# Patient Record
Sex: FEMALE | Race: OTHER | Employment: FULL TIME | URBAN - METROPOLITAN AREA
[De-identification: names, ages, dates, MRNs, and addresses within clinical notes are randomized per-mention and may not be internally consistent; named-entity substitution may affect disease eponyms.]

---

## 2017-03-21 ENCOUNTER — OFFICE VISIT (OUTPATIENT)
Dept: ORTHOPEDICS CLINIC | Facility: CLINIC | Age: 57
End: 2017-03-21

## 2017-03-21 DIAGNOSIS — M21.42 ACQUIRED PES PLANUS OF LEFT FOOT: Primary | ICD-10-CM

## 2017-03-21 PROCEDURE — 99212 OFFICE O/P EST SF 10 MIN: CPT | Performed by: ORTHOPAEDIC SURGERY

## 2017-03-21 PROCEDURE — 99203 OFFICE O/P NEW LOW 30 MIN: CPT | Performed by: ORTHOPAEDIC SURGERY

## 2017-03-21 RX ORDER — OLOPATADINE HCL 0.2 %
DROPS OPHTHALMIC (EYE)
Refills: 3 | COMMUNITY
Start: 2017-01-21 | End: 2018-05-07

## 2017-03-21 RX ORDER — MELOXICAM 15 MG/1
TABLET ORAL
Refills: 1 | COMMUNITY
Start: 2017-01-17 | End: 2017-09-16

## 2017-03-21 NOTE — PROGRESS NOTES
3/21/2017  Marysol Meng  7/10/1960  60year old   female  Alona Cockayne, MD    HPI:   Patient presents with:  Consult: Left ankle pain -- This is a second opinion. Referred by Dr Fabio Mcknight, per pt.  MRI done of right ankle and ordered by Dr. Viky Pond tonsillectomy 1970   • Lipid screening 2/28/2012     per NG   • Osteoporosis screening 5/5/2007     per NG   • Fibroids 2003   • Other and unspecified hyperlipidemia 2003          Past Surgical History    FOOT SURGERY  2010    Comment bunionectomy    TONSI and 2+ distal pulses. Sensation is intact to light touch in superficial peroneal, deep peroneal, sural, saphenous, and tibial nerve distributions. The patient has 5/5 strength in tibialis anterior, gastrocsoleus complex, EHL, and FHL.   The patient has pe

## 2017-06-30 RX ORDER — ENALAPRIL MALEATE 10 MG/1
TABLET ORAL
Qty: 90 TABLET | Refills: 0 | Status: SHIPPED | OUTPATIENT
Start: 2017-06-30 | End: 2017-10-04

## 2017-08-11 ENCOUNTER — OFFICE VISIT (OUTPATIENT)
Dept: INTERNAL MEDICINE CLINIC | Facility: CLINIC | Age: 57
End: 2017-08-11

## 2017-08-11 VITALS
HEIGHT: 61 IN | WEIGHT: 165 LBS | DIASTOLIC BLOOD PRESSURE: 90 MMHG | BODY MASS INDEX: 31.15 KG/M2 | TEMPERATURE: 98 F | HEART RATE: 90 BPM | SYSTOLIC BLOOD PRESSURE: 137 MMHG

## 2017-08-11 DIAGNOSIS — Z00.00 ROUTINE GENERAL MEDICAL EXAMINATION AT A HEALTH CARE FACILITY: Primary | ICD-10-CM

## 2017-08-11 DIAGNOSIS — I10 ESSENTIAL HYPERTENSION: ICD-10-CM

## 2017-08-11 DIAGNOSIS — Z12.31 VISIT FOR SCREENING MAMMOGRAM: ICD-10-CM

## 2017-08-11 DIAGNOSIS — E78.5 HYPERLIPIDEMIA, UNSPECIFIED HYPERLIPIDEMIA TYPE: ICD-10-CM

## 2017-08-11 PROCEDURE — 99396 PREV VISIT EST AGE 40-64: CPT | Performed by: INTERNAL MEDICINE

## 2017-08-28 NOTE — PROGRESS NOTES
HPI:    Patient ID: Brianne Unger is a 59year old female.     HPI    Physical exam  HTN  Long standing history of hypertension  More than a year adrian  Status::::: controlled:::::::::::::::::::::::::::::y   sympotms  :        Headache no  dizziness        n Outpatient Prescriptions:  ENALAPRIL MALEATE 10 MG Oral Tab TAKE 1 TABLET BY MOUTH DAILY Disp: 90 tablet Rfl: 0   Meloxicam 15 MG Oral Tab TK 1 T PO QD PC Disp:  Rfl: 1   PATADAY 0.2 % Ophthalmic Solution INT 1  GTT INTO BOTH EYES QD Disp:  Rfl: 3   Calciu alcoholism   • Hypertension Father    • Glaucoma Maternal Grandmother    • Diabetes Maternal Grandmother    • Hypertension Maternal Grandmother    • Diabetes Other      mGA   • Musculo-skelatal Disorder Mother      per ng osteoporosis   • Breast Cancer Mat colonoscopy, mammogram, dexa scan and eye exam       (Z12.31) Visit for screening mammogram  Plan: [facility] SCREENING BILAT (CPT=77067)  . Breast exam.  Bilateral symmetric  No discrete palpable masses or tenderness  No nipple discharge  And no axillary adenopat

## 2017-09-09 ENCOUNTER — HOSPITAL ENCOUNTER (OUTPATIENT)
Dept: MAMMOGRAPHY | Age: 57
Discharge: HOME | End: 2017-09-09
Attending: INTERNAL MEDICINE
Payer: COMMERCIAL

## 2017-09-09 DIAGNOSIS — Z12.31 VISIT FOR SCREENING MAMMOGRAM: ICD-10-CM

## 2017-09-09 PROCEDURE — 77067 SCR MAMMO BI INCL CAD: CPT | Performed by: INTERNAL MEDICINE

## 2017-09-13 ENCOUNTER — APPOINTMENT (OUTPATIENT)
Dept: CT IMAGING | Facility: HOSPITAL | Age: 57
DRG: 392 | End: 2017-09-13
Attending: EMERGENCY MEDICINE
Payer: COMMERCIAL

## 2017-09-13 ENCOUNTER — HOSPITAL ENCOUNTER (INPATIENT)
Facility: HOSPITAL | Age: 57
LOS: 3 days | Discharge: HOME | DRG: 392 | End: 2017-09-16
Attending: EMERGENCY MEDICINE | Admitting: HOSPITALIST
Payer: COMMERCIAL

## 2017-09-13 ENCOUNTER — TELEPHONE (OUTPATIENT)
Dept: INTERNAL MEDICINE CLINIC | Facility: CLINIC | Age: 57
End: 2017-09-13

## 2017-09-13 DIAGNOSIS — D72.829 LEUKOCYTOSIS, UNSPECIFIED TYPE: ICD-10-CM

## 2017-09-13 DIAGNOSIS — K57.32 SIGMOID DIVERTICULITIS: Primary | ICD-10-CM

## 2017-09-13 PROBLEM — R73.9 HYPERGLYCEMIA: Status: ACTIVE | Noted: 2017-09-13

## 2017-09-13 PROBLEM — E87.1 HYPONATREMIA: Status: ACTIVE | Noted: 2017-09-13

## 2017-09-13 LAB
ALBUMIN SERPL BCP-MCNC: 3.9 G/DL (ref 3.5–4.8)
ALP SERPL-CCNC: 110 U/L (ref 32–100)
ALT SERPL-CCNC: 46 U/L (ref 14–54)
ANION GAP SERPL CALC-SCNC: 7 MMOL/L (ref 0–18)
AST SERPL-CCNC: 40 U/L (ref 15–41)
BASOPHILS # BLD: 0 K/UL (ref 0–0.2)
BASOPHILS NFR BLD: 0 %
BILIRUB DIRECT SERPL-MCNC: 0.3 MG/DL (ref 0–0.2)
BILIRUB SERPL-MCNC: 2.5 MG/DL (ref 0.3–1.2)
BILIRUB UR QL: NEGATIVE
BUN SERPL-MCNC: 7 MG/DL (ref 8–20)
BUN/CREAT SERPL: 10.9 (ref 10–20)
CALCIUM SERPL-MCNC: 9.1 MG/DL (ref 8.5–10.5)
CHLORIDE SERPL-SCNC: 102 MMOL/L (ref 95–110)
CO2 SERPL-SCNC: 24 MMOL/L (ref 22–32)
COLOR UR: YELLOW
CREAT SERPL-MCNC: 0.64 MG/DL (ref 0.5–1.5)
EOSINOPHIL # BLD: 0 K/UL (ref 0–0.7)
EOSINOPHIL NFR BLD: 0 %
ERYTHROCYTE [DISTWIDTH] IN BLOOD BY AUTOMATED COUNT: 13 % (ref 11–15)
GLUCOSE SERPL-MCNC: 148 MG/DL (ref 70–99)
GLUCOSE UR-MCNC: NEGATIVE MG/DL
HCT VFR BLD AUTO: 41.8 % (ref 35–48)
HGB BLD-MCNC: 14.1 G/DL (ref 12–16)
KETONES UR-MCNC: NEGATIVE MG/DL
LEUKOCYTE ESTERASE UR QL STRIP.AUTO: NEGATIVE
LIPASE SERPL-CCNC: 23 U/L (ref 22–51)
LYMPHOCYTES # BLD: 2.1 K/UL (ref 1–4)
LYMPHOCYTES NFR BLD: 16 %
MCH RBC QN AUTO: 30.2 PG (ref 27–32)
MCHC RBC AUTO-ENTMCNC: 33.7 G/DL (ref 32–37)
MCV RBC AUTO: 89.5 FL (ref 80–100)
MONOCYTES # BLD: 0.5 K/UL (ref 0–1)
MONOCYTES NFR BLD: 4 %
NEUTROPHILS # BLD AUTO: 10.9 K/UL (ref 1.8–7.7)
NEUTROPHILS NFR BLD: 80 %
NITRITE UR QL STRIP.AUTO: NEGATIVE
OSMOLALITY UR CALC.SUM OF ELEC: 277 MOSM/KG (ref 275–295)
PH UR: 5 [PH] (ref 5–8)
PLATELET # BLD AUTO: 210 K/UL (ref 140–400)
PMV BLD AUTO: 8 FL (ref 7.4–10.3)
POTASSIUM SERPL-SCNC: 3.6 MMOL/L (ref 3.3–5.1)
PROT SERPL-MCNC: 7.8 G/DL (ref 5.9–8.4)
PROT UR-MCNC: NEGATIVE MG/DL
RBC # BLD AUTO: 4.67 M/UL (ref 3.7–5.4)
RBC #/AREA URNS AUTO: 3 /HPF
SODIUM SERPL-SCNC: 133 MMOL/L (ref 136–144)
SP GR UR STRIP: 1.01 (ref 1–1.03)
UROBILINOGEN UR STRIP-ACNC: <2
VIT C UR-MCNC: NEGATIVE MG/DL
WBC # BLD AUTO: 13.6 K/UL (ref 4–11)
WBC #/AREA URNS AUTO: 2 /HPF

## 2017-09-13 PROCEDURE — 74177 CT ABD & PELVIS W/CONTRAST: CPT | Performed by: EMERGENCY MEDICINE

## 2017-09-13 PROCEDURE — 99222 1ST HOSP IP/OBS MODERATE 55: CPT | Performed by: HOSPITALIST

## 2017-09-13 RX ORDER — ONDANSETRON 2 MG/ML
4 INJECTION INTRAMUSCULAR; INTRAVENOUS EVERY 6 HOURS PRN
Status: DISCONTINUED | OUTPATIENT
Start: 2017-09-13 | End: 2017-09-16

## 2017-09-13 RX ORDER — BISACODYL 10 MG
10 SUPPOSITORY, RECTAL RECTAL
Status: DISCONTINUED | OUTPATIENT
Start: 2017-09-13 | End: 2017-09-16

## 2017-09-13 RX ORDER — HYDROMORPHONE HYDROCHLORIDE 1 MG/ML
0.8 INJECTION, SOLUTION INTRAMUSCULAR; INTRAVENOUS; SUBCUTANEOUS EVERY 2 HOUR PRN
Status: DISCONTINUED | OUTPATIENT
Start: 2017-09-13 | End: 2017-09-16

## 2017-09-13 RX ORDER — SODIUM CHLORIDE 9 MG/ML
INJECTION, SOLUTION INTRAVENOUS CONTINUOUS
Status: DISCONTINUED | OUTPATIENT
Start: 2017-09-13 | End: 2017-09-16

## 2017-09-13 RX ORDER — OYSTER SHELL CALCIUM WITH VITAMIN D 500; 200 MG/1; [IU]/1
1 TABLET, FILM COATED ORAL DAILY
Status: DISCONTINUED | OUTPATIENT
Start: 2017-09-14 | End: 2017-09-16

## 2017-09-13 RX ORDER — ENALAPRIL MALEATE 10 MG/1
10 TABLET ORAL
Status: DISCONTINUED | OUTPATIENT
Start: 2017-09-14 | End: 2017-09-16

## 2017-09-13 RX ORDER — HYDROMORPHONE HYDROCHLORIDE 1 MG/ML
0.2 INJECTION, SOLUTION INTRAMUSCULAR; INTRAVENOUS; SUBCUTANEOUS EVERY 2 HOUR PRN
Status: DISCONTINUED | OUTPATIENT
Start: 2017-09-13 | End: 2017-09-16

## 2017-09-13 RX ORDER — SODIUM PHOSPHATE, DIBASIC AND SODIUM PHOSPHATE, MONOBASIC 7; 19 G/133ML; G/133ML
1 ENEMA RECTAL ONCE AS NEEDED
Status: DISCONTINUED | OUTPATIENT
Start: 2017-09-13 | End: 2017-09-16

## 2017-09-13 RX ORDER — DOCUSATE SODIUM 100 MG/1
100 CAPSULE, LIQUID FILLED ORAL 2 TIMES DAILY
Status: DISCONTINUED | OUTPATIENT
Start: 2017-09-13 | End: 2017-09-16

## 2017-09-13 RX ORDER — MORPHINE SULFATE 4 MG/ML
4 INJECTION, SOLUTION INTRAMUSCULAR; INTRAVENOUS ONCE
Status: COMPLETED | OUTPATIENT
Start: 2017-09-13 | End: 2017-09-13

## 2017-09-13 RX ORDER — HYDROMORPHONE HYDROCHLORIDE 1 MG/ML
0.4 INJECTION, SOLUTION INTRAMUSCULAR; INTRAVENOUS; SUBCUTANEOUS EVERY 2 HOUR PRN
Status: DISCONTINUED | OUTPATIENT
Start: 2017-09-13 | End: 2017-09-16

## 2017-09-13 RX ORDER — HEPARIN SODIUM 5000 [USP'U]/ML
5000 INJECTION, SOLUTION INTRAVENOUS; SUBCUTANEOUS EVERY 12 HOURS SCHEDULED
Status: DISCONTINUED | OUTPATIENT
Start: 2017-09-13 | End: 2017-09-16

## 2017-09-13 RX ORDER — ONDANSETRON 2 MG/ML
4 INJECTION INTRAMUSCULAR; INTRAVENOUS ONCE
Status: COMPLETED | OUTPATIENT
Start: 2017-09-13 | End: 2017-09-13

## 2017-09-13 RX ORDER — POLYETHYLENE GLYCOL 3350 17 G/17G
17 POWDER, FOR SOLUTION ORAL DAILY PRN
Status: DISCONTINUED | OUTPATIENT
Start: 2017-09-13 | End: 2017-09-16

## 2017-09-13 RX ORDER — SODIUM CHLORIDE 0.9 % (FLUSH) 0.9 %
3 SYRINGE (ML) INJECTION AS NEEDED
Status: DISCONTINUED | OUTPATIENT
Start: 2017-09-13 | End: 2017-09-16

## 2017-09-13 RX ORDER — ACETAMINOPHEN 325 MG/1
325 TABLET ORAL EVERY 6 HOURS PRN
Status: DISCONTINUED | OUTPATIENT
Start: 2017-09-13 | End: 2017-09-16

## 2017-09-13 NOTE — H&P
[facility]    PATIENT'S NAME: Shabnam Hernandez PHYSICIAN: Michael Allen MD   PATIENT ACCOUNT#:   [id number]    LOCATION:  [place] [address]  MEDICAL RECORD #:   [id number]       YOB: 1960  ADMISSION DATE:       09/13/20 78, respiratory rate 18, blood pressure 132/77, pulse ox 98% on room air. HEENT:  Atraumatic. Oropharynx clear with moist mucous membranes. Normal hard and soft palate. NECK:  Trachea midline. Full range of motion. Supple.   No thyromegaly or lymphad

## 2017-09-13 NOTE — TELEPHONE ENCOUNTER
To sick to be seen in the clinic  Hx of diverticultis?   I advise ER vist  So she can be promptly evaluated and treated  Suspect acute diveritcultiis

## 2017-09-13 NOTE — TELEPHONE ENCOUNTER
Dr. Rios Boyle, pt calls with c/o left sided abdominal pain. Started Monday. Had elevated temp this am of 100, now is 98.9. +tender to touch, +nausea yesterday, none today. Denies vomiting, diarrhea, dizziness, urinary pain. Requesting to see you.  You only have M

## 2017-09-13 NOTE — ED PROVIDER NOTES
Patient Seen in: [facility] AND [facility] Emergency Department    History   Patient presents with:  Abdomen/Flank Pain (GI/)    Stated Complaint: diverticulitis flare-up    HPI    The patient is a 49-year-old female with past history of hypertension, one prev HISTORY  2013: OTHER SURGICAL HISTORY Right      Comment: thigh cyst removal  2006: PESSARY  1970: TONSILLECTOMY  1992: TUBAL LIGATION    Family History   Problem Relation Age of Onset   • Diabetes Mother      type 2   • Lipids Mother      hyperlipidemia pharyngeal erythema  Chest: Clear to auscultation, no tenderness  Cardiovascular: Regular rate and rhythm without murmur  Abdomen: Soft and mildly distended. There is moderate left lower quadrant tenderness to palpation.   There is mild left upper quadrant W/ DIFFERENTIAL - Abnormal; Notable for the following:     HGB 11.9 (*)     HCT 34.0 (*)     Neutrophil Absolute 8.0 (*)     All other components within normal limits   LIPASE - Normal   POTASSIUM - Normal   CBC WITH DIFFERENTIAL WITH PLATELET    Narrative request something for pain at this time. Case was discussed with the hospitalist.  He will admit the patient. He request IV Zosyn.   He request consult to Dr. Floria Saint from GI.  MDM     abdominal pain including but not limited to appendicitis, cholecystitis

## 2017-09-13 NOTE — TELEPHONE ENCOUNTER
Pt calling back (Name and  verified) to follow-up on MD response for possible OV appt today. Pt instructed to go to the ER as per MD advice and pt voiced agreement with the plan. RN to follow up tomorrow.

## 2017-09-14 LAB
ANION GAP SERPL CALC-SCNC: 7 MMOL/L (ref 0–18)
BASOPHILS # BLD: 0 K/UL (ref 0–0.2)
BASOPHILS NFR BLD: 0 %
BUN SERPL-MCNC: 5 MG/DL (ref 8–20)
BUN/CREAT SERPL: 8.2 (ref 10–20)
CALCIUM SERPL-MCNC: 8.4 MG/DL (ref 8.5–10.5)
CHLORIDE SERPL-SCNC: 103 MMOL/L (ref 95–110)
CO2 SERPL-SCNC: 23 MMOL/L (ref 22–32)
CREAT SERPL-MCNC: 0.61 MG/DL (ref 0.5–1.5)
EOSINOPHIL # BLD: 0 K/UL (ref 0–0.7)
EOSINOPHIL NFR BLD: 0 %
ERYTHROCYTE [DISTWIDTH] IN BLOOD BY AUTOMATED COUNT: 12.7 % (ref 11–15)
GLUCOSE SERPL-MCNC: 135 MG/DL (ref 70–99)
HCT VFR BLD AUTO: 35.8 % (ref 35–48)
HGB BLD-MCNC: 12.4 G/DL (ref 12–16)
LYMPHOCYTES # BLD: 2.4 K/UL (ref 1–4)
LYMPHOCYTES NFR BLD: 20 %
MCH RBC QN AUTO: 30.7 PG (ref 27–32)
MCHC RBC AUTO-ENTMCNC: 34.6 G/DL (ref 32–37)
MCV RBC AUTO: 88.8 FL (ref 80–100)
MONOCYTES # BLD: 0.6 K/UL (ref 0–1)
MONOCYTES NFR BLD: 5 %
NEUTROPHILS # BLD AUTO: 9.2 K/UL (ref 1.8–7.7)
NEUTROPHILS NFR BLD: 75 %
OSMOLALITY UR CALC.SUM OF ELEC: 275 MOSM/KG (ref 275–295)
PLATELET # BLD AUTO: 192 K/UL (ref 140–400)
PMV BLD AUTO: 8.3 FL (ref 7.4–10.3)
POTASSIUM SERPL-SCNC: 3.8 MMOL/L (ref 3.3–5.1)
RBC # BLD AUTO: 4.03 M/UL (ref 3.7–5.4)
SODIUM SERPL-SCNC: 133 MMOL/L (ref 136–144)
WBC # BLD AUTO: 12.2 K/UL (ref 4–11)

## 2017-09-14 PROCEDURE — 99233 SBSQ HOSP IP/OBS HIGH 50: CPT | Performed by: HOSPITALIST

## 2017-09-14 RX ORDER — POTASSIUM CHLORIDE 20 MEQ/1
40 TABLET, EXTENDED RELEASE ORAL ONCE
Status: COMPLETED | OUTPATIENT
Start: 2017-09-14 | End: 2017-09-14

## 2017-09-14 NOTE — TELEPHONE ENCOUNTER
Patient went to [address] [place] [address] ER yesterday and was admitted. ASSESSMENT AND PLAN:    1. Acute sigmoid diverticulitis with no evidence of perforation.   2.       Left lower quadrant abdominal pain.     The patient will be admitted to general medical floor, I

## 2017-09-14 NOTE — PROGRESS NOTES
[facility] HOSP - [facility]  Progress Note     Juan Luis Miranda  : 7/10/1960    Status: Inpatient  Day #: 1    Attending: Jose Eduardo Martinez MD  PCP: Gage Dickson MD      Assessment and Plan     Acute Sigmoid Diverticulitis  Leukocytosis  - con't abx  - full liq sigmoid colon without abscess or perforation. If not recently performed, following resolution of patient's symptoms a colonoscopy should be performed to exclude an underlying mass. Hepatic steatosis without focal lesion.               Medications     • pi

## 2017-09-15 LAB
ANION GAP SERPL CALC-SCNC: 6 MMOL/L (ref 0–18)
BASOPHILS # BLD: 0 K/UL (ref 0–0.2)
BASOPHILS NFR BLD: 0 %
BUN SERPL-MCNC: 4 MG/DL (ref 8–20)
BUN/CREAT SERPL: 7 (ref 10–20)
CALCIUM SERPL-MCNC: 8.5 MG/DL (ref 8.5–10.5)
CHLORIDE SERPL-SCNC: 105 MMOL/L (ref 95–110)
CO2 SERPL-SCNC: 23 MMOL/L (ref 22–32)
CREAT SERPL-MCNC: 0.57 MG/DL (ref 0.5–1.5)
EOSINOPHIL # BLD: 0 K/UL (ref 0–0.7)
EOSINOPHIL NFR BLD: 0 %
ERYTHROCYTE [DISTWIDTH] IN BLOOD BY AUTOMATED COUNT: 13 % (ref 11–15)
GLUCOSE SERPL-MCNC: 131 MG/DL (ref 70–99)
HCT VFR BLD AUTO: 34 % (ref 35–48)
HGB BLD-MCNC: 11.9 G/DL (ref 12–16)
LYMPHOCYTES # BLD: 1.8 K/UL (ref 1–4)
LYMPHOCYTES NFR BLD: 17 %
MCH RBC QN AUTO: 31.3 PG (ref 27–32)
MCHC RBC AUTO-ENTMCNC: 34.9 G/DL (ref 32–37)
MCV RBC AUTO: 89.7 FL (ref 80–100)
MONOCYTES # BLD: 0.5 K/UL (ref 0–1)
MONOCYTES NFR BLD: 5 %
NEUTROPHILS # BLD AUTO: 8 K/UL (ref 1.8–7.7)
NEUTROPHILS NFR BLD: 77 %
OSMOLALITY UR CALC.SUM OF ELEC: 277 MOSM/KG (ref 275–295)
PLATELET # BLD AUTO: 188 K/UL (ref 140–400)
PMV BLD AUTO: 8.2 FL (ref 7.4–10.3)
POTASSIUM SERPL-SCNC: 3.7 MMOL/L (ref 3.3–5.1)
POTASSIUM SERPL-SCNC: 3.7 MMOL/L (ref 3.3–5.1)
RBC # BLD AUTO: 3.79 M/UL (ref 3.7–5.4)
SODIUM SERPL-SCNC: 134 MMOL/L (ref 136–144)
WBC # BLD AUTO: 10.4 K/UL (ref 4–11)

## 2017-09-15 PROCEDURE — 99223 1ST HOSP IP/OBS HIGH 75: CPT | Performed by: INTERNAL MEDICINE

## 2017-09-15 PROCEDURE — 99233 SBSQ HOSP IP/OBS HIGH 50: CPT | Performed by: HOSPITALIST

## 2017-09-15 RX ORDER — POTASSIUM CHLORIDE 20 MEQ/1
40 TABLET, EXTENDED RELEASE ORAL ONCE
Status: COMPLETED | OUTPATIENT
Start: 2017-09-15 | End: 2017-09-15

## 2017-09-15 NOTE — CONSULTS
[facility]  Report of Consultation    Eleanor Lugo Patient Status:  Inpatient    7/10/1960 MRN [id number]   Location [facility] 5SW/SE Attending Ernestina Hernandez MD   Hosp Day # 2 PCP Yolanda Foster MD     Reason for Consultation:  Acute dive LIGATION  Family History   Problem Relation Age of Onset   • Diabetes Mother      type 2   • Lipids Mother      hyperlipidemia   • Eye Problems Mother      eye issues   • Heart Disorder Mother      per ng CABG   • Hypertension Mother    • Musculo-skelatal Rectal, Daily PRN  •  FLEET ENEMA (FLEET) 7-19 GM/118ML enema 133 mL, 1 enema, Rectal, Once PRN  •  acetaminophen (TYLENOL) tab 325 mg, 325 mg, Oral, Q6H PRN  •  Calcium Carbonate-Vitamin D (OSCAL-500) 500-200 MG-UNIT per tab 1 tablet, 1 tablet, Oral, Christian will have the patient follow up with the GI service as an outpatient for consideration for repeat colonoscopy in 8 weeks. The patient is agreeable with above plan, all questions were answered. Consuelo Oshea  9/15/2017  6:58 PM

## 2017-09-15 NOTE — PLAN OF CARE
Problem: Patient/Family Goals  Goal: Patient/Family Long Term Goal  Patient's Long Term Goal: To discharge home    Interventions:  - Pain management, IVF, IV abx.   - See additional Care Plan goals for specific interventions    Outcome: Progressing  (1) Pa Outcome: Progressing  (1) Patient calls when experiencing pain. (2) Patient assessed for pain of pain scale of 1 to 10.  (3) Patient educated on side effects of opioids.   (4) Patient is receiving stool softeners per doctors orders    Problem: SAFETY MARTAH to be discharged home when medically stable. Problem: Patient Centered Care  Goal: Patient preferences are identified and integrated in the patient's plan of care  Interventions:  - What would you like us to know as we care for you?  Has a chronic left

## 2017-09-15 NOTE — PLAN OF CARE
Problem: Patient/Family Goals  Goal: Patient/Family Short Term Goal  Patient's Short Term Goal: free from abdominal pain    Interventions:   - Pain management, IVF, IV abx.   - See additional Care Plan goals for specific interventions    Outcome: Progressi assistive devices as appropriate  - Consider OT/PT consult to assist with strengthening/mobility  - Encourage toileting schedule   Outcome: Progressing      Problem: Patient Centered Care  Goal: Patient preferences are identified and integrated in the sami

## 2017-09-16 VITALS
HEIGHT: 61 IN | TEMPERATURE: 98 F | RESPIRATION RATE: 16 BRPM | HEART RATE: 80 BPM | WEIGHT: 160 LBS | SYSTOLIC BLOOD PRESSURE: 155 MMHG | OXYGEN SATURATION: 95 % | BODY MASS INDEX: 30.21 KG/M2 | DIASTOLIC BLOOD PRESSURE: 85 MMHG

## 2017-09-16 LAB
ANION GAP SERPL CALC-SCNC: 9 MMOL/L (ref 0–18)
BASOPHILS # BLD: 0 K/UL (ref 0–0.2)
BASOPHILS NFR BLD: 1 %
BUN SERPL-MCNC: 4 MG/DL (ref 8–20)
BUN/CREAT SERPL: 6.8 (ref 10–20)
CALCIUM SERPL-MCNC: 8.7 MG/DL (ref 8.5–10.5)
CHLORIDE SERPL-SCNC: 104 MMOL/L (ref 95–110)
CO2 SERPL-SCNC: 23 MMOL/L (ref 22–32)
CREAT SERPL-MCNC: 0.59 MG/DL (ref 0.5–1.5)
EOSINOPHIL # BLD: 0.1 K/UL (ref 0–0.7)
EOSINOPHIL NFR BLD: 2 %
ERYTHROCYTE [DISTWIDTH] IN BLOOD BY AUTOMATED COUNT: 13 % (ref 11–15)
GLUCOSE SERPL-MCNC: 112 MG/DL (ref 70–99)
HCT VFR BLD AUTO: 34.5 % (ref 35–48)
HGB BLD-MCNC: 12.1 G/DL (ref 12–16)
LYMPHOCYTES # BLD: 2 K/UL (ref 1–4)
LYMPHOCYTES NFR BLD: 29 %
MCH RBC QN AUTO: 30.9 PG (ref 27–32)
MCHC RBC AUTO-ENTMCNC: 34.9 G/DL (ref 32–37)
MCV RBC AUTO: 88.4 FL (ref 80–100)
MONOCYTES # BLD: 0.3 K/UL (ref 0–1)
MONOCYTES NFR BLD: 5 %
NEUTROPHILS # BLD AUTO: 4.5 K/UL (ref 1.8–7.7)
NEUTROPHILS NFR BLD: 64 %
OSMOLALITY UR CALC.SUM OF ELEC: 280 MOSM/KG (ref 275–295)
PLATELET # BLD AUTO: 218 K/UL (ref 140–400)
PMV BLD AUTO: 7.9 FL (ref 7.4–10.3)
POTASSIUM SERPL-SCNC: 3.6 MMOL/L (ref 3.3–5.1)
RBC # BLD AUTO: 3.9 M/UL (ref 3.7–5.4)
SODIUM SERPL-SCNC: 136 MMOL/L (ref 136–144)
WBC # BLD AUTO: 7 K/UL (ref 4–11)

## 2017-09-16 PROCEDURE — 99239 HOSP IP/OBS DSCHRG MGMT >30: CPT | Performed by: HOSPITALIST

## 2017-09-16 RX ORDER — POTASSIUM CHLORIDE 20 MEQ/1
40 TABLET, EXTENDED RELEASE ORAL EVERY 4 HOURS
Status: COMPLETED | OUTPATIENT
Start: 2017-09-16 | End: 2017-09-16

## 2017-09-16 RX ORDER — CIPROFLOXACIN 500 MG/1
500 TABLET, FILM COATED ORAL 2 TIMES DAILY
Qty: 20 TABLET | Refills: 0 | Status: SHIPPED | OUTPATIENT
Start: 2017-09-16 | End: 2017-10-18

## 2017-09-16 RX ORDER — METRONIDAZOLE 500 MG/1
500 TABLET ORAL 3 TIMES DAILY
Qty: 30 TABLET | Refills: 0 | Status: SHIPPED | OUTPATIENT
Start: 2017-09-16 | End: 2017-10-20

## 2017-09-16 NOTE — PLAN OF CARE
Problem: Patient/Family Goals  Goal: Patient/Family Long Term Goal  Patient's Long Term Goal: To discharge home    Interventions:  - Pain management, IVF, IV abx.   - See additional Care Plan goals for specific interventions    Outcome: [place] physical needs  - Identify cognitive and physical deficits and behaviors that affect risk of falls.   - [place] fall precautions as indicated by assessment.  - Educate pt/family on patient safety including physical limitations  - Instruct pt to call for a cultural backgrounds into the planning and delivery of care  - Encourage patient/family to participate in care and decision-making at the level they choose  - Honor patient and family perspectives and choices   Outcome: Progressing  Encouraged patient to e

## 2017-09-16 NOTE — PLAN OF CARE
Problem: Patient/Family Goals  Goal: Patient/Family Long Term Goal  Patient's Long Term Goal: To discharge home    Interventions:  - Pain management, IVF, IV abx.   - See additional Care Plan goals for specific interventions    Outcome: Progressing    Goal physical limitations  - Instruct pt to call for assistance with activity based on assessment  - Modify environment to reduce risk of injury  - Provide assistive devices as appropriate  - Consider OT/PT consult to assist with strengthening/mobility  - [place]

## 2017-09-16 NOTE — PROGRESS NOTES
Patient tolerated lunch. Okay per Dr. Kar Quintana to discharge patient before potassium recheck per protocol for later today. Patient given bishop information on low fiber diet.

## 2017-09-16 NOTE — PLAN OF CARE
Problem: Patient/Family Goals  Goal: Patient/Family Long Term Goal  Patient's Long Term Goal: To discharge home    Interventions:  - Pain management, IVF, IV abx.   - See additional Care Plan goals for specific interventions    Outcome: Josy Sophia that affect risk of falls.   - [place] fall precautions as indicated by assessment.  - Educate pt/family on patient safety including physical limitations  - Instruct pt to call for assistance with activity based on assessment  - Modify environment to redu Encourage patient/family to participate in care and decision-making at the level they choose  - Honor patient and family perspectives and choices   Outcome: Progressing  Patient ready for discharge.  Patient to be given all discharge information and updates

## 2017-09-16 NOTE — DISCHARGE SUMMARY
[facility]  Discharge Summary     Aislinn Chandler  : 7/10/1960    Status: Inpatient  Day #: 3    Attending: Melissa Ruffin MD  PCP: Yuli Walker MD     Date of Admission: 2017  Date of Discharge: 2017     Hospital Discharge Diagno Prescription details   Ciprofloxacin HCl 500 MG Tabs  Commonly known as:  CIPRO      Take 1 tablet (500 mg total) by mouth 2 (two) times daily.    Quantity:  20 tablet  Refills:  0     metRONIDAZOLE 500 MG Tabs  Commonly known as:  FLAGYL      Take 1 tablet patients enrolled in the Medicare ACO, [facility] and [address] [place] [address] will be handled by a member of the Care Management Team.  For all other patients, please follow usual protocol for discharge care transition.     Lace+ Score: 24  59-90 High Risk  29-58 Medium Ri

## 2017-09-18 ENCOUNTER — PATIENT OUTREACH (OUTPATIENT)
Dept: INTERNAL MEDICINE CLINIC | Facility: CLINIC | Age: 57
End: 2017-09-18

## 2017-09-18 ENCOUNTER — OFFICE VISIT (OUTPATIENT)
Dept: OBGYN CLINIC | Facility: CLINIC | Age: 57
End: 2017-09-18

## 2017-09-18 VITALS
BODY MASS INDEX: 30.68 KG/M2 | WEIGHT: 160.38 LBS | SYSTOLIC BLOOD PRESSURE: 125 MMHG | HEIGHT: 60.5 IN | HEART RATE: 90 BPM | DIASTOLIC BLOOD PRESSURE: 76 MMHG

## 2017-09-18 DIAGNOSIS — Z01.419 ENCOUNTER FOR GYNECOLOGICAL EXAMINATION WITHOUT ABNORMAL FINDING: Primary | ICD-10-CM

## 2017-09-18 DIAGNOSIS — N81.89 PELVIC RELAXATION: ICD-10-CM

## 2017-09-18 PROCEDURE — 99396 PREV VISIT EST AGE 40-64: CPT | Performed by: OBSTETRICS & GYNECOLOGY

## 2017-09-18 NOTE — PROGRESS NOTES
TCM OUTREACH    Call made to attempt to reach patient for TCM follow up. No answer, Voicemail left requesting call back.

## 2017-09-19 NOTE — PROGRESS NOTES
Initial Post Discharge Follow Up   Discharge Date: 9/16/17  Contact Date: 9/18/2017    Consent Verification:  Assessment Completed With: Patient  HIPAA Verified? Yes    1.  Tell me why you were in the hospital? \"Diverticulitis and fever pain improving\" N/A    13.  Do you have a follow up visit scheduled with your Primary Care Physician or Specialist?    Your appointments     Date & Time Appointment Department [facility])    Sep 20, 2017  1:00 PM CDT FOLLOW UP with Palmer Hayward MD [facility]

## 2017-09-19 NOTE — PROGRESS NOTES
Yolette Littlejohn is a 59year old female  No LMP recorded.  Patient is postmenopausal. Patient presents with:  Gyn Exam: annual exam -- recently in ER due to diverticulitis one week ago; now lives in [facility]; wants to get pessary checks -- no HISTORY Right      Comment: thigh cyst removal  2006: PESSARY  1970: TONSILLECTOMY  1992: TUBAL LIGATION  Obstetric History     T3    L3    SAB0  TAB0  Ectopic0  Multiple0  Live Births3        SOCIAL HISTORY:    Social History  Social History (Patient taking differently: 10 mg by mouth daily), Disp: 90 tablet, Rfl: 0  •  PATADAY 0.2 % Ophthalmic Solution, 1 drop to both eyes daily as needed, Disp: , Rfl: 3  •  Calcium 500-125 MG-UNIT Oral Tab, Take 1 tablet by mouth daily.   , Disp: , Rfl:   • lesions and prolapse  Bladder:  No fullness, masses or tenderness  Vagina:  Normal appearance without lesions, no abnormal discharge  (+) grade 3 cystocele  Cervix:  Normal without tenderness on motion  Uterus: normal in size, contour, position, mobility,

## 2017-09-19 NOTE — PROGRESS NOTES
TCM OUTREACH    Call made to attempt to reach patient for TCM follow up. No answer, Voicemail left requesting call back at [phone].

## 2017-09-19 NOTE — PROGRESS NOTES
TCM OUTREACH- Second Attempt      Call made to attempt to reach patient for TCM follow up. No answer, Voicemail left requesting call back.

## 2017-09-20 ENCOUNTER — OFFICE VISIT (OUTPATIENT)
Dept: INTERNAL MEDICINE CLINIC | Facility: CLINIC | Age: 57
End: 2017-09-20

## 2017-09-20 VITALS
HEART RATE: 103 BPM | HEIGHT: 60.5 IN | DIASTOLIC BLOOD PRESSURE: 68 MMHG | WEIGHT: 167 LBS | SYSTOLIC BLOOD PRESSURE: 110 MMHG | BODY MASS INDEX: 31.94 KG/M2 | TEMPERATURE: 98 F

## 2017-09-20 DIAGNOSIS — E66.09 NON MORBID OBESITY DUE TO EXCESS CALORIES: ICD-10-CM

## 2017-09-20 DIAGNOSIS — K57.32 DIVERTICULITIS OF COLON: Primary | ICD-10-CM

## 2017-09-20 DIAGNOSIS — I10 ESSENTIAL HYPERTENSION: ICD-10-CM

## 2017-09-20 DIAGNOSIS — R73.9 HYPERGLYCEMIA: ICD-10-CM

## 2017-09-20 PROCEDURE — 99496 TRANSJ CARE MGMT HIGH F2F 7D: CPT | Performed by: INTERNAL MEDICINE

## 2017-09-20 NOTE — PROGRESS NOTES
HPI:    Patient ID: Parrish Duggan is a 59year old female.     HPI     TCM  Follow up post hospitalization  DX acute diverticuotis uncomplicated   Tolerating antibiotic   Continue to have tenderness  LLQ overall feeling better  No fever no chills no nausea GASTROENTEROLOGY     Sol Jean-Baptiste MD Consulting Physician  GASTROENTEROLOGY              Physical Exam   Blood pressure 155/85, pulse 80, temperature 97.7 °F (36.5 °C), temperature source Oral, resp.  rate 16, height 5' 1\" (1.549 m), weight headaches. Hematological: Negative for adenopathy. Does not bruise/bleed easily. Psychiatric/Behavioral: Negative for agitation and behavioral problems.            Current Outpatient Prescriptions:  Ciprofloxacin HCl (CIPRO) 500 MG Oral Tab Take 1 table HISTORY  2013: OTHER SURGICAL HISTORY Right      Comment: thigh cyst removal  2006: PESSARY  1970: TONSILLECTOMY  1992: TUBAL LIGATION   Family History   Problem Relation Age of Onset   • Diabetes Mother      type 2   • Lipids Mother      hyperlipidemia LLQ).   Musculoskeletal: She exhibits no edema or tenderness. Lymphadenopathy:     She has no cervical adenopathy. Neurological: She is alert. Skin: No rash noted. She is not diaphoretic (obese). No erythema. Nursing note and vitals reviewed. total) by mouth 2 (two) times daily. metRONIDAZOLE (FLAGYL) 500 MG Oral Tab Take 1 tablet (500 mg total) by mouth 3 (three) times daily.    ENALAPRIL MALEATE 10 MG Oral Tab TAKE 1 TABLET BY MOUTH DAILY (Patient taking differently: 10 mg by mouth daily) smoked. She has never used smokeless tobacco. She reports that she does not drink alcohol or use drugs.      ROS:   GENERAL: weight stable, energy stable, no sweating  SKIN: denies any unusual skin lesions  EYES: denies blurred vision or double vision  HEEN change for now thereis still mild LLQ tenderens  contineu antibiotic and stay on low residue diet  exapline  When better  In about 2 wks  May resume diet  High in fiber again counsele re this  Follow up with GI within th enext 2 months  If pain wosens or p

## 2017-09-26 ENCOUNTER — TELEPHONE (OUTPATIENT)
Dept: INTERNAL MEDICINE CLINIC | Facility: CLINIC | Age: 57
End: 2017-09-26

## 2017-09-26 ENCOUNTER — PATIENT MESSAGE (OUTPATIENT)
Dept: INTERNAL MEDICINE CLINIC | Facility: CLINIC | Age: 57
End: 2017-09-26

## 2017-09-26 NOTE — TELEPHONE ENCOUNTER
Dr Frederick Rojo, please advise. Pt says she has no fever and feels much better but still has intermittent bouts of slight pain. Pt wants to know if she should continue (refill) Metronidazole.   Pt says her appetite has return, she has no chills, no fever, and

## 2017-09-26 NOTE — TELEPHONE ENCOUNTER
LMTCB please transfer to triage nurse  ----- Message -----     From: Aislinn Chandler     Sent: 9/26/2017 10:12 AM CDT       To: Yuli Walker MD  Subject: Non-Urgent Medical Question    I wanted to know after i am finish with my Metronidazole medicine I o

## 2017-09-26 NOTE — TELEPHONE ENCOUNTER
From: Hortensia Navarro  To: Nico Schumacher MD  Sent: 9/26/2017 10:12 AM CDT  Subject: Non-Urgent Medical Question    I wanted to know after i am finish with my Metronidazole medicine I only have on pill left can I refill it?     Thank you for your time    S

## 2017-09-27 NOTE — TELEPHONE ENCOUNTER
Advised patient of Dr. Rhonda Akins note. Patient verbalized understanding. Patient has 4 pills left of the cipro so will finish them off. Patient indicated that symptoms have cleared up.

## 2017-09-29 ENCOUNTER — PATIENT MESSAGE (OUTPATIENT)
Dept: INTERNAL MEDICINE CLINIC | Facility: CLINIC | Age: 57
End: 2017-09-29

## 2017-09-29 ENCOUNTER — TELEPHONE (OUTPATIENT)
Dept: GASTROENTEROLOGY | Facility: CLINIC | Age: 57
End: 2017-09-29

## 2017-09-29 DIAGNOSIS — R93.89 ABNORMAL CT SCAN: Primary | ICD-10-CM

## 2017-09-29 DIAGNOSIS — Z87.19 HISTORY OF DIVERTICULITIS: ICD-10-CM

## 2017-09-29 NOTE — TELEPHONE ENCOUNTER
Last colonoscopy 5/19/2012 recalled for 10 years - no polyps and no family history. Pt was admitted to [address] [place] [address] 9/13/17 and seen by Dr Lolly Smith for acute diverticulitis    Please review inpatient notes and advise on scheduling repeat colonoscopy in 8 weeks.

## 2017-09-29 NOTE — TELEPHONE ENCOUNTER
From: Hortensia Navarro  To: Nico Schumacher MD  Sent: 9/29/2017 3:52 PM CDT  Subject: Non-Urgent [facility] Dr. Carmelina Carmona. I wanted to know what phone number do i call to make the colonscopy appointment?     Thank you    Lilian Fowler

## 2017-10-04 RX ORDER — ENALAPRIL MALEATE 10 MG/1
TABLET ORAL
Qty: 90 TABLET | Refills: 0 | Status: SHIPPED | OUTPATIENT
Start: 2017-10-04 | End: 2018-02-27

## 2017-10-06 NOTE — TELEPHONE ENCOUNTER
I spoke with this patient today to schedule her procedure but she could not find a date that she can either take off work or find transportation. Patient will call back once she talks to her  regarding arranging transportation.

## 2017-10-06 NOTE — TELEPHONE ENCOUNTER
CBLM to schedule procedure. Please transfer to [facility] or [facility] in GI.

## 2017-10-17 NOTE — TELEPHONE ENCOUNTER
Scheduled for:  Colonoscopy 67[address]  Provider Name: Dr. Kadi Huddleston  Date:  12/5/17  Location:  [facility]  Sedation:  MAC  Time:  1300 (pt is aware to arrive at 1200)   Prep:  2 day Miralax/Gatorade, mailed 12/17/17 with codes  Meds/Allergies Reconciled?:  Physician rev

## 2017-10-18 ENCOUNTER — HOSPITAL ENCOUNTER (INPATIENT)
Facility: HOSPITAL | Age: 57
LOS: 1 days | Discharge: HOME | DRG: 392 | End: 2017-10-20
Attending: EMERGENCY MEDICINE | Admitting: HOSPITALIST
Payer: COMMERCIAL

## 2017-10-18 ENCOUNTER — NURSE TRIAGE (OUTPATIENT)
Dept: OTHER | Age: 57
End: 2017-10-18

## 2017-10-18 ENCOUNTER — HOSPITAL ENCOUNTER (OUTPATIENT)
Dept: CT IMAGING | Age: 57
Discharge: HOME | End: 2017-10-18
Attending: INTERNAL MEDICINE
Payer: COMMERCIAL

## 2017-10-18 ENCOUNTER — OFFICE VISIT (OUTPATIENT)
Dept: INTERNAL MEDICINE CLINIC | Facility: CLINIC | Age: 57
End: 2017-10-18

## 2017-10-18 VITALS
DIASTOLIC BLOOD PRESSURE: 76 MMHG | TEMPERATURE: 98 F | HEART RATE: 98 BPM | SYSTOLIC BLOOD PRESSURE: 162 MMHG | WEIGHT: 159 LBS | HEIGHT: 61 IN | BODY MASS INDEX: 30.02 KG/M2

## 2017-10-18 DIAGNOSIS — R10.31 RIGHT LOWER QUADRANT ABDOMINAL PAIN: Primary | ICD-10-CM

## 2017-10-18 DIAGNOSIS — N13.30 HYDRONEPHROSIS, UNSPECIFIED HYDRONEPHROSIS TYPE: ICD-10-CM

## 2017-10-18 DIAGNOSIS — R10.31 RIGHT LOWER QUADRANT ABDOMINAL PAIN: ICD-10-CM

## 2017-10-18 DIAGNOSIS — K57.20 DIVERTICULITIS OF LARGE INTESTINE WITH PERFORATION AND ABSCESS WITHOUT BLEEDING: Primary | ICD-10-CM

## 2017-10-18 DIAGNOSIS — I10 ESSENTIAL HYPERTENSION: ICD-10-CM

## 2017-10-18 PROCEDURE — 99223 1ST HOSP IP/OBS HIGH 75: CPT | Performed by: HOSPITALIST

## 2017-10-18 PROCEDURE — 82565 ASSAY OF CREATININE: CPT

## 2017-10-18 PROCEDURE — 99212 OFFICE O/P EST SF 10 MIN: CPT | Performed by: INTERNAL MEDICINE

## 2017-10-18 PROCEDURE — 99214 OFFICE O/P EST MOD 30 MIN: CPT | Performed by: INTERNAL MEDICINE

## 2017-10-18 PROCEDURE — 74177 CT ABD & PELVIS W/CONTRAST: CPT | Performed by: INTERNAL MEDICINE

## 2017-10-18 RX ORDER — DEXTROSE, SODIUM CHLORIDE, AND POTASSIUM CHLORIDE 5; .9; .15 G/100ML; G/100ML; G/100ML
INJECTION INTRAVENOUS CONTINUOUS
Status: DISCONTINUED | OUTPATIENT
Start: 2017-10-18 | End: 2017-10-18

## 2017-10-18 RX ORDER — MORPHINE SULFATE 4 MG/ML
4 INJECTION, SOLUTION INTRAMUSCULAR; INTRAVENOUS ONCE
Status: COMPLETED | OUTPATIENT
Start: 2017-10-18 | End: 2017-10-18

## 2017-10-18 NOTE — PROGRESS NOTES
Patient ID: Colin Lyman is a 59year old female. Patient presents with:  Urinary Frequency  Abdominal Pain: R low abdominal pain Began last night       HISTORY OF PRESENT ILLNESS:   HPI  Patient presents for above.   Here with a one-day history of righ Past Surgical History:  10/2007: BIOPSY OF UTERUS LINING      Comment: neg endometrial biopsy  5/19/2012: COLONOSCOPY      Comment: erendira MONSIVAIS  2010: FOOT SURGERY      Comment: bunionectomy  6/28/2016: FOOT SURGERY Bilateral  03/01/2013: NEEDLE BIOPSY LE kg/m². Physical Exam   Constitutional: She is oriented to person, place, and time. She appears well-developed and well-nourished. Cardiovascular: Normal rate, regular rhythm and normal heart sounds.     Pulmonary/Chest: Breath sounds normal.   Abdomina

## 2017-10-18 NOTE — TELEPHONE ENCOUNTER
Action Requested: Summary for Provider     []  Critical Lab, Recommendations Needed  [] Need Additional Advice  []   FYI    []   Need Orders  [] Need Medications Sent to Pharmacy  []  Other     SUMMARY: Appt today at 1 PM with Dr. Teresa Card at [place], to elisabeth

## 2017-10-19 PROBLEM — N13.30 HYDRONEPHROSIS, UNSPECIFIED HYDRONEPHROSIS TYPE: Status: ACTIVE | Noted: 2017-10-19

## 2017-10-19 PROCEDURE — 99254 IP/OBS CNSLTJ NEW/EST MOD 60: CPT | Performed by: SURGERY

## 2017-10-19 PROCEDURE — 02HV33Z INSERTION OF INFUSION DEVICE INTO SUPERIOR VENA CAVA, PERCUTANEOUS APPROACH: ICD-10-PCS | Performed by: HOSPITALIST

## 2017-10-19 PROCEDURE — 99233 SBSQ HOSP IP/OBS HIGH 50: CPT | Performed by: HOSPITALIST

## 2017-10-19 PROCEDURE — 99253 IP/OBS CNSLTJ NEW/EST LOW 45: CPT | Performed by: UROLOGY

## 2017-10-19 RX ORDER — MORPHINE SULFATE 2 MG/ML
2 INJECTION, SOLUTION INTRAMUSCULAR; INTRAVENOUS EVERY 4 HOURS PRN
Status: DISCONTINUED | OUTPATIENT
Start: 2017-10-19 | End: 2017-10-20

## 2017-10-19 RX ORDER — LIDOCAINE HYDROCHLORIDE 10 MG/ML
0.5 INJECTION, SOLUTION INFILTRATION; PERINEURAL ONCE AS NEEDED
Status: ACTIVE | OUTPATIENT
Start: 2017-10-19 | End: 2017-10-19

## 2017-10-19 RX ORDER — SODIUM CHLORIDE 9 MG/ML
INJECTION, SOLUTION INTRAVENOUS CONTINUOUS
Status: DISCONTINUED | OUTPATIENT
Start: 2017-10-19 | End: 2017-10-20

## 2017-10-19 RX ORDER — KETOTIFEN FUMARATE 0.35 MG/ML
1 SOLUTION/ DROPS OPHTHALMIC 2 TIMES DAILY
Status: DISCONTINUED | OUTPATIENT
Start: 2017-10-19 | End: 2017-10-20

## 2017-10-19 RX ORDER — SODIUM CHLORIDE 0.9 % (FLUSH) 0.9 %
10 SYRINGE (ML) INJECTION AS NEEDED
Status: DISCONTINUED | OUTPATIENT
Start: 2017-10-19 | End: 2017-10-20

## 2017-10-19 RX ORDER — ENALAPRIL MALEATE 10 MG/1
10 TABLET ORAL DAILY
Status: DISCONTINUED | OUTPATIENT
Start: 2017-10-19 | End: 2017-10-20

## 2017-10-19 RX ORDER — SODIUM CHLORIDE 0.9 % (FLUSH) 0.9 %
3 SYRINGE (ML) INJECTION AS NEEDED
Status: DISCONTINUED | OUTPATIENT
Start: 2017-10-19 | End: 2017-10-20

## 2017-10-19 RX ORDER — ONDANSETRON 2 MG/ML
4 INJECTION INTRAMUSCULAR; INTRAVENOUS EVERY 4 HOURS PRN
Status: DISCONTINUED | OUTPATIENT
Start: 2017-10-19 | End: 2017-10-20

## 2017-10-19 RX ORDER — HEPARIN SODIUM 5000 [USP'U]/ML
5000 INJECTION, SOLUTION INTRAVENOUS; SUBCUTANEOUS EVERY 12 HOURS SCHEDULED
Status: DISCONTINUED | OUTPATIENT
Start: 2017-10-19 | End: 2017-10-20

## 2017-10-19 RX ORDER — MORPHINE SULFATE 2 MG/ML
4 INJECTION, SOLUTION INTRAMUSCULAR; INTRAVENOUS EVERY 4 HOURS PRN
Status: DISCONTINUED | OUTPATIENT
Start: 2017-10-19 | End: 2017-10-20

## 2017-10-19 NOTE — CONSULTS
[facility] HOSP - [facility]    Report of Consultation    Ev Voss Patient Status:  Inpatient    7/10/1960 MRN [id number]   Location [facility] 4W/SW/SE Attending Lorraine Lowe MD   Hosp Day # 0 PCP Jelani Williamson MD     Date of Admis • Diverticulitis 2017    hosp x 3 days   • Diverticulosis    • Fibroids 2003   • Hemorrhoids 2012    per ng colonoscopy   • High blood pressure    • High cholesterol    • History of bone density study 12/2004    \"normal dexa '04, '07\"   • Postmenopausa Facility-Administered Medications:  Normal Saline Flush 0.9 % injection 3 mL 3 mL Intravenous PRN   Heparin Sodium (Porcine) 5000 UNIT/ML injection 5,000 Units 5,000 Units Subcutaneous 2 times per day   ondansetron HCl (ZOFRAN) injection 4 mg 4 mg Intraven normal.  Psychiatric: calm    Results:     Laboratory Data:    Lab Results  Component Value Date   WBC 7.3 10/19/2017   HGB 12.2 10/19/2017   HCT 35.7 10/19/2017    10/19/2017   CREATSERUM 0.52 10/19/2017   BUN 7 (L) 10/19/2017    10/19/2017 surgery service,      Hydronephrosis, unspecified hydronephrosis type  Differential diagnosis includes intrinsic ureteral mass, recently passed ureteral stone, extrinsic compression from an ovarian lesion or uterine lesion, inflammation in the pelvis relat

## 2017-10-19 NOTE — ED NOTES
Pt ambulated to the restroom to give urine sample with the assist of one nurse. Pt experienced no difficulty walking but did have some pain with movement. Report given to the [facility], RN at this time, pt transport will be placed.

## 2017-10-19 NOTE — PROGRESS NOTES
Vascular Access Note  Inserted by Shannan Miller Rn  Vascular Access Screening:   Allergies to Lidocaine: no  Allergies to Latex: no  Presence of Pacemaker/Defibrillator: No  Mastectomy with Lymph Node Dissection: No  AV Fistula / AV Graft: No  Dialysis Catheter:

## 2017-10-19 NOTE — PROGRESS NOTES
[facility]    Progress Note    Sherry Alvarado Patient Status:  Inpatient    7/10/1960 MRN [id number]   Location [facility] 4W/SW/SE Attending Allan Sadler MD   Hosp Day # 0 PCP Mitchel Joshua MD       Subjective:   Emily Van normal  Gastrointestinal prophylaxis.   Protonix    Dispo: Pending    Results:     Lab Results  Component Value Date   WBC 7.3 10/19/2017   HGB 12.2 10/19/2017   HCT 35.7 10/19/2017    10/19/2017   CREATSERUM 0.52 10/19/2017   BUN 7 (L) 10/19/2017 Miriam Schwab MD  10/19/2017

## 2017-10-19 NOTE — CONSULTS
60 y/o with recurrent or persistent sigmoid diverticulitis, probably unrelated R hydronephrosis. Clear liqs ok, broad IV abx,  and GI f/u. Would begin meropenem with fluconazole, given recent abx usage and anticipated outpt IV abx tx.  Short interval CT

## 2017-10-19 NOTE — ED PROVIDER NOTES
Patient Seen in: [facility] AND [facility] Emergency Department    History   Patient presents with:  Abdomen/Flank Pain (GI/)    Stated Complaint: right sided abd pain    HPI    18-year-old female presents for complaint of abdominal pain and abnormal CT.  Mirella Lipids Mother      hyperlipidemia   • Eye Problems Mother      eye issues   • Heart Disorder Mother      per ng CABG   • Hypertension Mother    • Musculo-skelatal Disorder Mother      per ng osteoporosis   • Alcohol and Other Disorders Associated Father atraumatic. Eyes: EOM are normal. Pupils are equal, round, and reactive to light. Neck: Normal range of motion. Neck supple. Cardiovascular: Normal rate, regular rhythm and intact distal pulses.     Pulmonary/Chest: Effort normal and breath sounds nor PELVIS IV CONTRAST NO ORAL (ER), 9/13/2017, 16:41.   INDICATIONS: R10.31 Right lower quadrant pain  TECHNIQUE: CT images of the abdomen and pelvis were obtained with non-ionic intravenous contrast material.  Automated exposure control for dose reduction was organized mitral fluid collection or lymphadenopathy in the abdomen or pelvis. ABDOMINAL WALL: Normal.  No mass or hernia. URINARY BLADDER: No visible focal wall thickening, lesion or calculus. PELVIC NODES: No enlarged mass or adenopathy.    PELVIC ORGANS redemonstrated.            SpO2: Normal 98%    Total Critical Care Time: 35 minutes including time spent examining and re-evaluating the patient, ordering and reviewing laboratory tests, documenting, reviewing previous records, obtaining information from th

## 2017-10-19 NOTE — PLAN OF CARE
Received patient from ED. A&O X4. From home with . Came in d/t right lower abdominal pain started last night and urinary urgency. Patient states that pain is much better right now,denies nausea and vomiting,VSS.  Patient gets up with standby assist,

## 2017-10-19 NOTE — BH PROGRESS NOTE
ADMISSION NOTE    59year old female with h/o diverticulitis presents with r sided abdominal pain  Ct reveals R hydroureteronephrosis and sigmoid diverticulitis with microperf and fluid collection . Available medical records partially reviewed.  Dictation t

## 2017-10-19 NOTE — H&P
[facility]    PATIENT'S NAME: Tomasa regan PHYSICIAN: Vibha Braxton MD   PATIENT ACCOUNT#:   [id number]    LOCATION:  [address] [place] [address] #:   [id number]       YOB: 1960  ADMISSION DATE:       1 with many bacteria and a few squamous epithelial cells. The patient was given IV Zosyn in the emergency room. Urology and surgical services were consulted. She was admitted for further evaluation and treatment.     PAST MEDICAL HISTORY:  Significant for reactive to light. Sclerae anicteric. There is no sinus tenderness. Posterior pharynx is not injected. NECK:  Supple. LUNGS:  Clear with easy respiratory excursions. No wheezes or rhonchi. HEART:  Regular rate and rhythm. Normal S1 and S2.   I did n Deep vein thrombosis prophylaxis. The patient is placed on SCDs and subcutaneous heparin. 4.   Elevated bilirubin of unclear significance. Question Gilbert's in this patient. Recheck labs in the morning. 5.   History of hypertension.   Hold oral medic

## 2017-10-19 NOTE — ED INITIAL ASSESSMENT (HPI)
Complains of R sided abd pain since 2 days ago, states she was sent here for evaluation of possible diverticulitis, states she has urinary frequency, states her last BM was this morning

## 2017-10-19 NOTE — CONSULTS
[facility]    PATIENT'S NAME: Mateo Poser PHYSICIAN: Kerline Yuen MD   CONSULTING PHYSICIAN: Micky Nuñez MD   PATIENT ACCOUNT#:   [id number]    LOCATION:  4WSWSE [address] [facility] #:   [id number]       DATE OF BI vitamin D, enalapril, loratadine. ALLERGIES:  Codeine (rash). SOCIAL HISTORY:  She is  and has 3 children. She previously worked as an . No significant tobacco or alcohol use.     FAMILY HISTORY:  No known colorectal d ago.  She has a small clinically contained abscess adjacent to the sigmoid colon. Another new finding on CT scan is that of right hydroureteronephrosis. PLAN:  I reviewed the findings today with the patient and discussed this case with Dr. Mar Santos.   He

## 2017-10-19 NOTE — PLAN OF CARE
PAIN - ADULT    • Verbalizes/displays adequate comfort level or patient's stated pain goal Progressing        Patient/Family Goals    • Patient/Family Long Term Goal Progressing    • Patient/Family Short Term Goal Progressing        Patient feeling better

## 2017-10-20 VITALS
HEART RATE: 83 BPM | TEMPERATURE: 98 F | WEIGHT: 158 LBS | OXYGEN SATURATION: 95 % | SYSTOLIC BLOOD PRESSURE: 124 MMHG | RESPIRATION RATE: 18 BRPM | BODY MASS INDEX: 29.83 KG/M2 | DIASTOLIC BLOOD PRESSURE: 76 MMHG | HEIGHT: 61 IN

## 2017-10-20 PROBLEM — K57.20 PERFORATED DIVERTICULUM OF LARGE INTESTINE: Status: ACTIVE | Noted: 2017-10-20

## 2017-10-20 PROCEDURE — 99231 SBSQ HOSP IP/OBS SF/LOW 25: CPT | Performed by: SURGERY

## 2017-10-20 PROCEDURE — 99232 SBSQ HOSP IP/OBS MODERATE 35: CPT | Performed by: UROLOGY

## 2017-10-20 PROCEDURE — 99239 HOSP IP/OBS DSCHRG MGMT >30: CPT | Performed by: HOSPITALIST

## 2017-10-20 RX ORDER — FLUCONAZOLE 2 MG/ML
200 INJECTION, SOLUTION INTRAVENOUS EVERY 24 HOURS
Status: DISCONTINUED | OUTPATIENT
Start: 2017-10-20 | End: 2017-10-20

## 2017-10-20 RX ORDER — FLUCONAZOLE 200 MG/1
200 TABLET ORAL DAILY
Qty: 14 TABLET | Refills: 0 | Status: SHIPPED | OUTPATIENT
Start: 2017-10-20 | End: 2017-11-03

## 2017-10-20 NOTE — PROGRESS NOTES
[facility]    Progress Note    Andrés Armendariz Patient Status:  Inpatient    7/10/1960 MRN [id number]   Location [facility] 4W/SW/SE Attending Lela Carrasco MD   Hosp Day # 1 PCP Flo Jaime MD     Assessment and Plan: right ureter approximately 5 cm from the ureterovesicular junction (UVJ). No obvious ureteral stone.   The site of transition corresponds with the level of the right ovary, and the ovary otherwise has a normal size for age and the CT appearance of the righ

## 2017-10-20 NOTE — PAYOR COMM NOTE
10/20/17----  Continued IVAB  D/c planning in progress for home IV Antibiotic therapy    ----------  ADMISSION REVIEW     Payor: ANNABELLA GALLAGHER  Subscriber #:  [id number]  Authorization Number: [id number]    Admit date: 10/19/17  Admit time: Lindsay Huff Temporal  SpO2: 98 %  O2 Device: n/a[CS.2]    Current:[CS.1]/74   Pulse 100   Temp 99.6 °F (37.6 °C) (Temporal)   Resp 18   Ht 154.9 cm (5' 1\")   Wt 71.2 kg   SpO2 98%   BMI 29.66 kg/m²[CS.2]     Physical Exam   Abdominal: Soft. Normal appearance.  James Goldman ureteral compression, vesicoureteral reflux or ascending urinary infection. Further Urologic assessment is recommended.  2.  There is acute diverticulitis in the proximal cell sigmoid colon in a distribution similar to prior exam.  There is an inflamed gas Fumarate (ZADITOR) 0.025 % ophthalmic solution 1 drop     Date Action Dose Route User    10/20/2017 0800 Given 1 drop Both Eyes Dario Donohue, RN      Normal Saline Flush 0.9 % injection 10 mL     Date Action Dose Route User    10/20/2017 0911 Given 1

## 2017-10-20 NOTE — PROGRESS NOTES
[facility]    Progress Note    Colin Lyman Patient Status:  Inpatient    7/10/1960 MRN [id number]   Location [facility] 4W/SW/SE Attending Jacquie Molina MD   Hosp Day # 1 PCP Nathalia Elias MD       Subjective:   Maco Dey junction (UVJ). No obvious ureteral stone.   The site of transition corresponds with the level of the right ovary, and the ovary otherwise has a normal size for age and the CT appearance of the right ovary is similar to prior exam suggesting this may be co

## 2017-10-20 NOTE — CM/SW NOTE
10/20CM-This Writer explained the below to the Patient. The Patient reiterated that it was too far for her to come daily for IV infusion at the [facility].  This Writer acknowledged the Patient stating that because she only wants a professional to [employer] This Writer to inform the Patient of the above. This Writer has informed the Patient's RN of the above.         9:32a. m.10/20CM-Per rounds, the Patient is going to need IV infusion upon discharge.   The Patient was seen at bedside.  The Patient reside with

## 2017-10-20 NOTE — PLAN OF CARE
PAIN - ADULT    • Verbalizes/displays adequate comfort level or patient's stated pain goal Progressing        Patient/Family Goals    • Patient/Family Long Term Goal Progressing    • Patient/Family Short Term Goal Progressing          Patient sleeping comf

## 2017-10-20 NOTE — DISCHARGE SUMMARY
[facility] HOSPITALIST  DISCHARGE SUMMARY     Abdifatah Parry Patient Status:  Inpatient    7/10/1960 MRN [id number]   Location [facility] 4W/SW/SE Attending Danay Daniel MD   Hosp Day # 1 PCP Ananth Shankar MD     Date of Admission: 10/18/ sigmoid colon in the distribution similar to the prior exam; however, there was now an inflamed gap and fluid-filled collection along the inferior margin of the site of diverticulitis which measures 29 x 35 x 19 mm. This suggested a contained perforation. 11/3/2017  Quantity:  14 tablet  Refills:  0     sodium chloride 0.9 % SOLN 100 mL with ertapenem 1 g SOLR 1 g      Inject 1 g into the vein daily.    Stop taking on:  11/3/2017  Quantity:  14 Dose  Refills:  0        CONTINUE taking these medications edema.  -----------------------------------------------------------------------------------------------  PATIENT DISCHARGE INSTRUCTIONS: See electronic chart  Hospital Discharge Diagnoses: diverticulitis    TCM Diagnosis at discharge from Hospital: Other:

## 2017-10-21 ENCOUNTER — OFFICE VISIT (OUTPATIENT)
Dept: HEMATOLOGY/ONCOLOGY | Facility: HOSPITAL | Age: 57
End: 2017-10-21
Attending: HOSPITALIST
Payer: COMMERCIAL

## 2017-10-21 VITALS
DIASTOLIC BLOOD PRESSURE: 85 MMHG | TEMPERATURE: 98 F | SYSTOLIC BLOOD PRESSURE: 148 MMHG | RESPIRATION RATE: 18 BRPM | HEART RATE: 90 BPM

## 2017-10-21 DIAGNOSIS — K57.20 PERFORATED DIVERTICULUM OF LARGE INTESTINE: Primary | ICD-10-CM

## 2017-10-21 PROCEDURE — 96365 THER/PROPH/DIAG IV INF INIT: CPT

## 2017-10-21 RX ORDER — SODIUM CHLORIDE 9 MG/ML
INJECTION, SOLUTION INTRAVENOUS
Status: DISCONTINUED
Start: 2017-10-21 | End: 2017-10-21

## 2017-10-21 RX ORDER — 0.9 % SODIUM CHLORIDE 0.9 %
VIAL (ML) INJECTION
Status: DISCONTINUED
Start: 2017-10-21 | End: 2017-10-21

## 2017-10-21 NOTE — PATIENT INSTRUCTIONS
Patient to department for infusion of invanz to treat her diverticulitis with possible perforation/abcess. Denies any gi upset. Following a low residue diet.  Encouraged adherence to diet for bowel rest. Patient unsure why she is on diflucan and it was exp

## 2017-10-22 ENCOUNTER — OFFICE VISIT (OUTPATIENT)
Dept: HEMATOLOGY/ONCOLOGY | Facility: HOSPITAL | Age: 57
End: 2017-10-22
Attending: HOSPITALIST
Payer: COMMERCIAL

## 2017-10-22 VITALS
SYSTOLIC BLOOD PRESSURE: 147 MMHG | TEMPERATURE: 98 F | HEART RATE: 87 BPM | RESPIRATION RATE: 16 BRPM | DIASTOLIC BLOOD PRESSURE: 74 MMHG

## 2017-10-22 DIAGNOSIS — K57.20 PERFORATED DIVERTICULUM OF LARGE INTESTINE: Primary | ICD-10-CM

## 2017-10-22 PROCEDURE — 96365 THER/PROPH/DIAG IV INF INIT: CPT

## 2017-10-22 RX ORDER — 0.9 % SODIUM CHLORIDE 0.9 %
VIAL (ML) INJECTION
Status: DISCONTINUED
Start: 2017-10-22 | End: 2017-10-22

## 2017-10-22 NOTE — PROGRESS NOTES
Patient to infusion for invanz to treat her diverticulitis with possible perforation/abcess. Pt denies diarrhea. Denies any fever or chills.  Pt states the hardest thing is staying away from the spicy foods she likes to eat.      Pt with complaints of ankl

## 2017-10-23 ENCOUNTER — PATIENT OUTREACH (OUTPATIENT)
Dept: INTERNAL MEDICINE CLINIC | Facility: CLINIC | Age: 57
End: 2017-10-23

## 2017-10-23 ENCOUNTER — OFFICE VISIT (OUTPATIENT)
Dept: HEMATOLOGY/ONCOLOGY | Facility: HOSPITAL | Age: 57
End: 2017-10-23
Attending: HOSPITALIST
Payer: COMMERCIAL

## 2017-10-23 ENCOUNTER — TELEPHONE (OUTPATIENT)
Dept: GASTROENTEROLOGY | Facility: CLINIC | Age: 57
End: 2017-10-23

## 2017-10-23 ENCOUNTER — TELEPHONE (OUTPATIENT)
Dept: INTERNAL MEDICINE UNIT | Facility: HOSPITAL | Age: 57
End: 2017-10-23

## 2017-10-23 VITALS
TEMPERATURE: 98 F | SYSTOLIC BLOOD PRESSURE: 134 MMHG | HEART RATE: 88 BPM | DIASTOLIC BLOOD PRESSURE: 74 MMHG | RESPIRATION RATE: 16 BRPM

## 2017-10-23 DIAGNOSIS — K57.20 PERFORATED DIVERTICULUM OF LARGE INTESTINE: Primary | ICD-10-CM

## 2017-10-23 PROCEDURE — 96365 THER/PROPH/DIAG IV INF INIT: CPT

## 2017-10-23 RX ORDER — 0.9 % SODIUM CHLORIDE 0.9 %
VIAL (ML) INJECTION
Status: DISCONTINUED
Start: 2017-10-23 | End: 2017-10-23

## 2017-10-23 NOTE — PROGRESS NOTES
Patient to infusion for invanz to treat her diverticulitis with possible perforation/abcess. Pt denies diarrhea. Denies any fever or chills.  Pt states she does have stomach cramps occasionally that go away on her own, patient also states that she feel bl

## 2017-10-23 NOTE — TELEPHONE ENCOUNTER
Phoned patient to schedule TCM. Patient is unable to come at times available. Patient states she has infusions everyday until 11/3 and her appointments are 4 and 5. Needs after 5 following infusion.   Lynced Dr. Bobby Jesus to see if she can work with this t

## 2017-10-23 NOTE — PROGRESS NOTES
Initial Post Discharge Follow Up   Discharge Date: 10/20/17  Contact Date: 10/23/2017    Consent Verification:  Assessment Completed With: Patient  HIPAA Verified? Yes    1.  Tell me why you were in the hospital?  \"My right side was killing me I was in so None to report    10. Have you received your home health care services / DME ordered upon discharge? No HH / No DME    11. Are you able to do normal activities as you did prior to your hospitalization? Yes    12. In need of referral for:  NA    13.  Do you h Center - [facility])    Dec 05, 2017  1:00 PM CST  (Arrive by 12:00 PM) Procedure Mac with ISAAC, PROCEDURE [facility] PROCEDURE (--)        [facility] PROCEDURE  [phone]

## 2017-10-24 ENCOUNTER — TELEPHONE (OUTPATIENT)
Dept: INTERNAL MEDICINE UNIT | Facility: HOSPITAL | Age: 57
End: 2017-10-24

## 2017-10-24 ENCOUNTER — TELEPHONE (OUTPATIENT)
Dept: GASTROENTEROLOGY | Facility: CLINIC | Age: 57
End: 2017-10-24

## 2017-10-24 ENCOUNTER — OFFICE VISIT (OUTPATIENT)
Dept: HEMATOLOGY/ONCOLOGY | Facility: HOSPITAL | Age: 57
End: 2017-10-24
Attending: HOSPITALIST
Payer: COMMERCIAL

## 2017-10-24 VITALS
DIASTOLIC BLOOD PRESSURE: 85 MMHG | SYSTOLIC BLOOD PRESSURE: 133 MMHG | RESPIRATION RATE: 16 BRPM | HEART RATE: 86 BPM | TEMPERATURE: 97 F

## 2017-10-24 DIAGNOSIS — R93.89 ABNORMAL CT SCAN: Primary | ICD-10-CM

## 2017-10-24 DIAGNOSIS — Z87.19 PERSONAL HISTORY OF DIGESTIVE DISEASE: ICD-10-CM

## 2017-10-24 DIAGNOSIS — K57.20 PERFORATED DIVERTICULUM OF LARGE INTESTINE: Primary | ICD-10-CM

## 2017-10-24 PROCEDURE — 96365 THER/PROPH/DIAG IV INF INIT: CPT

## 2017-10-24 RX ORDER — 0.9 % SODIUM CHLORIDE 0.9 %
VIAL (ML) INJECTION
Status: DISCONTINUED
Start: 2017-10-24 | End: 2017-10-24

## 2017-10-24 NOTE — TELEPHONE ENCOUNTER
Phoned patient to schedule TCM around her infusion appointments. Unable to find anything that would work with Dr. Sigifredo Villasenor schedule. Patient states she needs to see  someone per the [address] [place] [address] Street also a TCM.    Scheduled patient with Fady and sent

## 2017-10-24 NOTE — PROGRESS NOTES
Patient arrives for daily antibiotics for diverticulitis. Patient reports she is feeling well, complains of some pain in her lower abdomen. PICC line present to left arm, PICC flushed with saline, positive blood return noted.  Invanz given over thirty minut

## 2017-10-25 ENCOUNTER — OFFICE VISIT (OUTPATIENT)
Dept: HEMATOLOGY/ONCOLOGY | Facility: HOSPITAL | Age: 57
End: 2017-10-25
Attending: HOSPITALIST
Payer: COMMERCIAL

## 2017-10-25 VITALS
HEART RATE: 89 BPM | TEMPERATURE: 98 F | SYSTOLIC BLOOD PRESSURE: 148 MMHG | DIASTOLIC BLOOD PRESSURE: 69 MMHG | RESPIRATION RATE: 16 BRPM

## 2017-10-25 DIAGNOSIS — K57.20 PERFORATED DIVERTICULUM OF LARGE INTESTINE: Primary | ICD-10-CM

## 2017-10-25 PROCEDURE — 96365 THER/PROPH/DIAG IV INF INIT: CPT

## 2017-10-25 RX ORDER — 0.9 % SODIUM CHLORIDE 0.9 %
VIAL (ML) INJECTION
Status: DISCONTINUED
Start: 2017-10-25 | End: 2017-10-25

## 2017-10-25 NOTE — PROGRESS NOTES
Patient to infusion for invanz to treat her diverticulitis with possible perforation/abcess. Pt denies diarrhea. Denies any fever or chills, and stomach cramps. Patient states that she still feels bloated.   Following a liquid diet.   Pt with complaints

## 2017-10-26 ENCOUNTER — OFFICE VISIT (OUTPATIENT)
Dept: HEMATOLOGY/ONCOLOGY | Facility: HOSPITAL | Age: 57
End: 2017-10-26
Attending: HOSPITALIST
Payer: COMMERCIAL

## 2017-10-26 ENCOUNTER — TELEPHONE (OUTPATIENT)
Dept: SURGERY | Facility: CLINIC | Age: 57
End: 2017-10-26

## 2017-10-26 VITALS
RESPIRATION RATE: 16 BRPM | HEART RATE: 91 BPM | TEMPERATURE: 98 F | SYSTOLIC BLOOD PRESSURE: 145 MMHG | DIASTOLIC BLOOD PRESSURE: 83 MMHG

## 2017-10-26 DIAGNOSIS — K57.20 PERFORATED DIVERTICULUM OF LARGE INTESTINE: Primary | ICD-10-CM

## 2017-10-26 PROCEDURE — 96365 THER/PROPH/DIAG IV INF INIT: CPT

## 2017-10-26 NOTE — TELEPHONE ENCOUNTER
Pt. was released on 10/20 and is supposed to be seen next week for hosp f/up appt. for Diverticulitis. Pt. Is requesting a late appt around 5:00 or later.

## 2017-10-26 NOTE — PROGRESS NOTES
Pt to infusion for daily Invanz for diverticulitis. Pt denies pain, fevers, n/v, diarrhea. PICC flushed with good blood return. Pt tolerated Invanz infusion over 30 minutes. No s/s adverse reaction noted during or after infusion complete.   PICC flushed

## 2017-10-27 ENCOUNTER — OFFICE VISIT (OUTPATIENT)
Dept: HEMATOLOGY/ONCOLOGY | Facility: HOSPITAL | Age: 57
End: 2017-10-27
Attending: HOSPITALIST
Payer: COMMERCIAL

## 2017-10-27 VITALS
SYSTOLIC BLOOD PRESSURE: 147 MMHG | HEART RATE: 89 BPM | TEMPERATURE: 97 F | DIASTOLIC BLOOD PRESSURE: 79 MMHG | RESPIRATION RATE: 16 BRPM

## 2017-10-27 DIAGNOSIS — K57.20 PERFORATED DIVERTICULUM OF LARGE INTESTINE: Primary | ICD-10-CM

## 2017-10-27 PROCEDURE — 96365 THER/PROPH/DIAG IV INF INIT: CPT

## 2017-10-27 RX ORDER — 0.9 % SODIUM CHLORIDE 0.9 %
VIAL (ML) INJECTION
Status: DISCONTINUED
Start: 2017-10-27 | End: 2017-10-27

## 2017-10-27 NOTE — PROGRESS NOTES
Pt here for daily Invanz for diverticulitis. Arrived ambulating independently. Reports feeling well, c/o heel pain for which she is receiving PT. Denies any abdominal pain/discomfort. PICC CDI, line flushing well with positive blood return noted.  Appeared

## 2017-10-28 ENCOUNTER — OFFICE VISIT (OUTPATIENT)
Dept: HEMATOLOGY/ONCOLOGY | Facility: HOSPITAL | Age: 57
End: 2017-10-28
Attending: HOSPITALIST
Payer: COMMERCIAL

## 2017-10-28 VITALS
DIASTOLIC BLOOD PRESSURE: 83 MMHG | RESPIRATION RATE: 16 BRPM | HEART RATE: 85 BPM | SYSTOLIC BLOOD PRESSURE: 153 MMHG | TEMPERATURE: 98 F

## 2017-10-28 DIAGNOSIS — K57.20 PERFORATED DIVERTICULUM OF LARGE INTESTINE: Primary | ICD-10-CM

## 2017-10-28 PROCEDURE — 96365 THER/PROPH/DIAG IV INF INIT: CPT

## 2017-10-28 RX ORDER — 0.9 % SODIUM CHLORIDE 0.9 %
VIAL (ML) INJECTION
Status: DISCONTINUED
Start: 2017-10-28 | End: 2017-10-28

## 2017-10-29 ENCOUNTER — OFFICE VISIT (OUTPATIENT)
Dept: HEMATOLOGY/ONCOLOGY | Facility: HOSPITAL | Age: 57
End: 2017-10-29
Attending: HOSPITALIST
Payer: COMMERCIAL

## 2017-10-29 VITALS
HEART RATE: 84 BPM | DIASTOLIC BLOOD PRESSURE: 77 MMHG | SYSTOLIC BLOOD PRESSURE: 135 MMHG | RESPIRATION RATE: 16 BRPM | TEMPERATURE: 98 F

## 2017-10-29 DIAGNOSIS — K57.20 PERFORATED DIVERTICULUM OF LARGE INTESTINE: Primary | ICD-10-CM

## 2017-10-29 PROCEDURE — 96365 THER/PROPH/DIAG IV INF INIT: CPT

## 2017-10-29 RX ORDER — 0.9 % SODIUM CHLORIDE 0.9 %
VIAL (ML) INJECTION
Status: DISCONTINUED
Start: 2017-10-29 | End: 2017-10-29

## 2017-10-29 NOTE — PROGRESS NOTES
Pt here for daily Invanz for diverticulitis. Arrived ambulating independently. Reports feeling well,  Denies any abdominal pain/discomfort. PICC functioning well. Discharged from infusion, ambulating independently w/ steady gait.      Aware of future appo

## 2017-10-30 ENCOUNTER — OFFICE VISIT (OUTPATIENT)
Dept: FAMILY MEDICINE CLINIC | Facility: CLINIC | Age: 57
End: 2017-10-30

## 2017-10-30 ENCOUNTER — OFFICE VISIT (OUTPATIENT)
Dept: HEMATOLOGY/ONCOLOGY | Facility: HOSPITAL | Age: 57
End: 2017-10-30
Attending: HOSPITALIST
Payer: COMMERCIAL

## 2017-10-30 VITALS
WEIGHT: 154 LBS | BODY MASS INDEX: 29.07 KG/M2 | TEMPERATURE: 98 F | HEIGHT: 61 IN | SYSTOLIC BLOOD PRESSURE: 144 MMHG | DIASTOLIC BLOOD PRESSURE: 87 MMHG | HEART RATE: 86 BPM

## 2017-10-30 VITALS
RESPIRATION RATE: 16 BRPM | DIASTOLIC BLOOD PRESSURE: 78 MMHG | HEART RATE: 85 BPM | SYSTOLIC BLOOD PRESSURE: 137 MMHG | TEMPERATURE: 98 F

## 2017-10-30 DIAGNOSIS — N13.30 HYDRONEPHROSIS, UNSPECIFIED HYDRONEPHROSIS TYPE: ICD-10-CM

## 2017-10-30 DIAGNOSIS — K57.20 PERFORATED DIVERTICULUM OF LARGE INTESTINE: Primary | ICD-10-CM

## 2017-10-30 DIAGNOSIS — K57.20 DIVERTICULITIS OF LARGE INTESTINE WITH PERFORATION AND ABSCESS WITHOUT BLEEDING: Primary | ICD-10-CM

## 2017-10-30 DIAGNOSIS — I10 ESSENTIAL HYPERTENSION: ICD-10-CM

## 2017-10-30 PROCEDURE — 99495 TRANSJ CARE MGMT MOD F2F 14D: CPT | Performed by: PHYSICIAN ASSISTANT

## 2017-10-30 PROCEDURE — 96365 THER/PROPH/DIAG IV INF INIT: CPT

## 2017-10-30 RX ORDER — 0.9 % SODIUM CHLORIDE 0.9 %
VIAL (ML) INJECTION
Status: DISCONTINUED
Start: 2017-10-30 | End: 2017-10-30

## 2017-10-30 NOTE — PROGRESS NOTES
HPI:    Kaden Lara is a 59year old female here today for hospital follow up.    She was discharged from Inpatient hospital, [facility]  to Home   Admission Date: 10/18/17   Discharge Date: 10/20/17  Hospital Discharge Diagnosis: diverticulitis and denies abdominal pain. No fever or chills. No diarrhea, blood in stool or problems with bowel movements. No redness or swelling at PICC line site. No swelling or pain in lower legs. She has ongoing left ankle pain due to recent injury.   She denies grandmother, mother, and another family member; Eye Problems in her mother; Glaucoma in her maternal grandmother; Heart Disorder in her mother; Hypertension in her father, maternal grandmother, and mother; Lipids in her mother; Musculo-skelatal Disorder in is not tender, FROM of the extremities  EXTREMITIES: no cyanosis, clubbing or edema  NEURO: Oriented times three, cranial nerves are intact, motor and sensory are grossly intact    ASSESSMENT/ PLAN:   Diagnoses and all orders for this visit:    Neville Martinez

## 2017-10-30 NOTE — PROGRESS NOTES
Mary Ellen Lepe presents for daily antibiotic, picc line flushed with 10ml ns with positive blood return noted. Invanz infused in 30 minutes with no s/s of adverse reaction noted. PIcc line flushed with 10ml ns with positive blood return, capped, and secured.  Picc

## 2017-10-31 ENCOUNTER — OFFICE VISIT (OUTPATIENT)
Dept: HEMATOLOGY/ONCOLOGY | Facility: HOSPITAL | Age: 57
End: 2017-10-31
Attending: HOSPITALIST
Payer: COMMERCIAL

## 2017-10-31 VITALS
DIASTOLIC BLOOD PRESSURE: 78 MMHG | SYSTOLIC BLOOD PRESSURE: 151 MMHG | RESPIRATION RATE: 16 BRPM | HEART RATE: 91 BPM | TEMPERATURE: 98 F

## 2017-10-31 DIAGNOSIS — K57.20 PERFORATED DIVERTICULUM OF LARGE INTESTINE: Primary | ICD-10-CM

## 2017-10-31 PROCEDURE — 96365 THER/PROPH/DIAG IV INF INIT: CPT

## 2017-10-31 RX ORDER — 0.9 % SODIUM CHLORIDE 0.9 %
VIAL (ML) INJECTION
Status: DISCONTINUED
Start: 2017-10-31 | End: 2017-10-31

## 2017-10-31 NOTE — PROGRESS NOTES
Flor Huang presents for daily antibiotic, picc line flushed with 10ml ns with positive blood return noted. Invanz infused in 30 minutes with no s/s of adverse reaction noted. PIcc line flushed with 10ml ns with positive blood return, capped, and secured.  Picc

## 2017-10-31 NOTE — TELEPHONE ENCOUNTER
Pt was dx with diverticulitis w/microperforation and abscess in October. She is currently having abx tx w/PICC line. She needs to cancel her Colonoscopy until she is finished with antibiotics and is cleared by her PCP. She will call back to reschedule.

## 2017-11-01 ENCOUNTER — OFFICE VISIT (OUTPATIENT)
Dept: HEMATOLOGY/ONCOLOGY | Facility: HOSPITAL | Age: 57
End: 2017-11-01
Attending: HOSPITALIST
Payer: COMMERCIAL

## 2017-11-01 VITALS
TEMPERATURE: 98 F | SYSTOLIC BLOOD PRESSURE: 160 MMHG | HEART RATE: 85 BPM | DIASTOLIC BLOOD PRESSURE: 87 MMHG | RESPIRATION RATE: 16 BRPM

## 2017-11-01 DIAGNOSIS — K57.20 PERFORATED DIVERTICULUM OF LARGE INTESTINE: Primary | ICD-10-CM

## 2017-11-01 PROCEDURE — 96365 THER/PROPH/DIAG IV INF INIT: CPT

## 2017-11-01 RX ORDER — 0.9 % SODIUM CHLORIDE 0.9 %
VIAL (ML) INJECTION
Status: DISCONTINUED
Start: 2017-11-01 | End: 2017-11-01

## 2017-11-01 NOTE — PROGRESS NOTES
Pt to infusion for daily Invanz for diverticulitis. Arrived ambulating independently. Reports feeling well overall, offers no complaints. Denies any abdominal pain or diarrhea. Reports feeling better since starting abx. No fevers or chills.  PICC dressing C

## 2017-11-01 NOTE — TELEPHONE ENCOUNTER
Note      Pt was dx with diverticulitis w/microperforation and abscess in October. She is currently having abx tx w/PICC line. She needs to cancel her Colonoscopy until she is finished with antibiotics and is cleared by her PCP.  She will call back to res

## 2017-11-02 ENCOUNTER — OFFICE VISIT (OUTPATIENT)
Dept: HEMATOLOGY/ONCOLOGY | Facility: HOSPITAL | Age: 57
End: 2017-11-02
Attending: HOSPITALIST
Payer: COMMERCIAL

## 2017-11-02 VITALS
TEMPERATURE: 98 F | RESPIRATION RATE: 16 BRPM | HEART RATE: 89 BPM | SYSTOLIC BLOOD PRESSURE: 159 MMHG | DIASTOLIC BLOOD PRESSURE: 76 MMHG

## 2017-11-02 DIAGNOSIS — K57.20 PERFORATED DIVERTICULUM OF LARGE INTESTINE: Primary | ICD-10-CM

## 2017-11-02 PROCEDURE — 96365 THER/PROPH/DIAG IV INF INIT: CPT

## 2017-11-02 RX ORDER — 0.9 % SODIUM CHLORIDE 0.9 %
VIAL (ML) INJECTION
Status: DISCONTINUED
Start: 2017-11-02 | End: 2017-11-02

## 2017-11-02 NOTE — PROGRESS NOTES
Pt to infusion for daily Invanz for diverticulitis. Arrived ambulating independently. Reports feeling well overall, offers no complaints. Denies any abdominal pain or diarrhea or fever. PICC line patent to left upper arm. Good blood return noted.   PICC li

## 2017-11-03 ENCOUNTER — HOSPITAL ENCOUNTER (OUTPATIENT)
Dept: CT IMAGING | Facility: HOSPITAL | Age: 57
Discharge: HOME | End: 2017-11-03
Attending: SURGERY
Payer: COMMERCIAL

## 2017-11-03 ENCOUNTER — OFFICE VISIT (OUTPATIENT)
Dept: HEMATOLOGY/ONCOLOGY | Facility: HOSPITAL | Age: 57
End: 2017-11-03
Attending: HOSPITALIST
Payer: COMMERCIAL

## 2017-11-03 VITALS
DIASTOLIC BLOOD PRESSURE: 80 MMHG | SYSTOLIC BLOOD PRESSURE: 146 MMHG | TEMPERATURE: 98 F | HEART RATE: 83 BPM | RESPIRATION RATE: 16 BRPM

## 2017-11-03 DIAGNOSIS — K57.20 DIVERTICULITIS OF LARGE INTESTINE WITH PERFORATION AND ABSCESS WITHOUT BLEEDING: ICD-10-CM

## 2017-11-03 DIAGNOSIS — N13.30 HYDRONEPHROSIS, UNSPECIFIED HYDRONEPHROSIS TYPE: ICD-10-CM

## 2017-11-03 DIAGNOSIS — K57.20 PERFORATED DIVERTICULUM OF LARGE INTESTINE: Primary | ICD-10-CM

## 2017-11-03 PROCEDURE — 74177 CT ABD & PELVIS W/CONTRAST: CPT | Performed by: SURGERY

## 2017-11-03 PROCEDURE — 96374 THER/PROPH/DIAG INJ IV PUSH: CPT

## 2017-11-03 RX ORDER — 0.9 % SODIUM CHLORIDE 0.9 %
VIAL (ML) INJECTION
Status: DISCONTINUED
Start: 2017-11-03 | End: 2017-11-03

## 2017-11-07 ENCOUNTER — OFFICE VISIT (OUTPATIENT)
Dept: HEMATOLOGY/ONCOLOGY | Facility: HOSPITAL | Age: 57
End: 2017-11-07
Attending: HOSPITALIST
Payer: COMMERCIAL

## 2017-11-07 ENCOUNTER — OFFICE VISIT (OUTPATIENT)
Dept: SURGERY | Facility: CLINIC | Age: 57
End: 2017-11-07

## 2017-11-07 VITALS — BODY MASS INDEX: 29 KG/M2 | WEIGHT: 154 LBS

## 2017-11-07 VITALS
TEMPERATURE: 97 F | SYSTOLIC BLOOD PRESSURE: 162 MMHG | RESPIRATION RATE: 16 BRPM | HEART RATE: 86 BPM | DIASTOLIC BLOOD PRESSURE: 80 MMHG

## 2017-11-07 DIAGNOSIS — K57.20 PERFORATED DIVERTICULUM OF LARGE INTESTINE: Primary | ICD-10-CM

## 2017-11-07 DIAGNOSIS — K57.20 DIVERTICULITIS OF LARGE INTESTINE WITH PERFORATION AND ABSCESS WITHOUT BLEEDING: Primary | ICD-10-CM

## 2017-11-07 PROCEDURE — 99214 OFFICE O/P EST MOD 30 MIN: CPT | Performed by: SURGERY

## 2017-11-07 PROCEDURE — 99212 OFFICE O/P EST SF 10 MIN: CPT | Performed by: SURGERY

## 2017-11-07 PROCEDURE — 96365 THER/PROPH/DIAG IV INF INIT: CPT

## 2017-11-07 PROCEDURE — 96374 THER/PROPH/DIAG INJ IV PUSH: CPT

## 2017-11-07 RX ORDER — 0.9 % SODIUM CHLORIDE 0.9 %
VIAL (ML) INJECTION
Status: DISCONTINUED
Start: 2017-11-07 | End: 2017-11-07

## 2017-11-07 NOTE — PROGRESS NOTES
Patient to infusion for invanz to treat her diverticulitis with possible perforation/abcess. Ambulated from waiting room with steady gait. Patient saw Dr. Mark Collier today and reviewed CT scan from Friday- her antibiotics were extended. Pt denies diarrhea.  D

## 2017-11-08 ENCOUNTER — APPOINTMENT (OUTPATIENT)
Dept: HEMATOLOGY/ONCOLOGY | Facility: HOSPITAL | Age: 57
End: 2017-11-08
Attending: HOSPITALIST
Payer: COMMERCIAL

## 2017-11-08 VITALS
RESPIRATION RATE: 16 BRPM | HEART RATE: 89 BPM | TEMPERATURE: 98 F | SYSTOLIC BLOOD PRESSURE: 146 MMHG | DIASTOLIC BLOOD PRESSURE: 75 MMHG

## 2017-11-08 DIAGNOSIS — K57.20 PERFORATED DIVERTICULUM OF LARGE INTESTINE: Primary | ICD-10-CM

## 2017-11-08 PROCEDURE — 96365 THER/PROPH/DIAG IV INF INIT: CPT

## 2017-11-08 RX ORDER — 0.9 % SODIUM CHLORIDE 0.9 %
VIAL (ML) INJECTION
Status: DISCONTINUED
Start: 2017-11-08 | End: 2017-11-08

## 2017-11-08 NOTE — PROGRESS NOTES
Established Patient Follow-up      11/7/2017    Joycelyn Aguilar 59year old      HPI  Patient presents with: Follow - Up: Here for results of CT scan, and to see if it is time to remove the PICC.   States she feels great, denies pain, bloating, fevers, naus

## 2017-11-09 ENCOUNTER — APPOINTMENT (OUTPATIENT)
Dept: LAB | Facility: HOSPITAL | Age: 57
End: 2017-11-09
Attending: UROLOGY
Payer: COMMERCIAL

## 2017-11-09 ENCOUNTER — OFFICE VISIT (OUTPATIENT)
Dept: SURGERY | Facility: CLINIC | Age: 57
End: 2017-11-09

## 2017-11-09 ENCOUNTER — OFFICE VISIT (OUTPATIENT)
Dept: HEMATOLOGY/ONCOLOGY | Facility: HOSPITAL | Age: 57
End: 2017-11-09
Attending: SURGERY
Payer: COMMERCIAL

## 2017-11-09 VITALS
SYSTOLIC BLOOD PRESSURE: 125 MMHG | HEART RATE: 89 BPM | DIASTOLIC BLOOD PRESSURE: 90 MMHG | RESPIRATION RATE: 16 BRPM | TEMPERATURE: 98 F

## 2017-11-09 VITALS
SYSTOLIC BLOOD PRESSURE: 138 MMHG | DIASTOLIC BLOOD PRESSURE: 70 MMHG | HEIGHT: 61 IN | RESPIRATION RATE: 16 BRPM | BODY MASS INDEX: 29.07 KG/M2 | TEMPERATURE: 98 F | WEIGHT: 154 LBS | HEART RATE: 85 BPM

## 2017-11-09 DIAGNOSIS — K57.20 PERFORATED DIVERTICULUM OF LARGE INTESTINE: Primary | ICD-10-CM

## 2017-11-09 DIAGNOSIS — N39.0 RECURRENT UTI: Primary | ICD-10-CM

## 2017-11-09 DIAGNOSIS — N39.0 RECURRENT UTI: ICD-10-CM

## 2017-11-09 PROCEDURE — 99213 OFFICE O/P EST LOW 20 MIN: CPT | Performed by: UROLOGY

## 2017-11-09 PROCEDURE — 81003 URINALYSIS AUTO W/O SCOPE: CPT | Performed by: UROLOGY

## 2017-11-09 PROCEDURE — 87086 URINE CULTURE/COLONY COUNT: CPT

## 2017-11-09 PROCEDURE — 99212 OFFICE O/P EST SF 10 MIN: CPT | Performed by: UROLOGY

## 2017-11-09 PROCEDURE — 96365 THER/PROPH/DIAG IV INF INIT: CPT

## 2017-11-09 RX ORDER — 0.9 % SODIUM CHLORIDE 0.9 %
VIAL (ML) INJECTION
Status: DISCONTINUED
Start: 2017-11-09 | End: 2017-11-09

## 2017-11-09 NOTE — PROGRESS NOTES
Kaylin Cassidy presents for daily antibiotic, picc line flushed with 10ml ns with positive blood return noted. Invanz infused in 30 minutes with no s/s of adverse reaction noted. PIcc line flushed with 10ml ns with positive blood return, capped, and secured.  Picc

## 2017-11-10 ENCOUNTER — OFFICE VISIT (OUTPATIENT)
Dept: HEMATOLOGY/ONCOLOGY | Facility: HOSPITAL | Age: 57
End: 2017-11-10
Attending: SURGERY
Payer: COMMERCIAL

## 2017-11-10 VITALS
HEART RATE: 95 BPM | DIASTOLIC BLOOD PRESSURE: 82 MMHG | TEMPERATURE: 98 F | RESPIRATION RATE: 16 BRPM | SYSTOLIC BLOOD PRESSURE: 127 MMHG

## 2017-11-10 DIAGNOSIS — K57.20 PERFORATED DIVERTICULUM OF LARGE INTESTINE: Primary | ICD-10-CM

## 2017-11-10 PROCEDURE — 96365 THER/PROPH/DIAG IV INF INIT: CPT

## 2017-11-10 RX ORDER — 0.9 % SODIUM CHLORIDE 0.9 %
VIAL (ML) INJECTION
Status: DISCONTINUED
Start: 2017-11-10 | End: 2017-11-10

## 2017-11-10 NOTE — PROGRESS NOTES
PatientPt to infusion for daily Invanz for diverticulitis. Arrived ambulating independently. Reports feeling well overall, offers no complaints. Denies any abdominal pain or diarrhea. No fevers or chills. PICC dressing change done today.   PICC line inserti

## 2017-11-10 NOTE — PROGRESS NOTES
Sherry Alvarado is a 59year old female. HPI:   Patient presents with:   Other: follow up from [address] [place] [address] Right-sided hydroureteronephrosis      39-year-old female who I seen in consultation at [facility] October 18 October 19, 2017 right-sided h bilateral feet   • Cyst of skin 2013    per ng rt thigh cyst removal   • Diverticulitis 2017    hosp x 3 days   • Diverticulosis    • Fibroids 2003   • Hemorrhoids 2012    per ng colonoscopy   • High blood pressure    • High cholesterol    • History of bon TAKE 1 TABLET BY MOUTH DAILY Disp: 90 tablet Rfl: 0   PATADAY 0.2 % Ophthalmic Solution 1 drop to both eyes daily as needed Disp:  Rfl: 3   acetaminophen (TYLENOL) 325 MG Oral Tab Take 325 mg by mouth every 6 (six) hours as needed for Pain.  Disp:  Rfl:

## 2017-11-11 ENCOUNTER — OFFICE VISIT (OUTPATIENT)
Dept: HEMATOLOGY/ONCOLOGY | Facility: HOSPITAL | Age: 57
End: 2017-11-11
Attending: SURGERY
Payer: COMMERCIAL

## 2017-11-11 VITALS
TEMPERATURE: 98 F | HEART RATE: 85 BPM | DIASTOLIC BLOOD PRESSURE: 72 MMHG | SYSTOLIC BLOOD PRESSURE: 137 MMHG | RESPIRATION RATE: 16 BRPM

## 2017-11-11 DIAGNOSIS — K57.20 PERFORATED DIVERTICULUM OF LARGE INTESTINE: Primary | ICD-10-CM

## 2017-11-11 PROCEDURE — 96365 THER/PROPH/DIAG IV INF INIT: CPT

## 2017-11-11 RX ORDER — 0.9 % SODIUM CHLORIDE 0.9 %
VIAL (ML) INJECTION
Status: DISCONTINUED
Start: 2017-11-11 | End: 2017-11-11

## 2017-11-11 NOTE — PROGRESS NOTES
PatientPt to infusion for daily Invanz for diverticulitis. Arrived ambulating independently. Reports feeling well overall, offers no complaints. States she has occasional abdominal cramps. . No fevers or chills. Benitez August PICC line with good blood return.  Flushe

## 2017-11-12 ENCOUNTER — OFFICE VISIT (OUTPATIENT)
Dept: HEMATOLOGY/ONCOLOGY | Facility: HOSPITAL | Age: 57
End: 2017-11-12
Attending: SURGERY
Payer: COMMERCIAL

## 2017-11-12 VITALS
TEMPERATURE: 98 F | HEART RATE: 79 BPM | SYSTOLIC BLOOD PRESSURE: 144 MMHG | DIASTOLIC BLOOD PRESSURE: 72 MMHG | RESPIRATION RATE: 18 BRPM

## 2017-11-12 DIAGNOSIS — K57.20 PERFORATED DIVERTICULUM OF LARGE INTESTINE: Primary | ICD-10-CM

## 2017-11-12 PROCEDURE — 96365 THER/PROPH/DIAG IV INF INIT: CPT

## 2017-11-12 RX ORDER — 0.9 % SODIUM CHLORIDE 0.9 %
VIAL (ML) INJECTION
Status: DISCONTINUED
Start: 2017-11-12 | End: 2017-11-12

## 2017-11-12 NOTE — PROGRESS NOTES
Pt here for daily Invanz for diverticulitis. Arrived ambulating independently. Reports feeling well, some fatigue,  Denies any abdominal pain/discomfort. PICC functioning well. Discharged from infusion, ambulating independently w/ steady gait.      Aware

## 2017-11-13 ENCOUNTER — TELEPHONE (OUTPATIENT)
Dept: SURGERY | Facility: CLINIC | Age: 57
End: 2017-11-13

## 2017-11-13 ENCOUNTER — OFFICE VISIT (OUTPATIENT)
Dept: HEMATOLOGY/ONCOLOGY | Facility: HOSPITAL | Age: 57
End: 2017-11-13
Attending: SURGERY
Payer: COMMERCIAL

## 2017-11-13 VITALS
RESPIRATION RATE: 18 BRPM | SYSTOLIC BLOOD PRESSURE: 91 MMHG | HEART RATE: 80 BPM | DIASTOLIC BLOOD PRESSURE: 73 MMHG | TEMPERATURE: 98 F

## 2017-11-13 DIAGNOSIS — K57.20 PERFORATED DIVERTICULUM OF LARGE INTESTINE: Primary | ICD-10-CM

## 2017-11-13 PROCEDURE — 96365 THER/PROPH/DIAG IV INF INIT: CPT

## 2017-11-13 RX ORDER — 0.9 % SODIUM CHLORIDE 0.9 %
VIAL (ML) INJECTION
Status: DISCONTINUED
Start: 2017-11-13 | End: 2017-11-13

## 2017-11-13 NOTE — PROGRESS NOTES
Pt here for daily Invanz for diverticulitis. Reports feeling well, mild fatigue,  Denies any abdominal pain/discomfort. PICC present w/ good blood return. Discharged from infusion, ambulating independently w/ steady gait.  Encouraged pt to contact Dr Pineda Daily

## 2017-11-13 NOTE — TELEPHONE ENCOUNTER
Pt requesting a call back with results of the urine test done 11-9-17. Is it ok for pt to call dr Jessica Villalobos. Call pt to advise, ok to leave detailed message.

## 2017-11-14 ENCOUNTER — TELEPHONE (OUTPATIENT)
Dept: SURGERY | Facility: CLINIC | Age: 57
End: 2017-11-14

## 2017-11-14 ENCOUNTER — OFFICE VISIT (OUTPATIENT)
Dept: HEMATOLOGY/ONCOLOGY | Facility: HOSPITAL | Age: 57
End: 2017-11-14
Attending: SURGERY
Payer: COMMERCIAL

## 2017-11-14 VITALS
TEMPERATURE: 97 F | HEART RATE: 80 BPM | SYSTOLIC BLOOD PRESSURE: 150 MMHG | RESPIRATION RATE: 18 BRPM | DIASTOLIC BLOOD PRESSURE: 73 MMHG

## 2017-11-14 DIAGNOSIS — K57.20 PERFORATED DIVERTICULUM OF LARGE INTESTINE: Primary | ICD-10-CM

## 2017-11-14 PROCEDURE — 96365 THER/PROPH/DIAG IV INF INIT: CPT

## 2017-11-14 RX ORDER — 0.9 % SODIUM CHLORIDE 0.9 %
VIAL (ML) INJECTION
Status: DISCONTINUED
Start: 2017-11-14 | End: 2017-11-14

## 2017-11-14 NOTE — TELEPHONE ENCOUNTER
Dr. Lawyer Lim, patient was last seen 11/07 when another 10 days of IV abx was ordered. Ok to discharge PICC? Please advise. Thank you.

## 2017-11-14 NOTE — PROGRESS NOTES
Pt to infusion for daily Invanz for diverticulitis. Pt denies fevers, n/v, diarrhea. States some occasional abdominal cramping. Pt states that she maintains low fiber diet with no seeds/nuts.   Pt states that Thursday is her last infusion and she is not

## 2017-11-14 NOTE — TELEPHONE ENCOUNTER
Pt. States that she needs to get an order to get the pic line removed on Thurs11/16 at the [facility].

## 2017-11-15 ENCOUNTER — OFFICE VISIT (OUTPATIENT)
Dept: HEMATOLOGY/ONCOLOGY | Facility: HOSPITAL | Age: 57
End: 2017-11-15
Attending: SURGERY
Payer: COMMERCIAL

## 2017-11-15 ENCOUNTER — TELEPHONE (OUTPATIENT)
Dept: SURGERY | Facility: CLINIC | Age: 57
End: 2017-11-15

## 2017-11-15 VITALS
SYSTOLIC BLOOD PRESSURE: 144 MMHG | DIASTOLIC BLOOD PRESSURE: 81 MMHG | HEART RATE: 86 BPM | RESPIRATION RATE: 18 BRPM | TEMPERATURE: 98 F

## 2017-11-15 DIAGNOSIS — K57.20 PERFORATED DIVERTICULUM OF LARGE INTESTINE: Primary | ICD-10-CM

## 2017-11-15 PROCEDURE — 96365 THER/PROPH/DIAG IV INF INIT: CPT

## 2017-11-15 RX ORDER — 0.9 % SODIUM CHLORIDE 0.9 %
VIAL (ML) INJECTION
Status: DISCONTINUED
Start: 2017-11-15 | End: 2017-11-15

## 2017-11-15 NOTE — PROGRESS NOTES
Patient to infusion for invanz to treat her diverticulitis with possible perforation/abcess. Pt denies diarrhea. Denies any fever or chills.      PICC line with positive blood return noted. Dressing is clean, dry, intact. Invanz given over 30 min.  Pt jairo

## 2017-11-15 NOTE — TELEPHONE ENCOUNTER
Phoned pt and received her identified voice mail. After verifying in fyi, ok to do so, left detailed message stating 's reply, as outlined below in this encounter, in it's entirety. Clinic call back number provided.

## 2017-11-15 NOTE — TELEPHONE ENCOUNTER
Please refer to TE from 11/13. Pt received message with results from Rn. Has follow up questions regarding results would like to know if any further testing will be needed in the future. Pt states it is ok to leave a detailed message on vm. Please advise. Thank you.

## 2017-11-16 ENCOUNTER — OFFICE VISIT (OUTPATIENT)
Dept: HEMATOLOGY/ONCOLOGY | Facility: HOSPITAL | Age: 57
End: 2017-11-16
Attending: SURGERY
Payer: COMMERCIAL

## 2017-11-16 ENCOUNTER — TELEPHONE (OUTPATIENT)
Dept: SURGERY | Facility: CLINIC | Age: 57
End: 2017-11-16

## 2017-11-16 VITALS
HEART RATE: 84 BPM | SYSTOLIC BLOOD PRESSURE: 149 MMHG | RESPIRATION RATE: 16 BRPM | DIASTOLIC BLOOD PRESSURE: 72 MMHG | TEMPERATURE: 98 F

## 2017-11-16 DIAGNOSIS — K57.20 PERFORATED DIVERTICULUM OF LARGE INTESTINE: Primary | ICD-10-CM

## 2017-11-16 DIAGNOSIS — Z01.818 PREOP EXAMINATION: Primary | ICD-10-CM

## 2017-11-16 DIAGNOSIS — R39.9 SYMPTOM INVOLVING BLADDER: ICD-10-CM

## 2017-11-16 PROCEDURE — 96365 THER/PROPH/DIAG IV INF INIT: CPT

## 2017-11-16 RX ORDER — 0.9 % SODIUM CHLORIDE 0.9 %
VIAL (ML) INJECTION
Status: DISCONTINUED
Start: 2017-11-16 | End: 2017-11-16

## 2017-11-16 NOTE — PROGRESS NOTES
Pt to infusion for daily Invanz for diverticulitis. Pt denies fevers, n/v, diarrhea, cramping. This is last dose of ordered antibiotic. No orders from Dr. Drew Hope office to remove PICC line.   Messages left with office to call infusion for further order

## 2017-11-16 NOTE — TELEPHONE ENCOUNTER
Pt has completed order of additional 10 days of abx. Have not received orders for what to do with picc line going forward. Please fax orders as soon as possible to [phone] for either routine picc line care or removal of line.   Please call [phone]

## 2017-11-17 ENCOUNTER — APPOINTMENT (OUTPATIENT)
Dept: HEMATOLOGY/ONCOLOGY | Facility: HOSPITAL | Age: 57
End: 2017-11-17
Attending: SURGERY
Payer: COMMERCIAL

## 2017-11-17 ENCOUNTER — NURSE ONLY (OUTPATIENT)
Dept: HEMATOLOGY/ONCOLOGY | Facility: HOSPITAL | Age: 57
End: 2017-11-17
Attending: HOSPITALIST
Payer: COMMERCIAL

## 2017-11-17 ENCOUNTER — TELEPHONE (OUTPATIENT)
Dept: SURGERY | Facility: CLINIC | Age: 57
End: 2017-11-17

## 2017-11-17 VITALS
HEART RATE: 72 BPM | RESPIRATION RATE: 16 BRPM | SYSTOLIC BLOOD PRESSURE: 144 MMHG | TEMPERATURE: 97 F | DIASTOLIC BLOOD PRESSURE: 84 MMHG

## 2017-11-17 PROCEDURE — 99211 OFF/OP EST MAY X REQ PHY/QHP: CPT

## 2017-11-17 NOTE — TELEPHONE ENCOUNTER
Patient phoned this morning stating that at this time she is to busy to proceed with surgery and would like to do it next year sometime will call back when available.

## 2017-11-17 NOTE — TELEPHONE ENCOUNTER
pt called,  She sent an email canceling a procedure. She would like to Keep the 11/22/17 procedure. She is just very nervous about this procedure. Please call with the time. Ok to leave detailed message.

## 2017-11-17 NOTE — PROGRESS NOTES
Patient arrived to infusion for PICC line removal.  Ambulated from waiting room with steady gait. PICC line removed per physician order. Catheter length 43cm w/ clean cut tip. No bleeding at site. Manual pressure held for 5 minutes.   Then a pressure dress

## 2017-11-17 NOTE — TELEPHONE ENCOUNTER
Order to discontinue and remove PICC line sent to Infusion at [phone]. [phone]: Received fax confirmation receipt, status \"success\" from [phone]. Documents sent to scan.

## 2017-11-20 ENCOUNTER — TELEPHONE (OUTPATIENT)
Dept: SURGERY | Facility: CLINIC | Age: 57
End: 2017-11-20

## 2017-11-20 DIAGNOSIS — N13.2 HYDRONEPHROSIS WITH RENAL AND URETERAL CALCULUS OBSTRUCTION: Primary | ICD-10-CM

## 2017-11-20 NOTE — TELEPHONE ENCOUNTER
Please explained to the patient that I would not recommend waiting until next year because the etiology or reason for her right-sided hydronephrosis is not clear and may be malignant in nature or cancerous.   As such I  would recommend proceeding with the

## 2017-11-20 NOTE — TELEPHONE ENCOUNTER
pt called. Following up on rescheduling  11/22/17 surgery.  (please refer to 11/17/17 Email)  OK to leave a detailed message. Thank you.

## 2017-11-20 NOTE — TELEPHONE ENCOUNTER
I spoke with pt and she stated that she had cxld the procedure with whoever she talked to because she was shocked to her her say that she was calling to schd her for surgery. She states that she wants to reschd it now. I found a msg from [facility] [place] that explained that pt cannot wait for a year to have this procedure d/t the hydro and the fact that malignancy needs to be r/o ASAP. She said that she was speaking to her brother in law about it who is a psychiatrist and he stated that she should proceed with this surgery ASAP also. I told her that I will send this msg to Bakari Ballesteros the surgery schdr. And have her call her to reschd. Tasked to Bakari Ballesteros and also notified her about this by phone.

## 2017-11-21 ENCOUNTER — PATIENT MESSAGE (OUTPATIENT)
Dept: SURGERY | Facility: CLINIC | Age: 57
End: 2017-11-21

## 2017-11-22 NOTE — TELEPHONE ENCOUNTER
Spoke with patient scheduled for Wednesday 12/13/17@ 7:30 at [facility]/outpatient informed patient of pre-op/labs I would mail out.

## 2017-11-24 NOTE — TELEPHONE ENCOUNTER
From: [place] Rames[place]  To: Salomon Saleh MD  Sent: 11/21/2017 10:33 AM CST  Subject: Non-Urgent Medical Question    Do you know when I can have the procedure done the one you wanted me to do on Nov. 22nd. with my kidney?

## 2017-12-01 ENCOUNTER — TELEPHONE (OUTPATIENT)
Dept: SURGERY | Facility: CLINIC | Age: 57
End: 2017-12-01

## 2017-12-05 ENCOUNTER — LAB ENCOUNTER (OUTPATIENT)
Dept: LAB | Facility: HOSPITAL | Age: 57
End: 2017-12-05
Attending: UROLOGY
Payer: COMMERCIAL

## 2017-12-05 ENCOUNTER — APPOINTMENT (OUTPATIENT)
Dept: LAB | Facility: HOSPITAL | Age: 57
End: 2017-12-05
Attending: UROLOGY
Payer: COMMERCIAL

## 2017-12-05 DIAGNOSIS — Z01.818 PREOP EXAMINATION: ICD-10-CM

## 2017-12-05 DIAGNOSIS — R10.31 RIGHT LOWER QUADRANT ABDOMINAL PAIN: ICD-10-CM

## 2017-12-05 DIAGNOSIS — R39.9 SYMPTOM INVOLVING BLADDER: ICD-10-CM

## 2017-12-05 PROCEDURE — 80053 COMPREHEN METABOLIC PANEL: CPT

## 2017-12-05 PROCEDURE — 93010 ELECTROCARDIOGRAM REPORT: CPT | Performed by: UROLOGY

## 2017-12-05 PROCEDURE — 93005 ELECTROCARDIOGRAM TRACING: CPT

## 2017-12-05 PROCEDURE — 36415 COLL VENOUS BLD VENIPUNCTURE: CPT

## 2017-12-05 PROCEDURE — 87086 URINE CULTURE/COLONY COUNT: CPT

## 2017-12-05 PROCEDURE — 85025 COMPLETE CBC W/AUTO DIFF WBC: CPT

## 2017-12-06 ENCOUNTER — TELEPHONE (OUTPATIENT)
Dept: GASTROENTEROLOGY | Facility: CLINIC | Age: 57
End: 2017-12-06

## 2017-12-06 DIAGNOSIS — R93.89 ABNORMAL CT SCAN: Primary | ICD-10-CM

## 2017-12-06 DIAGNOSIS — Z87.19 HISTORY OF DIVERTICULITIS: ICD-10-CM

## 2017-12-06 NOTE — TELEPHONE ENCOUNTER
CBLM to reschedule procedure. Please transfer to [facility] at ext [phone] or [phone] for scheduling. Or please transfer to [facility] in GI if unavailable.

## 2017-12-07 ENCOUNTER — PATIENT MESSAGE (OUTPATIENT)
Dept: INTERNAL MEDICINE CLINIC | Facility: CLINIC | Age: 57
End: 2017-12-07

## 2017-12-07 NOTE — TELEPHONE ENCOUNTER
Dr. Wes Haddad - I spoke with this pt & she is unsure when she will be \"ok\" to come in for her CLN. She is having a procedure done next week with Dr. Ron Donato in Urology.  Pt would like to know if there is a certain amount of time she should be waiting after that

## 2017-12-07 NOTE — TELEPHONE ENCOUNTER
From: Juan Luis Miranda  To: Jaky Lindsay MD  Sent: 12/7/2017 9:29 AM CST  Subject: Non-Urgent Medical Question    Good cold morning Maelen. What is the number of my blood sugar?    so my kidneys are okay?

## 2017-12-11 NOTE — TELEPHONE ENCOUNTER
Scheduled for:  Colonoscopy [id number]  Provider Name: Dr. Afia Paulson  Date:  2/20/18  Location:  [address] [place] [address] 1  Sedation:  MAC  Time:  9811 (pt is aware to arrive at 1430)   Prep:  2 day Miralax/Gatorade, mailed 12/11/17  Meds/Allergies Reconciled?: Physician reviewed   Diag

## 2017-12-13 ENCOUNTER — ANESTHESIA (OUTPATIENT)
Dept: SURGERY | Facility: HOSPITAL | Age: 57
End: 2017-12-13
Payer: COMMERCIAL

## 2017-12-13 ENCOUNTER — ANESTHESIA EVENT (OUTPATIENT)
Dept: SURGERY | Facility: HOSPITAL | Age: 57
End: 2017-12-13
Payer: COMMERCIAL

## 2017-12-13 ENCOUNTER — HOSPITAL ENCOUNTER (OUTPATIENT)
Facility: HOSPITAL | Age: 57
Setting detail: HOSPITAL OUTPATIENT SURGERY
Discharge: HOME | End: 2017-12-13
Attending: UROLOGY | Admitting: UROLOGY
Payer: COMMERCIAL

## 2017-12-13 ENCOUNTER — APPOINTMENT (OUTPATIENT)
Dept: GENERAL RADIOLOGY | Facility: HOSPITAL | Age: 57
End: 2017-12-13
Attending: UROLOGY
Payer: COMMERCIAL

## 2017-12-13 ENCOUNTER — SURGERY (OUTPATIENT)
Age: 57
End: 2017-12-13

## 2017-12-13 VITALS
SYSTOLIC BLOOD PRESSURE: 137 MMHG | WEIGHT: 153.5 LBS | OXYGEN SATURATION: 95 % | RESPIRATION RATE: 16 BRPM | HEART RATE: 74 BPM | DIASTOLIC BLOOD PRESSURE: 73 MMHG | TEMPERATURE: 98 F | HEIGHT: 61 IN | BODY MASS INDEX: 28.98 KG/M2

## 2017-12-13 DIAGNOSIS — N13.2 HYDRONEPHROSIS WITH RENAL AND URETERAL CALCULUS OBSTRUCTION: ICD-10-CM

## 2017-12-13 DIAGNOSIS — N13.30 HYDRONEPHROSIS OF RIGHT KIDNEY: Primary | ICD-10-CM

## 2017-12-13 PROCEDURE — 74420 UROGRAPHY RTRGR +-KUB: CPT | Performed by: UROLOGY

## 2017-12-13 PROCEDURE — BT1D1ZZ FLUOROSCOPY OF RIGHT KIDNEY, URETER AND BLADDER USING LOW OSMOLAR CONTRAST: ICD-10-PCS | Performed by: UROLOGY

## 2017-12-13 PROCEDURE — 52000 CYSTOURETHROSCOPY: CPT | Performed by: UROLOGY

## 2017-12-13 RX ORDER — HYDROMORPHONE HYDROCHLORIDE 1 MG/ML
0.2 INJECTION, SOLUTION INTRAMUSCULAR; INTRAVENOUS; SUBCUTANEOUS EVERY 5 MIN PRN
Status: DISCONTINUED | OUTPATIENT
Start: 2017-12-13 | End: 2017-12-13

## 2017-12-13 RX ORDER — NALOXONE HYDROCHLORIDE 0.4 MG/ML
80 INJECTION, SOLUTION INTRAMUSCULAR; INTRAVENOUS; SUBCUTANEOUS AS NEEDED
Status: DISCONTINUED | OUTPATIENT
Start: 2017-12-13 | End: 2017-12-13

## 2017-12-13 RX ORDER — SODIUM CHLORIDE, SODIUM LACTATE, POTASSIUM CHLORIDE, CALCIUM CHLORIDE 600; 310; 30; 20 MG/100ML; MG/100ML; MG/100ML; MG/100ML
INJECTION, SOLUTION INTRAVENOUS CONTINUOUS
Status: DISCONTINUED | OUTPATIENT
Start: 2017-12-13 | End: 2017-12-13

## 2017-12-13 RX ORDER — HYDROMORPHONE HYDROCHLORIDE 1 MG/ML
0.4 INJECTION, SOLUTION INTRAMUSCULAR; INTRAVENOUS; SUBCUTANEOUS EVERY 5 MIN PRN
Status: DISCONTINUED | OUTPATIENT
Start: 2017-12-13 | End: 2017-12-13

## 2017-12-13 RX ORDER — METOCLOPRAMIDE 10 MG/1
10 TABLET ORAL ONCE
Status: DISCONTINUED | OUTPATIENT
Start: 2017-12-13 | End: 2017-12-13 | Stop reason: HOSPADM

## 2017-12-13 RX ORDER — FAMOTIDINE 20 MG/1
20 TABLET ORAL ONCE
Status: DISCONTINUED | OUTPATIENT
Start: 2017-12-13 | End: 2017-12-13 | Stop reason: HOSPADM

## 2017-12-13 RX ORDER — MORPHINE SULFATE 4 MG/ML
4 INJECTION, SOLUTION INTRAMUSCULAR; INTRAVENOUS EVERY 10 MIN PRN
Status: DISCONTINUED | OUTPATIENT
Start: 2017-12-13 | End: 2017-12-13

## 2017-12-13 RX ORDER — PHENAZOPYRIDINE HYDROCHLORIDE 200 MG/1
200 TABLET, FILM COATED ORAL 3 TIMES DAILY PRN
Qty: 10 TABLET | Refills: 0 | Status: SHIPPED | OUTPATIENT
Start: 2017-12-13 | End: 2018-05-07

## 2017-12-13 RX ORDER — HYDROCODONE BITARTRATE AND ACETAMINOPHEN 5; 325 MG/1; MG/1
1 TABLET ORAL AS NEEDED
Status: DISCONTINUED | OUTPATIENT
Start: 2017-12-13 | End: 2017-12-13

## 2017-12-13 RX ORDER — ONDANSETRON 2 MG/ML
4 INJECTION INTRAMUSCULAR; INTRAVENOUS ONCE AS NEEDED
Status: DISCONTINUED | OUTPATIENT
Start: 2017-12-13 | End: 2017-12-13

## 2017-12-13 RX ORDER — LIDOCAINE HYDROCHLORIDE 20 MG/ML
JELLY TOPICAL AS NEEDED
Status: DISCONTINUED | OUTPATIENT
Start: 2017-12-13 | End: 2017-12-13 | Stop reason: HOSPADM

## 2017-12-13 RX ORDER — SULFAMETHOXAZOLE AND TRIMETHOPRIM 800; 160 MG/1; MG/1
1 TABLET ORAL 2 TIMES DAILY
Qty: 6 TABLET | Refills: 0 | Status: SHIPPED | OUTPATIENT
Start: 2017-12-14 | End: 2017-12-17

## 2017-12-13 RX ORDER — ONDANSETRON 2 MG/ML
INJECTION INTRAMUSCULAR; INTRAVENOUS AS NEEDED
Status: DISCONTINUED | OUTPATIENT
Start: 2017-12-13 | End: 2017-12-13 | Stop reason: SURG

## 2017-12-13 RX ORDER — MORPHINE SULFATE 2 MG/ML
2 INJECTION, SOLUTION INTRAMUSCULAR; INTRAVENOUS EVERY 10 MIN PRN
Status: DISCONTINUED | OUTPATIENT
Start: 2017-12-13 | End: 2017-12-13

## 2017-12-13 RX ORDER — HYDROMORPHONE HYDROCHLORIDE 1 MG/ML
0.6 INJECTION, SOLUTION INTRAMUSCULAR; INTRAVENOUS; SUBCUTANEOUS EVERY 5 MIN PRN
Status: DISCONTINUED | OUTPATIENT
Start: 2017-12-13 | End: 2017-12-13

## 2017-12-13 RX ORDER — LIDOCAINE HYDROCHLORIDE 10 MG/ML
INJECTION, SOLUTION EPIDURAL; INFILTRATION; INTRACAUDAL; PERINEURAL AS NEEDED
Status: DISCONTINUED | OUTPATIENT
Start: 2017-12-13 | End: 2017-12-13 | Stop reason: SURG

## 2017-12-13 RX ORDER — PHENAZOPYRIDINE HYDROCHLORIDE 200 MG/1
200 TABLET, FILM COATED ORAL ONCE
Status: COMPLETED | OUTPATIENT
Start: 2017-12-13 | End: 2017-12-13

## 2017-12-13 RX ORDER — LEVOFLOXACIN 5 MG/ML
500 INJECTION, SOLUTION INTRAVENOUS ONCE
Status: COMPLETED | OUTPATIENT
Start: 2017-12-13 | End: 2017-12-13

## 2017-12-13 RX ORDER — DEXAMETHASONE SODIUM PHOSPHATE 4 MG/ML
VIAL (ML) INJECTION AS NEEDED
Status: DISCONTINUED | OUTPATIENT
Start: 2017-12-13 | End: 2017-12-13 | Stop reason: SURG

## 2017-12-13 RX ORDER — HALOPERIDOL 5 MG/ML
0.25 INJECTION INTRAMUSCULAR ONCE AS NEEDED
Status: DISCONTINUED | OUTPATIENT
Start: 2017-12-13 | End: 2017-12-13

## 2017-12-13 RX ORDER — ACETAMINOPHEN 500 MG
1000 TABLET ORAL ONCE
Status: COMPLETED | OUTPATIENT
Start: 2017-12-13 | End: 2017-12-13

## 2017-12-13 RX ORDER — MORPHINE SULFATE 10 MG/ML
6 INJECTION, SOLUTION INTRAMUSCULAR; INTRAVENOUS EVERY 10 MIN PRN
Status: DISCONTINUED | OUTPATIENT
Start: 2017-12-13 | End: 2017-12-13

## 2017-12-13 RX ORDER — MIDAZOLAM HYDROCHLORIDE 1 MG/ML
INJECTION INTRAMUSCULAR; INTRAVENOUS AS NEEDED
Status: DISCONTINUED | OUTPATIENT
Start: 2017-12-13 | End: 2017-12-13 | Stop reason: SURG

## 2017-12-13 RX ORDER — HYDROCODONE BITARTRATE AND ACETAMINOPHEN 5; 325 MG/1; MG/1
2 TABLET ORAL AS NEEDED
Status: DISCONTINUED | OUTPATIENT
Start: 2017-12-13 | End: 2017-12-13

## 2017-12-13 RX ADMIN — MIDAZOLAM HYDROCHLORIDE 2 MG: 1 INJECTION INTRAMUSCULAR; INTRAVENOUS at 07:46:00

## 2017-12-13 RX ADMIN — ONDANSETRON 4 MG: 2 INJECTION INTRAMUSCULAR; INTRAVENOUS at 07:52:00

## 2017-12-13 RX ADMIN — LEVOFLOXACIN 500 MG: 5 INJECTION, SOLUTION INTRAVENOUS at 07:50:00

## 2017-12-13 RX ADMIN — SODIUM CHLORIDE, SODIUM LACTATE, POTASSIUM CHLORIDE, CALCIUM CHLORIDE: 600; 310; 30; 20 INJECTION, SOLUTION INTRAVENOUS at 08:25:00

## 2017-12-13 RX ADMIN — LIDOCAINE HYDROCHLORIDE 50 MG: 10 INJECTION, SOLUTION EPIDURAL; INFILTRATION; INTRACAUDAL; PERINEURAL at 07:46:00

## 2017-12-13 RX ADMIN — DEXAMETHASONE SODIUM PHOSPHATE 4 MG: 4 MG/ML VIAL (ML) INJECTION at 07:52:00

## 2017-12-13 RX ADMIN — SODIUM CHLORIDE, SODIUM LACTATE, POTASSIUM CHLORIDE, CALCIUM CHLORIDE: 600; 310; 30; 20 INJECTION, SOLUTION INTRAVENOUS at 07:42:00

## 2017-12-13 NOTE — ANESTHESIA PREPROCEDURE EVALUATION
Anesthesia PreOp Note    HPI:     Teddy Marin is a 59year old female who presents for preoperative consultation requested by:  Gabby Arizmendi MD    Date of Surgery: 12/13/2017    Procedure(s):  CYSTOSCOPY  CYSTOSCOPY RETROGRADE  CYSTOSCOPY URETEROSCOPY LEFT  No date:       Comment: x3 w/PPTL  No date: OTHER SURGICAL HISTORY  2013: OTHER SURGICAL HISTORY Right      Comment: thigh cyst removal  2006: PESSARY  1970: TONSILLECTOMY  1992: TUBAL LIGATION      Prescriptions Prior to Admission:  ENALAPRIL MA sulfate (PF) 2 MG/ML injection 2 mg 2 mg Intravenous Q10 Min PRN Ashley Perez MD   morphINE sulfate (PF) 4 MG/ML injection 4 mg 4 mg Intravenous Q10 Min PRN Ashley Perez MD   morphINE sulfate (PF) 10 MG/ML injection 6 mg 6 mg Intravenous Q10 Mi protection: Tubal Ligation     Other Topics Concern    Caffeine Concern Yes    Comment: 16oz soda chocolate daily    Pt has a pacemaker No    Pt has a defibrillator No    Reaction to local anesthetic No     Social History Narrative   None on file       Jennie to the best of my ability. The patient desires the anesthetic management as planned.   Tracey Ott  12/13/2017 7:03 AM

## 2017-12-13 NOTE — H&P
49-year-old female who I seen in consultation at [facility] October 18 October 19, 2017 right-sided hydroureteronephrosis. The patient was admitted for diverticulitis with a perforation and abscess without bleeding.   CT scan of the abdomen Fibroids 2003   • Hemorrhoids 2012     per ng colonoscopy   • High blood pressure     • High cholesterol     • History of bone density study 12/2004     \"normal dexa '04, '07\"   • Postmenopausal bleeding 2007     per ng neg endometrial biopsy   • Tonsill Ophthalmic Solution 1 drop to both eyes daily as needed Disp:  Rfl: 3   acetaminophen (TYLENOL) 325 MG Oral Tab Take 325 mg by mouth every 6 (six) hours as needed for Pain.  Disp:  Rfl:    loratadine (CLARITIN) 10 MG Oral Tab Take 10 mg by mouth daily as ne

## 2017-12-13 NOTE — INTERVAL H&P NOTE
Pre-op Diagnosis: Hydronephrosis with renal and ureteral calculus obstruction [N13.2]    The above referenced H&P was reviewed by Susannah Quinones MD on 12/13/2017, the patient was examined and no significant changes have occurred in the patient's condition

## 2017-12-13 NOTE — ANESTHESIA POSTPROCEDURE EVALUATION
Patient: Huan May    Procedure Summary     Date:  12/13/17 Room / Location:  [address] [place] [address] MAIN OR 14 / [address] [place] [address] MAIN OR    Anesthesia Start:  9270 Anesthesia Stop:  4564    Procedure:  CYSTOSCOPY (N/A ) Diagnosis:       Hydronephrosis with renal and ureteral calcu

## 2017-12-13 NOTE — OPERATIVE REPORT
[facility]    Operative Note     Kathrine Carrel Location: OR   [facility] [id number] MRN [id number]   Admission Date 12/13/2017 Operation Date 12/13/2017   Attending Physician Princess Kaity MD Operating Physician Duane Parker MD      Preoperati mild hydronephrosis on the right side. I believe the hydronephrosis is related to extrinsic pressure placed on the right ureter from the cystocele and possibly also related to the use of a pessary as the patient had on previous imaging studies.     The vincent

## 2017-12-14 ENCOUNTER — TELEPHONE (OUTPATIENT)
Dept: SURGERY | Facility: CLINIC | Age: 57
End: 2017-12-14

## 2017-12-14 NOTE — TELEPHONE ENCOUNTER
Spoke to patient. Patient c/o right lower back pain, describes it as dull, comes and goes, walking makes it better, does not radiate. Patient is 1 day s/p CYSTOSCOPY, RIGHT RETROGRADE PYELOGRAM, Dx; right hydronephrosis. Informed patient lower right back pain may be from yesterday's procedure. Informed patient to take antibiotic as prescribed, answered all questions about medications. Patient verbalized understanding. Dr. Enzo Obando, please advise with any further direction. Please see yesterday's Retrograde pyelogram; conclusion is copied and pasted below. Can patient take ibuprofen? CONCLUSION:   1. Mild right renal pelviectasis.  Otherwise normal.

## 2017-12-14 NOTE — TELEPHONE ENCOUNTER
Pt calling back - pls call - pt states she has back pain - can pt take claritin - has ques about pain med and blood thinner -

## 2017-12-15 ENCOUNTER — TELEPHONE (OUTPATIENT)
Dept: SURGERY | Facility: CLINIC | Age: 57
End: 2017-12-15

## 2017-12-15 NOTE — TELEPHONE ENCOUNTER
Pt. wants to know if it is ok to take Miralax Laxative for constipation and how much to take.? Pt. States that she gives the RN consent to leave her a detailed message with answer, in case she is not able to answer the call.

## 2017-12-15 NOTE — TELEPHONE ENCOUNTER
Patient called asking if she can take laxative. Per Dr. Stacie Strong, informed patient, that yes she can take laxative such as Miralax or Colace. Informed patient to only take recommended amount as printed on packaging. Patient verbalized understanding.  Informed

## 2018-01-09 ENCOUNTER — TELEPHONE (OUTPATIENT)
Dept: GASTROENTEROLOGY | Facility: CLINIC | Age: 58
End: 2018-01-09

## 2018-01-09 DIAGNOSIS — Z87.19 HISTORY OF DIVERTICULITIS: ICD-10-CM

## 2018-01-09 DIAGNOSIS — R93.5 ABNORMAL CT OF THE ABDOMEN: Primary | ICD-10-CM

## 2018-01-09 NOTE — TELEPHONE ENCOUNTER
LMTCB  informing patient [facility] does not have morning appt on Tuesdays, but Wednesdays 2-21-18 is available.

## 2018-01-09 NOTE — TELEPHONE ENCOUNTER
Pt asking if there are morning times available for CLN at [address] [place] [address] office on 2/20 or another day - pt is scheduled for afternoon on 2/20

## 2018-01-10 NOTE — TELEPHONE ENCOUNTER
Patient states her  works nights so an early afternoon appointment would be best. Patients appointment moved up to 1:30 same day     Scheduled for:  Colonoscopy 50632  Provider Name: Dr. Willi Morales  Date:  2/20/18  Location:  [facility]  Sedation:  MAC  Time:

## 2018-01-16 ENCOUNTER — TELEPHONE (OUTPATIENT)
Dept: SURGERY | Facility: CLINIC | Age: 58
End: 2018-01-16

## 2018-01-16 NOTE — TELEPHONE ENCOUNTER
Pt had cysto on 12/13, asking if she needs to make f/u appt? States she feels completely fine, pt is scheduled for colonoscopy on 2/20. Pls advise thank you. Ok to leave detailed VM.

## 2018-01-22 NOTE — TELEPHONE ENCOUNTER
Dr. Atilio Choi, please see pt's question about a need for a f/u apt below and advise. Tasked to [facility] Aneudy LILLY IN again.

## 2018-01-23 NOTE — PROGRESS NOTES
Pt to infusion for daily Invanz for diverticulitis. Arrived ambulating independently. Reports feeling well, c/o heel pain for which she is receiving PT. Denies any abdominal pain/discomfort.  PICC dressing changed using sterile technique, line flushing well
present x 4 quadrants

## 2018-02-12 ENCOUNTER — TELEPHONE (OUTPATIENT)
Dept: GASTROENTEROLOGY | Facility: CLINIC | Age: 58
End: 2018-02-12

## 2018-02-12 NOTE — TELEPHONE ENCOUNTER
Spoke to pt, reviewed pt procedure date 2/20 and time 1:30pm w/ 12:30pm arrival time. Reviewed miralax prep instructions. Pt understands and has no further questions at this time.

## 2018-02-12 NOTE — TELEPHONE ENCOUNTER
Phone room transferred Harman Aponte from [place], wanting to know where procedure was being done. I gave tax ID, NPI of Dr Peter Phillips, also explained the same tax ID used for clinic and our [facility].  I gave address of [address] [place] [address]

## 2018-02-17 ENCOUNTER — TELEPHONE (OUTPATIENT)
Dept: GASTROENTEROLOGY | Facility: CLINIC | Age: 58
End: 2018-02-17

## 2018-02-19 ENCOUNTER — TELEPHONE (OUTPATIENT)
Dept: GASTROENTEROLOGY | Facility: CLINIC | Age: 58
End: 2018-02-19

## 2018-02-19 NOTE — TELEPHONE ENCOUNTER
Pt has CLN scheduled for tomorrow and would like to know when she should stop taking Rx Meloxicam. Pt states she takes medication as needed for ankle pain. Pt took medication yesterday.  Please advise

## 2018-02-19 NOTE — TELEPHONE ENCOUNTER
Spoke to pt. Informed pt to hold Meloxicam until after the procedure. Pt inquiring what can help with ankle pain advised pt can use hot and or cold compress to help with ankle pain. Pt verbalized understanding with no further questions at this time.

## 2018-02-20 ENCOUNTER — HOSPITAL ENCOUNTER (OUTPATIENT)
Age: 58
Setting detail: HOSPITAL OUTPATIENT SURGERY
Discharge: HOME | End: 2018-02-20
Attending: INTERNAL MEDICINE | Admitting: INTERNAL MEDICINE
Payer: COMMERCIAL

## 2018-02-20 ENCOUNTER — ANESTHESIA (OUTPATIENT)
Dept: ENDOSCOPY | Age: 58
End: 2018-02-20
Payer: COMMERCIAL

## 2018-02-20 ENCOUNTER — SURGERY (OUTPATIENT)
Age: 58
End: 2018-02-20

## 2018-02-20 ENCOUNTER — ANESTHESIA EVENT (OUTPATIENT)
Dept: ENDOSCOPY | Age: 58
End: 2018-02-20
Payer: COMMERCIAL

## 2018-02-20 DIAGNOSIS — Z87.19 HISTORY OF DIVERTICULITIS: ICD-10-CM

## 2018-02-20 DIAGNOSIS — R93.89 ABNORMAL CT SCAN: ICD-10-CM

## 2018-02-20 PROBLEM — K57.30 DIVERTICULOSIS LARGE INTESTINE W/O PERFORATION OR ABSCESS W/O BLEEDING: Status: ACTIVE | Noted: 2017-10-18

## 2018-02-20 PROBLEM — K64.8 INTERNAL HEMORRHOIDS: Status: ACTIVE | Noted: 2018-02-20

## 2018-02-20 PROCEDURE — 45378 DIAGNOSTIC COLONOSCOPY: CPT | Performed by: INTERNAL MEDICINE

## 2018-02-20 RX ORDER — SODIUM CHLORIDE, SODIUM LACTATE, POTASSIUM CHLORIDE, CALCIUM CHLORIDE 600; 310; 30; 20 MG/100ML; MG/100ML; MG/100ML; MG/100ML
INJECTION, SOLUTION INTRAVENOUS CONTINUOUS PRN
Status: DISCONTINUED | OUTPATIENT
Start: 2018-02-20 | End: 2018-02-20 | Stop reason: SURG

## 2018-02-20 RX ORDER — SODIUM CHLORIDE, SODIUM LACTATE, POTASSIUM CHLORIDE, CALCIUM CHLORIDE 600; 310; 30; 20 MG/100ML; MG/100ML; MG/100ML; MG/100ML
INJECTION, SOLUTION INTRAVENOUS CONTINUOUS
Status: CANCELLED | OUTPATIENT
Start: 2018-02-20

## 2018-02-20 RX ORDER — MELOXICAM 15 MG/1
15 TABLET ORAL DAILY PRN
Status: ON HOLD | COMMUNITY
End: 2018-12-06

## 2018-02-20 RX ORDER — NALOXONE HYDROCHLORIDE 0.4 MG/ML
80 INJECTION, SOLUTION INTRAMUSCULAR; INTRAVENOUS; SUBCUTANEOUS AS NEEDED
Status: CANCELLED | OUTPATIENT
Start: 2018-02-20 | End: 2018-02-20

## 2018-02-20 RX ORDER — LIDOCAINE HYDROCHLORIDE 10 MG/ML
INJECTION, SOLUTION EPIDURAL; INFILTRATION; INTRACAUDAL; PERINEURAL AS NEEDED
Status: DISCONTINUED | OUTPATIENT
Start: 2018-02-20 | End: 2018-02-20 | Stop reason: SURG

## 2018-02-20 RX ADMIN — SODIUM CHLORIDE, SODIUM LACTATE, POTASSIUM CHLORIDE, CALCIUM CHLORIDE: 600; 310; 30; 20 INJECTION, SOLUTION INTRAVENOUS at 13:43:00

## 2018-02-20 RX ADMIN — SODIUM CHLORIDE, SODIUM LACTATE, POTASSIUM CHLORIDE, CALCIUM CHLORIDE: 600; 310; 30; 20 INJECTION, SOLUTION INTRAVENOUS at 13:36:00

## 2018-02-20 RX ADMIN — SODIUM CHLORIDE, SODIUM LACTATE, POTASSIUM CHLORIDE, CALCIUM CHLORIDE: 600; 310; 30; 20 INJECTION, SOLUTION INTRAVENOUS at 13:48:00

## 2018-02-20 RX ADMIN — LIDOCAINE HYDROCHLORIDE 25 MG: 10 INJECTION, SOLUTION EPIDURAL; INFILTRATION; INTRACAUDAL; PERINEURAL at 13:36:00

## 2018-02-20 NOTE — ANESTHESIA POSTPROCEDURE EVALUATION
Patient: Tenzin Davis    Procedure Summary     Date:  02/20/18 Room / Location:  [facility] ENDOSCOPY 01 / [facility] ENDO    Anesthesia Start:  1461 Anesthesia Stop:  1400    Procedure:  COLONOSCOPY (N/A ) Diagnosis:       Abnormal CT scan      History of diverti

## 2018-02-20 NOTE — ANESTHESIA PREPROCEDURE EVALUATION
Anesthesia PreOp Note    HPI:     Andrés Armendariz is a 59year old female who presents for preoperative consultation requested by: Mary Lou Rivas MD    Date of Surgery: 2/20/2018    Procedure(s):  COLONOSCOPY  Indication: Abnormal CT scan  Hist SURGICAL HISTORY Right      Comment: thigh cyst removal  2006: PESSARY  1970: TONSILLECTOMY  1992: TUBAL LIGATION      Prescriptions Prior to Admission:  Meloxicam 15 MG Oral Tab Take 15 mg by mouth daily.  Disp:  Rfl:  2/18/2018   Phenazopyridine HCl (PYRI Smoking status: Never Smoker    Smokeless tobacco: Never Used    Alcohol use No    Drug use: No    Sexual activity: Yes    Birth control/ protection: Tubal Ligation     Other Topics Concern    Caffeine Concern Yes    Comment: 16oz soda chocolate daily    P management. All of the patient's questions were answered to the best of my ability. The patient desires the anesthetic management as planned.   Marjorie Walker  2/20/2018 1:14 PM

## 2018-02-20 NOTE — OPERATIVE REPORT
[facility]    PATIENT'S NAME: Dora Morrison PHYSICIAN: Ruthie Huertas MD   OPERATING PHYSICIAN: Ruthie Huertas MD   PATIENT ACCOUNT#:   [id number]    LOCATION:  [facility] ENDO POOL [address] symptoms develop in the interim.     Dictated By Chris Roberts MD  d: 02/20/2018 14:04:41  t: 02/20/2018 14:59:43  [facility] [id number]  [facility]/

## 2018-02-20 NOTE — BRIEF OP NOTE
Pre-Operative Diagnosis: Abnormal CT scan  History of diverticulitis     Post-Operative Diagnosis: Diverticulosis, internal hemorrhoids     Procedure Performed:   Procedure(s):  COLONOSCOPY    Surgeon(s) and Role:     * Felicia Mora MD -

## 2018-02-20 NOTE — H&P
History & Physical Examination    Patient Name: Freddy Head  MRN: [id number]  CSN: [id number]  YOB: 1960    Diagnosis: Abnormal CT scan, history of diverticulitis      Prescriptions Prior to Admission:  Meloxicam 15 MG Oral Tab Take 15 mg HISTORY  2013: OTHER SURGICAL HISTORY Right      Comment: thigh cyst removal  2006: PESSARY  1970: TONSILLECTOMY  1992: TUBAL LIGATION  Family History   Problem Relation Age of Onset   • Diabetes Mother      type 2   • Lipids Mother      hyperlipidemia   •

## 2018-02-21 VITALS
HEIGHT: 61 IN | OXYGEN SATURATION: 97 % | WEIGHT: 153 LBS | DIASTOLIC BLOOD PRESSURE: 73 MMHG | HEART RATE: 75 BPM | BODY MASS INDEX: 28.89 KG/M2 | RESPIRATION RATE: 14 BRPM | SYSTOLIC BLOOD PRESSURE: 123 MMHG

## 2018-02-23 NOTE — TELEPHONE ENCOUNTER
Pt called requesting refill   Pt states pharmacy will not request  Pt almost out    ENALAPRIL MALEATE 10 MG Oral Tab

## 2018-02-26 NOTE — TELEPHONE ENCOUNTER
Entered into EPIC:Recall colon in 10 years per Dr. Raymond Prince. Last Colon done 2/20/18, next due 2/20/28. Snapshot updated.

## 2018-02-27 NOTE — TELEPHONE ENCOUNTER
Please advise. Last seen 10/30/17. Has multiple issues.     Hypertensive Medications  Protocol Criteria:  · Appointment scheduled in the past 6 months or in the next 3 months  · BMP or CMP in the past 12 months  · Creatinine result < 2  Recent Outpatien

## 2018-02-28 RX ORDER — ENALAPRIL MALEATE 10 MG/1
10 TABLET ORAL
Qty: 90 TABLET | Refills: 1 | Status: SHIPPED | OUTPATIENT
Start: 2018-02-28 | End: 2018-06-02

## 2018-05-07 ENCOUNTER — OFFICE VISIT (OUTPATIENT)
Dept: INTERNAL MEDICINE CLINIC | Facility: CLINIC | Age: 58
End: 2018-05-07

## 2018-05-07 VITALS
WEIGHT: 160.81 LBS | BODY MASS INDEX: 30.36 KG/M2 | SYSTOLIC BLOOD PRESSURE: 126 MMHG | HEIGHT: 61 IN | HEART RATE: 86 BPM | TEMPERATURE: 99 F | DIASTOLIC BLOOD PRESSURE: 80 MMHG

## 2018-05-07 DIAGNOSIS — I10 ESSENTIAL HYPERTENSION: Primary | ICD-10-CM

## 2018-05-07 DIAGNOSIS — R73.9 HYPERGLYCEMIA: ICD-10-CM

## 2018-05-07 DIAGNOSIS — K57.30 DIVERTICULOSIS LARGE INTESTINE W/O PERFORATION OR ABSCESS W/O BLEEDING: ICD-10-CM

## 2018-05-07 DIAGNOSIS — E78.5 HYPERLIPIDEMIA, UNSPECIFIED HYPERLIPIDEMIA TYPE: ICD-10-CM

## 2018-05-07 PROCEDURE — 99212 OFFICE O/P EST SF 10 MIN: CPT | Performed by: INTERNAL MEDICINE

## 2018-05-07 PROCEDURE — 99214 OFFICE O/P EST MOD 30 MIN: CPT | Performed by: INTERNAL MEDICINE

## 2018-05-07 NOTE — PROGRESS NOTES
HPI:    Patient ID: Hortensia Navarro is a 59year old female.     HPI    Left foot surgery   Hyprocure Stent  Recent still limping    Episode of acute diverticulitis withcomplication treated    Planning on travel to [facility] in June    Follow up    HTN  Pa st and headaches. Hematological: Negative for adenopathy. Does not bruise/bleed easily. Psychiatric/Behavioral: Negative for agitation and behavioral problems.            Current Outpatient Prescriptions:  Enalapril Maleate 10 MG Oral Tab Take 1 tablet (10 hyperlipidemia   • Eye Problems Mother      eye issues   • Heart Disorder Mother      per ng CABG   • Hypertension Mother    • Musculo-skelatal Disorder Mother      per ng osteoporosis   • Alcohol and Other Disorders Associated Father      alcoholism   • H rash noted. She is not diaphoretic. No erythema. Nursing note and vitals reviewed.            ASSESSMENT/PLAN:   (I10) Essential hypertension  (primary encounter diagnosis)  Plan: /80 (BP Location: Left arm, Patient Position: Sitting, Cuff Size: rosanne

## 2018-06-02 RX ORDER — ENALAPRIL MALEATE 10 MG/1
TABLET ORAL
Qty: 90 TABLET | Refills: 0 | Status: SHIPPED | OUTPATIENT
Start: 2018-06-02 | End: 2018-10-08

## 2018-06-02 NOTE — TELEPHONE ENCOUNTER
Hypertensive Medications  Protocol Criteria:  · Appointment scheduled in the past 6 months or in the next 3 months  · BMP or CMP in the past 12 months  · Creatinine result < 2  Recent Outpatient Visits            3 weeks ago Essential hypertension    Elu

## 2018-07-19 ENCOUNTER — TELEPHONE (OUTPATIENT)
Dept: INTERNAL MEDICINE CLINIC | Facility: CLINIC | Age: 58
End: 2018-07-19

## 2018-07-19 NOTE — TELEPHONE ENCOUNTER
Pt returned call states. Symptoms currently resolved. States intermittent nerve shooting pain to one foot only. Pt also admits to recent foot surgery in March to same foot as feeling numbness.   Only feels numb in AM.  Has appt w/ podiatrist in AM will ad

## 2018-07-19 NOTE — TELEPHONE ENCOUNTER
Patient was transferred to me because patient mentioned feeling numbness in toes, and that is why she wanted diabetes lab testing. Call disconnected, and patient was called back; I left message to call back with concerns.      Please note DR Som Wahl

## 2018-07-24 ENCOUNTER — PATIENT MESSAGE (OUTPATIENT)
Dept: OBGYN CLINIC | Facility: CLINIC | Age: 58
End: 2018-07-24

## 2018-07-24 NOTE — TELEPHONE ENCOUNTER
From: Karen Verma  To: Tucker Edwards MD  Sent: 7/24/2018 4:00 PM CDT  Subject: Non-Urgent Medical Question    I wanted to know if there was a cancelation for August 13th. so I can make my appointment earlier since I have appointment with Dr. Frederick reynoso

## 2018-07-27 ENCOUNTER — TELEPHONE (OUTPATIENT)
Dept: OBGYN CLINIC | Facility: CLINIC | Age: 58
End: 2018-07-27

## 2018-07-27 NOTE — TELEPHONE ENCOUNTER
Pt returning nurse call, states she will pass on the appt on 7/30/18, she was looking for something earlier on 8/13/18.

## 2018-07-27 NOTE — TELEPHONE ENCOUNTER
LMTCB.   WE HAVE A CANCELLATION OPENING ON 7-30-18 AT 2:40PM AT [facility] WITH ANDRE. PT IS ON THE WAIT LIST. I HELD THIS APPT FOR  HER.

## 2018-07-30 NOTE — TELEPHONE ENCOUNTER
ALEXTCBALJINDER REESEJAMIE HAS A CANCELLATION OPENING TOMORROW AT 8:10AM.  DOES SHE WANT IT? I DID NOT SAVE THIS APPT.

## 2018-08-08 ENCOUNTER — APPOINTMENT (OUTPATIENT)
Dept: LAB | Age: 58
End: 2018-08-08
Attending: INTERNAL MEDICINE
Payer: COMMERCIAL

## 2018-08-08 DIAGNOSIS — E78.5 HYPERLIPIDEMIA, UNSPECIFIED HYPERLIPIDEMIA TYPE: ICD-10-CM

## 2018-08-08 LAB
CHOLEST SERPL-MCNC: 243 MG/DL (ref 110–200)
HDLC SERPL-MCNC: 51 MG/DL
LDLC SERPL CALC-MCNC: 165 MG/DL (ref 0–99)
NONHDLC SERPL-MCNC: 192 MG/DL
T4 FREE SERPL-MCNC: 0.83 NG/DL (ref 0.58–1.64)
TRIGL SERPL-MCNC: 136 MG/DL (ref 1–149)
TSH SERPL-ACNC: 1.51 UIU/ML (ref 0.45–5.33)

## 2018-08-08 PROCEDURE — 84439 ASSAY OF FREE THYROXINE: CPT

## 2018-08-08 PROCEDURE — 36415 COLL VENOUS BLD VENIPUNCTURE: CPT

## 2018-08-08 PROCEDURE — 84443 ASSAY THYROID STIM HORMONE: CPT

## 2018-08-08 PROCEDURE — 80061 LIPID PANEL: CPT

## 2018-08-13 ENCOUNTER — OFFICE VISIT (OUTPATIENT)
Dept: OBGYN CLINIC | Facility: CLINIC | Age: 58
End: 2018-08-13
Payer: COMMERCIAL

## 2018-08-13 ENCOUNTER — OFFICE VISIT (OUTPATIENT)
Dept: INTERNAL MEDICINE CLINIC | Facility: CLINIC | Age: 58
End: 2018-08-13
Payer: COMMERCIAL

## 2018-08-13 VITALS
WEIGHT: 164 LBS | DIASTOLIC BLOOD PRESSURE: 75 MMHG | HEIGHT: 61 IN | TEMPERATURE: 99 F | HEART RATE: 97 BPM | SYSTOLIC BLOOD PRESSURE: 126 MMHG | BODY MASS INDEX: 30.96 KG/M2

## 2018-08-13 VITALS — HEART RATE: 97 BPM | DIASTOLIC BLOOD PRESSURE: 84 MMHG | SYSTOLIC BLOOD PRESSURE: 157 MMHG

## 2018-08-13 DIAGNOSIS — N81.4 UTERINE PROLAPSE: ICD-10-CM

## 2018-08-13 DIAGNOSIS — N81.89 PELVIC RELAXATION: Primary | ICD-10-CM

## 2018-08-13 DIAGNOSIS — Z00.00 PHYSICAL EXAM: Primary | ICD-10-CM

## 2018-08-13 DIAGNOSIS — I10 ESSENTIAL HYPERTENSION: ICD-10-CM

## 2018-08-13 DIAGNOSIS — E11.9 TYPE 2 DIABETES MELLITUS WITHOUT COMPLICATION, WITHOUT LONG-TERM CURRENT USE OF INSULIN (HCC): ICD-10-CM

## 2018-08-13 DIAGNOSIS — E78.5 HYPERLIPIDEMIA, UNSPECIFIED HYPERLIPIDEMIA TYPE: ICD-10-CM

## 2018-08-13 PROCEDURE — 99213 OFFICE O/P EST LOW 20 MIN: CPT | Performed by: OBSTETRICS & GYNECOLOGY

## 2018-08-13 PROCEDURE — 99396 PREV VISIT EST AGE 40-64: CPT | Performed by: INTERNAL MEDICINE

## 2018-08-17 NOTE — PROGRESS NOTES
Joycelyn Aguilar is a 59year old female  No LMP recorded. Patient is postmenopausal. Patient presents with:  Gyn Problem: PESSARY  CHECK -- no issues  .      OBSTETRICS HISTORY:  Obstetric History     T3    L3    SAB0  TAB0  Ectopic0  Mult Never Smoker    Smokeless tobacco: Never Used    Alcohol use No    Drug use: No    Sexual activity: Yes    Birth control/ protection: Tubal Ligation     Other Topics Concern    Caffeine Concern Yes    Comment: 16oz soda chocolate daily    Pt has a pacemake affect    Pelvic Exam:  External Genitalia:  normal appearance, hair distribution, and no lesions  Urethral Meatus:   normal in size, location, without lesions and prolapse  Bladder:    no fullness, masses or tenderness  Vagina:    normal appearance withou

## 2018-08-25 PROBLEM — K57.32 SIGMOID DIVERTICULITIS: Status: RESOLVED | Noted: 2017-09-13 | Resolved: 2018-08-25

## 2018-08-25 PROBLEM — K57.30 DIVERTICULOSIS LARGE INTESTINE W/O PERFORATION OR ABSCESS W/O BLEEDING: Status: RESOLVED | Noted: 2017-10-18 | Resolved: 2018-08-25

## 2018-08-25 PROBLEM — K57.32 DIVERTICULITIS OF COLON: Status: RESOLVED | Noted: 2017-09-13 | Resolved: 2018-08-25

## 2018-08-25 PROBLEM — N13.30 HYDRONEPHROSIS, UNSPECIFIED HYDRONEPHROSIS TYPE: Status: RESOLVED | Noted: 2017-10-19 | Resolved: 2018-08-25

## 2018-08-25 PROBLEM — K64.8 INTERNAL HEMORRHOIDS: Status: RESOLVED | Noted: 2018-02-20 | Resolved: 2018-08-25

## 2018-08-27 NOTE — PROGRESS NOTES
HPI:    Patient ID: Tobias Carter is a 59year old female.     HPI    Physical exam    /75 (BP Location: Right arm, Patient Position: Sitting, Cuff Size: adult)   Pulse 97   Temp 98.8 °F (37.1 °C) (Oral)   Ht 5' 1\" (1.549 m)   Wt 164 lb (74.4 kg) Take 10 mg by mouth daily as needed.    Disp:  Rfl:      Allergies:  Codeine                 RASH    HISTORY:  Past Medical History:   Diagnosis Date   • Bartholin cyst 1994    per  excision of bartholins cyst   • Bunion 2010    per  bunionectomy, bilat Maternal Grandmother      per ng osteoporosis   • Diabetes Other      mGA   • Breast Cancer Maternal Aunt 55   • Cancer Paternal Uncle      per ng stomach   • Diabetes Cousin    • Breast Cancer Maternal Aunt 72      Social History: Smoking status: Never Sm (SGPT), HEMOGLOBIN A1C, LIPID PANEL          HGBA1C:    Lab Results  Component Value Date   A1C 5.9 10/19/2017     controlledcPM    (I10) Essential hypertension  Plan: /75 (BP Location: Right arm, Patient Position: Sitting, Cuff Size: adult)

## 2018-09-17 ENCOUNTER — TELEPHONE (OUTPATIENT)
Dept: OTHER | Age: 58
End: 2018-09-17

## 2018-09-17 ENCOUNTER — PATIENT MESSAGE (OUTPATIENT)
Dept: INTERNAL MEDICINE CLINIC | Facility: CLINIC | Age: 58
End: 2018-09-17

## 2018-09-17 ENCOUNTER — NURSE TRIAGE (OUTPATIENT)
Dept: INTERNAL MEDICINE CLINIC | Facility: CLINIC | Age: 58
End: 2018-09-17

## 2018-09-17 NOTE — TELEPHONE ENCOUNTER
From: Hortensia Navarro  To: Nico Schumacher MD  Sent: 9/17/2018 10:44 AM CDT  Subject: Non-Urgent Medical Question    to whom it may concern:    I would like to know what days does Dr. Carmelina Carmona has for Saturday or later times after 6:00 p.m.     I need to see

## 2018-09-17 NOTE — TELEPHONE ENCOUNTER
ALEXFRANCOIS to further triage symptoms (also sent patient message via Vuze to call triage).     Jairo Abdul To  Em Rn Triage Sent  9/17/2018 10:44 AM   ----- Message from [employer] sent at 9/17/2018 10:44 AM CDT -----     to whom it may concern:

## 2018-09-17 NOTE — TELEPHONE ENCOUNTER
Action Requested: Summary for Provider     []  Critical Lab, Recommendations Needed  [] Need Additional Advice  []   FYI    []   Need Orders  [] Need Medications Sent to Pharmacy  []  Other     SUMMARY: Patient scheduled appt 9/25/18 at 6:15pm with Dr LIRA,

## 2018-09-25 ENCOUNTER — OFFICE VISIT (OUTPATIENT)
Dept: FAMILY MEDICINE CLINIC | Facility: CLINIC | Age: 58
End: 2018-09-25
Payer: COMMERCIAL

## 2018-09-25 VITALS
WEIGHT: 160 LBS | DIASTOLIC BLOOD PRESSURE: 82 MMHG | BODY MASS INDEX: 30.21 KG/M2 | TEMPERATURE: 98 F | SYSTOLIC BLOOD PRESSURE: 146 MMHG | HEART RATE: 86 BPM | HEIGHT: 61 IN

## 2018-09-25 DIAGNOSIS — M70.61 TROCHANTERIC BURSITIS OF RIGHT HIP: Primary | ICD-10-CM

## 2018-09-25 PROCEDURE — 99212 OFFICE O/P EST SF 10 MIN: CPT | Performed by: FAMILY MEDICINE

## 2018-09-25 PROCEDURE — 99213 OFFICE O/P EST LOW 20 MIN: CPT | Performed by: FAMILY MEDICINE

## 2018-09-25 RX ORDER — NAPROXEN 500 MG/1
500 TABLET ORAL 2 TIMES DAILY WITH MEALS
Qty: 50 TABLET | Refills: 0 | Status: SHIPPED | OUTPATIENT
Start: 2018-09-25 | End: 2018-09-25

## 2018-09-26 RX ORDER — NAPROXEN 500 MG/1
TABLET ORAL
Qty: 180 TABLET | Refills: 0 | Status: ON HOLD | OUTPATIENT
Start: 2018-09-26 | End: 2018-12-06

## 2018-09-26 NOTE — TELEPHONE ENCOUNTER
Please advise in regards to refill request. Thank You    Refill Protocol Appointment Criteria  · Appointment scheduled in the past 6 months or in the next 3 months  Recent Outpatient Visits            Today     [facility], [facility], [address]

## 2018-09-30 NOTE — PROGRESS NOTES
HPI:    Patient ID: Brooke Bundy is a 59year old female. Complaining about thigh pain for last 2 weeks, not getting better. It travels around the lateral aspect of thigh and also travels down the leg.    No neuri defucits        Review of Systems prescriptions requested or ordered in this encounter       Imaging & Referrals:  None       #[id number]

## 2018-10-08 RX ORDER — ENALAPRIL MALEATE 10 MG/1
TABLET ORAL
Qty: 90 TABLET | Refills: 3 | Status: SHIPPED | OUTPATIENT
Start: 2018-10-08 | End: 2019-04-17

## 2018-10-27 ENCOUNTER — APPOINTMENT (OUTPATIENT)
Dept: ULTRASOUND IMAGING | Facility: HOSPITAL | Age: 58
End: 2018-10-27
Attending: EMERGENCY MEDICINE
Payer: COMMERCIAL

## 2018-10-27 ENCOUNTER — HOSPITAL ENCOUNTER (EMERGENCY)
Facility: HOSPITAL | Age: 58
Discharge: HOME | End: 2018-10-27
Attending: EMERGENCY MEDICINE
Payer: COMMERCIAL

## 2018-10-27 ENCOUNTER — NURSE TRIAGE (OUTPATIENT)
Dept: OTHER | Age: 58
End: 2018-10-27

## 2018-10-27 ENCOUNTER — APPOINTMENT (OUTPATIENT)
Dept: GENERAL RADIOLOGY | Facility: HOSPITAL | Age: 58
End: 2018-10-27
Attending: EMERGENCY MEDICINE
Payer: COMMERCIAL

## 2018-10-27 VITALS
DIASTOLIC BLOOD PRESSURE: 92 MMHG | TEMPERATURE: 98 F | SYSTOLIC BLOOD PRESSURE: 151 MMHG | OXYGEN SATURATION: 99 % | WEIGHT: 158 LBS | RESPIRATION RATE: 18 BRPM | BODY MASS INDEX: 30 KG/M2 | HEART RATE: 82 BPM

## 2018-10-27 DIAGNOSIS — M79.604 PAIN OF RIGHT LOWER EXTREMITY: Primary | ICD-10-CM

## 2018-10-27 PROBLEM — E87.6 HYPOKALEMIA: Status: ACTIVE | Noted: 2018-10-27

## 2018-10-27 PROCEDURE — 73552 X-RAY EXAM OF FEMUR 2/>: CPT | Performed by: EMERGENCY MEDICINE

## 2018-10-27 PROCEDURE — 93971 EXTREMITY STUDY: CPT | Performed by: EMERGENCY MEDICINE

## 2018-10-27 PROCEDURE — 80048 BASIC METABOLIC PNL TOTAL CA: CPT | Performed by: EMERGENCY MEDICINE

## 2018-10-27 PROCEDURE — 36415 COLL VENOUS BLD VENIPUNCTURE: CPT

## 2018-10-27 PROCEDURE — 99284 EMERGENCY DEPT VISIT MOD MDM: CPT

## 2018-10-27 PROCEDURE — 85025 COMPLETE CBC W/AUTO DIFF WBC: CPT | Performed by: EMERGENCY MEDICINE

## 2018-10-27 RX ORDER — IBUPROFEN 400 MG/1
400 TABLET ORAL EVERY 8 HOURS PRN
Qty: 30 TABLET | Refills: 0 | Status: SHIPPED | OUTPATIENT
Start: 2018-10-27 | End: 2018-11-03

## 2018-10-27 RX ORDER — CYCLOBENZAPRINE HCL 10 MG
10 TABLET ORAL 3 TIMES DAILY PRN
Qty: 20 TABLET | Refills: 0 | Status: SHIPPED | OUTPATIENT
Start: 2018-10-27 | End: 2018-11-03

## 2018-10-27 NOTE — TELEPHONE ENCOUNTER
Action Requested: Summary for Provider     []  Critical Lab, Recommendations Needed  [] Need Additional Advice  []   FYI    []   Need Orders  [] Need Medications Sent to Pharmacy  []  Other     SUMMARY: Patient sent to ER due to constant Thigh pain.  R/o dv

## 2018-10-27 NOTE — ED INITIAL ASSESSMENT (HPI)
Pt states she has been having pain to right leg. Her doctor has been treating it with medication for inflammation.  Pain is now worse and pt concerned for possible blood clot

## 2018-10-27 NOTE — ED PROVIDER NOTES
Patient Seen in: [facility] AND [facility] Emergency Department    History   Patient presents with:  Lower Extremity Injury (musculoskeletal)    Stated Complaint: right leg pain    HPI    Patient presents emergency department today with complaint of right thigh 2016   • NEEDLE BIOPSY LEFT  2013   •       x3 w/PPTL   • OTHER SURGICAL HISTORY     • OTHER SURGICAL HISTORY Right     thigh cyst removal   • PESSARY     • TONSILLECTOMY     • TUBAL LIGATION             Social History    Tob Labs Reviewed   BASIC METABOLIC PANEL (8) - Abnormal; Notable for the following components:       Result Value    Glucose 148 (*)     BUN 7 (*)     All other components within normal limits   CBC WITH DIFFERENTIAL WITH PLATELET    Narrative:      The fo total) by mouth 3 (three) times daily as needed for Muscle spasms. Qty: 20 tablet Refills: 0    ibuprofen 400 MG Oral Tab  Take 1 tablet (400 mg total) by mouth every 8 (eight) hours as needed for Pain or Fever.   Qty: 30 tablet Refills: 0

## 2018-10-27 NOTE — ED NOTES
Right upper thigh pain for the past month pt has been receiving medications for swelling. Pt states that the pain came after pt was pulled while walking dog. No bruising noted. Pt denies any calf pain. Dr in for eval at this time.

## 2018-10-29 NOTE — TELEPHONE ENCOUNTER
Clinical Impression:  Pain of right lower extremity  (primary encounter diagnosis)     Disposition:  Discharge  10/27/2018  5:13 pm     Follow-up:  Ana Paula Tucker MD  [address] [place] [address]  [phone]     Schedule an appointment as price

## 2018-10-31 NOTE — TELEPHONE ENCOUNTER
MMP_-- ok for patient to wait until 11/7/18 for ER f/u or should she be seen sooner?     No other appts available except for SDS and MD approval    Please advise    Please reply to pool: EM Jacob Todd Rn Triage 59 minutes ago (8:56 A

## 2018-11-07 ENCOUNTER — OFFICE VISIT (OUTPATIENT)
Dept: INTERNAL MEDICINE CLINIC | Facility: CLINIC | Age: 58
End: 2018-11-07
Payer: COMMERCIAL

## 2018-11-07 ENCOUNTER — HOSPITAL ENCOUNTER (OUTPATIENT)
Dept: GENERAL RADIOLOGY | Age: 58
Discharge: HOME | End: 2018-11-07
Attending: INTERNAL MEDICINE
Payer: COMMERCIAL

## 2018-11-07 VITALS
DIASTOLIC BLOOD PRESSURE: 84 MMHG | SYSTOLIC BLOOD PRESSURE: 132 MMHG | BODY MASS INDEX: 31.15 KG/M2 | WEIGHT: 165 LBS | HEART RATE: 90 BPM | HEIGHT: 61 IN

## 2018-11-07 DIAGNOSIS — E11.9 TYPE 2 DIABETES MELLITUS WITHOUT COMPLICATION, WITHOUT LONG-TERM CURRENT USE OF INSULIN (HCC): ICD-10-CM

## 2018-11-07 DIAGNOSIS — I10 ESSENTIAL HYPERTENSION: ICD-10-CM

## 2018-11-07 DIAGNOSIS — M79.672 PAIN OF LEFT HEEL: Primary | ICD-10-CM

## 2018-11-07 DIAGNOSIS — M79.672 PAIN OF LEFT HEEL: ICD-10-CM

## 2018-11-07 PROCEDURE — 99212 OFFICE O/P EST SF 10 MIN: CPT | Performed by: INTERNAL MEDICINE

## 2018-11-07 PROCEDURE — 99213 OFFICE O/P EST LOW 20 MIN: CPT | Performed by: INTERNAL MEDICINE

## 2018-11-07 PROCEDURE — 73650 X-RAY EXAM OF HEEL: CPT | Performed by: INTERNAL MEDICINE

## 2018-11-07 RX ORDER — CYCLOBENZAPRINE HCL 10 MG
10 TABLET ORAL 3 TIMES DAILY PRN
COMMUNITY
End: 2019-07-09

## 2018-11-07 RX ORDER — IBUPROFEN 400 MG/1
400 TABLET ORAL EVERY 8 HOURS PRN
Status: ON HOLD | COMMUNITY
End: 2018-12-06

## 2018-11-07 NOTE — PROGRESS NOTES
HPI:    Patient ID: Andrés Armendariz is a 59year old female.     HPI  ] Discontinue needle was done [place] showed up today dictating patient is here for follow-up she is emergency room for right thigh pain ultrasound right Negative for adenopathy. Does not bruise/bleed easily. Psychiatric/Behavioral: Negative for agitation and behavioral problems.            Current Outpatient Medications:  ibuprofen 400 MG Oral Tab Take 400 mg by mouth every 6 (six) hours as needed for Viviana Sandifer 2017    Performed by Desirae Arora MD at [address] [place] [address] MAIN OR   • FOOT SURGERY  2010    bunionectomy   • FOOT SURGERY Bilateral 2016   • NEEDLE BIOPSY LEFT  2013   •       x3 w/PPTL   • OTHER SURGICAL HISTORY     • OTHER SURGICAL HISTORY [facility] VIEWS), LEFT         (CPT=73650)            (E11.9) Type 2 diabetes mellitus without complication, without long-term current use of insulin (HCC)  Plan:   HGBA1C:    Lab Results   Component Value Date    A1C 5.9 10/19/2017   controlled    (I10) Essential h

## 2018-11-08 ENCOUNTER — PATIENT MESSAGE (OUTPATIENT)
Dept: INTERNAL MEDICINE CLINIC | Facility: CLINIC | Age: 58
End: 2018-11-08

## 2018-11-08 DIAGNOSIS — Z12.31 VISIT FOR SCREENING MAMMOGRAM: Primary | ICD-10-CM

## 2018-11-08 NOTE — TELEPHONE ENCOUNTER
From: Cedrick Burris  To: Walter Whitten MD  Sent: 11/8/2018 8:20 AM CST  Subject: Referral Request    I need a referral from doctor Ruiz for a mammogram. and do I still need to see her on Nov. 13th. or can I cancel it?     Wiliam Cabrera

## 2018-11-08 NOTE — TELEPHONE ENCOUNTER
Dr Ashkan Thomson, patient asking for mammogram order and if she needs to see you for 11/13/18 appt?       To: Colin Lyman      From: Radha Coulter RN      Created: 11/8/2018 12:25 PM        Solo Dey, I have sent your earlier message, and will send thi

## 2018-11-08 NOTE — TELEPHONE ENCOUNTER
To: Colin Lyman      From: Radha Coulter RN      Created: 11/8/2018 1:20 PM        Solo Dey, we will let you know as soon as we hear from Dr Ashkan Thomson.  Thank you, Yanely Samuels RN    ----- Message -----  Cherylin Bence From: Colin Lyman     Sent: 11/8/2018  1:08 PM

## 2018-11-12 ENCOUNTER — PATIENT MESSAGE (OUTPATIENT)
Dept: INTERNAL MEDICINE CLINIC | Facility: CLINIC | Age: 58
End: 2018-11-12

## 2018-11-13 NOTE — TELEPHONE ENCOUNTER
From: Cedrick Burris  To: Walter Whitten MD  Sent: 11/12/2018 11:59 AM CST  Subject: Other    Please cancel the appointment for tomorrow 11/13 with doctor Sara I already saw her on Nov. 7th.  I had called to cancel but i got the recording for the appoi

## 2018-11-30 ENCOUNTER — APPOINTMENT (OUTPATIENT)
Dept: ULTRASOUND IMAGING | Facility: HOSPITAL | Age: 58
DRG: 391 | End: 2018-11-30
Attending: EMERGENCY MEDICINE
Payer: COMMERCIAL

## 2018-11-30 ENCOUNTER — HOSPITAL ENCOUNTER (INPATIENT)
Facility: HOSPITAL | Age: 58
LOS: 6 days | Discharge: HOME | DRG: 391 | End: 2018-12-06
Attending: EMERGENCY MEDICINE | Admitting: HOSPITALIST
Payer: COMMERCIAL

## 2018-11-30 ENCOUNTER — APPOINTMENT (OUTPATIENT)
Dept: CT IMAGING | Facility: HOSPITAL | Age: 58
DRG: 391 | End: 2018-11-30
Attending: EMERGENCY MEDICINE
Payer: COMMERCIAL

## 2018-11-30 DIAGNOSIS — K57.20 PERFORATED DIVERTICULUM OF LARGE INTESTINE: Primary | ICD-10-CM

## 2018-11-30 PROCEDURE — 93975 VASCULAR STUDY: CPT | Performed by: EMERGENCY MEDICINE

## 2018-11-30 PROCEDURE — 74177 CT ABD & PELVIS W/CONTRAST: CPT | Performed by: EMERGENCY MEDICINE

## 2018-11-30 PROCEDURE — 99254 IP/OBS CNSLTJ NEW/EST MOD 60: CPT | Performed by: SURGERY

## 2018-11-30 PROCEDURE — 76830 TRANSVAGINAL US NON-OB: CPT | Performed by: EMERGENCY MEDICINE

## 2018-11-30 PROCEDURE — 99223 1ST HOSP IP/OBS HIGH 75: CPT | Performed by: HOSPITALIST

## 2018-11-30 PROCEDURE — 76856 US EXAM PELVIC COMPLETE: CPT | Performed by: EMERGENCY MEDICINE

## 2018-11-30 PROCEDURE — 99223 1ST HOSP IP/OBS HIGH 75: CPT | Performed by: INTERNAL MEDICINE

## 2018-11-30 RX ORDER — SODIUM CHLORIDE 9 MG/ML
INJECTION, SOLUTION INTRAVENOUS CONTINUOUS
Status: ACTIVE | OUTPATIENT
Start: 2018-11-30 | End: 2018-11-30

## 2018-11-30 RX ORDER — SODIUM CHLORIDE 0.9 % (FLUSH) 0.9 %
3 SYRINGE (ML) INJECTION AS NEEDED
Status: DISCONTINUED | OUTPATIENT
Start: 2018-11-30 | End: 2018-12-06

## 2018-11-30 RX ORDER — ONDANSETRON 2 MG/ML
4 INJECTION INTRAMUSCULAR; INTRAVENOUS ONCE
Status: COMPLETED | OUTPATIENT
Start: 2018-11-30 | End: 2018-11-30

## 2018-11-30 RX ORDER — ONDANSETRON 2 MG/ML
4 INJECTION INTRAMUSCULAR; INTRAVENOUS EVERY 6 HOURS PRN
Status: DISCONTINUED | OUTPATIENT
Start: 2018-11-30 | End: 2018-12-06

## 2018-11-30 RX ORDER — DEXTROSE, SODIUM CHLORIDE, AND POTASSIUM CHLORIDE 5; .45; .15 G/100ML; G/100ML; G/100ML
INJECTION INTRAVENOUS CONTINUOUS
Status: DISCONTINUED | OUTPATIENT
Start: 2018-11-30 | End: 2018-12-06

## 2018-11-30 RX ORDER — MORPHINE SULFATE 2 MG/ML
2 INJECTION, SOLUTION INTRAMUSCULAR; INTRAVENOUS EVERY 2 HOUR PRN
Status: DISCONTINUED | OUTPATIENT
Start: 2018-11-30 | End: 2018-12-06

## 2018-11-30 RX ORDER — MORPHINE SULFATE 4 MG/ML
4 INJECTION, SOLUTION INTRAMUSCULAR; INTRAVENOUS EVERY 2 HOUR PRN
Status: DISCONTINUED | OUTPATIENT
Start: 2018-11-30 | End: 2018-12-06

## 2018-11-30 RX ORDER — MORPHINE SULFATE 4 MG/ML
2 INJECTION, SOLUTION INTRAMUSCULAR; INTRAVENOUS ONCE
Status: COMPLETED | OUTPATIENT
Start: 2018-11-30 | End: 2018-11-30

## 2018-11-30 RX ORDER — MORPHINE SULFATE 2 MG/ML
1 INJECTION, SOLUTION INTRAMUSCULAR; INTRAVENOUS EVERY 2 HOUR PRN
Status: DISCONTINUED | OUTPATIENT
Start: 2018-11-30 | End: 2018-12-06

## 2018-11-30 NOTE — H&P
[facility]    PATIENT'S NAME: Connie Smart PHYSICIAN: Jax Guzman MD   PATIENT ACCOUNT#:   [id number]    LOCATION:  [place] [address]  MEDICAL RECORD #:   [id number]       YOB: 1960  ADMISSION DATE:       11/30/20 patient reports progressive abdominal pain mainly on the left side, became more generalized since yesterday, associated with fever, chills, decreased appetite. No bowel movement since last night. Other 12-point review of systems negative.       PHYSICAL E

## 2018-11-30 NOTE — CONSULTS
[facility]   Gastroenterology Consultation Note    Joan Benton  Patient Status:    Emergency  Date of Admission:         11/30/2018, Hospital day #0  Attending:   Thai Flaherty MD  PCP:     Merilynn Apley, MD    Reason for [facility] • Uterine prolapse 2006    uses pessary   • Uterine prolapse 4/17/2015    Donut pessary -- self cleaning      Past Surgical History:   Procedure Laterality Date   • BIOPSY OF UTERUS LINING  10/2007    neg endometrial biopsy   • COLONOSCOPY  5/19/2012 Intravenous, PRN  •  dextrose 5 % and 0.45 % NaCl with KCl 20 mEq infusion, , Intravenous, Continuous  •  morphINE sulfate (PF) 2 MG/ML injection 1 mg, 1 mg, Intravenous, Q2H PRN **OR** morphINE sulfate (PF) 2 MG/ML injection 2 mg, 2 mg, Intravenous, Q2H P 11/30/2018    K 3.5 11/30/2018     11/30/2018    CO2 22 11/30/2018     11/30/2018    CA 9.3 11/30/2018       Imaging:  Ct Abdomen+pelvis(contrast Only)(cpt=74177)    Result Date: 11/30/2018  CONCLUSION:   1.  Findings most consistent with perfo colon, +pneumoperitoneum and ovarian lesion. (+) leukocytosis. Limited interview 2/2 patient's pain + requests for water. #Diverticulitis - c/b perforation.  This appears to be the patient's 2nd episode of perforated diverticulitis, 3rd episode of divert

## 2018-11-30 NOTE — ED INITIAL ASSESSMENT (HPI)
Pt came in for abdominal cramping and fever over 102F at home. Took tylenol PTA. Hx of diverticulitis, reports it feels similar. Reports nausea, V/D. RR even and nonlabored, speaking in full sentences.

## 2018-11-30 NOTE — ED NOTES
Pt states lower abd pain currently present but tolerable. Denies need for additional pain medication.

## 2018-11-30 NOTE — CONSULTS
[facility] HOSP - [facility]    Report of Consultation    Parrish Duggan Patient Status:  Emergency    7/10/1960 MRN [id number]   Location [address] Attending Kalen Simons MD   Hosp Day # 0 PCP Cristal Ryan MD     Date Performed by Haley Mayorga MD at [facility] ENDO   • CYSTOSCOPY N/A 12/13/2017    Performed by Theodosia Bumpers, MD at [address] [place] [address] MAIN OR   • FOOT SURGERY  2010    bunionectomy   • FOOT SURGERY Bilateral 6/28/2016   • NEEDLE BIOPSY LEFT  03/01/2013   • NSV Hematological: Does not bruise/bleed easily. Psychiatric/Behavioral: Negative. Physical Exam:   Vital Signs:  Blood pressure (!) 164/91, pulse 99, temperature 98.2 °F (36.8 °C), temperature source Oral, resp.  rate 20, height 154.9 cm (5' 1\"), sarthak CONCLUSION:   1. Findings most consistent with perforated diverticulitis involving the left/descending colon. There is multifocal pneumoperitoneum with the largest pocket of free air in the left paracolic gutter measuring 3.4 x 1.4 cm.  Post treatment colon

## 2018-11-30 NOTE — ED NOTES
Pt requesting pain medicine at this time d/t persistent abd cramping. Dr. Jen Clark updated, order for 2 mg iv morphine received.

## 2018-12-01 PROCEDURE — 99232 SBSQ HOSP IP/OBS MODERATE 35: CPT | Performed by: INTERNAL MEDICINE

## 2018-12-01 PROCEDURE — 99232 SBSQ HOSP IP/OBS MODERATE 35: CPT | Performed by: HOSPITALIST

## 2018-12-01 PROCEDURE — 99232 SBSQ HOSP IP/OBS MODERATE 35: CPT | Performed by: SURGERY

## 2018-12-01 RX ORDER — SODIUM CHLORIDE 9 MG/ML
INJECTION, SOLUTION INTRAVENOUS
Status: COMPLETED
Start: 2018-12-01 | End: 2018-12-01

## 2018-12-01 NOTE — ED PROVIDER NOTES
Patient Seen in: [facility] 4w/sw/se    History   Patient presents with:  Abdomen/Flank Pain (GI/)    Stated Complaint: Perforated diverticulum of large intestine    HPI    The patient is a 47-year-old female who presents with 2 days of left low Smokeless tobacco: Never Used    Alcohol use: No    Drug use: No      Review of Systems    Positive for stated complaint: Perforated diverticulum of large intestine  Other systems are as noted in HPI. Constitutional and vital signs reviewed.       All othe Result Value    Blood Urine Small (*)     Leukocyte Esterase Urine Moderate (*)     WBC Urine 15 (*)     Bacteria Urine Moderate (*)     All other components within normal limits   BASIC METABOLIC PANEL (8) - Abnormal; Notable for the following components: there is no significant periappendiceal inflammatory stranding. Acute appendicitis is considered unlikely. 3. Fatty liver. 4. Multifocal discoid atelectasis in both lung bases. 5. Circumaortic left renal vein.   6. Rim-enhancing 1.6 cm left ovarian/adnex

## 2018-12-01 NOTE — PROGRESS NOTES
Yoko Senior 89     Gastroenterology Progress Note    Marysol Meng Patient Status:  Inpatient    7/10/1960 MRN [id number]   Location [facility] 4W/SW/SE Attending Shar Flores MD   Hosp Day # 1 PCP Katalina Jones MD intervention.      Recommend:  -Continue IV fluids, IV abx (zosyn) and NPO with sips of clears/meds  -Prn pain meds/anti-emetics  -DVT ppx       KARINA Galvan MD  [facility], [facility] Gastroenterology      Results:     Lab Results   Component Value Date    W (trns Abd And Endovag) (QPW=[id number])    Result Date: 11/30/2018  CONCLUSION:   1. Slightly thickened endometrium for a postmenopausal female. 2. No significant change in the appearance of uterine fibroids.      Dictated by (CST): Shankar Garcia,

## 2018-12-01 NOTE — PROGRESS NOTES
[facility]    Progress Note    Maik Mora Patient Status:  Inpatient    7/10/1960 MRN [id number]   Location [facility] 4W/SW/SE Attending Virginie Yanez MD   Hosp Day # 1 PCP Carrie Gloria MD       Subjective:   Jennifer Forward PRN    Assessment and Plan:     Perforated diverticulum of large intestine  Surgery and GI consulted  Leukocytosis improved, pain improved  Cont bowel rest  Cont IVF  Cont IV abx  Cont Pain control      Ileus (HCC)  Passing some flatus  Cont NPO      L ova 11/30/2018 at 12:28     Approved by (CST): Roderick Bush MD on 11/30/2018 at 12:46          Us Pelvis Ev (trns Abd And Endovag) ([id number]=[id number])    Result Date: 11/30/2018  CONCLUSION:   1. Slightly thickened endometrium for a postmenopausal female.  2.

## 2018-12-01 NOTE — PROGRESS NOTES
[facility]    Progress Note    West Ramesh Patient Status:  Inpatient    7/10/1960 MRN [id number]   Location [facility] 4W/SW/SE Attending Yuliana Acharya MD   Hosp Day # 1 PCP Marly Izaguirre MD     Assessment and Plan:  12/01/2018    CREATSERUM 0.58 12/01/2018    BUN 5 (L) 12/01/2018     (L) 12/01/2018    K 4.6 12/01/2018     12/01/2018    CO2 23 12/01/2018     (H) 12/01/2018    CA 8.0 (L) 12/01/2018    ALB 3.9 12/05/2017    ALKPHO 123 (H) 12 on 11/30/2018 at 15:02                    Yolanda Pablo MD  12/1/2018

## 2018-12-02 PROCEDURE — 99232 SBSQ HOSP IP/OBS MODERATE 35: CPT | Performed by: INTERNAL MEDICINE

## 2018-12-02 PROCEDURE — 99232 SBSQ HOSP IP/OBS MODERATE 35: CPT | Performed by: HOSPITALIST

## 2018-12-02 PROCEDURE — 99232 SBSQ HOSP IP/OBS MODERATE 35: CPT | Performed by: SURGERY

## 2018-12-02 RX ORDER — METRONIDAZOLE 500 MG/1
500 TABLET ORAL EVERY 8 HOURS SCHEDULED
Status: DISCONTINUED | OUTPATIENT
Start: 2018-12-02 | End: 2018-12-03

## 2018-12-02 RX ORDER — TRAMADOL HYDROCHLORIDE 50 MG/1
50 TABLET ORAL EVERY 6 HOURS PRN
Status: DISCONTINUED | OUTPATIENT
Start: 2018-12-02 | End: 2018-12-06

## 2018-12-02 RX ORDER — CIPROFLOXACIN 500 MG/1
500 TABLET, FILM COATED ORAL EVERY 12 HOURS SCHEDULED
Status: DISCONTINUED | OUTPATIENT
Start: 2018-12-02 | End: 2018-12-03

## 2018-12-02 NOTE — PROGRESS NOTES
Yoko Senior 89     Gastroenterology Progress Note    Scott Rodriguez Patient Status:  Inpatient    7/10/1960 MRN [id number]   Location [facility] 4W/SW/SE Attending Daily Lacey MD   Hosp Day # 2 PCP Ros Mario MD of clears/meds  -Ab US  -Prn pain meds/anti-emetics  -DVT ppx       S.  Natasha Olivier MD  [facility], [facility] Gastroenterology      Results:     Lab Results   Component Value Date    WBC 9.4 12/02/2018    HGB 12.1 12/02/2018    HCT 34.9 (L) 12/02/2018    PLT 20

## 2018-12-02 NOTE — PROGRESS NOTES
Lost IV access patient stated she \"bumped my arm and felt it move. \" Upon assessment IV was not flushing and out of vein. Vonda Roth RN attempted IV access twice without success. Francisca CARDONA attempted IV access twice.  MD paged and started on oral abx until PICC

## 2018-12-02 NOTE — PROGRESS NOTES
[facility]    Progress Note    [facility] Patient Status:  Inpatient    7/10/1960 MRN [id number]   Location [facility] 4W/SW/SE Attending René Dash MD   Hosp Day # 2 PCP Alexandr Ryan MD     Assessment and Plan: 12/02/2018    HCT 34.9 (L) 12/02/2018     12/02/2018    CREATSERUM 0.57 12/02/2018    BUN 5 (L) 12/02/2018     (L) 12/02/2018    K 3.5 12/02/2018     12/02/2018    CO2 22 12/02/2018     (H) 12/02/2018    CA 8.3 (L) 12/02/2018    A

## 2018-12-02 NOTE — PROGRESS NOTES
[facility]    Progress Note    Joan Benton Patient Status:  Inpatient    7/10/1960 MRN [id number]   Location [facility] 4W/SW/SE Attending Efrain Sanabria MD   Hosp Day # 2 PCP Merilynn Apley, MD       Subjective:   Smitha Castano morphINE sulfate (PF) 4 MG/ML injection 4 mg, 4 mg, Intravenous, Q2H PRN  •  ondansetron HCl (ZOFRAN) injection 4 mg, 4 mg, Intravenous, Q6H PRN    Assessment and Plan:     Perforated diverticulum of large intestine  Surgery and GI consulted  Leukocytosis

## 2018-12-03 ENCOUNTER — APPOINTMENT (OUTPATIENT)
Dept: ULTRASOUND IMAGING | Facility: HOSPITAL | Age: 58
DRG: 391 | End: 2018-12-03
Attending: INTERNAL MEDICINE
Payer: COMMERCIAL

## 2018-12-03 ENCOUNTER — APPOINTMENT (OUTPATIENT)
Dept: PICC SERVICES | Facility: HOSPITAL | Age: 58
DRG: 391 | End: 2018-12-03
Attending: HOSPITALIST
Payer: COMMERCIAL

## 2018-12-03 PROCEDURE — 99232 SBSQ HOSP IP/OBS MODERATE 35: CPT | Performed by: HOSPITALIST

## 2018-12-03 PROCEDURE — 99232 SBSQ HOSP IP/OBS MODERATE 35: CPT | Performed by: INTERNAL MEDICINE

## 2018-12-03 PROCEDURE — 76705 ECHO EXAM OF ABDOMEN: CPT | Performed by: INTERNAL MEDICINE

## 2018-12-03 PROCEDURE — 99232 SBSQ HOSP IP/OBS MODERATE 35: CPT | Performed by: SURGERY

## 2018-12-03 PROCEDURE — 02HV33Z INSERTION OF INFUSION DEVICE INTO SUPERIOR VENA CAVA, PERCUTANEOUS APPROACH: ICD-10-PCS | Performed by: HOSPITALIST

## 2018-12-03 RX ORDER — SODIUM CHLORIDE 9 MG/ML
INJECTION, SOLUTION INTRAVENOUS
Status: COMPLETED
Start: 2018-12-03 | End: 2018-12-03

## 2018-12-03 RX ORDER — SODIUM CHLORIDE 0.9 % (FLUSH) 0.9 %
10 SYRINGE (ML) INJECTION AS NEEDED
Status: DISCONTINUED | OUTPATIENT
Start: 2018-12-03 | End: 2018-12-06

## 2018-12-03 RX ORDER — LIDOCAINE HYDROCHLORIDE 10 MG/ML
0.5 INJECTION, SOLUTION INFILTRATION; PERINEURAL ONCE AS NEEDED
Status: ACTIVE | OUTPATIENT
Start: 2018-12-03 | End: 2018-12-03

## 2018-12-03 RX ORDER — HEPARIN SODIUM 5000 [USP'U]/ML
5000 INJECTION, SOLUTION INTRAVENOUS; SUBCUTANEOUS EVERY 8 HOURS SCHEDULED
Status: DISCONTINUED | OUTPATIENT
Start: 2018-12-03 | End: 2018-12-06

## 2018-12-03 RX ORDER — POTASSIUM CHLORIDE 20 MEQ/1
40 TABLET, EXTENDED RELEASE ORAL EVERY 4 HOURS
Status: COMPLETED | OUTPATIENT
Start: 2018-12-03 | End: 2018-12-03

## 2018-12-03 NOTE — PAYOR COMM NOTE
--------------  ADMISSION REVIEW     Payor: [facility]  Subscriber #:  [id number]  Authorization Number: [id number]    Admit date: 11/30/18  Admit time: 5       Admitting Physician: Isatu Ward MD  Attending Physician:  Erica Perez MD  Prim • COLONOSCOPY  5/19/2012    per NG   • COLONOSCOPY N/A 2/20/2018    Performed by Jailene Sánchez MD at [place] [address] N/A 12/13/2017    Performed by Leanna Jimenez MD at [facility] OR   • FOOT SURGERY  2010    bunionectomy   • FOOT ANOOP Abdominal: Soft. Bowel sounds are normal. She exhibits no distension and no mass. There is generalized tenderness and tenderness in the left lower quadrant. There is guarding. There is no rigidity and no rebound. Musculoskeletal: Normal range of motion. Case discussed with Dr. Quyen Blankenship and surgery and patient will be admitted for further management patient given IV antibiotics and fluids in the ED.   Vital signs stable prior to admission      MDM   Pulse Ox: 95%, Normal, room air    Cardiac Monitor: Pul Radiology exams  Viewed and reviewed by myself and findings discussed with patient including need for follow up    Admission disposition: 11/30/2018  1:01 PM                 Disposition and Plan     Clinical Impression:  Perforated diverticulum of large in HISTORY OF PRESENT ILLNESS:  The patient is a 57-year-old  female who came into the emergency department with progressive abdominal pain, fever and chills since yesterday. CBC showed a white blood cell count of 17 with left shift.   Chemistries unr VITAL SIGNS:  Temperature 98.1, pulse 95, respiratory rate 16, blood pressure 149/82, pulse ox 96% on room air. HEENT:  Atraumatic. Oropharynx clear. Dry mucous membranes. Ears and nose normal.  Eyes:  Anicteric sclerae.   Pupils equal, round, reactive Normal Saline Flush 0.9 % injection 3 mL     Date Action Dose Route User    12/2/2018 1524 Given 3 mL Intravenous Rolando Bonner RN      Potassium Chloride ER (K-DUR M20) CR tab 40 mEq     Date Action Dose Route User    12/3/2018 1410 Given 40 mEq Oral G •  Piperacillin Sod-Tazobactam So (ZOSYN) 3.375 g in dextrose 5 % 100 mL ADD-vantage, 3.375 g, Intravenous, Q8H  •  Normal Saline Flush 0.9 % injection 3 mL, 3 mL, Intravenous, PRN  •  dextrose 5 % and 0.45 % NaCl with KCl 20 mEq infusion, , Intravenous, C CONCLUSION:                1. Findings most consistent with perforated diverticulitis involving the left/descending colon. There is multifocal pneumoperitoneum with the largest pocket of free air in the left paracolic gutter measuring 3.4 x 1.4 cm.  Post tr Subjective:   Joycelyn cosby a(n) 59year old female was seen and examined  Pain improving  No nausea or vomiting  Resting in chair, no acute distress  Passing flatus     Other ROS neg     Objective:   Blood pressure 132/70, pulse 88, temperature 98.5 ° Surgery and GI consulted  Leukocytosis resolved, pain improved  Cont bowel rest  Cont IVF  Cont IV abx  Cont Pain control       Ileus (HCC)  Passing some flatus  Cont NPO       L ovarian rim enhancing adnexal lesion  Evident on CT scan  Pelvic US showing n PSYCHIATRIC: Calm and cooperative    HEENT:  Head was atraumatic and normocephalic.  Eyes: Sclera was anicteric.  Pupils were equal.  Ears:  There were no lesions.  Nose:  No lesions were noted.      NECK:  Supple.  There was no JVD.    CHEST:  Symmetrical DVT PPx: Heparin Sq  Full Code        Results:            Recent Labs   Lab  12/01/18   0532  12/02/18   0455  12/03/18   0436   RBC  3.91  3.95  4.01   HGB  12.2  12.1  12.4   HCT  34.1*  34.9*  35.1   MCV  87.4  88.4  87.6   MCH  31.2  30.6  31.0   MCHC

## 2018-12-03 NOTE — CM/SW NOTE
MD informed SW that pt will likely need IV abx at discharge, no final orders yet. PICC placed today 12/3. SW met w/ pt to discuss eventual discharge needs. Pt lives at home w/ her spouse in [facility].  Pt reports to be independent w/ all ADL's and w

## 2018-12-03 NOTE — PROGRESS NOTES
Vascular Access Note  Inserted by Sharon Portillo RN    Allergies to Lidocaine: no  Allergies to Latex: no  Presence of Pacemaker/Defibrillator: No  Mastectomy with Lymph Node Dissection: No  AV Fistula / AV Graft: No  Dialysis Catheter: No  Central Line: No

## 2018-12-03 NOTE — PROGRESS NOTES
[facility]    Progress Note    Lindy Crowder Patient Status:  Inpatient    7/10/1960 MRN [id number]   Location [facility] 4W/SW/SE Attending Elizabeth Can MD   Hosp Day # 3 PCP Beckie Orozco MD       Subjective:   Gary Durham MG/ML injection 1 mg, 1 mg, Intravenous, Q2H PRN **OR** morphINE sulfate (PF) 2 MG/ML injection 2 mg, 2 mg, Intravenous, Q2H PRN **OR** morphINE sulfate (PF) 4 MG/ML injection 4 mg, 4 mg, Intravenous, Q2H PRN  •  ondansetron HCl (ZOFRAN) injection 4 mg, 4

## 2018-12-03 NOTE — PROGRESS NOTES
Yoko Senior 89     Gastroenterology Progress Note    Scott Rodriguez Patient Status:  Inpatient    7/10/1960 MRN [id number]   Location [facility] 4W/SW/SE Attending Daily Lacey MD   Hosp Day # 3 PCP Ros Mario MD Gastroenterology      Results:     Lab Results   Component Value Date    WBC 7.9 12/03/2018    HGB 12.4 12/03/2018    HCT 35.1 12/03/2018     12/03/2018    CREATSERUM 0.56 12/03/2018    BUN 6 (L) 12/03/2018     12/03/2018    K 3.5 12/03/2018

## 2018-12-04 PROCEDURE — 99232 SBSQ HOSP IP/OBS MODERATE 35: CPT | Performed by: SURGERY

## 2018-12-04 PROCEDURE — 99232 SBSQ HOSP IP/OBS MODERATE 35: CPT | Performed by: HOSPITALIST

## 2018-12-04 PROCEDURE — 99232 SBSQ HOSP IP/OBS MODERATE 35: CPT | Performed by: INTERNAL MEDICINE

## 2018-12-04 NOTE — PROGRESS NOTES
[facility]    Progress Note    Freddy Head Patient Status:  Inpatient    7/10/1960 MRN [id number]   Location [facility] 4W/SW/SE Attending Carmen Bernal MD   Hosp Day # 4 PCP Teressa Echevarria MD     Assessment and Plan: 12/04/2018    ALT 21 12/04/2018    T4F 0.83 08/08/2018    TSH 1.51 08/08/2018    LIP 23 09/13/2017    MG 2.0 10/19/2017                     Neli Carrizales MD  12/4/2018

## 2018-12-04 NOTE — PLAN OF CARE
Problem: PAIN - ADULT  Goal: Verbalizes/displays adequate comfort level or patient's stated pain goal  INTERVENTIONS:  - Encourage pt to monitor pain and request assistance  - Assess pain using appropriate pain scale  - Administer analgesics based on type for managing their own health  - Refer to Case Management Department for coordinating discharge planning if the patient needs post-hospital services based on physician/LIP order or complex needs related to functional status, cognitive ability or social sup locked in low position, and patient calls appropriatley.

## 2018-12-04 NOTE — PAYOR COMM NOTE
--------------REQUESTING ADDITIONAL DAY 12/4  CONTINUED STAY REVIEW    Payor: [employer]  Subscriber #:  [id number]  Authorization Number: [id number]    Admit date: 11/30/18  Admit time: 5    Admitting Physician: Parish Arriaga MD  Attending Physician Hosp Day # 4 PCP Isi Muniz MD         Subjective:   Kaden Lara is a(n) 59year old female was seen and examined  No acute overnight events  Feels well this AM  abd pain and distension improved  No other complaints  Tolerating CLD   Other ROS neg •  ondansetron HCl (ZOFRAN) injection 4 mg, 4 mg, Intravenous, Q6H PRN     Assessment and Plan:     Perforated diverticulum of large intestine  Surgery and GI consulted  Leukocytosis resolved, pain and distension improved  Started on CLD as tolerated  Cont

## 2018-12-04 NOTE — PROGRESS NOTES
[facility]    Progress Note    Vanessa Jones Patient Status:  Inpatient    7/10/1960 MRN [id number]   Location [facility] 4W/SW/SE Attending Debbie Dunn MD   Hosp Day # 4 PCP Monica Platt MD       Subjective:   Za Baez Intravenous, Q2H PRN **OR** morphINE sulfate (PF) 2 MG/ML injection 2 mg, 2 mg, Intravenous, Q2H PRN **OR** morphINE sulfate (PF) 4 MG/ML injection 4 mg, 4 mg, Intravenous, Q2H PRN  •  ondansetron HCl (ZOFRAN) injection 4 mg, 4 mg, Intravenous, Q6H PRN

## 2018-12-04 NOTE — PROGRESS NOTES
Yoko Senior [address]     Gastroenterology Progress Note    Parrish Duggan Patient Status:  Inpatient    7/10/1960 MRN [id number]   Location [facility] 4W/SW/SE Attending Quinton Burnham MD   Hosp Day # 4 PCP Cristal Ryan MD  (L) 12/04/2018    K 3.9 12/04/2018     12/04/2018    CO2 22 12/04/2018     (H) 12/04/2018    CA 8.7 12/04/2018    ALB 2.6 (L) 12/04/2018    ALKPHO 130 (H) 12/04/2018    BILT 1.3 (H) 12/04/2018    TP 6.6 12/04/2018    AST 16 12/04/201

## 2018-12-05 PROCEDURE — 99232 SBSQ HOSP IP/OBS MODERATE 35: CPT | Performed by: INTERNAL MEDICINE

## 2018-12-05 PROCEDURE — 99232 SBSQ HOSP IP/OBS MODERATE 35: CPT | Performed by: HOSPITALIST

## 2018-12-05 PROCEDURE — 99232 SBSQ HOSP IP/OBS MODERATE 35: CPT | Performed by: SURGERY

## 2018-12-05 NOTE — PLAN OF CARE
Problem: PAIN - ADULT  Goal: Verbalizes/displays adequate comfort level or patient's stated pain goal  INTERVENTIONS:  - Encourage pt to monitor pain and request assistance  - Assess pain using appropriate pain scale  - Administer analgesics based on type for managing their own health  - Refer to Case Management Department for coordinating discharge planning if the patient needs post-hospital services based on physician/LIP order or complex needs related to functional status, cognitive ability or social sup and frequent rounding done.

## 2018-12-05 NOTE — CM/SW NOTE
ABDI received MD script for invanz 1g, q24 until 12/17/18. ABDI sent script to Janine [id number] at the [facility] and informed her of anticipated discharge Thursday 12/6.  ABDI requested latest appointment time for Friday 12/7, per pt request. [facility] s

## 2018-12-05 NOTE — PAYOR COMM NOTE
--------------REQUESTING ADDITIONAL DAY 12/5    CONTINUED STAY REVIEW    Payor: [employer]  Subscriber #:  [id number]  Authorization Number: [id number]    Admit date: 11/30/18  Admit time: 5    Admitting Physician: Faith Corcoran MD  Attending Physici Blood pressure 149/87, pulse 89, temperature 97.5 °F (36.4 °C), temperature source Oral, resp.  rate 16, height 5' 1\" (1.549 m), weight 160 lb (72.6 kg), SpO2 94 %, not currently breastfeeding.     GENERAL:  The patient appeared to be in no distress and wa Cont IV abx and IVF  Will need IV Invanz x 10 days on discharge, script in chart       Ileus ([facility]  Resolved  Had BM       L ovarian rim enhancing adnexal lesion  Evident on CT scan  Pelvic US showing no acute abnormality  Cont to monitor     DVT PPx: Hepa

## 2018-12-05 NOTE — PROGRESS NOTES
[facility]OS[facility] - [facility]    Progress Note    Terryl Confer Patient Status:  Inpatient    7/10/1960 MRN [id number]   Location [facility] 4W/SW/SE Attending Kaden Boss MD   Hosp Day # 5 PCP Jelani Williamson MD       Subjective:   Denny Field injection 1 mg, 1 mg, Intravenous, Q2H PRN **OR** morphINE sulfate (PF) 2 MG/ML injection 2 mg, 2 mg, Intravenous, Q2H PRN **OR** morphINE sulfate (PF) 4 MG/ML injection 4 mg, 4 mg, Intravenous, Q2H PRN  •  ondansetron HCl (ZOFRAN) injection 4 mg, 4 mg, In

## 2018-12-05 NOTE — PROGRESS NOTES
[facility]    Progress Note    [facility] Patient Status:  Inpatient    7/10/1960 MRN [id number]   Location [facility] 4W/SW/SE Attending René Dash MD   Hosp Day # 5 PCP Alexandr Ryan MD     Assessment and Plan: T4F 0.83 08/08/2018    TSH 1.51 08/08/2018    LIP 23 09/13/2017    MG 2.0 10/19/2017                     Jess Velasquez MD  12/5/2018

## 2018-12-06 VITALS
OXYGEN SATURATION: 94 % | HEIGHT: 61 IN | SYSTOLIC BLOOD PRESSURE: 137 MMHG | WEIGHT: 160 LBS | DIASTOLIC BLOOD PRESSURE: 82 MMHG | RESPIRATION RATE: 18 BRPM | TEMPERATURE: 98 F | BODY MASS INDEX: 30.21 KG/M2 | HEART RATE: 88 BPM

## 2018-12-06 PROCEDURE — 99239 HOSP IP/OBS DSCHRG MGMT >30: CPT | Performed by: HOSPITALIST

## 2018-12-06 PROCEDURE — 99232 SBSQ HOSP IP/OBS MODERATE 35: CPT | Performed by: SURGERY

## 2018-12-06 RX ORDER — POTASSIUM CHLORIDE 20 MEQ/1
40 TABLET, EXTENDED RELEASE ORAL ONCE
Status: COMPLETED | OUTPATIENT
Start: 2018-12-06 | End: 2018-12-06

## 2018-12-06 NOTE — CM/SW NOTE
Rita Ramesh from the [address] [place] [address] informed SW that pt has been scheduled for an outpatient appointment at the [facility] on Friday 12/7 at 4pm. Pt has been updated; Per MD, plan for discharge home today.      Tye Estrada

## 2018-12-06 NOTE — PROGRESS NOTES
Yoko Senior [address]     Gastroenterology Progress Note    Kanchan Hastings Patient Status:  Inpatient    7/10/1960 MRN [id number]   Location [facility] 4W/SW/SE Attending René Dash MD   Hosp Day # 5 PCP Alexandr Ryan MD 12/05/2018    K 4.1 12/05/2018     12/05/2018    CO2 23 12/05/2018     (H) 12/05/2018    CA 8.8 12/05/2018    ALB 2.6 (L) 12/04/2018    ALKPHO 130 (H) 12/04/2018    BILT 1.3 (H) 12/04/2018    TP 6.6 12/04/2018    AST 16 12/04/2018    ALT 21 12

## 2018-12-06 NOTE — DISCHARGE SUMMARY
[facility]    Discharge Summary    Tenzin Davis Patient Status:  Inpatient    7/10/1960 MRN [id number]   Location [facility] 4W/SW/SE Attending Sara Yusuf MD   Hosp Day # 6 PCP Renita Paulson MD     Date of Admission yesterday. CBC showed a white blood cell count of 17 with left shift. Chemistries unremarkable.   The patient had a CT scan of the abdomen and pelvis showing findings most consistent with perforated diverticulitis involving the left descending colon, mulrebeca Maleate 10 MG Tabs  Commonly known as:  VASOTEC      TAKE 1 TABLET BY MOUTH DAILY   Quantity:  90 tablet  Refills:  3        STOP taking these medications    ibuprofen 400 MG Tabs  Commonly known as:  MOTRIN        MEDROL (KRYSTEN) OR        Meloxicam 15 MG Ta

## 2018-12-06 NOTE — PAYOR COMM NOTE
Natali Capay #[id number] (59year old F)   Shana Boston MD   Physician   General Surgery   Progress Notes   Signed   Date of Service:  2018 10:40 AM               Signed                   Show:Clear all  [x]Manual[x]Templat    12/06/2018     CREATSERUM 0.62 12/06/2018     BUN 5 (L) 12/06/2018      (L) 12/06/2018     K 3.8 12/06/2018      12/06/2018     CO2 28 12/06/2018      (H) 12/06/2018     CA 8.8 12/06/2018     ALB 2.9 (L) 12/06/2018     ALKPHO 1

## 2018-12-06 NOTE — PROGRESS NOTES
[facility]    Progress Note    Abdifatah Parry Patient Status:  Inpatient    7/10/1960 MRN [id number]   Location [facility] 4W/SW/SE Attending Casimiro Arroyo MD   Hosp Day # 6 PCP Ananth Shankar MD     Assessment and Plan: 09/13/2017    MG 2.0 10/19/2017                     Zaida Swanson MD  12/6/2018

## 2018-12-06 NOTE — PLAN OF CARE
DISCHARGE PLANNING    • Discharge to home or other facility with appropriate resources Adequate for Discharge        GASTROINTESTINAL - ADULT    • Minimal or absence of nausea and vomiting Adequate for Discharge      • Maintains or returns to baseline sudha

## 2018-12-07 ENCOUNTER — TELEPHONE (OUTPATIENT)
Dept: INTERNAL MEDICINE CLINIC | Facility: CLINIC | Age: 58
End: 2018-12-07

## 2018-12-07 ENCOUNTER — PATIENT OUTREACH (OUTPATIENT)
Dept: CASE MANAGEMENT | Age: 58
End: 2018-12-07

## 2018-12-07 ENCOUNTER — OFFICE VISIT (OUTPATIENT)
Dept: HEMATOLOGY/ONCOLOGY | Facility: HOSPITAL | Age: 58
End: 2018-12-07
Attending: HOSPITALIST
Payer: COMMERCIAL

## 2018-12-07 VITALS
TEMPERATURE: 98 F | DIASTOLIC BLOOD PRESSURE: 76 MMHG | BODY MASS INDEX: 29 KG/M2 | WEIGHT: 153.63 LBS | HEART RATE: 95 BPM | SYSTOLIC BLOOD PRESSURE: 130 MMHG | RESPIRATION RATE: 18 BRPM

## 2018-12-07 DIAGNOSIS — K57.20 PERFORATED DIVERTICULUM OF LARGE INTESTINE: Primary | ICD-10-CM

## 2018-12-07 DIAGNOSIS — Z02.9 ENCOUNTERS FOR ADMINISTRATIVE PURPOSES: ICD-10-CM

## 2018-12-07 DIAGNOSIS — K57.20 PERFORATED DIVERTICULUM OF LARGE INTESTINE: ICD-10-CM

## 2018-12-07 PROCEDURE — 96365 THER/PROPH/DIAG IV INF INIT: CPT

## 2018-12-07 PROCEDURE — A4216 STERILE WATER/SALINE, 10 ML: HCPCS

## 2018-12-07 PROCEDURE — 1111F DSCHRG MED/CURRENT MED MERGE: CPT

## 2018-12-07 RX ORDER — SODIUM CHLORIDE 9 MG/ML
INJECTION, SOLUTION INTRAVENOUS
Status: DISCONTINUED
Start: 2018-12-07 | End: 2018-12-07

## 2018-12-07 RX ORDER — 0.9 % SODIUM CHLORIDE 0.9 %
VIAL (ML) INJECTION
Status: DISCONTINUED
Start: 2018-12-07 | End: 2018-12-07

## 2018-12-07 NOTE — PROGRESS NOTES
Brooke Davenport to infusion today for Maryanne Holding to treat diverticulitis of sigmoid colon with perforation. She arrives alert and independent. She c/o pain to left side of abdomen, rates at 2 of 10, intermittent.  She states if she needs something for pain then she just

## 2018-12-07 NOTE — PROGRESS NOTES
Initial Post Discharge Follow Up   Discharge Date: 12/6/18  Contact Date: 12/7/2018    Consent Verification:  Assessment Completed With: Patient  HIPAA Verified?   Yes    Discharge Dx: Perforated sigmoid diverticulitis, ileus, L adnexal lesion    General: Take 1 capsule by mouth daily. Disp:  Rfl:    loratadine (CLARITIN) 10 MG Oral Tab Take 10 mg by mouth daily as needed.    Disp:  Rfl:      • When you were leaving the hospital were any medication changes discussed with you? yes  • If you were prescribed [id number] [address])    Dec 14, 2018  3:30 PM CST Infusion Visit with EM CC INFRN [id number] [address])    Dec 15, 2018 11:00 AM CST Infusion Visit with EM CC mediation as instructed but check BP prior to taking BP medications, keep a log and contact MD for any low BP or if symptoms arise. Pt states she has not checked BP since dc but will begin doing so.  RN Unable to scheduled TCM apt as pt has infusion apt s

## 2018-12-07 NOTE — TELEPHONE ENCOUNTER
Pt discharged from [facility] on 12/6/18 . Please call to schedule TCM follow up with Primary Care Physician. Pt has infusions at cancer center and thud is not available at any of the designated TCM slots.  Please call pt to schedule TCM apt as appro

## 2018-12-08 ENCOUNTER — OFFICE VISIT (OUTPATIENT)
Dept: HEMATOLOGY/ONCOLOGY | Facility: HOSPITAL | Age: 58
End: 2018-12-08
Attending: HOSPITALIST
Payer: COMMERCIAL

## 2018-12-08 VITALS
HEART RATE: 91 BPM | RESPIRATION RATE: 18 BRPM | SYSTOLIC BLOOD PRESSURE: 148 MMHG | TEMPERATURE: 97 F | DIASTOLIC BLOOD PRESSURE: 61 MMHG

## 2018-12-08 DIAGNOSIS — K57.20 PERFORATED DIVERTICULUM OF LARGE INTESTINE: Primary | ICD-10-CM

## 2018-12-08 PROCEDURE — 96365 THER/PROPH/DIAG IV INF INIT: CPT

## 2018-12-08 PROCEDURE — A4216 STERILE WATER/SALINE, 10 ML: HCPCS

## 2018-12-08 RX ORDER — SODIUM CHLORIDE 9 MG/ML
INJECTION, SOLUTION INTRAVENOUS
Status: DISCONTINUED
Start: 2018-12-08 | End: 2018-12-08

## 2018-12-08 RX ORDER — 0.9 % SODIUM CHLORIDE 0.9 %
VIAL (ML) INJECTION
Status: DISCONTINUED
Start: 2018-12-08 | End: 2018-12-08

## 2018-12-08 NOTE — PROGRESS NOTES
Patient arrives for daily antibiotics for diverticultiis. Reports she is feeling well, denies any complaints. PICC line present to left upper arm, PICC flushed with saline, positive blood return noted.  Invanz given over thirty minutes, patient tolerated we

## 2018-12-09 ENCOUNTER — OFFICE VISIT (OUTPATIENT)
Dept: HEMATOLOGY/ONCOLOGY | Facility: HOSPITAL | Age: 58
End: 2018-12-09
Attending: HOSPITALIST
Payer: COMMERCIAL

## 2018-12-09 VITALS
DIASTOLIC BLOOD PRESSURE: 68 MMHG | TEMPERATURE: 98 F | RESPIRATION RATE: 16 BRPM | HEART RATE: 99 BPM | SYSTOLIC BLOOD PRESSURE: 133 MMHG

## 2018-12-09 DIAGNOSIS — K57.20 PERFORATED DIVERTICULUM OF LARGE INTESTINE: Primary | ICD-10-CM

## 2018-12-09 PROCEDURE — A4216 STERILE WATER/SALINE, 10 ML: HCPCS

## 2018-12-09 PROCEDURE — 96365 THER/PROPH/DIAG IV INF INIT: CPT

## 2018-12-09 RX ORDER — 0.9 % SODIUM CHLORIDE 0.9 %
VIAL (ML) INJECTION
Status: DISPENSED
Start: 2018-12-09 | End: 2018-12-09

## 2018-12-09 RX ORDER — SODIUM CHLORIDE 9 MG/ML
INJECTION, SOLUTION INTRAVENOUS
Status: DISPENSED
Start: 2018-12-09 | End: 2018-12-09

## 2018-12-09 NOTE — PROGRESS NOTES
Patient arrives for daily antibiotics for diverticultiis. Denies fever or chills over night. Energy level good. Appetite good, adhering to a low residue diet for now.   PICC line present to left upper arm, PICC flushed with saline, positive blood return not

## 2018-12-10 ENCOUNTER — OFFICE VISIT (OUTPATIENT)
Dept: HEMATOLOGY/ONCOLOGY | Facility: HOSPITAL | Age: 58
End: 2018-12-10
Attending: HOSPITALIST
Payer: COMMERCIAL

## 2018-12-10 VITALS
TEMPERATURE: 98 F | HEART RATE: 96 BPM | RESPIRATION RATE: 16 BRPM | SYSTOLIC BLOOD PRESSURE: 137 MMHG | DIASTOLIC BLOOD PRESSURE: 80 MMHG

## 2018-12-10 DIAGNOSIS — K57.20 PERFORATED DIVERTICULUM OF LARGE INTESTINE: Primary | ICD-10-CM

## 2018-12-10 PROCEDURE — 96365 THER/PROPH/DIAG IV INF INIT: CPT

## 2018-12-10 PROCEDURE — A4216 STERILE WATER/SALINE, 10 ML: HCPCS

## 2018-12-10 RX ORDER — SODIUM CHLORIDE 9 MG/ML
INJECTION, SOLUTION INTRAVENOUS
Status: DISCONTINUED
Start: 2018-12-10 | End: 2018-12-10

## 2018-12-10 RX ORDER — 0.9 % SODIUM CHLORIDE 0.9 %
VIAL (ML) INJECTION
Status: DISCONTINUED
Start: 2018-12-10 | End: 2018-12-10

## 2018-12-10 NOTE — PROGRESS NOTES
Aguilar Grayson presents for daily invanz ordered by Jessica Marie who is a hospitalist.  Followed by her PCP Dr. Angel Saavedra with whom she has an appointment with on 12/14.     Order for antibiotic has 10 days of antibiotic starting on 12/7 and ending 12/17 which is 11 da

## 2018-12-10 NOTE — PAYOR COMM NOTE
--------------  DISCHARGE REVIEW    Payor: [employer]  Subscriber #:  [id number]  Authorization Number: [id number]    Admit date: 11/30/18  Admit time:  5  Discharge Date: 12/6/2018  1:35 PM     Admitting Physician: Sandford Duane, MD  Attending Physic normocephalic.  Eyes: Sclera was anicteric.  Pupils were equal.  Ears:  There were no lesions.  Nose:  No lesions were noted.      NECK:  Supple.  There was no JVD.    CHEST:  Symmetrical movement on inspiration  CARDIAC: S1 S2+, RRR  LUNGS:  CTAB  ABDOMEN Prescription details   Ertapenem Sodium 1 g Solr  Commonly known Delfina Krause  Start taking on:  12/7/2018  Notes to patient:  ANTIBIOTIC      Inject 10 mL (1 g total) into the vein daily for 10 days.    Stop taking on:  12/17/2018  Quantity:  100 mL  Refill MD MARQUISE on 12/6/2018  1:56 PM         REVIEWER COMMENTS

## 2018-12-11 ENCOUNTER — OFFICE VISIT (OUTPATIENT)
Dept: HEMATOLOGY/ONCOLOGY | Facility: HOSPITAL | Age: 58
End: 2018-12-11
Attending: HOSPITALIST
Payer: COMMERCIAL

## 2018-12-11 VITALS
RESPIRATION RATE: 16 BRPM | HEART RATE: 94 BPM | DIASTOLIC BLOOD PRESSURE: 72 MMHG | TEMPERATURE: 98 F | SYSTOLIC BLOOD PRESSURE: 137 MMHG

## 2018-12-11 DIAGNOSIS — K57.20 PERFORATED DIVERTICULUM OF LARGE INTESTINE: Primary | ICD-10-CM

## 2018-12-11 PROCEDURE — 96365 THER/PROPH/DIAG IV INF INIT: CPT

## 2018-12-11 PROCEDURE — A4216 STERILE WATER/SALINE, 10 ML: HCPCS

## 2018-12-11 RX ORDER — SODIUM CHLORIDE 9 MG/ML
INJECTION, SOLUTION INTRAVENOUS
Status: DISCONTINUED
Start: 2018-12-11 | End: 2018-12-11

## 2018-12-11 RX ORDER — 0.9 % SODIUM CHLORIDE 0.9 %
VIAL (ML) INJECTION
Status: DISCONTINUED
Start: 2018-12-11 | End: 2018-12-11

## 2018-12-11 NOTE — PROGRESS NOTES
Harman Aponte presents for daily invanz ordered by Karen Thomas who is a hospitalist.  Followed by her PCP Dr. Tara Bryant with whom she has an appointment with on 12/14.     Order for antibiotic has 10 days of antibiotic starting on 12/7 and ending 12/17 which is 11 d

## 2018-12-12 ENCOUNTER — OFFICE VISIT (OUTPATIENT)
Dept: HEMATOLOGY/ONCOLOGY | Facility: HOSPITAL | Age: 58
End: 2018-12-12
Attending: HOSPITALIST
Payer: COMMERCIAL

## 2018-12-12 VITALS
TEMPERATURE: 98 F | RESPIRATION RATE: 16 BRPM | HEART RATE: 94 BPM | DIASTOLIC BLOOD PRESSURE: 76 MMHG | SYSTOLIC BLOOD PRESSURE: 140 MMHG

## 2018-12-12 DIAGNOSIS — K57.20 PERFORATED DIVERTICULUM OF LARGE INTESTINE: Primary | ICD-10-CM

## 2018-12-12 PROCEDURE — A4216 STERILE WATER/SALINE, 10 ML: HCPCS

## 2018-12-12 PROCEDURE — 96365 THER/PROPH/DIAG IV INF INIT: CPT

## 2018-12-12 RX ORDER — SODIUM CHLORIDE 9 MG/ML
INJECTION, SOLUTION INTRAVENOUS
Status: DISCONTINUED
Start: 2018-12-12 | End: 2018-12-12

## 2018-12-12 RX ORDER — 0.9 % SODIUM CHLORIDE 0.9 %
VIAL (ML) INJECTION
Status: DISCONTINUED
Start: 2018-12-12 | End: 2018-12-12

## 2018-12-12 NOTE — PROGRESS NOTES
Patient to center for daily Anisa Foss arrived ambulatory, she reports feeling well  PIC line flushed well with good blood return, Invanz administered, tolerated well   discharged stable with future appointments

## 2018-12-13 ENCOUNTER — OFFICE VISIT (OUTPATIENT)
Dept: HEMATOLOGY/ONCOLOGY | Facility: HOSPITAL | Age: 58
End: 2018-12-13
Attending: HOSPITALIST
Payer: COMMERCIAL

## 2018-12-13 VITALS
DIASTOLIC BLOOD PRESSURE: 74 MMHG | TEMPERATURE: 98 F | SYSTOLIC BLOOD PRESSURE: 139 MMHG | HEART RATE: 94 BPM | RESPIRATION RATE: 16 BRPM

## 2018-12-13 DIAGNOSIS — K57.20 PERFORATED DIVERTICULUM OF LARGE INTESTINE: Primary | ICD-10-CM

## 2018-12-13 PROCEDURE — 96365 THER/PROPH/DIAG IV INF INIT: CPT

## 2018-12-13 PROCEDURE — A4216 STERILE WATER/SALINE, 10 ML: HCPCS

## 2018-12-13 RX ORDER — 0.9 % SODIUM CHLORIDE 0.9 %
VIAL (ML) INJECTION
Status: DISCONTINUED
Start: 2018-12-13 | End: 2018-12-13

## 2018-12-13 RX ORDER — SODIUM CHLORIDE 9 MG/ML
INJECTION, SOLUTION INTRAVENOUS
Status: DISCONTINUED
Start: 2018-12-13 | End: 2018-12-13

## 2018-12-13 NOTE — PROGRESS NOTES
Patient arrives for daily antibiotics for diverticultiis. Ambulated with steady gait. Reports she is feeling well, denies any complaints. Denies fever chills, loose stools. Continues on low residue diet.   PICC line present to left upper arm, PICC flushed

## 2018-12-14 ENCOUNTER — TELEPHONE (OUTPATIENT)
Dept: SURGERY | Facility: CLINIC | Age: 58
End: 2018-12-14

## 2018-12-14 ENCOUNTER — OFFICE VISIT (OUTPATIENT)
Dept: INTERNAL MEDICINE CLINIC | Facility: CLINIC | Age: 58
End: 2018-12-14
Payer: COMMERCIAL

## 2018-12-14 ENCOUNTER — TELEPHONE (OUTPATIENT)
Dept: INTERNAL MEDICINE CLINIC | Facility: CLINIC | Age: 58
End: 2018-12-14

## 2018-12-14 ENCOUNTER — OFFICE VISIT (OUTPATIENT)
Dept: HEMATOLOGY/ONCOLOGY | Facility: HOSPITAL | Age: 58
End: 2018-12-14
Attending: HOSPITALIST
Payer: COMMERCIAL

## 2018-12-14 VITALS
RESPIRATION RATE: 16 BRPM | TEMPERATURE: 98 F | DIASTOLIC BLOOD PRESSURE: 76 MMHG | HEART RATE: 90 BPM | SYSTOLIC BLOOD PRESSURE: 144 MMHG

## 2018-12-14 VITALS
WEIGHT: 154.63 LBS | BODY MASS INDEX: 29 KG/M2 | SYSTOLIC BLOOD PRESSURE: 143 MMHG | DIASTOLIC BLOOD PRESSURE: 85 MMHG | TEMPERATURE: 98 F | HEART RATE: 93 BPM

## 2018-12-14 DIAGNOSIS — K57.32 DIVERTICULITIS OF COLON: ICD-10-CM

## 2018-12-14 DIAGNOSIS — K57.20 PERFORATED DIVERTICULUM OF LARGE INTESTINE: Primary | ICD-10-CM

## 2018-12-14 DIAGNOSIS — K57.20 PERFORATED DIVERTICULUM OF LARGE INTESTINE: ICD-10-CM

## 2018-12-14 DIAGNOSIS — I10 ESSENTIAL HYPERTENSION: Primary | ICD-10-CM

## 2018-12-14 DIAGNOSIS — E11.9 TYPE 2 DIABETES MELLITUS WITHOUT COMPLICATION, WITHOUT LONG-TERM CURRENT USE OF INSULIN (HCC): ICD-10-CM

## 2018-12-14 DIAGNOSIS — E78.5 HYPERLIPIDEMIA, UNSPECIFIED HYPERLIPIDEMIA TYPE: ICD-10-CM

## 2018-12-14 PROCEDURE — A4216 STERILE WATER/SALINE, 10 ML: HCPCS

## 2018-12-14 PROCEDURE — 96365 THER/PROPH/DIAG IV INF INIT: CPT

## 2018-12-14 PROCEDURE — 99495 TRANSJ CARE MGMT MOD F2F 14D: CPT | Performed by: INTERNAL MEDICINE

## 2018-12-14 RX ORDER — 0.9 % SODIUM CHLORIDE 0.9 %
VIAL (ML) INJECTION
Status: DISCONTINUED
Start: 2018-12-14 | End: 2018-12-14

## 2018-12-14 RX ORDER — SODIUM CHLORIDE 9 MG/ML
INJECTION, SOLUTION INTRAVENOUS
Status: DISCONTINUED
Start: 2018-12-14 | End: 2018-12-14

## 2018-12-14 NOTE — PROGRESS NOTES
HPI:    Patient ID: Brooke Bundy is a 59year old female. HPI    Review of Systems        Current Outpatient Medications:  Ertapenem Sodium (INVANZ) 1 g Intravenous Recon Soln Inject 10 mL (1 g total) into the vein daily for 10 days.  Disp: 100 mL Rfl: LAURAARY  2006   • TONSILLECTOMY  1970   • TUBAL LIGATION  1992      Family History   Problem Relation Age of Onset   • Diabetes Mother         type 2   • Lipids Mother         hyperlipidemia   • Eye Problems Mother         eye issues   • Heart Disorder Mot

## 2018-12-14 NOTE — TELEPHONE ENCOUNTER
Pt. has a pic line and wants to know when should it be removed? Pt. States that she was discharged on 12/6/18, and has sched hosp f/up appt. For 12/20. Pt is aware that the office is closed and will re-open on Mon.

## 2018-12-15 ENCOUNTER — OFFICE VISIT (OUTPATIENT)
Dept: HEMATOLOGY/ONCOLOGY | Facility: HOSPITAL | Age: 58
End: 2018-12-15
Attending: HOSPITALIST
Payer: COMMERCIAL

## 2018-12-15 VITALS
SYSTOLIC BLOOD PRESSURE: 144 MMHG | TEMPERATURE: 98 F | RESPIRATION RATE: 16 BRPM | DIASTOLIC BLOOD PRESSURE: 77 MMHG | HEART RATE: 91 BPM

## 2018-12-15 DIAGNOSIS — K57.20 PERFORATED DIVERTICULUM OF LARGE INTESTINE: Primary | ICD-10-CM

## 2018-12-15 PROCEDURE — A4216 STERILE WATER/SALINE, 10 ML: HCPCS

## 2018-12-15 PROCEDURE — 96365 THER/PROPH/DIAG IV INF INIT: CPT

## 2018-12-15 RX ORDER — SODIUM CHLORIDE 9 MG/ML
INJECTION, SOLUTION INTRAVENOUS
Status: DISCONTINUED
Start: 2018-12-15 | End: 2018-12-15

## 2018-12-15 RX ORDER — 0.9 % SODIUM CHLORIDE 0.9 %
VIAL (ML) INJECTION
Status: DISCONTINUED
Start: 2018-12-15 | End: 2018-12-15

## 2018-12-15 NOTE — PROGRESS NOTES
Patient arrives for daily antibiotics for diverticultiis. Ambulated with steady gait. Reports she is feeling well, denies any complaints. Denies fever/ chills. F/U with surgeon on Thursday. EOT tomorrow.     Instructed patient we are not able to pull PICC

## 2018-12-16 ENCOUNTER — OFFICE VISIT (OUTPATIENT)
Dept: HEMATOLOGY/ONCOLOGY | Facility: HOSPITAL | Age: 58
End: 2018-12-16
Attending: HOSPITALIST
Payer: COMMERCIAL

## 2018-12-16 VITALS
RESPIRATION RATE: 18 BRPM | TEMPERATURE: 98 F | SYSTOLIC BLOOD PRESSURE: 151 MMHG | DIASTOLIC BLOOD PRESSURE: 75 MMHG | HEART RATE: 88 BPM

## 2018-12-16 DIAGNOSIS — K57.20 PERFORATED DIVERTICULUM OF LARGE INTESTINE: Primary | ICD-10-CM

## 2018-12-16 PROCEDURE — 96365 THER/PROPH/DIAG IV INF INIT: CPT

## 2018-12-16 PROCEDURE — A4216 STERILE WATER/SALINE, 10 ML: HCPCS

## 2018-12-16 RX ORDER — SODIUM CHLORIDE 9 MG/ML
INJECTION, SOLUTION INTRAVENOUS
Status: DISPENSED
Start: 2018-12-16 | End: 2018-12-16

## 2018-12-16 RX ORDER — 0.9 % SODIUM CHLORIDE 0.9 %
VIAL (ML) INJECTION
Status: DISPENSED
Start: 2018-12-16 | End: 2018-12-16

## 2018-12-16 NOTE — PROGRESS NOTES
Patient arrives for daily antibiotics for diverticultiis. Ambulated with steady gait. Reports she is feeling well, denies any complaints. Denies fever/ chills. F/U with surgeon on Thursday. Last dose today, no orders to remove PICC, remains in place.

## 2018-12-17 ENCOUNTER — TELEPHONE (OUTPATIENT)
Dept: SURGERY | Facility: CLINIC | Age: 58
End: 2018-12-17

## 2018-12-17 ENCOUNTER — TELEPHONE (OUTPATIENT)
Dept: HEMATOLOGY/ONCOLOGY | Facility: HOSPITAL | Age: 58
End: 2018-12-17

## 2018-12-17 ENCOUNTER — APPOINTMENT (OUTPATIENT)
Dept: HEMATOLOGY/ONCOLOGY | Facility: HOSPITAL | Age: 58
End: 2018-12-17
Attending: HOSPITALIST
Payer: COMMERCIAL

## 2018-12-17 NOTE — TELEPHONE ENCOUNTER
Malcolm Gipson MD   You 7 minutes ago (8:53 AM)     Emir Harper we can remove it when she f/u in clinic.     Routing Comment       You   Ajay Woodruff MD 18 minutes ago (8:42 AM)     I did not see an Infectious Disease Physician on this case, the antibiotics appear to have

## 2018-12-17 NOTE — TELEPHONE ENCOUNTER
Spoke with Master Bundy RN in Dr Latosha Brown office regarding pt, Scott Rodriguez. Pt is due to have PICC line dressing change today, , but orders are .   Per Master Bundy RN, pt being seen in office tomorrow with Dr Lynnette Bazzi and will have PICC line removed at this time

## 2018-12-17 NOTE — TELEPHONE ENCOUNTER
Infusion RN questioned the dressing change for the PICC line on the patient on today's visit. Advised the RN she did not need to change the dressing today as the PICC would be discontinued on 12/18/18. Verbalized understanding received.

## 2018-12-17 NOTE — TELEPHONE ENCOUNTER
Dr. Sonja Barrow, patient has PICC line, last dose of IV abx was 12/16/2018, she has follow up appointment here on 12/18/2018. Should PICC be removed? Please advise, thank you.     Contacted patient, informed her we will relay her message to Dr. Sonja Barrow and will call he

## 2018-12-17 NOTE — TELEPHONE ENCOUNTER
MD Richard Gimenez, RN   Caller: Unspecified (3 days ago,  5:04 PM)             Up to infectious disease dr when or if PICC can come out. Jaden Rudd my standpoint, ok for PICC to be removed if no further abx is given.

## 2018-12-18 ENCOUNTER — OFFICE VISIT (OUTPATIENT)
Dept: SURGERY | Facility: CLINIC | Age: 58
End: 2018-12-18
Payer: COMMERCIAL

## 2018-12-18 VITALS — WEIGHT: 148 LBS | BODY MASS INDEX: 27.94 KG/M2 | HEIGHT: 61 IN

## 2018-12-18 DIAGNOSIS — K57.32 DIVERTICULITIS LARGE INTESTINE W/O PERFORATION OR ABSCESS W/O BLEEDING: Primary | ICD-10-CM

## 2018-12-18 PROCEDURE — 99214 OFFICE O/P EST MOD 30 MIN: CPT | Performed by: SURGERY

## 2018-12-18 PROCEDURE — 1111F DSCHRG MED/CURRENT MED MERGE: CPT | Performed by: SURGERY

## 2018-12-18 PROCEDURE — 99212 OFFICE O/P EST SF 10 MIN: CPT | Performed by: SURGERY

## 2018-12-19 PROBLEM — K57.32 DIVERTICULITIS LARGE INTESTINE W/O PERFORATION OR ABSCESS W/O BLEEDING: Status: ACTIVE | Noted: 2017-10-18

## 2018-12-20 NOTE — PROGRESS NOTES
HPI:    Patient ID: Andrés Armendariz is a 59year old female presenting with Patient presents with: Follow - Up: Diverticulitis. Patient here for followup visit from hospitalization. States she is feeling much better.   Complaints of being thirsty all the Hypertension Mother    • Musculo-skelatal Disorder Mother         per ng osteoporosis   • Alcohol and Other Disorders Associated Father         alcoholism   • Hypertension Father    • Glaucoma Maternal Grandmother    • Diabetes Maternal Grandmother    • Hy defibrillator: No        Breast feeding: Not Asked        Reaction to local anesthetic: No    Social History Narrative      Not on file        Constitutional: Negative. HENT: Negative. Eyes: Negative. Respiratory: Negative.     Cardiovascular: Nega w/o perforation or abscess w/o bleeding    Resolved second episode of uncomplicated sigmoid diverticulitis. Pt wants to continue observation and consider the option for an elective sigmoid resection should she develop a third episode.       Pt completed IV

## 2018-12-26 NOTE — PROGRESS NOTES
HPI:    Freddy Head is a 59year old female here today for hospital follow up.    She was discharged from Inpatient hospital, [facility]  to Home   Admission Date: 11/30/18   Discharge Date: 12/6/18  Hospital Discharge Diagnoses (since 11/26/2018) defecits        History of Present Illness:   Per Dr. Luz Maria Reddy  The patient is a 49-year-old  female who came into the emergency department with progressive abdominal pain, fever and chills since yesterday. [address] showed a white blood cell count of 1 loratadine       Take 10 mg by mouth daily as needed.    Refills:  0      Cyclobenzaprine HCl 10 MG Tabs  Commonly known as:  cyclobenzaprine       Take 10 mg by mouth 3 (three) times daily as needed for Muscle spasms.    Refills:  0      Enalapril Maleate living. ? Referrals as listed below in orders Assisted in scheduling required follow-up with community providers and services. Medication Reconciliation:  I am aware of an inpatient discharge within the last 30 days.   The discharge medication list has and Uterine prolapse (2015).     She  has a past surgical history that includes foot surgery (); tonsillectomy (); other surgical history; colonoscopy (2012); ; pessary (); biopsy of uterus lining (10/2007); other surgical history nervous/anxious. PHYSICAL EXAM:   No LMP recorded. Patient is postmenopausal. Estimated body mass index is 29.21 kg/m² as calculated from the following:    Height as of 11/30/18: 5' 1\" (1.549 m).     Weight as of this encounter: 154 lb 9.6 oz (70.1 low chol diet advised    (K57.32) Diverticulitis of colon  Plan: acute with perforation   Hosp notes reivewed in detial  IV antibiotic today I advised  For her to consult with surgery regarding  PIC line removal      (K57.20) Perforated diverticulum of lar

## 2019-01-21 ENCOUNTER — PATIENT MESSAGE (OUTPATIENT)
Dept: SURGERY | Facility: CLINIC | Age: 59
End: 2019-01-21

## 2019-01-21 NOTE — TELEPHONE ENCOUNTER
Left message on patient's personal VM. Advised patient to avoid all fried foods, or eat in moderation. Advised her to call us with any further questions.

## 2019-01-21 NOTE — TELEPHONE ENCOUNTER
From: Gagandeep St  To: Rachelle Alonso MD  Sent: 1/21/2019 9:50 AM CST  Subject: Other    Good morning,    My name is Gagandeep St and I have diverticulitis.  Can you please ask Dr. Stacy Romero if I can eat Egg plant fried and   the frozen Anthony chicken strips or

## 2019-01-21 NOTE — TELEPHONE ENCOUNTER
Dr. Awa Wheatley,    2 forms: Disability and RTW forms    Please sign off on form:  -Highlight the patient and hit \"Chart\" button. -In Chart Review, w/in the Encounter tab - click 1 time on the Telephone call encounter for 12/14/18.  Scroll down the telephone

## 2019-01-26 ENCOUNTER — HOSPITAL ENCOUNTER (OUTPATIENT)
Dept: MAMMOGRAPHY | Age: 59
Discharge: HOME | End: 2019-01-26
Attending: INTERNAL MEDICINE
Payer: COMMERCIAL

## 2019-01-26 DIAGNOSIS — Z12.31 VISIT FOR SCREENING MAMMOGRAM: ICD-10-CM

## 2019-01-26 PROCEDURE — 77067 SCR MAMMO BI INCL CAD: CPT | Performed by: INTERNAL MEDICINE

## 2019-01-26 PROCEDURE — 77063 BREAST TOMOSYNTHESIS BI: CPT | Performed by: INTERNAL MEDICINE

## 2019-02-12 ENCOUNTER — TELEPHONE (OUTPATIENT)
Dept: INTERNAL MEDICINE CLINIC | Facility: CLINIC | Age: 59
End: 2019-02-12

## 2019-02-12 NOTE — TELEPHONE ENCOUNTER
Received fax from [employer] [facility]. Form scanned. (No payment taken since form was received via fax).

## 2019-02-20 ENCOUNTER — PATIENT MESSAGE (OUTPATIENT)
Dept: INTERNAL MEDICINE CLINIC | Facility: CLINIC | Age: 59
End: 2019-02-20

## 2019-02-20 ENCOUNTER — NURSE TRIAGE (OUTPATIENT)
Dept: OTHER | Age: 59
End: 2019-02-20

## 2019-02-20 NOTE — TELEPHONE ENCOUNTER
LMTCB - transfer to triage      From: Cedrick Burris  To: Walter Whitten MD  Sent: 2/20/2019 12:02 PM CST  Subject: Non-Urgent Medical Question    Dr. Esmer Augustin wanted to know if I can go for a ultrasound on  my right thigh it has been hurting me for 2

## 2019-02-20 NOTE — TELEPHONE ENCOUNTER
From: Kanchan Hastings  To: Raquel Rodriguez MD  Sent: 2/20/2019 12:02 PM CST  Subject: Non-Urgent Medical Question    Dr. Rc Kasper I wanted to know if I can go for a ultrasound on my right thigh it has been hurting me for 2 weeks.     I can go any day after 5

## 2019-02-20 NOTE — TELEPHONE ENCOUNTER
Action Requested: Summary for Provider     []  Critical Lab, Recommendations Needed  [x] Need Additional Advice  []   FYI    [x]   Need Orders  [] Need Medications Sent to Pharmacy  []  Other     SUMMARY: I left pt a message  to call me back at ext [phone]

## 2019-02-20 NOTE — TELEPHONE ENCOUNTER
Not sure what it is from  If US is needed and we are looking for DVT this is an emergency  Need to be seen  Advise OV    UC or ER if no  IM MD available  Cannot diagnose over the phone

## 2019-02-20 NOTE — TELEPHONE ENCOUNTER
DOUG Phillips   Pt was advised of  message below and she verbalized understanding but does not want to go to ER or I/C. She stated that right now she has no pain after she took the tylenol.  She just thought since she had a u/s done last time whe

## 2019-02-22 NOTE — TELEPHONE ENCOUNTER
Pt called and wanted to know about her [employer] work. She was inform of  message below. She will called the [facility] department if she needs more information.  She will check her mail later as she has received a e-mail message that she has some paper work saqib

## 2019-02-23 ENCOUNTER — TELEPHONE (OUTPATIENT)
Dept: INTERNAL MEDICINE CLINIC | Facility: CLINIC | Age: 59
End: 2019-02-23

## 2019-02-23 NOTE — TELEPHONE ENCOUNTER
Pt is requesting if Dr can call her. Pt wants to know if she is okay to take vitamin  E . Pt said if doesn't answer to leave a vm.       Pt. stts if can take vitamin E what kind can she take.

## 2019-02-25 NOTE — TELEPHONE ENCOUNTER
Pt called in in regards to the [facility] papers.   She stated she received letter [employer] and they have not received the paper work    She stated she got letter that [employer] has not received the paperwork by the due date of 2/15    Please call [phone]

## 2019-02-27 NOTE — TELEPHONE ENCOUNTER
Dr. Gee Quinones,    Patients disability company needs more information as to why patient was still off 12/19 -1/9 when office notes of Dr. Karol Goodrich from 12/18 said picc line was removed and patient is fine.  Could you provide a letter as to why she was off until 1

## 2019-02-27 NOTE — TELEPHONE ENCOUNTER
This was my letter      To whom it may concern,        Gagandeep Alma Rosa is under my medical care.  She is unable to work at this time  She will return on 1/3/19 without restrictions

## 2019-02-27 NOTE — TELEPHONE ENCOUNTER
OK to write a letter   Pt still recovering from acute illness  Perforated intestinal diverticulum  Was to avoid prolonged standing

## 2019-02-28 ENCOUNTER — PATIENT MESSAGE (OUTPATIENT)
Dept: INTERNAL MEDICINE CLINIC | Facility: CLINIC | Age: 59
End: 2019-02-28

## 2019-02-28 NOTE — TELEPHONE ENCOUNTER
From: Andrés Armendariz  To: Darrick Min MD  Sent: 2/28/2019 9:45 AM CST  Subject: Non-Urgent Medical Question    thank you for your respond.  I will take only the Vatamin D and not the calcium one can I take the vatamin D at night and the blood pressure

## 2019-03-06 ENCOUNTER — PATIENT MESSAGE (OUTPATIENT)
Dept: INTERNAL MEDICINE CLINIC | Facility: CLINIC | Age: 59
End: 2019-03-06

## 2019-03-06 NOTE — TELEPHONE ENCOUNTER
From: Kilo Hammer  To: Mery Bautista MD  Sent: 3/6/2019 10:53 AM CST  Subject: Non-Urgent Medical Question    Can you please ask Dr. Timothy Munroe if the vitamin E 400 IU verito baldwin is okay to take.

## 2019-03-27 ENCOUNTER — OFFICE VISIT (OUTPATIENT)
Dept: DERMATOLOGY CLINIC | Facility: CLINIC | Age: 59
End: 2019-03-27
Payer: COMMERCIAL

## 2019-03-27 DIAGNOSIS — L82.1 SEBORRHEIC KERATOSES: ICD-10-CM

## 2019-03-27 DIAGNOSIS — L30.9 DERMATITIS: Primary | ICD-10-CM

## 2019-03-27 DIAGNOSIS — D23.9 BENIGN NEOPLASM OF SKIN, UNSPECIFIED LOCATION: ICD-10-CM

## 2019-03-27 PROCEDURE — 99213 OFFICE O/P EST LOW 20 MIN: CPT | Performed by: DERMATOLOGY

## 2019-03-27 PROCEDURE — 99212 OFFICE O/P EST SF 10 MIN: CPT | Performed by: DERMATOLOGY

## 2019-04-02 ENCOUNTER — OFFICE VISIT (OUTPATIENT)
Dept: SURGERY | Facility: CLINIC | Age: 59
End: 2019-04-02
Payer: COMMERCIAL

## 2019-04-02 VITALS — BODY MASS INDEX: 28.13 KG/M2 | WEIGHT: 149 LBS | HEIGHT: 61 IN

## 2019-04-02 DIAGNOSIS — K57.92 DIVERTICULITIS: Primary | ICD-10-CM

## 2019-04-02 PROCEDURE — 99212 OFFICE O/P EST SF 10 MIN: CPT | Performed by: SURGERY

## 2019-04-02 NOTE — PROGRESS NOTES
Patient presents with: Follow - Up: F/U from hospital stay, discharge December 6th for Diverticulitis. States she feels well now, just has to watch what she eats due to nausea. Bowels are regular now. No fevers or bloating.   Appetite is good, sleeps we

## 2019-04-07 NOTE — PROGRESS NOTES
Huan May is a 59year old female. HPI:     CC:  Patient presents with:  Rash: LOV 4/8/2016 Patient present with red raised itchy rash on R  side of face and forehead .  Patient denies any changes in soap         Allergies:  Codeine    HISTORY:    Pas alcoholism   • Hypertension Father    • Glaucoma Maternal Grandmother    • Diabetes Maternal Grandmother    • Hypertension Maternal Grandmother    • Musculo-skelatal Disorder Maternal Grandmother         per ng osteoporosis   • Diabetes Other         mG • CYSTOSCOPY N/A 2017    Performed by Jorge Mcneill MD at [facility] MAIN OR   • FOOT SURGERY  2010    bunionectomy   • FOOT SURGERY Bilateral 2016   • NEEDLE BIOPSY LEFT  2013   •       x3 w/PPTL   • OTHER SURGICAL HISTORY     • OTHER WHEELER Asked        Hobby Hazards: Not Asked        Sleep Concern: Not Asked        Stress Concern: Not Asked        Weight Concern: Not Asked        Special Diet: Not Asked        Back Care: Not Asked        Exercise: Not Asked        Bike Helmet: Not Asked reviewed as noted. Physical Examination:     Well-developed well-nourished patient alert oriented in no acute distress.   Exam performed, including scalp, head, neck, face,nails, hair, external eyes, including conjunctival mucosa, eyelids, lips extern cancer, ABCDE's of melanoma discussed with patient. Sunscreen use, sun protection, self exams reviewed. Followup as noted RTC ---routine checkup    6 mos -one year or p.r.n. The patient indicates understanding of these issues and agrees to the plan.   Caridad Daljit

## 2019-04-17 ENCOUNTER — HOSPITAL ENCOUNTER (OUTPATIENT)
Dept: ULTRASOUND IMAGING | Facility: HOSPITAL | Age: 59
Discharge: HOME | End: 2019-04-17
Attending: INTERNAL MEDICINE
Payer: COMMERCIAL

## 2019-04-17 ENCOUNTER — HOSPITAL ENCOUNTER (OUTPATIENT)
Dept: GENERAL RADIOLOGY | Age: 59
Discharge: HOME | End: 2019-04-17
Attending: INTERNAL MEDICINE
Payer: COMMERCIAL

## 2019-04-17 ENCOUNTER — OFFICE VISIT (OUTPATIENT)
Dept: INTERNAL MEDICINE CLINIC | Facility: CLINIC | Age: 59
End: 2019-04-17
Payer: COMMERCIAL

## 2019-04-17 VITALS
DIASTOLIC BLOOD PRESSURE: 77 MMHG | HEART RATE: 96 BPM | HEIGHT: 61 IN | WEIGHT: 151 LBS | TEMPERATURE: 99 F | SYSTOLIC BLOOD PRESSURE: 145 MMHG | BODY MASS INDEX: 28.51 KG/M2

## 2019-04-17 DIAGNOSIS — M79.604 RIGHT LEG PAIN: ICD-10-CM

## 2019-04-17 DIAGNOSIS — E78.5 HYPERLIPIDEMIA, UNSPECIFIED HYPERLIPIDEMIA TYPE: ICD-10-CM

## 2019-04-17 DIAGNOSIS — Z78.0 POSTMENOPAUSAL: ICD-10-CM

## 2019-04-17 DIAGNOSIS — M16.11 PRIMARY OSTEOARTHRITIS OF RIGHT HIP: ICD-10-CM

## 2019-04-17 DIAGNOSIS — I10 ESSENTIAL HYPERTENSION: Primary | ICD-10-CM

## 2019-04-17 DIAGNOSIS — R73.01 ABNORMAL FASTING GLUCOSE: ICD-10-CM

## 2019-04-17 DIAGNOSIS — M25.551 PAIN OF RIGHT HIP JOINT: ICD-10-CM

## 2019-04-17 PROCEDURE — 99214 OFFICE O/P EST MOD 30 MIN: CPT | Performed by: INTERNAL MEDICINE

## 2019-04-17 PROCEDURE — 73502 X-RAY EXAM HIP UNI 2-3 VIEWS: CPT | Performed by: INTERNAL MEDICINE

## 2019-04-17 PROCEDURE — 99212 OFFICE O/P EST SF 10 MIN: CPT | Performed by: INTERNAL MEDICINE

## 2019-04-17 PROCEDURE — 93971 EXTREMITY STUDY: CPT | Performed by: INTERNAL MEDICINE

## 2019-04-17 PROCEDURE — 73552 X-RAY EXAM OF FEMUR 2/>: CPT | Performed by: INTERNAL MEDICINE

## 2019-04-17 RX ORDER — ENALAPRIL MALEATE 10 MG/1
10 TABLET ORAL
Qty: 90 TABLET | Refills: 3 | Status: SHIPPED | OUTPATIENT
Start: 2019-04-17 | End: 2020-04-28

## 2019-04-17 RX ORDER — ENALAPRIL MALEATE 10 MG/1
10 TABLET ORAL
Qty: 90 TABLET | Refills: 3 | Status: SHIPPED | OUTPATIENT
Start: 2019-04-17 | End: 2019-04-17

## 2019-04-17 NOTE — PROGRESS NOTES
HPI:    Patient ID: Kathrine Carrel is a 59year old female.     HPI    Left ankle pain  She is going to see foot and ankle specialist at 4:30 PM today  Right thigh is painful with walking  Right hip pain  Ongoing for 3 wks    Hx of diveriticultis resolve  P Take 1 capsule by mouth daily. Disp:  Rfl:    loratadine (CLARITIN) 10 MG Oral Tab Take 10 mg by mouth daily as needed. Disp:  Rfl:    Cyclobenzaprine HCl 10 MG Oral Tab Take 10 mg by mouth 3 (three) times daily as needed for Muscle spasms.  Disp:  Rfl: Disorder Mother         per ng osteoporosis   • Alcohol and Other Disorders Associated Father         alcoholism   • Hypertension Father    • Glaucoma Maternal Grandmother    • Diabetes Maternal Grandmother    • Hypertension Maternal Grandmother    • Marcialu Skin: She is not diaphoretic. Nursing note and vitals reviewed.            ASSESSMENT/PLAN:   (I10) Essential hypertension  (primary encounter diagnosis)  Plan: URINE MICROSCOPIC W REFLEX CULTURE        Low salt diet  Monitor  Pt in pain BP elevated

## 2019-04-18 ENCOUNTER — TELEPHONE (OUTPATIENT)
Dept: INTERNAL MEDICINE CLINIC | Facility: CLINIC | Age: 59
End: 2019-04-18

## 2019-04-18 NOTE — TELEPHONE ENCOUNTER
Pt called stating she was provided discharge papers that state she has diabetes. Pt is unaware she has diabetes and has concerns if this is a mistake. Please advise.

## 2019-04-18 NOTE — TELEPHONE ENCOUNTER
Called Pt to inform Diabetes dx was removed from record . Pt is aware she has Hx of pre-diabetes . Pt verbalized understanding denied questions .

## 2019-04-28 ENCOUNTER — HOSPITAL ENCOUNTER (EMERGENCY)
Facility: HOSPITAL | Age: 59
Discharge: HOME | End: 2019-04-28
Attending: EMERGENCY MEDICINE
Payer: COMMERCIAL

## 2019-04-28 ENCOUNTER — APPOINTMENT (OUTPATIENT)
Dept: ULTRASOUND IMAGING | Facility: HOSPITAL | Age: 59
End: 2019-04-28
Attending: EMERGENCY MEDICINE
Payer: COMMERCIAL

## 2019-04-28 VITALS
OXYGEN SATURATION: 99 % | TEMPERATURE: 98 F | DIASTOLIC BLOOD PRESSURE: 86 MMHG | SYSTOLIC BLOOD PRESSURE: 135 MMHG | RESPIRATION RATE: 20 BRPM | HEART RATE: 96 BPM | WEIGHT: 147 LBS | BODY MASS INDEX: 27.75 KG/M2 | HEIGHT: 61 IN

## 2019-04-28 DIAGNOSIS — N81.4 UTERINE PROLAPSE: ICD-10-CM

## 2019-04-28 DIAGNOSIS — N95.0 POST-MENOPAUSAL BLEEDING: Primary | ICD-10-CM

## 2019-04-28 PROCEDURE — 86900 BLOOD TYPING SEROLOGIC ABO: CPT | Performed by: EMERGENCY MEDICINE

## 2019-04-28 PROCEDURE — 80048 BASIC METABOLIC PNL TOTAL CA: CPT | Performed by: EMERGENCY MEDICINE

## 2019-04-28 PROCEDURE — 85025 COMPLETE CBC W/AUTO DIFF WBC: CPT | Performed by: EMERGENCY MEDICINE

## 2019-04-28 PROCEDURE — 76830 TRANSVAGINAL US NON-OB: CPT | Performed by: EMERGENCY MEDICINE

## 2019-04-28 PROCEDURE — 86901 BLOOD TYPING SEROLOGIC RH(D): CPT | Performed by: EMERGENCY MEDICINE

## 2019-04-28 PROCEDURE — 99285 EMERGENCY DEPT VISIT HI MDM: CPT

## 2019-04-28 PROCEDURE — 86900 BLOOD TYPING SEROLOGIC ABO: CPT

## 2019-04-28 PROCEDURE — 81001 URINALYSIS AUTO W/SCOPE: CPT | Performed by: EMERGENCY MEDICINE

## 2019-04-28 PROCEDURE — 86901 BLOOD TYPING SEROLOGIC RH(D): CPT

## 2019-04-28 PROCEDURE — 36415 COLL VENOUS BLD VENIPUNCTURE: CPT

## 2019-04-28 PROCEDURE — 76856 US EXAM PELVIC COMPLETE: CPT | Performed by: EMERGENCY MEDICINE

## 2019-04-28 PROCEDURE — 86850 RBC ANTIBODY SCREEN: CPT | Performed by: EMERGENCY MEDICINE

## 2019-04-28 PROCEDURE — 86850 RBC ANTIBODY SCREEN: CPT

## 2019-04-28 NOTE — ED INITIAL ASSESSMENT (HPI)
Patient complains complains vaginal bleeding. Spotting started yesterday. Patient complains of lower abdominal pain. Heaving bleeding started this morning.  +clots. Patient reports saturating one pad every 15 minutes.   Patient denies of dizziness/light

## 2019-04-28 NOTE — ED PROVIDER NOTES
Patient Seen in: [facility] Emergency Department    History   Patient presents with:  Vaginal Bleeding    Stated Complaint: bleeding    HPI    78-year-old female presents for complaint of vaginal bleeding.   She states that she had some spotting ye 1970   • TUBAL LIGATION  1992           Social History    Tobacco Use      Smoking status: Never Smoker      Smokeless tobacco: Never Used    Alcohol use: No    Drug use: No      Review of Systems   Constitutional: Negative. HENT: Negative.     Eyes: Neg Urine Color Red (*)     Protein Urine 100  (*)     Glucose Urine 50  (*)     Blood Urine Moderate (*)     RBC URINE 1,398 (*)     All other components within normal limits   BASIC METABOLIC PANEL (8) - Abnormal; Notable for the following components:    Glu discussed with the patient. Patient is advised to follow up with PCP for reevaluation. Return precautions were given. Patient voices understanding and agreement with the treatment plan. All questions were addressed and answered.                     Disposit

## 2019-04-28 NOTE — ED NOTES
Discharge instructions given to pt. Pt verbalized understanding of home care, and to follow up with gynecologist. Pt denied further questions or concerns. Pt ambulatory out of ED with , discharged in stable condition.

## 2019-04-29 ENCOUNTER — OFFICE VISIT (OUTPATIENT)
Dept: OBGYN CLINIC | Facility: CLINIC | Age: 59
End: 2019-04-29
Payer: COMMERCIAL

## 2019-04-29 VITALS
SYSTOLIC BLOOD PRESSURE: 133 MMHG | WEIGHT: 148 LBS | DIASTOLIC BLOOD PRESSURE: 84 MMHG | BODY MASS INDEX: 28 KG/M2 | HEART RATE: 102 BPM

## 2019-04-29 DIAGNOSIS — N94.89 ENDOMETRIAL MASS: ICD-10-CM

## 2019-04-29 DIAGNOSIS — N95.0 POSTMENOPAUSAL BLEEDING: ICD-10-CM

## 2019-04-29 DIAGNOSIS — N95.0 POST-MENOPAUSAL BLEEDING: Primary | ICD-10-CM

## 2019-04-29 PROCEDURE — 99213 OFFICE O/P EST LOW 20 MIN: CPT | Performed by: OBSTETRICS & GYNECOLOGY

## 2019-04-29 PROCEDURE — 58100 BIOPSY OF UTERUS LINING: CPT | Performed by: OBSTETRICS & GYNECOLOGY

## 2019-04-29 NOTE — PROGRESS NOTES
Juan Luis Miranda is a 59year old female  No LMP recorded. Patient is postmenopausal. Patient presents with:  Gyn Exam: Vaginal bleeding -- last seen in 2017. Using donut pessary for complete uterine prolapse. Wishes to avoid surgery.  Sudden onset of History     T3    L3    SAB0  TAB0  Ectopic0  Multiple0  Live Births3      SOCIAL HISTORY:  Social History    Socioeconomic History      Marital status:       Spouse name: Not on file      Number of children: Not on file      Years of ed Asked        Self-Exams: Not Asked        Grew up on a farm: Not Asked        History of tanning: Not Asked        Outdoor occupation: Not Asked        Pt has a pacemaker: No        Pt has a defibrillator: No        Breast feeding: Not Asked        Reactio affect    Pelvic Exam:  External Genitalia:  normal appearance, hair distribution, and no lesions  Urethral Meatus:   normal in size, location, without lesions and prolapse  Bladder:    no fullness, masses or tenderness  Vagina:    normal appearance withou complex 0.8 cm mass that appears to protrude into the endometrium at the level of the uterine fundus. Differential considerations include submucosal fibroid, endometrial polyp, or less likely endometrial neoplasm.   Suggest gynecologic evaluation with cons Right Ovary Length: 2.91 cm             Right Ovary Height: 2.55 cm             Right Ovary Width: 1.83 cm             Left Ovary Length: 3.31 cm             Left Ovary Height: 1.27 cm             Left Ovary Width: 1.97 cm       CONCLUSION:   1Vivian Escobedo

## 2019-04-29 NOTE — PROCEDURES
Endometrial Biopsy     Pre-Procedure Care:   Consent was obtained. Procedure/risks were explained. Questions were answered. Correct patient was identified. Correct side and site were confirmed.       Birth control method(s) used: POSTMENOPAUSAL & TUBAL

## 2019-05-06 ENCOUNTER — OFFICE VISIT (OUTPATIENT)
Dept: OBGYN CLINIC | Facility: CLINIC | Age: 59
End: 2019-05-06
Payer: COMMERCIAL

## 2019-05-06 VITALS
WEIGHT: 151.38 LBS | BODY MASS INDEX: 29 KG/M2 | SYSTOLIC BLOOD PRESSURE: 133 MMHG | DIASTOLIC BLOOD PRESSURE: 71 MMHG | HEART RATE: 91 BPM

## 2019-05-06 DIAGNOSIS — Z12.4 SCREENING FOR MALIGNANT NEOPLASM OF CERVIX: ICD-10-CM

## 2019-05-06 DIAGNOSIS — Z01.411 ENCOUNTER FOR GYNECOLOGICAL EXAMINATION WITH ABNORMAL FINDING: Primary | ICD-10-CM

## 2019-05-06 DIAGNOSIS — N95.0 POST-MENOPAUSAL BLEEDING: ICD-10-CM

## 2019-05-06 DIAGNOSIS — N81.3 COMPLETE UTERINE PROLAPSE WITH PROLAPSE OF ANTERIOR VAGINAL WALL: ICD-10-CM

## 2019-05-06 PROCEDURE — 99213 OFFICE O/P EST LOW 20 MIN: CPT | Performed by: OBSTETRICS & GYNECOLOGY

## 2019-05-06 PROCEDURE — 99396 PREV VISIT EST AGE 40-64: CPT | Performed by: OBSTETRICS & GYNECOLOGY

## 2019-05-06 RX ORDER — MEDROXYPROGESTERONE ACETATE 10 MG/1
10 TABLET ORAL DAILY
Qty: 5 TABLET | Refills: 0 | Status: SHIPPED | OUTPATIENT
Start: 2019-05-06 | End: 2019-05-24 | Stop reason: ALTCHOICE

## 2019-05-07 ENCOUNTER — TELEPHONE (OUTPATIENT)
Dept: INTERNAL MEDICINE CLINIC | Facility: CLINIC | Age: 59
End: 2019-05-07

## 2019-05-07 RX ORDER — MEDROXYPROGESTERONE ACETATE 10 MG/1
TABLET ORAL
Qty: 90 TABLET | Refills: 0 | OUTPATIENT
Start: 2019-05-07

## 2019-05-07 NOTE — TELEPHONE ENCOUNTER
Pt states she saw Dr. Miguelito Crane yesterday and agreed to the hysterectomy per pt needs preop clearance pt would like to know if she needs to come see MMP again and stated she also needs to get her heart check.  Pt states is she does not answer can leave message o

## 2019-05-07 NOTE — TELEPHONE ENCOUNTER
PT WAS SEEN FOR ANNUAL YESTERDAY AND [facility] SENT RX FOR PROVERA 10MG, #5 ONLY. SENT BACK RX REQUEST FOR #90 AS DENIED AND QUANTITY REQUEST IS NOT APPROPRIATE.

## 2019-05-08 NOTE — PROGRESS NOTES
Teddy Marin is a 59year old female  No LMP recorded. Patient is postmenopausal. Patient presents with:  Gyn Exam: Annual -- had embx done last week due to  bleed  Other: wishes for surgery for uterine prolapse  .     OBSTETRICS HISTORY:  OB Hi Elysia Calvo MD at [facility] MAIN OR   • FOOT SURGERY  2010    bunionectomy   • FOOT SURGERY Bilateral 2016   • NEEDLE BIOPSY LEFT  2013   •       x3 w/PPTL   • OTHER SURGICAL HISTORY     • OTHER SURGICAL HISTORY Right     thigh cyst remova soda chocolate daily        Occupational Exposure: Not Asked        Hobby Hazards: Not Asked        Sleep Concern: Not Asked        Stress Concern: Not Asked        Weight Concern: Not Asked        Special Diet: Not Asked        Back Care: Not Asked Oral Tab, Take 10 mg by mouth daily as needed.   , Disp: , Rfl:     ALLERGIES:    Codeine                 RASH    Comment:Tylenol with Codeine      Review of Systems:  Constitutional:    denies fatigue, night sweats, hot flashes  Eyes:     denies blurred or normal in size, contour, mobility, without tenderness (+) grade 4 prolpase  Adnexa:   normal without masses or tenderness  Perineum:   normal  Anus: no hemorroids     Assessment & Plan:  Maco Dey was seen today for gyn exam and other.     Diagnoses and all

## 2019-05-11 ENCOUNTER — OFFICE VISIT (OUTPATIENT)
Dept: OBGYN CLINIC | Facility: CLINIC | Age: 59
End: 2019-05-11
Payer: COMMERCIAL

## 2019-05-11 VITALS — DIASTOLIC BLOOD PRESSURE: 85 MMHG | HEART RATE: 85 BPM | SYSTOLIC BLOOD PRESSURE: 157 MMHG

## 2019-05-11 DIAGNOSIS — N81.3 UTEROVAGINAL PROLAPSE, COMPLETE: Primary | ICD-10-CM

## 2019-05-11 PROCEDURE — 57160 INSERT PESSARY/OTHER DEVICE: CPT | Performed by: OBSTETRICS & GYNECOLOGY

## 2019-05-11 PROCEDURE — A4561 PESSARY RUBBER, ANY TYPE: HCPCS | Performed by: OBSTETRICS & GYNECOLOGY

## 2019-05-11 NOTE — PROCEDURES
Pessary Fitting    Birth control method(s) used: POSTMENOPAUSAL & TUBAL LIGATION; date last used:      Consent signed. Procedure discussed with patient in detail including indication, risk, benefits, alternatives and complications.     Problems:  cervix /

## 2019-05-24 ENCOUNTER — APPOINTMENT (OUTPATIENT)
Dept: LAB | Age: 59
End: 2019-05-24
Attending: OBSTETRICS & GYNECOLOGY
Payer: COMMERCIAL

## 2019-05-24 ENCOUNTER — HOSPITAL ENCOUNTER (OUTPATIENT)
Dept: GENERAL RADIOLOGY | Age: 59
Discharge: HOME | End: 2019-05-24
Attending: INTERNAL MEDICINE
Payer: COMMERCIAL

## 2019-05-24 ENCOUNTER — OFFICE VISIT (OUTPATIENT)
Dept: UROLOGY | Facility: HOSPITAL | Age: 59
End: 2019-05-24
Attending: OBSTETRICS & GYNECOLOGY
Payer: COMMERCIAL

## 2019-05-24 ENCOUNTER — TELEPHONE (OUTPATIENT)
Dept: OBGYN CLINIC | Facility: CLINIC | Age: 59
End: 2019-05-24

## 2019-05-24 ENCOUNTER — OFFICE VISIT (OUTPATIENT)
Dept: INTERNAL MEDICINE CLINIC | Facility: CLINIC | Age: 59
End: 2019-05-24
Payer: COMMERCIAL

## 2019-05-24 VITALS
SYSTOLIC BLOOD PRESSURE: 160 MMHG | WEIGHT: 152 LBS | BODY MASS INDEX: 28.7 KG/M2 | HEIGHT: 61 IN | DIASTOLIC BLOOD PRESSURE: 100 MMHG

## 2019-05-24 VITALS
WEIGHT: 151 LBS | TEMPERATURE: 98 F | BODY MASS INDEX: 28.51 KG/M2 | HEART RATE: 90 BPM | SYSTOLIC BLOOD PRESSURE: 144 MMHG | HEIGHT: 61 IN | DIASTOLIC BLOOD PRESSURE: 77 MMHG

## 2019-05-24 DIAGNOSIS — N81.2 UTEROVAGINAL PROLAPSE, INCOMPLETE: Primary | ICD-10-CM

## 2019-05-24 DIAGNOSIS — N81.4 UTERINE PROLAPSE: ICD-10-CM

## 2019-05-24 DIAGNOSIS — I10 ESSENTIAL HYPERTENSION: ICD-10-CM

## 2019-05-24 DIAGNOSIS — R73.01 ABNORMAL FASTING GLUCOSE: ICD-10-CM

## 2019-05-24 DIAGNOSIS — E11.9 TYPE 2 DIABETES MELLITUS WITHOUT COMPLICATION, WITHOUT LONG-TERM CURRENT USE OF INSULIN (HCC): ICD-10-CM

## 2019-05-24 DIAGNOSIS — N81.84 PELVIC MUSCLE WASTING: ICD-10-CM

## 2019-05-24 DIAGNOSIS — Z01.818 PREOP EXAM FOR INTERNAL MEDICINE: Primary | ICD-10-CM

## 2019-05-24 DIAGNOSIS — R93.89 THICKENED ENDOMETRIUM: ICD-10-CM

## 2019-05-24 DIAGNOSIS — D25.9 UTERINE LEIOMYOMA, UNSPECIFIED LOCATION: ICD-10-CM

## 2019-05-24 DIAGNOSIS — N81.3 COMPLETE UTERINE PROLAPSE WITH PROLAPSE OF ANTERIOR VAGINAL WALL: ICD-10-CM

## 2019-05-24 DIAGNOSIS — N94.89 ENDOMETRIAL MASS: ICD-10-CM

## 2019-05-24 DIAGNOSIS — N95.2 POSTMENOPAUSAL ATROPHIC VAGINITIS: ICD-10-CM

## 2019-05-24 DIAGNOSIS — E78.5 HYPERLIPIDEMIA, UNSPECIFIED HYPERLIPIDEMIA TYPE: ICD-10-CM

## 2019-05-24 PROBLEM — R73.9 HYPERGLYCEMIA: Status: RESOLVED | Noted: 2017-09-13 | Resolved: 2019-05-24

## 2019-05-24 PROBLEM — E87.6 HYPOKALEMIA: Status: RESOLVED | Noted: 2018-10-27 | Resolved: 2019-05-24

## 2019-05-24 PROBLEM — D72.829 LEUKOCYTOSIS, UNSPECIFIED TYPE: Status: RESOLVED | Noted: 2017-09-13 | Resolved: 2019-05-24

## 2019-05-24 PROBLEM — E87.1 HYPONATREMIA: Status: RESOLVED | Noted: 2017-09-13 | Resolved: 2019-05-24

## 2019-05-24 PROBLEM — K57.32 DIVERTICULITIS LARGE INTESTINE W/O PERFORATION OR ABSCESS W/O BLEEDING: Status: RESOLVED | Noted: 2017-10-18 | Resolved: 2019-05-24

## 2019-05-24 PROCEDURE — 87086 URINE CULTURE/COLONY COUNT: CPT | Performed by: OBSTETRICS & GYNECOLOGY

## 2019-05-24 PROCEDURE — 80053 COMPREHEN METABOLIC PANEL: CPT

## 2019-05-24 PROCEDURE — 81002 URINALYSIS NONAUTO W/O SCOPE: CPT

## 2019-05-24 PROCEDURE — 84156 ASSAY OF PROTEIN URINE: CPT

## 2019-05-24 PROCEDURE — 80061 LIPID PANEL: CPT

## 2019-05-24 PROCEDURE — 85027 COMPLETE CBC AUTOMATED: CPT

## 2019-05-24 PROCEDURE — 84443 ASSAY THYROID STIM HORMONE: CPT

## 2019-05-24 PROCEDURE — 93010 ELECTROCARDIOGRAM REPORT: CPT | Performed by: INTERNAL MEDICINE

## 2019-05-24 PROCEDURE — 81015 MICROSCOPIC EXAM OF URINE: CPT

## 2019-05-24 PROCEDURE — 71046 X-RAY EXAM CHEST 2 VIEWS: CPT | Performed by: INTERNAL MEDICINE

## 2019-05-24 PROCEDURE — 99244 OFF/OP CNSLTJ NEW/EST MOD 40: CPT | Performed by: INTERNAL MEDICINE

## 2019-05-24 PROCEDURE — 36415 COLL VENOUS BLD VENIPUNCTURE: CPT

## 2019-05-24 PROCEDURE — 81001 URINALYSIS AUTO W/SCOPE: CPT

## 2019-05-24 PROCEDURE — 93005 ELECTROCARDIOGRAM TRACING: CPT

## 2019-05-24 PROCEDURE — 99201 HC OUTPT EVAL AND MGNT NEW PT LEVEL 1: CPT

## 2019-05-24 PROCEDURE — 99212 OFFICE O/P EST SF 10 MIN: CPT | Performed by: INTERNAL MEDICINE

## 2019-05-24 PROCEDURE — 83036 HEMOGLOBIN GLYCOSYLATED A1C: CPT

## 2019-05-24 PROCEDURE — 84439 ASSAY OF FREE THYROXINE: CPT

## 2019-05-24 NOTE — PROGRESS NOTES
HPI:    Patient ID: Kaden Lara is a 59year old female.     HPI     PreOP clearance      Planned  SANDRA BSO   DR Samir [facility]  Date to be determined    N81.2) Uterovaginal prolapse, incomplete  (primary encounter diagnosis)     (N81.84 Medications:  Enalapril Maleate 10 MG Oral Tab Take 1 tablet (10 mg total) by mouth once daily. Disp: 90 tablet Rfl: 3   Cyclobenzaprine HCl 10 MG Oral Tab Take 10 mg by mouth 3 (three) times daily as needed for Muscle spasms.  Disp:  Rfl:    KIRBY OR Kael Pelaez Eye Problems Mother         eye issues   • Heart Disorder Mother         per ng CABG   • Hypertension Mother    • Musculo-skelatal Disorder Mother         per ng osteoporosis   • Alcohol and Other Disorders Associated Father         alcoholism   • Hyperten diaphoretic. No erythema. Nursing note and vitals reviewed.     Component      Latest Ref Rng & Units 5/24/2019   WBC      4.0 - 11.0 x10(3) uL    RBC      3.80 - 5.30 x10(6)uL    Hemoglobin      12.0 - 16.0 g/dL    Hematocrit      35.0 - 48.0 %    MCV >=60    GFR, -American      >=60    Thinprep Pap          Specimen Adequacy          GENERAL CATEGORIZATION:          Final Diagnosis Comment          HPV High Risk mRNA          Recommendations/Comments          Procedure          CLINICAL INFORMAT - 3 /HPF    Bacteria Urine      None Seen /HPF    Glucose      70 - 99 mg/dL    Sodium      136 - 145 mmol/L    Potassium      3.5 - 5.1 mmol/L    Chloride      98 - 112 mmol/L    Carbon Dioxide, Total      21.0 - 32.0 mmol/L    ANION GAP      0 - 18 mmol/ 100.0 fL    MCH      26.0 - 34.0 pg    MCHC      31.0 - 37.0 g/dL    RDW-SD      35.1 - 46.3 fL    RDW      11.0 - 15.0 %    Platelet Count      012.8 - 450.0 10(3)uL    Prelim Neutrophil Abs      1.50 - 7.70 x10 (3) uL    Neutrophils Absolute      1.50 - text formatting which cannot be displayed here. Reason for testing          Gyn Additional Information          Case Report       Surgical Pathology                                Case: [id number] . . .    FINAL DIAGNOSIS       This result contains rich t Urine      <2.0 <2.0   Leukocyte Esterase Urine      Negative Negative   ASCORBIC ACID      Negative mg/dL Negative   SQUAM EPI CELLS UR      /HPF Few   WBC Urine      0 - 5 /HPF 3   RBC URINE      0 - 3 /HPF 1,398 (H)   Bacteria Urine      None Seen /HPF There is increase in thoracic kyphosis, scoliosis, demineralization of the bony structures and moderate to severe osteoarthritic changes in the spine and milder changes about both shoulders.  There is mild chronic appearing wedging of multiple   vertebral b Visit:  Requested Prescriptions      No prescriptions requested or ordered in this encounter       Imaging & Referrals:  None        YQ#[id number]

## 2019-05-24 NOTE — PROGRESS NOTES
Yoshi Colin,   5/24/2019     Referred by Dr. Elliot Merlin  Pt here with self    Patient presents with:  Prolapse: Refered by Dr Elliot Merlin Using pessary 2 1/2 years, plan to have hysterecomy    Worsening bulge    HPI:  Denies YOSEPH  Denies UUI  + prolapse - worsenin 1992      Family History   Problem Relation Age of Onset   • Diabetes Mother         type 2   • Lipids Mother         hyperlipidemia   • Eye Problems Mother         eye issues   • Heart Disorder Mother         per ng CABG   • Hypertension Mother    • Muscu Other    Review of Systems:    A comprehensive 12 point review of systems was completed. Pertinent positives noted in the the HPI.   No CP  No SOB    GENERAL EXAM:  GENERAL:  Alert and Oriented, and NAD  HEENT:  Normal, no lesions  LUNGS:  Normal effort  H behavioral modification, discussed pharmacologic and nonpharmacologic mgmt options for urinary symptoms. Discussed dietary & weight management with potential improvements in symptoms with weight loss.     Diagnostic Items:  Urodynamics  urine testing    Med

## 2019-05-28 NOTE — TELEPHONE ENCOUNTER
Please schedule LAVH, possible TVH for my part of surgery -- Please find out if Dr Fercho Rae can assist otherwise need one of my partners  Please schedule the following surgery:    Procedure: LAVH, possible TVH    Date: Monday am    Diagnosis: uterine prolapse    Admission:AM admit    Anesth: general    Preop Medical Clearance needed:  Yes    Additional Orders: per Dr. Andressa Nolen plan    Bowel Prep: per protocol    Additional equipment:  No    Rep needed: No    Additional surgery time needed: No    IPA tubal / hyst form signed: not applicable    Comments / Orders to Nurse: per Dr. Catrina Mohr    Discussed possible complications including but not limited to: Alternatives to surgical intervention discussed with patient in detail., Likely consequences of not undergoing procedure discussed with patient. , anesthesia risks, bleeding, death, infection, injury, bowel / bladder, injury, internal, postop DVT / PE and wound dehiscence

## 2019-05-29 NOTE — TELEPHONE ENCOUNTER
Per  8/19 is the first Monday she's available at 7:30am.  The  stated she will inform pt of date and if she is displeased we can see if we can  sooner. She also noted that Marcel Monae is free anytime on thursdays. She asked if you were assisting Dr. Anyi Morales and I said yes.

## 2019-05-29 NOTE — TELEPHONE ENCOUNTER
I AM NOT AVAILABLE ON THURSDAYS & I CANNOT DO THE TIMES ASKED. PLEASE FIND OUT OF ORLY CAN MOVE SOME OF HER INDEPENDENT CASES ON Monday MORNING TO LATER MORNING TIME SO WE CAN DO 0730 START. IF NOT, THEN PATIENT HAS TO WAIT.

## 2019-05-31 NOTE — TELEPHONE ENCOUNTER
Spoke to Roby Horta (surgery scheduler for Dr Erica Deleon) about message below. Roby Horta states already discussed and the soonest 7:30 am availability is in August. lmtcb to notify pt.

## 2019-06-05 NOTE — TELEPHONE ENCOUNTER
Left a detailed message advising pt to c/b to scheduled a pre op consultation with Dr. Alfreida Najjar. Dr. Alfreida Najjar is out of town, but, performs procedures Monday afternoons. Tentative procedure date 7/22/19 1pm with PAMELA, Dr. Joesph Calvert is available and a spot is being held.

## 2019-06-06 ENCOUNTER — PATIENT MESSAGE (OUTPATIENT)
Dept: OBGYN CLINIC | Facility: CLINIC | Age: 59
End: 2019-06-06

## 2019-06-06 NOTE — TELEPHONE ENCOUNTER
C/b and left a detailed message. May use Res 24 or NP slot, make sure the appointment is 20 mins with PAMELA for a pre op consult.

## 2019-06-06 NOTE — TELEPHONE ENCOUNTER
From: Joycelyn Aguilar  To: Angelina Villa MD  Sent: 6/6/2019 12:38 PM CDT  Subject: Other    I got your message I made appointment with Dr. Alverto Johnson on Tuesday June 11 at 2:00 pm in [facility].

## 2019-06-10 ENCOUNTER — OFFICE VISIT (OUTPATIENT)
Dept: UROLOGY | Facility: HOSPITAL | Age: 59
End: 2019-06-10
Attending: OBSTETRICS & GYNECOLOGY
Payer: COMMERCIAL

## 2019-06-10 DIAGNOSIS — N81.2 UTEROVAGINAL PROLAPSE, INCOMPLETE: Primary | ICD-10-CM

## 2019-06-10 PROCEDURE — 51741 ELECTRO-UROFLOWMETRY FIRST: CPT

## 2019-06-10 PROCEDURE — 51729 CYSTOMETROGRAM W/VP&UP: CPT

## 2019-06-10 PROCEDURE — 51784 ANAL/URINARY MUSCLE STUDY: CPT

## 2019-06-10 PROCEDURE — 51797 INTRAABDOMINAL PRESSURE TEST: CPT

## 2019-06-10 NOTE — PROCEDURES
URODYNAMIC EVALUATION  PATIENT HISTORY:  Patient with known prolapse, has been using donut pessary for a few years, feels the prolapse is getting worse  Surgery?   []  No  []  Yes, specify date:      PATIENT DIAGNOSIS:  Uterovaginal Prolapse N81.2 (incomple n/a mL/cm water  Detrusor Activity:  []  Unstable   [x]  Stable  Urge leakage?     []  Yes [x]  No  Volume at 1st unhibited detrusor cont:   n/a mL  Detrusor instability provoked by:    []  Spontaneous []  Coughing  []  Filling  []  Heel Bounce  []  Runnin DO Glen   6/10/2019   2:07 PM

## 2019-06-10 NOTE — PROGRESS NOTES
..Patient here for urodynamic testing. Procedure explained and confirmed by patient. See evaluation form for results. Both verbal and written discharge instructions were given.   Patient tolerated procedure well and will follow up with Dr. Gary Morales

## 2019-06-10 NOTE — PATIENT INSTRUCTIONS
[facility]  [address]  [place], [address] [facility]  Office: [phone]      Urodynamic Testing Discharge Instructions: There are NO dietary or activity restrictions. You may resume your normal schedule.       You may hav for a week or more after you go home. Where will I go to get the catheter removed? Your catheter will be removed in the office. Please call the office to schedule a voiding trial with the nurse. How will the catheter be removed?   A nurse will disco bloating, pressure or back pain. · You have a fever over 100.5 for 4 hours or above 101.0 anytime. · You have nausea and/or vomiting. · Burning/pain with urination.     Please feel free to call the nurse at [phone] with any questions 8am-4:30pm Mond

## 2019-06-11 ENCOUNTER — OFFICE VISIT (OUTPATIENT)
Dept: OBGYN CLINIC | Facility: CLINIC | Age: 59
End: 2019-06-11
Payer: COMMERCIAL

## 2019-06-11 VITALS
BODY MASS INDEX: 29 KG/M2 | SYSTOLIC BLOOD PRESSURE: 146 MMHG | DIASTOLIC BLOOD PRESSURE: 71 MMHG | HEART RATE: 81 BPM | WEIGHT: 153 LBS

## 2019-06-11 DIAGNOSIS — D25.1 FIBROIDS, INTRAMURAL: ICD-10-CM

## 2019-06-11 DIAGNOSIS — N81.2 INCOMPLETE UTEROVAGINAL PROLAPSE: Primary | ICD-10-CM

## 2019-06-11 DIAGNOSIS — N95.0 POST-MENOPAUSAL BLEEDING: ICD-10-CM

## 2019-06-11 PROCEDURE — 99213 OFFICE O/P EST LOW 20 MIN: CPT | Performed by: OBSTETRICS & GYNECOLOGY

## 2019-06-12 NOTE — PROGRESS NOTES
HPI:    Patient ID: Keiko Haynes is a 59year old female. HPI   and patient of Dr Xander Grant. Seen recently for  bleeding and prolapse. Previous LMP was age 50. She had reassuring EB with Dr Xander Grant and then saw Dr Rosalina Gray.  In  Trying to coordinat Codeine   PHYSICAL EXAM:   Physical Exam   Constitutional: She appears well-developed and well-nourished. No distress. Abdominal:   Flat, soft and non-tender. No mass, hernia or adenopathy.     Genitourinary:   Genitourinary Comments: Donut pessary remove

## 2019-06-14 ENCOUNTER — OFFICE VISIT (OUTPATIENT)
Dept: UROLOGY | Facility: HOSPITAL | Age: 59
End: 2019-06-14
Attending: OBSTETRICS & GYNECOLOGY
Payer: COMMERCIAL

## 2019-06-14 VITALS
SYSTOLIC BLOOD PRESSURE: 140 MMHG | BODY MASS INDEX: 28.7 KG/M2 | WEIGHT: 152 LBS | HEIGHT: 61 IN | DIASTOLIC BLOOD PRESSURE: 82 MMHG

## 2019-06-14 DIAGNOSIS — N39.3 FEMALE STRESS INCONTINENCE: ICD-10-CM

## 2019-06-14 DIAGNOSIS — N81.2 UTEROVAGINAL PROLAPSE, INCOMPLETE: Primary | ICD-10-CM

## 2019-06-14 PROCEDURE — 99211 OFF/OP EST MAY X REQ PHY/QHP: CPT

## 2019-06-14 NOTE — PROGRESS NOTES
Pt presents w/ initial c/o bulge  Urodynamic testing undergone without complication.   Results reviewed with patient  89/75cc & 589/10cc  No DO  [facility] >500cc  +YOSEPH @ 200cc      Discussed with patient mgmt options for POP, YOSEPH, incomplete emptying  She's possi

## 2019-06-14 NOTE — PATIENT INSTRUCTIONS
[facility]     Post-Operative Guidelines    · AVOID CONSTIPATION - Take Miralax: one capful in water or juice each morning. You can also take each evening if needed. · No lifting over 10 lbs.  (1 gallon of m

## 2019-06-17 ENCOUNTER — TELEPHONE (OUTPATIENT)
Dept: OBGYN CLINIC | Facility: CLINIC | Age: 59
End: 2019-06-17

## 2019-06-17 DIAGNOSIS — N81.2 INCOMPLETE UTEROVAGINAL PROLAPSE: Primary | ICD-10-CM

## 2019-06-17 NOTE — TELEPHONE ENCOUNTER
OB GYN SURGICAL SCHEDULING    Assessment: incomplete uterovaginal prolapse    Pre-Operative Procedure:  TVH- BSO    Side: bilateral    In / on:     Date:  07-22-19 combined with Dr Junior Ayala procedure    Admission:  AM Admit    Anesthesia: General    Add

## 2019-06-18 NOTE — TELEPHONE ENCOUNTER
Patient is scheduled 1pm TVH/BSO PAMELA/Glen. Pat orders. Instructions routed to the patient via my chart.

## 2019-07-09 ENCOUNTER — OFFICE VISIT (OUTPATIENT)
Dept: INTERNAL MEDICINE CLINIC | Facility: CLINIC | Age: 59
End: 2019-07-09
Payer: COMMERCIAL

## 2019-07-09 VITALS
WEIGHT: 154 LBS | DIASTOLIC BLOOD PRESSURE: 74 MMHG | HEART RATE: 88 BPM | SYSTOLIC BLOOD PRESSURE: 127 MMHG | BODY MASS INDEX: 29.07 KG/M2 | TEMPERATURE: 98 F | HEIGHT: 61 IN

## 2019-07-09 DIAGNOSIS — R73.03 PREDIABETES: ICD-10-CM

## 2019-07-09 DIAGNOSIS — Z01.818 PREOPERATIVE CLEARANCE: Primary | ICD-10-CM

## 2019-07-09 DIAGNOSIS — N81.3 COMPLETE UTERINE PROLAPSE WITH PROLAPSE OF ANTERIOR VAGINAL WALL: ICD-10-CM

## 2019-07-09 DIAGNOSIS — M16.11 PRIMARY OSTEOARTHRITIS OF RIGHT HIP: ICD-10-CM

## 2019-07-09 DIAGNOSIS — I10 ESSENTIAL HYPERTENSION: ICD-10-CM

## 2019-07-09 PROCEDURE — 99244 OFF/OP CNSLTJ NEW/EST MOD 40: CPT | Performed by: INTERNAL MEDICINE

## 2019-07-09 RX ORDER — CYCLOBENZAPRINE HCL 10 MG
10 TABLET ORAL 3 TIMES DAILY PRN
Qty: 30 TABLET | Refills: 0 | Status: ON HOLD | OUTPATIENT
Start: 2019-07-09 | End: 2019-07-23

## 2019-07-09 RX ORDER — NAPROXEN 500 MG/1
500 TABLET ORAL 2 TIMES DAILY WITH MEALS
Qty: 60 TABLET | Refills: 0 | Status: ON HOLD | OUTPATIENT
Start: 2019-07-09 | End: 2019-07-23

## 2019-07-09 RX ORDER — NAPROXEN 500 MG/1
500 TABLET ORAL 2 TIMES DAILY WITH MEALS
COMMUNITY
End: 2019-07-09

## 2019-07-09 NOTE — PROGRESS NOTES
HPI:    Patient ID: John Garrison is a 59year old female. HPI     PRE OP CLEARANCE    Date: 7/22/2019 Time: 12:45 PM Status: Scheduled   Location: [facility] MAIN OR Room: [facility] Service: Gynecology Urology   Patient class:  Outpatient in a Bed Case classifica Gastrointestinal: Negative for abdominal pain, blood in stool, constipation, diarrhea, nausea and vomiting. Genitourinary: Negative for difficulty urinating, dysuria, frequency, hematuria and urgency.    Musculoskeletal: Positive for arthralgias (right -- self cleaning       Past Surgical History:   Procedure Laterality Date   • BIOPSY OF UTERUS LINING  10/2007    neg endometrial biopsy   • COLONOSCOPY  5/19/2012    per NG   • COLONOSCOPY N/A 2/20/2018    Performed by Sol Jean-Baptiste MD at [facility] eye exhibits no discharge. Left eye exhibits no discharge. No scleral icterus. Neck: Neck supple. No thyromegaly present. Cardiovascular: Normal rate, regular rhythm, S1 normal, S2 normal, normal heart sounds and intact distal pulses.  Exam reveals no g - 5.1 mmol/L 3.9   Chloride      98 - 112 mmol/L 109   Carbon Dioxide, Total      21.0 - 32.0 mmol/L 25.0   ANION GAP      0 - 18 mmol/L 5   BUN      7 - 18 mg/dL 11   CREATININE      0.55 - 1.02 mg/dL 0.64   BUN/CREAT Ratio      10.0 - 20.0 17.2   CALCIUM Normal.  No significant pulmonary parenchymal abnormalities. No effusion or pleural thickening.     BONES:             There is increase in thoracic kyphosis, scoliosis, demineralization of the bony structures and moderate to severe osteoarthritic above    (M16.11) Primary osteoarthritis of right hip  Plan: refilled naproxen side effect discussed  Instructed to hold 10 days prior to surgery  Take prn tylenol instead    Prediabetes  Monitor glucose  Closely    Note hx of acute diverticulitis with per

## 2019-07-10 RX ORDER — NAPROXEN 500 MG/1
TABLET ORAL
Qty: 180 TABLET | Refills: 0 | OUTPATIENT
Start: 2019-07-10

## 2019-07-13 ENCOUNTER — APPOINTMENT (OUTPATIENT)
Dept: LAB | Age: 59
End: 2019-07-13
Attending: INTERNAL MEDICINE
Payer: COMMERCIAL

## 2019-07-13 ENCOUNTER — LAB ENCOUNTER (OUTPATIENT)
Dept: LAB | Age: 59
End: 2019-07-13
Attending: INTERNAL MEDICINE
Payer: COMMERCIAL

## 2019-07-13 DIAGNOSIS — Z01.818 PREOPERATIVE CLEARANCE: ICD-10-CM

## 2019-07-13 DIAGNOSIS — I10 ESSENTIAL HYPERTENSION: ICD-10-CM

## 2019-07-13 LAB
ALBUMIN SERPL-MCNC: 3.7 G/DL (ref 3.4–5)
ALBUMIN/GLOB SERPL: 0.9 {RATIO} (ref 1–2)
ALP LIVER SERPL-CCNC: 119 U/L (ref 50–130)
ALT SERPL-CCNC: 20 U/L (ref 13–56)
ANION GAP SERPL CALC-SCNC: 5 MMOL/L (ref 0–18)
APTT PPP: 28.8 SECONDS (ref 23.2–35.3)
AST SERPL-CCNC: 12 U/L (ref 15–37)
BASOPHILS # BLD AUTO: 0.03 X10(3) UL (ref 0–0.2)
BASOPHILS NFR BLD AUTO: 0.4 %
BILIRUB SERPL-MCNC: 1.1 MG/DL (ref 0.1–2)
BILIRUB UR QL: NEGATIVE
BUN BLD-MCNC: 11 MG/DL (ref 7–18)
BUN/CREAT SERPL: 17.2 (ref 10–20)
CALCIUM BLD-MCNC: 9.3 MG/DL (ref 8.5–10.1)
CHLORIDE SERPL-SCNC: 109 MMOL/L (ref 98–112)
CO2 SERPL-SCNC: 25 MMOL/L (ref 21–32)
COLOR UR: YELLOW
CREAT BLD-MCNC: 0.64 MG/DL (ref 0.55–1.02)
DEPRECATED RDW RBC AUTO: 39.8 FL (ref 35.1–46.3)
EOSINOPHIL # BLD AUTO: 0.04 X10(3) UL (ref 0–0.7)
EOSINOPHIL NFR BLD AUTO: 0.5 %
ERYTHROCYTE [DISTWIDTH] IN BLOOD BY AUTOMATED COUNT: 12.4 % (ref 11–15)
GLOBULIN PLAS-MCNC: 4.1 G/DL (ref 2.8–4.4)
GLUCOSE BLD-MCNC: 138 MG/DL (ref 70–99)
GLUCOSE UR-MCNC: NEGATIVE MG/DL
HCT VFR BLD AUTO: 39 % (ref 35–48)
HGB BLD-MCNC: 12.9 G/DL (ref 12–16)
HGB UR QL STRIP.AUTO: NEGATIVE
IMM GRANULOCYTES # BLD AUTO: 0.03 X10(3) UL (ref 0–1)
IMM GRANULOCYTES NFR BLD: 0.4 %
KETONES UR-MCNC: NEGATIVE MG/DL
LEUKOCYTE ESTERASE UR QL STRIP.AUTO: NEGATIVE
LYMPHOCYTES # BLD AUTO: 2.2 X10(3) UL (ref 1–4)
LYMPHOCYTES NFR BLD AUTO: 30 %
M PROTEIN MFR SERPL ELPH: 7.8 G/DL (ref 6.4–8.2)
MCH RBC QN AUTO: 29.3 PG (ref 26–34)
MCHC RBC AUTO-ENTMCNC: 33.1 G/DL (ref 31–37)
MCV RBC AUTO: 88.4 FL (ref 80–100)
MONOCYTES # BLD AUTO: 0.42 X10(3) UL (ref 0.1–1)
MONOCYTES NFR BLD AUTO: 5.7 %
NEUTROPHILS # BLD AUTO: 4.62 X10 (3) UL (ref 1.5–7.7)
NEUTROPHILS # BLD AUTO: 4.62 X10(3) UL (ref 1.5–7.7)
NEUTROPHILS NFR BLD AUTO: 63 %
NITRITE UR QL STRIP.AUTO: NEGATIVE
OSMOLALITY SERPL CALC.SUM OF ELEC: 290 MOSM/KG (ref 275–295)
PATIENT FASTING: NO
PH UR: 7 [PH] (ref 5–8)
PLATELET # BLD AUTO: 234 10(3)UL (ref 150–450)
POTASSIUM SERPL-SCNC: 3.9 MMOL/L (ref 3.5–5.1)
PROT UR-MCNC: NEGATIVE MG/DL
RBC # BLD AUTO: 4.41 X10(6)UL (ref 3.8–5.3)
RBC #/AREA URNS AUTO: 1 /HPF
SODIUM SERPL-SCNC: 139 MMOL/L (ref 136–145)
SP GR UR STRIP: 1.02 (ref 1–1.03)
UROBILINOGEN UR STRIP-ACNC: <2
VIT C UR-MCNC: NEGATIVE MG/DL
WBC # BLD AUTO: 7.3 X10(3) UL (ref 4–11)
WBC #/AREA URNS AUTO: 2 /HPF

## 2019-07-13 PROCEDURE — 85025 COMPLETE CBC W/AUTO DIFF WBC: CPT

## 2019-07-13 PROCEDURE — 93010 ELECTROCARDIOGRAM REPORT: CPT | Performed by: INTERNAL MEDICINE

## 2019-07-13 PROCEDURE — 80053 COMPREHEN METABOLIC PANEL: CPT

## 2019-07-13 PROCEDURE — 36415 COLL VENOUS BLD VENIPUNCTURE: CPT

## 2019-07-13 PROCEDURE — 85730 THROMBOPLASTIN TIME PARTIAL: CPT

## 2019-07-13 PROCEDURE — 93005 ELECTROCARDIOGRAM TRACING: CPT

## 2019-07-13 PROCEDURE — 81001 URINALYSIS AUTO W/SCOPE: CPT

## 2019-07-17 RX ORDER — FAMOTIDINE 20 MG/1
20 TABLET ORAL ONCE
Status: CANCELLED | OUTPATIENT
Start: 2019-07-17 | End: 2019-07-17

## 2019-07-17 RX ORDER — METOCLOPRAMIDE 10 MG/1
10 TABLET ORAL ONCE
Status: CANCELLED | OUTPATIENT
Start: 2019-07-17 | End: 2019-07-17

## 2019-07-17 RX ORDER — SODIUM CHLORIDE, SODIUM LACTATE, POTASSIUM CHLORIDE, CALCIUM CHLORIDE 600; 310; 30; 20 MG/100ML; MG/100ML; MG/100ML; MG/100ML
INJECTION, SOLUTION INTRAVENOUS CONTINUOUS
Status: CANCELLED | OUTPATIENT
Start: 2019-07-17

## 2019-07-17 RX ORDER — ACETAMINOPHEN 500 MG
1000 TABLET ORAL ONCE
Status: CANCELLED | OUTPATIENT
Start: 2019-07-17 | End: 2019-07-17

## 2019-07-19 ENCOUNTER — LAB ENCOUNTER (OUTPATIENT)
Dept: LAB | Age: 59
End: 2019-07-19
Attending: OBSTETRICS & GYNECOLOGY
Payer: COMMERCIAL

## 2019-07-19 ENCOUNTER — TELEPHONE (OUTPATIENT)
Dept: INTERNAL MEDICINE CLINIC | Facility: CLINIC | Age: 59
End: 2019-07-19

## 2019-07-19 DIAGNOSIS — Z01.818 PREOP TESTING: ICD-10-CM

## 2019-07-19 LAB
ANTIBODY SCREEN: NEGATIVE
RH BLOOD TYPE: POSITIVE

## 2019-07-19 PROCEDURE — 86900 BLOOD TYPING SEROLOGIC ABO: CPT

## 2019-07-19 PROCEDURE — 86850 RBC ANTIBODY SCREEN: CPT

## 2019-07-19 PROCEDURE — 36415 COLL VENOUS BLD VENIPUNCTURE: CPT

## 2019-07-19 PROCEDURE — 86901 BLOOD TYPING SEROLOGIC RH(D): CPT

## 2019-07-19 NOTE — TELEPHONE ENCOUNTER
Per Rylee, need to fax the med clearance, labs, EKG with Tracing to [phone] surgery is on 07/22/2019.

## 2019-07-22 ENCOUNTER — ANESTHESIA EVENT (OUTPATIENT)
Dept: SURGERY | Facility: HOSPITAL | Age: 59
End: 2019-07-22
Payer: COMMERCIAL

## 2019-07-22 ENCOUNTER — ANESTHESIA (OUTPATIENT)
Dept: SURGERY | Facility: HOSPITAL | Age: 59
End: 2019-07-22
Payer: COMMERCIAL

## 2019-07-22 ENCOUNTER — HOSPITAL ENCOUNTER (OUTPATIENT)
Facility: HOSPITAL | Age: 59
Discharge: HOME | End: 2019-07-23
Attending: OBSTETRICS & GYNECOLOGY | Admitting: OBSTETRICS & GYNECOLOGY
Payer: COMMERCIAL

## 2019-07-22 DIAGNOSIS — Z01.818 PREOP TESTING: Primary | ICD-10-CM

## 2019-07-22 PROBLEM — N80.9 ENDOMETRIOSIS: Status: ACTIVE | Noted: 2019-07-22

## 2019-07-22 PROBLEM — D21.9 FIBROIDS: Status: ACTIVE | Noted: 2019-07-22

## 2019-07-22 PROCEDURE — 0UT97ZZ RESECTION OF UTERUS, VIA NATURAL OR ARTIFICIAL OPENING: ICD-10-PCS | Performed by: OBSTETRICS & GYNECOLOGY

## 2019-07-22 PROCEDURE — 0UQF0ZZ REPAIR CUL-DE-SAC, OPEN APPROACH: ICD-10-PCS | Performed by: OBSTETRICS & GYNECOLOGY

## 2019-07-22 PROCEDURE — 0JQC0ZZ REPAIR PELVIC REGION SUBCUTANEOUS TISSUE AND FASCIA, OPEN APPROACH: ICD-10-PCS | Performed by: OBSTETRICS & GYNECOLOGY

## 2019-07-22 PROCEDURE — 0TJB8ZZ INSPECTION OF BLADDER, VIA NATURAL OR ARTIFICIAL OPENING ENDOSCOPIC: ICD-10-PCS | Performed by: OBSTETRICS & GYNECOLOGY

## 2019-07-22 PROCEDURE — 0TSD0ZZ REPOSITION URETHRA, OPEN APPROACH: ICD-10-PCS | Performed by: OBSTETRICS & GYNECOLOGY

## 2019-07-22 PROCEDURE — 0USG7ZZ REPOSITION VAGINA, VIA NATURAL OR ARTIFICIAL OPENING: ICD-10-PCS | Performed by: OBSTETRICS & GYNECOLOGY

## 2019-07-22 PROCEDURE — 88307 TISSUE EXAM BY PATHOLOGIST: CPT | Performed by: OBSTETRICS & GYNECOLOGY

## 2019-07-22 PROCEDURE — 88305 TISSUE EXAM BY PATHOLOGIST: CPT | Performed by: OBSTETRICS & GYNECOLOGY

## 2019-07-22 PROCEDURE — 0UT77ZZ RESECTION OF BILATERAL FALLOPIAN TUBES, VIA NATURAL OR ARTIFICIAL OPENING: ICD-10-PCS | Performed by: OBSTETRICS & GYNECOLOGY

## 2019-07-22 DEVICE — TRANSVAGINAL MID-URETHRAL SLING
Type: IMPLANTABLE DEVICE | Status: FUNCTIONAL
Brand: ADVANTAGE FIT™  SYSTEM

## 2019-07-22 RX ORDER — MORPHINE SULFATE 4 MG/ML
4 INJECTION, SOLUTION INTRAMUSCULAR; INTRAVENOUS EVERY 10 MIN PRN
Status: DISCONTINUED | OUTPATIENT
Start: 2019-07-22 | End: 2019-07-22 | Stop reason: HOSPADM

## 2019-07-22 RX ORDER — HYDROMORPHONE HYDROCHLORIDE 1 MG/ML
0.2 INJECTION, SOLUTION INTRAMUSCULAR; INTRAVENOUS; SUBCUTANEOUS EVERY 5 MIN PRN
Status: DISCONTINUED | OUTPATIENT
Start: 2019-07-22 | End: 2019-07-22 | Stop reason: HOSPADM

## 2019-07-22 RX ORDER — CEFAZOLIN SODIUM/WATER 2 G/20 ML
2 SYRINGE (ML) INTRAVENOUS ONCE
Status: COMPLETED | OUTPATIENT
Start: 2019-07-22 | End: 2019-07-22

## 2019-07-22 RX ORDER — MIDAZOLAM HYDROCHLORIDE 1 MG/ML
INJECTION INTRAMUSCULAR; INTRAVENOUS AS NEEDED
Status: DISCONTINUED | OUTPATIENT
Start: 2019-07-22 | End: 2019-07-22 | Stop reason: SURG

## 2019-07-22 RX ORDER — HYDROCODONE BITARTRATE AND ACETAMINOPHEN 5; 325 MG/1; MG/1
1 TABLET ORAL AS NEEDED
Status: DISCONTINUED | OUTPATIENT
Start: 2019-07-22 | End: 2019-07-22 | Stop reason: HOSPADM

## 2019-07-22 RX ORDER — BUPIVACAINE HYDROCHLORIDE AND EPINEPHRINE 2.5; 5 MG/ML; UG/ML
INJECTION, SOLUTION EPIDURAL; INFILTRATION; INTRACAUDAL; PERINEURAL AS NEEDED
Status: DISCONTINUED | OUTPATIENT
Start: 2019-07-22 | End: 2019-07-22 | Stop reason: HOSPADM

## 2019-07-22 RX ORDER — ONDANSETRON 4 MG/1
4 TABLET, FILM COATED ORAL EVERY 8 HOURS PRN
Status: DISCONTINUED | OUTPATIENT
Start: 2019-07-22 | End: 2019-07-23

## 2019-07-22 RX ORDER — SODIUM CHLORIDE, SODIUM LACTATE, POTASSIUM CHLORIDE, CALCIUM CHLORIDE 600; 310; 30; 20 MG/100ML; MG/100ML; MG/100ML; MG/100ML
INJECTION, SOLUTION INTRAVENOUS CONTINUOUS
Status: DISCONTINUED | OUTPATIENT
Start: 2019-07-22 | End: 2019-07-22

## 2019-07-22 RX ORDER — SODIUM CHLORIDE 9 MG/ML
INJECTION, SOLUTION INTRAVENOUS CONTINUOUS PRN
Status: DISCONTINUED | OUTPATIENT
Start: 2019-07-22 | End: 2019-07-22 | Stop reason: SURG

## 2019-07-22 RX ORDER — TRAMADOL HYDROCHLORIDE 50 MG/1
50 TABLET ORAL EVERY 6 HOURS PRN
Status: DISCONTINUED | OUTPATIENT
Start: 2019-07-22 | End: 2019-07-23

## 2019-07-22 RX ORDER — NEOSTIGMINE METHYLSULFATE 0.5 MG/ML
INJECTION INTRAVENOUS AS NEEDED
Status: DISCONTINUED | OUTPATIENT
Start: 2019-07-22 | End: 2019-07-22 | Stop reason: SURG

## 2019-07-22 RX ORDER — SODIUM CHLORIDE 0.9 % (FLUSH) 0.9 %
10 SYRINGE (ML) INJECTION AS NEEDED
Status: DISCONTINUED | OUTPATIENT
Start: 2019-07-22 | End: 2019-07-23

## 2019-07-22 RX ORDER — HYDROMORPHONE HYDROCHLORIDE 1 MG/ML
0.6 INJECTION, SOLUTION INTRAMUSCULAR; INTRAVENOUS; SUBCUTANEOUS EVERY 5 MIN PRN
Status: DISCONTINUED | OUTPATIENT
Start: 2019-07-22 | End: 2019-07-22 | Stop reason: HOSPADM

## 2019-07-22 RX ORDER — DOCUSATE SODIUM 100 MG/1
100 CAPSULE, LIQUID FILLED ORAL 2 TIMES DAILY
Status: DISCONTINUED | OUTPATIENT
Start: 2019-07-22 | End: 2019-07-23

## 2019-07-22 RX ORDER — ROCURONIUM BROMIDE 10 MG/ML
INJECTION, SOLUTION INTRAVENOUS AS NEEDED
Status: DISCONTINUED | OUTPATIENT
Start: 2019-07-22 | End: 2019-07-22 | Stop reason: SURG

## 2019-07-22 RX ORDER — SODIUM CHLORIDE, SODIUM LACTATE, POTASSIUM CHLORIDE, CALCIUM CHLORIDE 600; 310; 30; 20 MG/100ML; MG/100ML; MG/100ML; MG/100ML
INJECTION, SOLUTION INTRAVENOUS CONTINUOUS
Status: DISCONTINUED | OUTPATIENT
Start: 2019-07-22 | End: 2019-07-23

## 2019-07-22 RX ORDER — ENALAPRIL MALEATE 10 MG/1
10 TABLET ORAL
Status: DISCONTINUED | OUTPATIENT
Start: 2019-07-23 | End: 2019-07-23

## 2019-07-22 RX ORDER — HYDROCODONE BITARTRATE AND ACETAMINOPHEN 5; 325 MG/1; MG/1
2 TABLET ORAL AS NEEDED
Status: DISCONTINUED | OUTPATIENT
Start: 2019-07-22 | End: 2019-07-22 | Stop reason: HOSPADM

## 2019-07-22 RX ORDER — GLYCOPYRROLATE 0.2 MG/ML
INJECTION INTRAMUSCULAR; INTRAVENOUS AS NEEDED
Status: DISCONTINUED | OUTPATIENT
Start: 2019-07-22 | End: 2019-07-22 | Stop reason: SURG

## 2019-07-22 RX ORDER — PROCHLORPERAZINE EDISYLATE 5 MG/ML
5 INJECTION INTRAMUSCULAR; INTRAVENOUS ONCE AS NEEDED
Status: DISCONTINUED | OUTPATIENT
Start: 2019-07-22 | End: 2019-07-22 | Stop reason: HOSPADM

## 2019-07-22 RX ORDER — METOCLOPRAMIDE 10 MG/1
10 TABLET ORAL ONCE
Status: DISCONTINUED | OUTPATIENT
Start: 2019-07-22 | End: 2019-07-22 | Stop reason: HOSPADM

## 2019-07-22 RX ORDER — NALOXONE HYDROCHLORIDE 0.4 MG/ML
80 INJECTION, SOLUTION INTRAMUSCULAR; INTRAVENOUS; SUBCUTANEOUS AS NEEDED
Status: DISCONTINUED | OUTPATIENT
Start: 2019-07-22 | End: 2019-07-22 | Stop reason: HOSPADM

## 2019-07-22 RX ORDER — FAMOTIDINE 20 MG/1
20 TABLET ORAL ONCE
Status: DISCONTINUED | OUTPATIENT
Start: 2019-07-22 | End: 2019-07-22 | Stop reason: HOSPADM

## 2019-07-22 RX ORDER — HYDROMORPHONE HYDROCHLORIDE 1 MG/ML
0.4 INJECTION, SOLUTION INTRAMUSCULAR; INTRAVENOUS; SUBCUTANEOUS EVERY 5 MIN PRN
Status: DISCONTINUED | OUTPATIENT
Start: 2019-07-22 | End: 2019-07-22 | Stop reason: HOSPADM

## 2019-07-22 RX ORDER — MORPHINE SULFATE 10 MG/ML
6 INJECTION, SOLUTION INTRAMUSCULAR; INTRAVENOUS EVERY 10 MIN PRN
Status: DISCONTINUED | OUTPATIENT
Start: 2019-07-22 | End: 2019-07-22 | Stop reason: HOSPADM

## 2019-07-22 RX ORDER — ONDANSETRON 2 MG/ML
4 INJECTION INTRAMUSCULAR; INTRAVENOUS ONCE AS NEEDED
Status: DISCONTINUED | OUTPATIENT
Start: 2019-07-22 | End: 2019-07-22 | Stop reason: HOSPADM

## 2019-07-22 RX ORDER — KETOROLAC TROMETHAMINE 30 MG/ML
30 INJECTION, SOLUTION INTRAMUSCULAR; INTRAVENOUS EVERY 6 HOURS
Status: DISCONTINUED | OUTPATIENT
Start: 2019-07-22 | End: 2019-07-23

## 2019-07-22 RX ORDER — ONDANSETRON 2 MG/ML
4 INJECTION INTRAMUSCULAR; INTRAVENOUS EVERY 8 HOURS PRN
Status: DISCONTINUED | OUTPATIENT
Start: 2019-07-22 | End: 2019-07-23

## 2019-07-22 RX ORDER — SODIUM CHLORIDE, SODIUM LACTATE, POTASSIUM CHLORIDE, CALCIUM CHLORIDE 600; 310; 30; 20 MG/100ML; MG/100ML; MG/100ML; MG/100ML
INJECTION, SOLUTION INTRAVENOUS CONTINUOUS
Status: DISCONTINUED | OUTPATIENT
Start: 2019-07-22 | End: 2019-07-22 | Stop reason: HOSPADM

## 2019-07-22 RX ORDER — ONDANSETRON 2 MG/ML
INJECTION INTRAMUSCULAR; INTRAVENOUS AS NEEDED
Status: DISCONTINUED | OUTPATIENT
Start: 2019-07-22 | End: 2019-07-22 | Stop reason: SURG

## 2019-07-22 RX ORDER — POLYETHYLENE GLYCOL 3350 17 G/17G
17 POWDER, FOR SOLUTION ORAL DAILY PRN
Status: DISCONTINUED | OUTPATIENT
Start: 2019-07-22 | End: 2019-07-23

## 2019-07-22 RX ORDER — DEXAMETHASONE SODIUM PHOSPHATE 4 MG/ML
VIAL (ML) INJECTION AS NEEDED
Status: DISCONTINUED | OUTPATIENT
Start: 2019-07-22 | End: 2019-07-22 | Stop reason: SURG

## 2019-07-22 RX ORDER — MORPHINE SULFATE 4 MG/ML
2 INJECTION, SOLUTION INTRAMUSCULAR; INTRAVENOUS EVERY 10 MIN PRN
Status: DISCONTINUED | OUTPATIENT
Start: 2019-07-22 | End: 2019-07-22 | Stop reason: HOSPADM

## 2019-07-22 RX ORDER — ACETAMINOPHEN 500 MG
1000 TABLET ORAL ONCE
Status: COMPLETED | OUTPATIENT
Start: 2019-07-22 | End: 2019-07-22

## 2019-07-22 RX ADMIN — GLYCOPYRROLATE 0.6 MG: 0.2 INJECTION INTRAMUSCULAR; INTRAVENOUS at 15:52:00

## 2019-07-22 RX ADMIN — NEOSTIGMINE METHYLSULFATE 4 MG: 0.5 INJECTION INTRAVENOUS at 15:52:00

## 2019-07-22 RX ADMIN — ROCURONIUM BROMIDE 40 MG: 10 INJECTION, SOLUTION INTRAVENOUS at 13:25:00

## 2019-07-22 RX ADMIN — SODIUM CHLORIDE: 9 INJECTION, SOLUTION INTRAVENOUS at 14:45:00

## 2019-07-22 RX ADMIN — ROCURONIUM BROMIDE 10 MG: 10 INJECTION, SOLUTION INTRAVENOUS at 14:38:00

## 2019-07-22 RX ADMIN — SODIUM CHLORIDE: 9 INJECTION, SOLUTION INTRAVENOUS at 14:44:00

## 2019-07-22 RX ADMIN — SODIUM CHLORIDE, SODIUM LACTATE, POTASSIUM CHLORIDE, CALCIUM CHLORIDE: 600; 310; 30; 20 INJECTION, SOLUTION INTRAVENOUS at 14:45:00

## 2019-07-22 RX ADMIN — SODIUM CHLORIDE: 9 INJECTION, SOLUTION INTRAVENOUS at 13:29:00

## 2019-07-22 RX ADMIN — DEXAMETHASONE SODIUM PHOSPHATE 4 MG: 4 MG/ML VIAL (ML) INJECTION at 13:40:00

## 2019-07-22 RX ADMIN — ROCURONIUM BROMIDE 10 MG: 10 INJECTION, SOLUTION INTRAVENOUS at 14:15:00

## 2019-07-22 RX ADMIN — ROCURONIUM BROMIDE 10 MG: 10 INJECTION, SOLUTION INTRAVENOUS at 15:13:00

## 2019-07-22 RX ADMIN — MIDAZOLAM HYDROCHLORIDE 2 MG: 1 INJECTION INTRAMUSCULAR; INTRAVENOUS at 13:21:00

## 2019-07-22 RX ADMIN — SODIUM CHLORIDE, SODIUM LACTATE, POTASSIUM CHLORIDE, CALCIUM CHLORIDE: 600; 310; 30; 20 INJECTION, SOLUTION INTRAVENOUS at 13:21:00

## 2019-07-22 RX ADMIN — ONDANSETRON 4 MG: 2 INJECTION INTRAMUSCULAR; INTRAVENOUS at 15:23:00

## 2019-07-22 RX ADMIN — CEFAZOLIN SODIUM/WATER 2 G: 2 G/20 ML SYRINGE (ML) INTRAVENOUS at 13:41:00

## 2019-07-22 NOTE — INTERVAL H&P NOTE
Pre-op Diagnosis: uterovaginal prolapse    The above referenced H&P was reviewed by Katharine Jorgensen DO on 7/22/2019, the patient was examined and no significant changes have occurred in the patient's condition since the H&P was performed.   I discussed wi

## 2019-07-22 NOTE — BRIEF OP NOTE
Pre-Operative Diagnosis: uterovaginal prolapse,stress incontinence     Post-Operative Diagnosis: uterovaginal prolapse, stress incontinence, endometriosis      Procedure Performed:   Procedure(s):  ureterosacral vaginal vault suspension, anterior posterior

## 2019-07-22 NOTE — DISCHARGE SUMMARY
[facility]  Discharge Summary    Brooke Bundy Patient Status:  Outpatient in a Bed    7/10/1960 MRN [id number]   Location [facility] UNIT Attending Marie Pope, [address] [facility][note 3] Day # 0 PCP Alverto Mcgregor MD

## 2019-07-22 NOTE — H&P
63 y/o female with uterovaginal prolapse and stress urinary incontinence for surgical mgmt  Pre operative clearance with Dr Kandace Ferrera, pt seen & examined without changes.   Thorough discussion of surgical risks, benefits, and alternatives including, but not l

## 2019-07-22 NOTE — ANESTHESIA PREPROCEDURE EVALUATION
Anesthesia PreOp Note    HPI:     Kathrine Carrel is a 62year old female who presents for preoperative consultation requested by: Twila Duron DO    Date of Surgery: 7/22/2019    Procedure(s):  UTEROSACRAL LIGAMENT SUSPENSION  ANTERIOR POSTERIOR REPAI Gilmer Phillips MD at [facility] ENDO   • CYSTOSCOPY N/A 12/13/2017    Performed by Briseyda Gore MD at [facility] OR   • FOOT SURGERY  2010    bunionectomy   • LEG/ANKLE SURGERY PROC UNLISTED Left 2018    to repair flat foot   • NEEDLE BIOPSY LEFT Mother    • Musculo-skelatal Disorder Mother         per ng osteoporosis   • Alcohol and Other Disorders Associated Father         alcoholism   • Hypertension Father    • Glaucoma Maternal Grandmother    • Diabetes Maternal Grandmother    • Hypertension Ma Service: Not Asked        Blood Transfusions: Not Asked        Caffeine Concern: Yes          16oz soda chocolate daily        Occupational Exposure: Not Asked        Hobby Hazards: Not Asked        Sleep Concern: Not Asked        Stress Concern: Not Asked Cardiovascular - negative ROS and normal exam  (+) hypertension,     Neuro/Psych - negative ROS     GI/Hepatic/Renal - negative ROS     Endo/Other - negative ROS   Abdominal  - normal exam               Anesthesia Plan:   ASA:  2  Plan:   General  Airway

## 2019-07-22 NOTE — OPERATIVE REPORT
PRE-OP DIAGNOSIS:  Uterovaginal prolapse; stress incontinence    POST-OP DIAGNOSIS:  Same    PROCEDURE:  Repair of enterocele; uterosacral ligament suspension; anterior colporrhaphy; posterior colporrhaphy; midurethral sling; cystourethroscopy    SURGEON: in a running locking fashion. The uterosacral sutures were then tied down resulting in satisfactory elevation of the vaginal apex.      Allis clamps were then used to grasp the vaginal epithelium suburethrally and a scalpel was used to make the midline inci awakened and extubated and transferred from the operating room to the recovery room in stable condition, having tolerated the procedure well. Sponge, lap, & needle counts were correct.

## 2019-07-22 NOTE — ANESTHESIA PROCEDURE NOTES
Airway  Urgency: elective      General Information and Staff    Patient location during procedure: OR  Anesthesiologist: Maryse Mayers MD  Resident/CRNA: Jossie Beckett CRNA  Performed: CRNA     Indications and Patient Condition  Indications for airwa

## 2019-07-22 NOTE — ANESTHESIA POSTPROCEDURE EVALUATION
Patient: Freddy Head    Procedure Summary     Date:  07/22/19 Room / Location:  [address] [place] [address] OR 02 / [address] [place] [address] OR    Anesthesia Start:  2808 Anesthesia Stop:  0187    Procedures:       UTEROSACRAL LIGAMENT SUSPENSION (N/A )      ANTERIOR POSTERIOR REPAIR (N/

## 2019-07-22 NOTE — H&P
[facility]    History and Physical    Marysol Meng Patient Status:  Outpatient in a Bed    7/10/1960 MRN [id number]   Location [place] [address] Attending Dori Marie, [address] [facility] Day # 0 PCP Katalina Jones MD     Da by Colin Scruggs MD at [facility] MAIN OR   • FOOT SURGERY      bunionectomy   • LEG/ANKLE SURGERY PROC UNLISTED Left     to repair flat foot   • NEEDLE BIOPSY LEFT  2013   •       x3 w/PPTL   • OTHER SURGICAL HISTORY Right     thigh cyst Musculoskeletal: Negative for arthralgias and myalgias. Skin: Negative for rash. Neurological: Negative for weakness, numbness and headaches. Psychiatric/Behavioral: Negative for dysphoric mood. The patient is not nervous/anxious.     Physical Exam:

## 2019-07-23 VITALS
BODY MASS INDEX: 28.32 KG/M2 | WEIGHT: 150 LBS | DIASTOLIC BLOOD PRESSURE: 57 MMHG | TEMPERATURE: 98 F | SYSTOLIC BLOOD PRESSURE: 121 MMHG | HEART RATE: 92 BPM | OXYGEN SATURATION: 100 % | HEIGHT: 61 IN | RESPIRATION RATE: 18 BRPM

## 2019-07-23 LAB
BASOPHILS # BLD AUTO: 0.02 X10(3) UL (ref 0–0.2)
BASOPHILS NFR BLD AUTO: 0.2 %
DEPRECATED RDW RBC AUTO: 39.3 FL (ref 35.1–46.3)
EOSINOPHIL # BLD AUTO: 0.01 X10(3) UL (ref 0–0.7)
EOSINOPHIL NFR BLD AUTO: 0.1 %
ERYTHROCYTE [DISTWIDTH] IN BLOOD BY AUTOMATED COUNT: 12.5 % (ref 11–15)
HCT VFR BLD AUTO: 30.5 % (ref 35–48)
HGB BLD-MCNC: 10.3 G/DL (ref 12–16)
IMM GRANULOCYTES # BLD AUTO: 0.04 X10(3) UL (ref 0–1)
IMM GRANULOCYTES NFR BLD: 0.4 %
LYMPHOCYTES # BLD AUTO: 2.39 X10(3) UL (ref 1–4)
LYMPHOCYTES NFR BLD AUTO: 22.1 %
MCH RBC QN AUTO: 29.3 PG (ref 26–34)
MCHC RBC AUTO-ENTMCNC: 33.8 G/DL (ref 31–37)
MCV RBC AUTO: 86.6 FL (ref 80–100)
MONOCYTES # BLD AUTO: 0.61 X10(3) UL (ref 0.1–1)
MONOCYTES NFR BLD AUTO: 5.6 %
NEUTROPHILS # BLD AUTO: 7.76 X10 (3) UL (ref 1.5–7.7)
NEUTROPHILS # BLD AUTO: 7.76 X10(3) UL (ref 1.5–7.7)
NEUTROPHILS NFR BLD AUTO: 71.6 %
PLATELET # BLD AUTO: 194 10(3)UL (ref 150–450)
RBC # BLD AUTO: 3.52 X10(6)UL (ref 3.8–5.3)
WBC # BLD AUTO: 10.8 X10(3) UL (ref 4–11)

## 2019-07-23 PROCEDURE — 85025 COMPLETE CBC W/AUTO DIFF WBC: CPT | Performed by: OBSTETRICS & GYNECOLOGY

## 2019-07-23 RX ORDER — TRAMADOL HYDROCHLORIDE 50 MG/1
50 TABLET ORAL EVERY 6 HOURS PRN
Qty: 15 TABLET | Refills: 0 | Status: SHIPPED | OUTPATIENT
Start: 2019-07-23 | End: 2019-08-09

## 2019-07-23 RX ORDER — IBUPROFEN 600 MG/1
TABLET ORAL
Qty: 385 TABLET | Refills: 0 | OUTPATIENT
Start: 2019-07-23

## 2019-07-23 RX ORDER — PSEUDOEPHEDRINE HCL 30 MG
100 TABLET ORAL 2 TIMES DAILY
Qty: 60 CAPSULE | Refills: 0 | Status: SHIPPED | OUTPATIENT
Start: 2019-07-23 | End: 2020-12-17

## 2019-07-23 RX ORDER — IBUPROFEN 600 MG/1
600 TABLET ORAL EVERY 6 HOURS PRN
Qty: 30 TABLET | Refills: 0 | Status: SHIPPED | OUTPATIENT
Start: 2019-07-23 | End: 2020-05-22

## 2019-07-23 NOTE — PLAN OF CARE
Patient educated in length about boyd care and discharge handouts given. Changed to leg bag. Patient tolerating dinner, pain controlled with Toradol and heat packs. Pt performing IS accurately.  at bedside to take patient home.  Reviewed discharge i

## 2019-07-23 NOTE — PROGRESS NOTES
[facility]    OB/GYNE Progress Note      Huan May Patient Status:  Outpatient in a Bed    7/10/1960 MRN [id number]   Location [facility] 4W/SW/SE Attending Marleni Reddy, [address] [facility] Day # 0 PCP Piedad Morris MD        As 07/13/2019    HCT 39.0 07/13/2019    .0 07/13/2019    CREATSERUM 0.64 07/13/2019    BUN 11 07/13/2019     07/13/2019    K 3.9 07/13/2019     07/13/2019    CO2 25.0 07/13/2019     (H) 07/13/2019    CA 9.3 07/13/2019    ALB 3.7 07/1

## 2019-07-23 NOTE — PROGRESS NOTES
Patient transferred from PACU into room 450. Patient alert, moving all extremities. Vazquez in place, yellow/clear output. Pain 3/10, denies N/V. Tolerating ice chips/water. Will advance diet as tolerated. Family at beside.

## 2019-07-23 NOTE — PLAN OF CARE
Pt has good pain control with scheduled toradol. Advancing diet this am, tolerating liquids well. Unable to walk in the evening due to feeling dizzy on standing.   Will ambulate in am.  Problem: Patient Centered Care  Goal: Patient preferences are identif Goal  Description  Patient's Short Term Goal: control pain, get up and walk, get better    Interventions: analgesia as scheduled and prn, walk numerous times daily, boyd care provided  -   - See additional Care Plan goals for specific interventions   Outc

## 2019-07-23 NOTE — PROGRESS NOTES
TC to pt  She feels sore, but otherwise well  Discussed catheter plan, home with catheter and voiding trial in office in approximately one week  Discussed surgery  Reviewed post op restrictions and bowel mgmt  All questions answered  Discharge likely today

## 2019-07-23 NOTE — OPERATIVE REPORT
[facility]    PATIENT'S NAME: Judith Franklin   ATTENDING PHYSICIAN: Kwasi King DO   OPERATING PHYSICIAN: Darien Dickens.  DO Lucille   PATIENT ACCOUNT#:   [de-identified]    LOCATION:  [address] [place] [address] #:   [id number]       DATE OF BIRTH: ligaments were clamped, cut, and ligated with 0 Vicryl suture. At this point in time, we did sequential bites for the ligamentous pedicles bilaterally. These were each developed cut, clamped, and ligated with 0 Vicryl suture.   We then bluntly dissected a

## 2019-07-24 ENCOUNTER — TELEPHONE (OUTPATIENT)
Dept: UROLOGY | Facility: HOSPITAL | Age: 59
End: 2019-07-24

## 2019-07-24 NOTE — TELEPHONE ENCOUNTER
Talked with patient this AM, had left a message asking about YADIRA hose, she has since spoke with Dr. Bennett Limon, questions answered, has boyd catheter in place, draining clear yellow urine, has voiding trial apt on Monday, has not had a BM yet, is taking Co

## 2019-07-24 NOTE — TELEPHONE ENCOUNTER
TC to pt following surgery  Doing well, no complaints - feeling better than yesterday  Cath draining clear urine  Pain controlled with PO meds (IBP & norco)  Reviewed bowel mgmt and activity restrictions  Tolerates ambulation  No heavy vaginal bleeding, so

## 2019-07-26 ENCOUNTER — PATIENT MESSAGE (OUTPATIENT)
Dept: OBGYN CLINIC | Facility: CLINIC | Age: 59
End: 2019-07-26

## 2019-07-26 NOTE — TELEPHONE ENCOUNTER
From: Juan Luis Miranda  To: Travis Tristan DO  Sent: 7/26/2019 3:19 PM CDT  Subject: Non-Urgent Medical Question    I am wearing a catheter since July 22 can you find out if they will remove it on Monday July 29th?

## 2019-07-29 ENCOUNTER — PATIENT MESSAGE (OUTPATIENT)
Dept: OBGYN CLINIC | Facility: CLINIC | Age: 59
End: 2019-07-29

## 2019-07-29 ENCOUNTER — TELEPHONE (OUTPATIENT)
Dept: OBGYN CLINIC | Facility: CLINIC | Age: 59
End: 2019-07-29

## 2019-07-29 ENCOUNTER — OFFICE VISIT (OUTPATIENT)
Dept: UROLOGY | Facility: HOSPITAL | Age: 59
End: 2019-07-29
Attending: OBSTETRICS & GYNECOLOGY
Payer: COMMERCIAL

## 2019-07-29 VITALS
WEIGHT: 150 LBS | RESPIRATION RATE: 20 BRPM | HEIGHT: 61 IN | SYSTOLIC BLOOD PRESSURE: 140 MMHG | BODY MASS INDEX: 28.32 KG/M2 | DIASTOLIC BLOOD PRESSURE: 80 MMHG

## 2019-07-29 DIAGNOSIS — Z98.890 POST-OPERATIVE STATE: ICD-10-CM

## 2019-07-29 DIAGNOSIS — N39.3 FEMALE STRESS INCONTINENCE: Primary | ICD-10-CM

## 2019-07-29 PROCEDURE — 99212 OFFICE O/P EST SF 10 MIN: CPT

## 2019-07-29 NOTE — TELEPHONE ENCOUNTER
Pt had surgery on 7/22/19. Pt asking if she walk up and down her 12 stairs. Advised pt it is okay to walk up and down her stairs. Pt verbalized understanding.

## 2019-07-29 NOTE — TELEPHONE ENCOUNTER
Per pt had surgery on 7/22, pt lives on the 2nd floor and theres 12 steps wondering how many steps shes able to go up or down.  Please advise

## 2019-07-29 NOTE — TELEPHONE ENCOUNTER
From: Peggy Morley  To: Rosann Mohs, DO  Sent: 7/29/2019 12:34 PM CDT  Subject: Other    I need to make appointment for my 6 weeks follow up with Dr. Lj Salinas.

## 2019-07-29 NOTE — PROCEDURES
Patient here for voiding trial.  Vazquez catheter drained and then using a 60 cc Mary syringe, 300ml sterile water was instilled per gravity, balloon deflated using 10 cc syringe, and catheter removed.   Pt up to bathroom and voided 175mL,  Patient passes v

## 2019-08-01 ENCOUNTER — PATIENT MESSAGE (OUTPATIENT)
Dept: OBGYN CLINIC | Facility: CLINIC | Age: 59
End: 2019-08-01

## 2019-08-02 NOTE — TELEPHONE ENCOUNTER
From: Ev Voss  To: Anurag Blair DO  Sent: 8/1/2019 10:04 PM CDT  Subject: Non-Urgent Medical Question    how much longer do I need to take IBUPROFEN and the stool softener. I don't have any more pain and no problem with my bowel.     Thank you f

## 2019-08-09 ENCOUNTER — OFFICE VISIT (OUTPATIENT)
Dept: UROLOGY | Facility: HOSPITAL | Age: 59
End: 2019-08-09
Attending: OBSTETRICS & GYNECOLOGY
Payer: COMMERCIAL

## 2019-08-09 VITALS
WEIGHT: 150 LBS | TEMPERATURE: 98 F | BODY MASS INDEX: 28.32 KG/M2 | DIASTOLIC BLOOD PRESSURE: 62 MMHG | HEIGHT: 61 IN | SYSTOLIC BLOOD PRESSURE: 126 MMHG

## 2019-08-09 DIAGNOSIS — Z98.890 POST-OPERATIVE STATE: Primary | ICD-10-CM

## 2019-08-09 PROCEDURE — 99211 OFF/OP EST MAY X REQ PHY/QHP: CPT

## 2019-08-20 RX ORDER — TRAMADOL HYDROCHLORIDE 50 MG/1
TABLET ORAL
Qty: 30 TABLET | Refills: 0 | OUTPATIENT
Start: 2019-08-20

## 2019-08-20 NOTE — TELEPHONE ENCOUNTER
SENT BACK RX DENIED AND HAVE PT CALL THE OFFICE. PT HAD SURGERY ON 7-22-19 AND REQUESTING REFILL OF TRAMADOL. NEED TO FIND OUT WHY SHE STILL WANTS THIS MEDICATION?

## 2019-08-27 ENCOUNTER — HOSPITAL ENCOUNTER (OUTPATIENT)
Age: 59
Discharge: HOME | End: 2019-08-27
Attending: EMERGENCY MEDICINE
Payer: COMMERCIAL

## 2019-08-27 VITALS
DIASTOLIC BLOOD PRESSURE: 78 MMHG | BODY MASS INDEX: 28.32 KG/M2 | HEART RATE: 86 BPM | WEIGHT: 150 LBS | RESPIRATION RATE: 18 BRPM | HEIGHT: 61 IN | TEMPERATURE: 98 F | SYSTOLIC BLOOD PRESSURE: 147 MMHG | OXYGEN SATURATION: 98 %

## 2019-08-27 DIAGNOSIS — J06.9 VIRAL UPPER RESPIRATORY TRACT INFECTION: Primary | ICD-10-CM

## 2019-08-27 LAB — S PYO AG THROAT QL: NEGATIVE

## 2019-08-27 PROCEDURE — 99214 OFFICE O/P EST MOD 30 MIN: CPT

## 2019-08-27 PROCEDURE — 99213 OFFICE O/P EST LOW 20 MIN: CPT

## 2019-08-27 PROCEDURE — 87430 STREP A AG IA: CPT

## 2019-08-27 RX ORDER — FLUTICASONE PROPIONATE 50 MCG
2 SPRAY, SUSPENSION (ML) NASAL DAILY
Qty: 16 G | Refills: 0 | Status: SHIPPED | OUTPATIENT
Start: 2019-08-27 | End: 2019-09-26

## 2019-08-27 NOTE — ED NOTES
Pt discharged to home, discharge instructions reviewed, questions answered, pt verbalized understanding.

## 2019-08-27 NOTE — ED PROVIDER NOTES
Patient Seen in: [facility] In [address] [place] [address]    History   Patient presents with:  Sore Throat  Fever    Stated Complaint: sore throat, body aches, fever    HPI    Patient here with cough, congestion for 3 days.   No travel, no known sick UTEROSACRAL LIGAMENT SUSPENSION N/A 7/22/2019    Performed by Liya Sage DO at [address] [place] [address] OR   • [address] [place] [address] [address] Bilateral 7/22/2019    Performed by Marcello Vyas DO at [address] [place] [address] MAIN OR       Medications :   Saline (AYR HPI.  Constitutional and vital signs reviewed. All other systems reviewed and negative except as noted above. PSFH elements reviewed from today and agreed except as otherwise stated in HPI.     Physical Exam     ED Triage Vitals [08/27/19 1229]   BP

## 2019-08-27 NOTE — ED INITIAL ASSESSMENT (HPI)
Pt to IC with sore throat and fever 101 x 3 days. Denies N/V/D. States temp 99 today. Pt 5 weeks post op for vaginal hysterectomy.

## 2019-08-28 NOTE — ED NOTES
Patient called with question about how to take Airborne and Zinc at home. Patient confirmed understanding on how to take the medication per the AVS directions and directions on the bottles she purchased.

## 2019-09-03 ENCOUNTER — TELEPHONE (OUTPATIENT)
Dept: UROLOGY | Facility: HOSPITAL | Age: 59
End: 2019-09-03

## 2019-09-03 NOTE — TELEPHONE ENCOUNTER
Patient calling to report that she went to urgent care and has a upper respiratory infection and is feeling better however would like to have the current FMLA extended so that she can adequately recover from the URI - Explained to patient that she will nee

## 2019-09-03 NOTE — TELEPHONE ENCOUNTER
Pt states was seen in IC last week dx with viral URI , taking nasal spray and lozenges, no antibiotic. Pt reports still with sore throat,bilateral ear pain and coughing once in a while, non productive, no fever. Pt needs appt with Dr Deysi Ramos.    Scheduled O

## 2019-09-04 ENCOUNTER — PATIENT MESSAGE (OUTPATIENT)
Dept: OBGYN CLINIC | Facility: CLINIC | Age: 59
End: 2019-09-04

## 2019-09-04 ENCOUNTER — OFFICE VISIT (OUTPATIENT)
Dept: INTERNAL MEDICINE CLINIC | Facility: CLINIC | Age: 59
End: 2019-09-04
Payer: COMMERCIAL

## 2019-09-04 ENCOUNTER — OFFICE VISIT (OUTPATIENT)
Dept: OBGYN CLINIC | Facility: CLINIC | Age: 59
End: 2019-09-04
Payer: COMMERCIAL

## 2019-09-04 VITALS
SYSTOLIC BLOOD PRESSURE: 154 MMHG | WEIGHT: 154 LBS | BODY MASS INDEX: 29 KG/M2 | HEART RATE: 88 BPM | DIASTOLIC BLOOD PRESSURE: 89 MMHG

## 2019-09-04 VITALS
WEIGHT: 153 LBS | BODY MASS INDEX: 28.89 KG/M2 | DIASTOLIC BLOOD PRESSURE: 77 MMHG | SYSTOLIC BLOOD PRESSURE: 151 MMHG | HEIGHT: 61 IN | HEART RATE: 84 BPM | TEMPERATURE: 98 F

## 2019-09-04 DIAGNOSIS — Z98.890 POST-OPERATIVE STATE: Primary | ICD-10-CM

## 2019-09-04 DIAGNOSIS — J40 BRONCHITIS: Primary | ICD-10-CM

## 2019-09-04 PROCEDURE — 99024 POSTOP FOLLOW-UP VISIT: CPT | Performed by: OBSTETRICS & GYNECOLOGY

## 2019-09-04 PROCEDURE — 99213 OFFICE O/P EST LOW 20 MIN: CPT | Performed by: INTERNAL MEDICINE

## 2019-09-05 NOTE — TELEPHONE ENCOUNTER
From: Tobias Carter  To: Nancy Amado DO  Sent: 9/4/2019 10:40 PM CDT  Subject: Other    Can you please let Dr. Kamari Baldwin know that I think he made a mistake on my letter to  saying to excuse me for 3 days I think he meant 3 weeks.  Please fax the let

## 2019-09-05 NOTE — TELEPHONE ENCOUNTER
Brooke Davenport is correct. The letter is pre-populated with the word days. Please generate / edit it. Thanks.

## 2019-09-13 NOTE — PROGRESS NOTES
HPI:    Patient ID: Aislinn Chandler is a 62year old female.     HPI  presistent cough slowly getting better  No fever no chills no phlegm no wheeinzg     8/27  Stated Complaint: sore throat, body aches, fever     HPI     Patient here with cough, congestio daily. Disp: 60 capsule Rfl: 0   ibuprofen 600 MG Oral Tab Take 1 tablet (600 mg total) by mouth every 6 (six) hours as needed for Pain. Disp: 30 tablet Rfl: 0   Enalapril Maleate 10 MG Oral Tab Take 1 tablet (10 mg total) by mouth once daily.  Disp: 90 tab 7/22/2019    Performed by Ishmael Bhagat DO at [address]   • UTEROSACRAL LIGAMENT SUSPENSION N/A 7/22/2019    Performed by Ishmael Bhagat DO at [facility] OR   • VAGINAL HYSTERECTOMY WITH BILATERAL SAL adenopathy.             ASSESSMENT/PLAN:   (J40) Bronchitis  (primary encounter diagnosis)  Plan: viral   Cont conservative mgt  followu p if symptoms persist or worsens    Patient voiced understanding  and agrees with plan        Meds This Visit:  Thao Penn

## 2019-09-18 NOTE — PROGRESS NOTES
POST OP VISIT: No complaints of pain , bowel or bladder issues    PE: EG w/o lesions. Vaginal surgical sites intact and healing well. No mass or tenderness    IMP: Normal PO    PLAN: Follow Dr Gabriela Noland instructions for return to activity.  Annual in May

## 2019-09-26 ENCOUNTER — PATIENT MESSAGE (OUTPATIENT)
Dept: INTERNAL MEDICINE CLINIC | Facility: CLINIC | Age: 59
End: 2019-09-26

## 2019-09-27 ENCOUNTER — PATIENT MESSAGE (OUTPATIENT)
Dept: INTERNAL MEDICINE CLINIC | Facility: CLINIC | Age: 59
End: 2019-09-27

## 2019-09-27 NOTE — TELEPHONE ENCOUNTER
From: Peggy Morley  To: Taylor Cotter MD  Sent: 9/26/2019 9:57 AM CDT  Subject: Other    Good morning, on the bottle of ultra strength probiotic it says to take 2 capsuls I have been taking only l capsuls is that okay?

## 2019-09-28 NOTE — TELEPHONE ENCOUNTER
No need to send any message. From: Brianne Unger  To: Herman Lockhart MD  Sent: 9/27/2019  2:33 PM CDT  Subject: Other    Okay, thank you  I will do that take 2 capsules instead of one, I will take it  once a day.    have a nice afternoon.

## 2019-10-18 NOTE — TELEPHONE ENCOUNTER
LongMessage[employer]unker, spoke with Tez, transferred to [facility] [place]. Asking if patient can take Enalapril with Claritin, states there would  be NO drug interaction.      Spoke with patient, informed of above,  Patient verbalizes understanding

## 2019-10-25 ENCOUNTER — OFFICE VISIT (OUTPATIENT)
Dept: UROLOGY | Facility: HOSPITAL | Age: 59
End: 2019-10-25
Attending: OBSTETRICS & GYNECOLOGY
Payer: COMMERCIAL

## 2019-10-25 VITALS
SYSTOLIC BLOOD PRESSURE: 128 MMHG | HEIGHT: 61 IN | BODY MASS INDEX: 28.89 KG/M2 | DIASTOLIC BLOOD PRESSURE: 68 MMHG | WEIGHT: 153 LBS

## 2019-10-25 DIAGNOSIS — N81.84 PELVIC MUSCLE WASTING: ICD-10-CM

## 2019-10-25 DIAGNOSIS — N95.2 POSTMENOPAUSAL ATROPHIC VAGINITIS: Primary | ICD-10-CM

## 2019-10-25 PROCEDURE — 99212 OFFICE O/P EST SF 10 MIN: CPT

## 2019-10-25 RX ORDER — ESTRADIOL 0.1 MG/G
CREAM VAGINAL
Qty: 1 TUBE | Refills: 0 | Status: SHIPPED | OUTPATIENT
Start: 2019-10-25 | End: 2020-12-17

## 2019-10-25 NOTE — PROGRESS NOTES
She is s/p Post-Op Summary  Procedure Date: 07/22/19  Procedure Name: Uterosacral Ligament Suspension; Anterior/Posterior/Enterocele Repair;Prolene Mesh Mid Urethral Sling;Cystoscopy;Vaginal Hysterectomy;Bilateral Salpingectomy  Post-Op Symptoms: Patient de

## 2019-10-25 NOTE — PATIENT INSTRUCTIONS
[facility]  [facility]    Fingertip Application Method for Estrogen Vaginal Cream    1. Wash you hands with soap and water and dry thoroughly.     2.  Squeeze out enough cream from the tube to cover 1/2 of your index fin

## 2019-10-30 ENCOUNTER — NURSE TRIAGE (OUTPATIENT)
Dept: INTERNAL MEDICINE CLINIC | Facility: CLINIC | Age: 59
End: 2019-10-30

## 2019-10-30 NOTE — TELEPHONE ENCOUNTER
----- Message from John Garrison sent at 10/30/2019  9:49 AM CDT -----  Regarding: Non-Urgent Medical Question  Contact: [phone]  Good morning, I have itch on my forehead can i put cortizone cream?

## 2019-10-30 NOTE — TELEPHONE ENCOUNTER
Action Requested: Summary for Provider     []  Critical Lab, Recommendations Needed  [] Need Additional Advice  [x]   FYI    []   Need Orders  [] Need Medications Sent to Pharmacy  []  Other     SUMMARY: Patient returned call, states she used a different s

## 2019-11-04 NOTE — TELEPHONE ENCOUNTER
Patient stating that the itchiness resolved on her scalp but is still having itchiness on the two lipomas on her forehead that Dr. Dea Espinoza has seen her for before.  She states itchiness is nor only on th lipomas but continues, denies redness or drainage, no

## 2019-11-04 NOTE — TELEPHONE ENCOUNTER
Pt was called and left a detailed message that  agrees with whatthe RN recommended and she has no addition advise. Pt can call us back if she has more question or concerns.  Thanks

## 2019-11-13 ENCOUNTER — PATIENT MESSAGE (OUTPATIENT)
Dept: OBGYN CLINIC | Facility: CLINIC | Age: 59
End: 2019-11-13

## 2019-11-13 ENCOUNTER — TELEPHONE (OUTPATIENT)
Dept: OBGYN CLINIC | Facility: CLINIC | Age: 59
End: 2019-11-13

## 2019-11-13 NOTE — TELEPHONE ENCOUNTER
From: Brooke Bundy  To: Carole Whitlock. [id number] [facility]  Sent: 11/13/2019 1:04 PM CST  Subject: Non-Urgent Medical Question    I got your message my cramps are very very mild i have done the kegel exercise and that seem to help me.     if I have any problems I will

## 2019-11-13 NOTE — TELEPHONE ENCOUNTER
----- Message from Brianne Unger sent at 11/13/2019 10:41 AM CST -----  Regarding: Non-Urgent Medical Question  Contact: [phone]  Good morning, I just wanted to know if it is normal to have mile  cramps after I had a hysterectomy on July 22 2019?

## 2019-11-13 NOTE — TELEPHONE ENCOUNTER
Pt is calling back okay to LVM  Just answer what she left on my chart ,  She dont carry her cell phone

## 2019-11-13 NOTE — TELEPHONE ENCOUNTER
Msg left on VM that without speaking with pt it is difficult to triage this call. Pt was advised if the mild cramping does not resolve she should call the office and schedule an appt to see PAMELA for an evaluation.

## 2019-11-13 NOTE — TELEPHONE ENCOUNTER
LM for pt that we will let PAMELA know and instructing her pt call the office if the cramping occurs again. PAMELA, see [employer] message x2 and other phone call  from today as well.

## 2019-11-13 NOTE — TELEPHONE ENCOUNTER
----- Message from John Garrison sent at 11/13/2019 10:41 AM CST -----  Regarding: Non-Urgent Medical Question  Contact: [phone]  Good morning, I just wanted to know if it is normal to have mile  cramps after I had a hysterectomy on July 22 2019?

## 2019-11-25 ENCOUNTER — TELEPHONE (OUTPATIENT)
Dept: UROLOGY | Facility: HOSPITAL | Age: 59
End: 2019-11-25

## 2019-12-03 ENCOUNTER — TELEPHONE (OUTPATIENT)
Dept: UROLOGY | Facility: HOSPITAL | Age: 59
End: 2019-12-03

## 2019-12-04 ENCOUNTER — TELEPHONE (OUTPATIENT)
Dept: UROLOGY | Facility: HOSPITAL | Age: 59
End: 2019-12-04

## 2019-12-04 NOTE — TELEPHONE ENCOUNTER
Patient called with questions regarding Estrace Cream.\"Is Estrace Cream safe to use and how long will  I be on this medication. \" Questions answered. Patient verbalized understanding. Encouraged patient to call with questions or concerns.

## 2019-12-12 ENCOUNTER — TELEPHONE (OUTPATIENT)
Dept: UROLOGY | Facility: HOSPITAL | Age: 59
End: 2019-12-12

## 2019-12-12 ENCOUNTER — PATIENT MESSAGE (OUTPATIENT)
Dept: OBGYN CLINIC | Facility: CLINIC | Age: 59
End: 2019-12-12

## 2019-12-12 NOTE — TELEPHONE ENCOUNTER
Patient called  today and left a message for the nurse. She stated on the message that she\" found a lump on her vagina today and she is very worried\" Attempted to return phone call and no answer. Instructed to call back with questions or concerns.

## 2019-12-12 NOTE — TELEPHONE ENCOUNTER
From: Kanchan Hastings  To: Carolina Garcia MD  Sent: 12/12/2019 1:14 PM CST  Subject: Non-Urgent Medical Question    I don't know if this question would apply to Dr Shraddha Angel or Dr. Tamika Tellez. I had a hysterectomy on July 22, 2019 and I am concern because I have a

## 2019-12-13 ENCOUNTER — OFFICE VISIT (OUTPATIENT)
Dept: OBGYN CLINIC | Facility: CLINIC | Age: 59
End: 2019-12-13
Payer: COMMERCIAL

## 2019-12-13 ENCOUNTER — TELEPHONE (OUTPATIENT)
Dept: UROLOGY | Facility: HOSPITAL | Age: 59
End: 2019-12-13

## 2019-12-13 VITALS
BODY MASS INDEX: 30 KG/M2 | SYSTOLIC BLOOD PRESSURE: 150 MMHG | WEIGHT: 156.63 LBS | DIASTOLIC BLOOD PRESSURE: 84 MMHG | HEART RATE: 81 BPM

## 2019-12-13 DIAGNOSIS — N81.11 CYSTOCELE, MIDLINE: Primary | ICD-10-CM

## 2019-12-13 PROBLEM — N81.3 COMPLETE UTERINE PROLAPSE WITH PROLAPSE OF ANTERIOR VAGINAL WALL: Status: RESOLVED | Noted: 2019-05-06 | Resolved: 2019-12-13

## 2019-12-13 PROBLEM — D21.9 FIBROIDS: Status: RESOLVED | Noted: 2019-07-22 | Resolved: 2019-12-13

## 2019-12-13 PROBLEM — N94.89 ENDOMETRIAL MASS: Status: RESOLVED | Noted: 2019-04-29 | Resolved: 2019-12-13

## 2019-12-13 PROCEDURE — 99213 OFFICE O/P EST LOW 20 MIN: CPT | Performed by: OBSTETRICS & GYNECOLOGY

## 2019-12-13 RX ORDER — MELOXICAM 15 MG/1
TABLET ORAL
Refills: 1 | COMMUNITY
Start: 2019-11-26 | End: 2020-05-04

## 2019-12-13 RX ORDER — LIDOCAINE 50 MG/G
OINTMENT TOPICAL
Refills: 3 | COMMUNITY
Start: 2019-10-02 | End: 2020-12-17

## 2019-12-13 NOTE — TELEPHONE ENCOUNTER
Patient called, feels her prolapse is worse, she had a bad cold and has been coughing hard, she saw Dr. Ann Torres who suggested she follow up with our office, spoke to Dr. Renita Rice, can schedule a nurse visit for possible pessary fitting and make an apt with REYES

## 2019-12-13 NOTE — PROGRESS NOTES
Eleanor Lugo is a 62year old female  No LMP recorded. Patient has had a hysterectomy. Patient presents with:  Gyn Exam: VAGINAL LUMP -- had pelvic relaxation surgery by Dr. Brooke Leon in July. Was recently sick w/ xs coughing.  Very nervous about f MELVA Reynolds   • [facility] N/A 7/22/2019    Performed by Hannah Niño DO at [facility] OR   • [address] [place] [address]   • UTEROSACRAL LIGAMENT SUSPENSION N/A 7/22/2019    Performed by Hannah Niño DO at [facility] Not Asked        Caffeine Concern: Yes          16oz soda chocolate daily        Occupational Exposure: Not Asked        Hobby Hazards: Not Asked        Sleep Concern: Not Asked        Stress Concern: Not Asked        Weight Concern: Not Asked        Speci palpitations  Respiratory:    denies shortness of breath  Gastrointestinal:   denies heartburn, abdominal pain, diarrhea or constipation  Genitourinary:    denies dysuria, incontinence, abnormal vaginal discharge, vaginal itching  Musculoskeletal:   denies

## 2019-12-17 ENCOUNTER — OFFICE VISIT (OUTPATIENT)
Dept: UROLOGY | Facility: HOSPITAL | Age: 59
End: 2019-12-17
Attending: OBSTETRICS & GYNECOLOGY
Payer: COMMERCIAL

## 2019-12-17 DIAGNOSIS — Z98.890 POST-OPERATIVE STATE: Primary | ICD-10-CM

## 2019-12-17 PROCEDURE — 57160 INSERT PESSARY/OTHER DEVICE: CPT

## 2019-12-17 NOTE — PROGRESS NOTES
Patient here for pessary fitting, has noticed a bulge in the vagina for a few weeks, states she had a bad cold and had been coughing a lot, states went to see Dr. Dieter Mayers who told her she had a cystocele, per Dr. Zaina Silva, patient to make an apt with her aft

## 2019-12-20 ENCOUNTER — TELEPHONE (OUTPATIENT)
Dept: UROLOGY | Facility: HOSPITAL | Age: 59
End: 2019-12-20

## 2019-12-20 NOTE — TELEPHONE ENCOUNTER
This patient called today and left a voice message on the nursing line. She had questions regarding her pessary management.  Attempted to return this patient's phone call at [phone] left her a message on her voicemail stating to call with questions or

## 2020-01-10 ENCOUNTER — OFFICE VISIT (OUTPATIENT)
Dept: UROLOGY | Facility: HOSPITAL | Age: 60
End: 2020-01-10
Attending: OBSTETRICS & GYNECOLOGY
Payer: COMMERCIAL

## 2020-01-10 VITALS
WEIGHT: 156 LBS | SYSTOLIC BLOOD PRESSURE: 140 MMHG | TEMPERATURE: 98 F | RESPIRATION RATE: 20 BRPM | HEIGHT: 61 IN | BODY MASS INDEX: 29.45 KG/M2 | DIASTOLIC BLOOD PRESSURE: 80 MMHG

## 2020-01-10 DIAGNOSIS — N81.84 PELVIC MUSCLE WASTING: ICD-10-CM

## 2020-01-10 DIAGNOSIS — N95.2 POSTMENOPAUSAL ATROPHIC VAGINITIS: Primary | ICD-10-CM

## 2020-01-10 DIAGNOSIS — N81.11 CYSTOCELE, MIDLINE: ICD-10-CM

## 2020-01-10 PROCEDURE — 99212 OFFICE O/P EST SF 10 MIN: CPT

## 2020-01-10 NOTE — PROGRESS NOTES
She is s/p Post-Op Summary  Procedure Date: 07/22/19  Procedure Name: Uterosacral Ligament Suspension; Other (Please specify in comments); Prolene Mesh Mid Urethral Sling; Enterocele Repair;Cystoscopy;Vaginal Hysterectomy;Bilateral Salpingectomy(anterior colp

## 2020-01-11 NOTE — TELEPHONE ENCOUNTER
Pt. is not sure if she is able to get her proc done on 12/5, as she is getting Infusion done through IV, for 2 weeks? She states that it is an antibiotic that she is getting through the IV. Please call to discuss. Abdomen soft, nontender, nondistended, bowel sounds present in all 4 quadrants.

## 2020-01-16 ENCOUNTER — TELEPHONE (OUTPATIENT)
Dept: INTERNAL MEDICINE CLINIC | Facility: CLINIC | Age: 60
End: 2020-01-16

## 2020-01-16 NOTE — TELEPHONE ENCOUNTER
Dr. Anson Farooq, patient is requesting a mammogram order. Last mammogram was completed 11/09/2018. Please advise on order.      Please reply to pool: EM TRIAGE SUPPORT

## 2020-01-16 NOTE — TELEPHONE ENCOUNTER
Pt requesting orders for mammogram be added to the chart. Please call back with confirmation once added.

## 2020-01-17 NOTE — TELEPHONE ENCOUNTER
Elizabeth message acknowledged by patient.     Mammogram order   Message [id number]   From  Vincent Santos, [address] To  Johnson Santana and Delivered  1/16/2020  3:25 PM   Last Read in [address]  1/16/2020  4:08 PM by Kanchan Hastings

## 2020-01-20 ENCOUNTER — TELEPHONE (OUTPATIENT)
Dept: UROLOGY | Facility: HOSPITAL | Age: 60
End: 2020-01-20

## 2020-01-23 ENCOUNTER — TELEPHONE (OUTPATIENT)
Dept: UROLOGY | Facility: HOSPITAL | Age: 60
End: 2020-01-23

## 2020-01-23 NOTE — TELEPHONE ENCOUNTER
Patient called today very concerned she is unable to reinsert pessary after multiple attempts. She also noted a scant amount of bleeding yesterday when attempting to place pessary. Denies gross vaginal bleeding at this time.  Requesting a nurse visit for pe

## 2020-01-24 ENCOUNTER — OFFICE VISIT (OUTPATIENT)
Dept: UROLOGY | Facility: HOSPITAL | Age: 60
End: 2020-01-24
Attending: OBSTETRICS & GYNECOLOGY
Payer: COMMERCIAL

## 2020-01-24 VITALS — RESPIRATION RATE: 20 BRPM | HEIGHT: 61 IN | WEIGHT: 156 LBS | BODY MASS INDEX: 29.45 KG/M2

## 2020-01-24 DIAGNOSIS — N81.11 CYSTOCELE, MIDLINE: Primary | ICD-10-CM

## 2020-01-24 PROCEDURE — 99212 OFFICE O/P EST SF 10 MIN: CPT

## 2020-01-24 NOTE — PROGRESS NOTES
S:     Patient here for pessary check. vaginal bleeding No ( patient reports scant amount of bleeding yesterday) no active bleeding present at this time.)    Discharge No     Patient happy with pessary.       Bowels Regular    Vaginal estrogen cream-Usin

## 2020-02-07 ENCOUNTER — TELEPHONE (OUTPATIENT)
Dept: UROLOGY | Facility: HOSPITAL | Age: 60
End: 2020-02-07

## 2020-02-07 NOTE — TELEPHONE ENCOUNTER
Patient called the nurse line, left a message asking how long she should keep the pessary out and how to store it, called patient back, left a detailed message with instructions. Pao Villareal it is up to her, most patients leave out for a few hours or a day, she can de

## 2020-02-11 ENCOUNTER — NURSE TRIAGE (OUTPATIENT)
Dept: OTHER | Age: 60
End: 2020-02-11

## 2020-02-11 NOTE — TELEPHONE ENCOUNTER
Action Requested: Summary for Provider     []  Critical Lab, Recommendations Needed  [x] Need Additional Advice  []   FYI    []   Need Orders  [] Need Medications Sent to Pharmacy  []  Other     SUMMARY: patient called in with complaints of pain under her

## 2020-02-12 ENCOUNTER — PATIENT MESSAGE (OUTPATIENT)
Dept: INTERNAL MEDICINE CLINIC | Facility: CLINIC | Age: 60
End: 2020-02-12

## 2020-02-12 NOTE — TELEPHONE ENCOUNTER
Dr Melisa Boyd can we use any of your TCM and res 24 for this patient before calling her to schedule for an appointment?     Please reply to pool: CARLOS Leigh

## 2020-02-12 NOTE — TELEPHONE ENCOUNTER
please see pt mychart message below and advise. I called pt and left her a message to call us back. To further assess her s/sx. Did she stop taking her medications?   Olesya Garay   to Sharifa Isidro MD            2/12/20 9:40 AM   This is

## 2020-02-12 NOTE — TELEPHONE ENCOUNTER
I cant answer all these questions  Please have pt make an appt for follow up  Need to check bP in the office before I do anything with her meds

## 2020-02-12 NOTE — TELEPHONE ENCOUNTER
Patient states she is feeling much better today as she's been taking Advil 400 mg every 6 hours. States she slept through the night last night with no pain. Informed patient to call for an appointment if her symptoms return.   Pt voiced understanding an

## 2020-02-13 NOTE — TELEPHONE ENCOUNTER
Patient returning call. She is feeling better with the Advil every 6 hours. Advised if symptoms not improved by the weekend to call us and schedule an appointment to be seen on Monday. Patient expressed understanding.

## 2020-03-07 ENCOUNTER — HOSPITAL ENCOUNTER (OUTPATIENT)
Dept: MAMMOGRAPHY | Age: 60
Discharge: HOME | End: 2020-03-07
Attending: INTERNAL MEDICINE
Payer: COMMERCIAL

## 2020-03-07 DIAGNOSIS — Z12.31 VISIT FOR SCREENING MAMMOGRAM: ICD-10-CM

## 2020-03-07 PROCEDURE — 77067 SCR MAMMO BI INCL CAD: CPT | Performed by: INTERNAL MEDICINE

## 2020-03-07 PROCEDURE — 77063 BREAST TOMOSYNTHESIS BI: CPT | Performed by: INTERNAL MEDICINE

## 2020-03-10 ENCOUNTER — PATIENT MESSAGE (OUTPATIENT)
Dept: INTERNAL MEDICINE CLINIC | Facility: CLINIC | Age: 60
End: 2020-03-10

## 2020-03-10 NOTE — TELEPHONE ENCOUNTER
From: Kathrine Carrel  To: Stephanie Barboza MD  Sent: 3/10/2020 10:00 AM CDT  Subject: Test Results Question    Good morning, I just got my mammogram test result on my chart I just have a question what does it mean when it says palpable lump, is every thing

## 2020-04-28 RX ORDER — ENALAPRIL MALEATE 10 MG/1
TABLET ORAL
Qty: 90 TABLET | Refills: 3 | Status: SHIPPED | OUTPATIENT
Start: 2020-04-28 | End: 2021-08-13

## 2020-05-03 ENCOUNTER — PATIENT MESSAGE (OUTPATIENT)
Dept: INTERNAL MEDICINE CLINIC | Facility: CLINIC | Age: 60
End: 2020-05-03

## 2020-05-03 ENCOUNTER — TELEPHONE (OUTPATIENT)
Dept: INTERNAL MEDICINE CLINIC | Facility: CLINIC | Age: 60
End: 2020-05-03

## 2020-05-04 ENCOUNTER — VIRTUAL PHONE E/M (OUTPATIENT)
Dept: INTERNAL MEDICINE CLINIC | Facility: CLINIC | Age: 60
End: 2020-05-04
Payer: COMMERCIAL

## 2020-05-04 DIAGNOSIS — E78.5 HYPERLIPIDEMIA, UNSPECIFIED HYPERLIPIDEMIA TYPE: ICD-10-CM

## 2020-05-04 DIAGNOSIS — R73.01 ABNORMAL FASTING GLUCOSE: ICD-10-CM

## 2020-05-04 DIAGNOSIS — M16.11 PRIMARY OSTEOARTHRITIS OF RIGHT HIP: Primary | ICD-10-CM

## 2020-05-04 DIAGNOSIS — I10 ESSENTIAL HYPERTENSION: ICD-10-CM

## 2020-05-04 PROCEDURE — 99213 OFFICE O/P EST LOW 20 MIN: CPT | Performed by: INTERNAL MEDICINE

## 2020-05-04 RX ORDER — MELOXICAM 15 MG/1
TABLET ORAL
Qty: 30 TABLET | Refills: 1 | Status: ON HOLD | OUTPATIENT
Start: 2020-05-04 | End: 2020-08-18

## 2020-05-04 NOTE — TELEPHONE ENCOUNTER
From: Parrish Duggan  To: Cristal Ryan MD  Sent: 5/3/2020 7:02 PM CDT  Subject: Other    I got another question I forgot to ask if I can ride a tricycles?

## 2020-05-04 NOTE — TELEPHONE ENCOUNTER
Triage, please contact patient to further assess. Multiple mychart messages below. DOUG Del Angel.       Subject Delivery           Other  5/3/2020 6:51 PM Reply    To: CARLOS Boateng      From: Brooke Bundy      Created: 5/3/2020 6:51 PM        *-*-*Laron Crowell

## 2020-05-04 NOTE — TELEPHONE ENCOUNTER
From: Cyndeee Daughters  To: Isi Muinz MD  Sent: 5/3/2020 6:51 PM CDT  Subject: Other    Hello, hope all of you are doing well.  I wanted to know if i can still take cyclobenzaprine i gotten on July 9, 2019 for my muscle spasms I don't have a muscle spas

## 2020-05-05 NOTE — PROGRESS NOTES
Virtual Telephone Check-In    Marysol Fan verbally consents to a Virtual/Telephone Check-In visit on 05/05/20. Patient understands and accepts financial responsibility for any deductible, co-insurance and/or co-pays associated with this service.     D nausea and vomiting. Genitourinary: Negative for difficulty urinating, dysuria, frequency, hematuria and urgency. Musculoskeletal: Positive for arthralgias. Negative for back pain, gait problem and joint swelling. Skin: Negative for rash.    Neurologi uses pessary   • Uterine prolapse 4/17/2015    Donut pessary -- self cleaning       Past Surgical History:   Procedure Laterality Date   • ANTERIOR POSTERIOR REPAIR N/A 7/22/2019    Performed by Roxie De Jesus DO at [address] [place] [address] MAIN OR   • BIOPSY OF UTERUS L History    Tobacco Use      Smoking status: Never Smoker      Smokeless tobacco: Never Used    Alcohol use: No    Drug use: No       PHYSICAL EXAM:    Physical Exam  Patient is alert awake in no distress on the phone he is breathing comfortably on room air reviewing labs, medications, radiology tests and decision making. Appropriate medical decision-making and tests are ordered as detailed in the plan of care above.     LG#[id number]

## 2020-05-06 ENCOUNTER — TELEPHONE (OUTPATIENT)
Dept: INTERNAL MEDICINE CLINIC | Facility: CLINIC | Age: 60
End: 2020-05-06

## 2020-05-22 ENCOUNTER — VIRTUAL PHONE E/M (OUTPATIENT)
Dept: INTERNAL MEDICINE CLINIC | Facility: CLINIC | Age: 60
End: 2020-05-22
Payer: COMMERCIAL

## 2020-05-22 DIAGNOSIS — I10 ESSENTIAL HYPERTENSION: ICD-10-CM

## 2020-05-22 DIAGNOSIS — M16.11 PRIMARY OSTEOARTHRITIS OF RIGHT HIP: Primary | ICD-10-CM

## 2020-05-22 DIAGNOSIS — Z91.09 ENVIRONMENTAL ALLERGIES: ICD-10-CM

## 2020-05-22 PROCEDURE — 99213 OFFICE O/P EST LOW 20 MIN: CPT | Performed by: INTERNAL MEDICINE

## 2020-05-22 NOTE — PROGRESS NOTES
Virtual Telephone Check-In    Eleanor Lugo verbally consents to a Virtual/Telephone Check-In visit on 05/22/20. Patient has been referred to the [facility] website at [email or web address] to review the yearly Consent to Treat document.     Patient Negrito Levine and urgency. Musculoskeletal: Positive for arthralgias. Negative for back pain, gait problem and joint swelling. Skin: Negative for rash. Neurological: Negative for syncope, weakness, light-headedness and headaches.    Hematological: Negative for windy Laterality Date   • ANTERIOR POSTERIOR REPAIR N/A 7/22/2019    Performed by Santiago David DO at [facility] OR   • BIOPSY OF UTERUS LINING  10/2007    neg endometrial biopsy   • COLONOSCOPY N/A 2/20/2018    Performed by Felicia Mora MD at [facility] No       PHYSICAL EXAM:    Physical Exam    Pt is alert  speaks in full sentences without shortness of breath           ASSESSMENT/PLAN:   (M16.11) Primary osteoarthritis of right hip  (primary encounter diagnosis)  Plan: pt with different brand NSAID list

## 2020-06-01 ENCOUNTER — TELEPHONE (OUTPATIENT)
Dept: UROLOGY | Facility: HOSPITAL | Age: 60
End: 2020-06-01

## 2020-06-01 NOTE — TELEPHONE ENCOUNTER
Patient called today. She was wondering if she was able to keep her pessary in place for two weeks at a time. Instead of removing and cleaning her pessary on a weekly basis.  Informed the patient that for the next month she could see if this worked better f

## 2020-06-01 NOTE — TELEPHONE ENCOUNTER
Attempted to return patients phone call. Unable to reach patient. Left a message for her to call back with questions or concerns.

## 2020-06-05 ENCOUNTER — TELEPHONE (OUTPATIENT)
Dept: INTERNAL MEDICINE CLINIC | Facility: CLINIC | Age: 60
End: 2020-06-05

## 2020-06-05 NOTE — TELEPHONE ENCOUNTER
Patient called back (verified name and ), asked if she can take all her medications tomorrow prior to do the blood tests,advised that she can take all her medications and can drink water only, 12 hour fasting for food,instructed to wear mask and no feve

## 2020-06-05 NOTE — TELEPHONE ENCOUNTER
CS transferred call, stated patient wanted to ask if she should take medicines before blood draw tomorrow morning.  Patient call was lost. I called patient back, went to message, her full name was on the message, left message that yes, she should take her m

## 2020-06-06 ENCOUNTER — APPOINTMENT (OUTPATIENT)
Dept: LAB | Facility: HOSPITAL | Age: 60
End: 2020-06-06
Attending: INTERNAL MEDICINE
Payer: COMMERCIAL

## 2020-06-06 DIAGNOSIS — R73.01 ABNORMAL FASTING GLUCOSE: ICD-10-CM

## 2020-06-06 DIAGNOSIS — E78.5 HYPERLIPIDEMIA, UNSPECIFIED HYPERLIPIDEMIA TYPE: ICD-10-CM

## 2020-06-06 PROCEDURE — 84450 TRANSFERASE (AST) (SGOT): CPT

## 2020-06-06 PROCEDURE — 80048 BASIC METABOLIC PNL TOTAL CA: CPT

## 2020-06-06 PROCEDURE — 36415 COLL VENOUS BLD VENIPUNCTURE: CPT

## 2020-06-06 PROCEDURE — 83036 HEMOGLOBIN GLYCOSYLATED A1C: CPT

## 2020-06-06 PROCEDURE — 84460 ALANINE AMINO (ALT) (SGPT): CPT

## 2020-06-06 PROCEDURE — 80061 LIPID PANEL: CPT

## 2020-06-11 ENCOUNTER — OFFICE VISIT (OUTPATIENT)
Dept: ORTHOPEDICS CLINIC | Facility: CLINIC | Age: 60
End: 2020-06-11
Payer: COMMERCIAL

## 2020-06-11 ENCOUNTER — HOSPITAL ENCOUNTER (OUTPATIENT)
Dept: GENERAL RADIOLOGY | Facility: HOSPITAL | Age: 60
Discharge: HOME | End: 2020-06-11
Attending: ORTHOPAEDIC SURGERY
Payer: COMMERCIAL

## 2020-06-11 VITALS — WEIGHT: 157 LBS | BODY MASS INDEX: 29.64 KG/M2 | HEIGHT: 61 IN

## 2020-06-11 DIAGNOSIS — M16.11 PRIMARY OSTEOARTHRITIS OF RIGHT HIP: Primary | ICD-10-CM

## 2020-06-11 DIAGNOSIS — M25.551 HIP PAIN, RIGHT: ICD-10-CM

## 2020-06-11 PROCEDURE — 99244 OFF/OP CNSLTJ NEW/EST MOD 40: CPT | Performed by: ORTHOPAEDIC SURGERY

## 2020-06-11 PROCEDURE — 73502 X-RAY EXAM HIP UNI 2-3 VIEWS: CPT | Performed by: ORTHOPAEDIC SURGERY

## 2020-06-11 NOTE — PROGRESS NOTES
NURSING INTAKE COMMENTS: Patient presents with:  Consult: referred by Dr Rc Kasper regarding right hip pain, ongoing pain since Feb 2020, 7/10 right hip pain, pt is taking meloxicam for pain, pt denies injury, previous surgery, numbness, tingling, or swellin 2010    bunionectomy   • LEG/ANKLE SURGERY PROC UNLISTED Left 2018    to repair flat foot   • NEEDLE BIOPSY LEFT  2013   •       x3 w/PPTL   • OTHER SURGICAL HISTORY Right     thigh cyst removal   • PESSARY     • PUBO VAGINAL SLING N/A  mGA   • Breast Cancer Maternal Aunt 55   • Cancer Paternal Uncle         per ng stomach   • Diabetes Cousin    • Breast Cancer Maternal Aunt 72       Social History    Occupational History      Not on file    Tobacco Use      Smoking status: Never Smoker greater trochanter. She has severe pain with any range of motion of the hip. Logrolling of the femur produces significant pain. She has difficulty actively straight leg raising secondary to pain.   Passive straight leg raising produces mild discomfort in Kenalog. I believe a Kenalog injection would likely be only a short-term temporary benefit. Follow Up: Return for follow-up visit two weeks after surgery.     Rosaura Schlatter, MD

## 2020-06-12 ENCOUNTER — TELEPHONE (OUTPATIENT)
Dept: INTERNAL MEDICINE CLINIC | Facility: CLINIC | Age: 60
End: 2020-06-12

## 2020-06-12 NOTE — TELEPHONE ENCOUNTER
Patient requesting to schedule pre-op visit in June. She was referred to Dr. Robert Doyle who wants her to have right hip surgery.

## 2020-06-12 NOTE — TELEPHONE ENCOUNTER
CSS please help pt with a appointment. Adelso Mainland   to Ana Paula Tucker MD            6/11/20 9:06 PM   I went to see Dr. Adam Light yesterday 6-11-20 he told me to make appointment with Dr. Rylie Suarez because I am going to need the hip surgery.

## 2020-07-03 ENCOUNTER — OFFICE VISIT (OUTPATIENT)
Dept: INTERNAL MEDICINE CLINIC | Facility: CLINIC | Age: 60
End: 2020-07-03
Payer: COMMERCIAL

## 2020-07-03 ENCOUNTER — LAB ENCOUNTER (OUTPATIENT)
Dept: LAB | Age: 60
End: 2020-07-03
Attending: INTERNAL MEDICINE
Payer: COMMERCIAL

## 2020-07-03 ENCOUNTER — APPOINTMENT (OUTPATIENT)
Dept: LAB | Age: 60
End: 2020-07-03
Attending: INTERNAL MEDICINE
Payer: COMMERCIAL

## 2020-07-03 DIAGNOSIS — Z01.818 PREOP EXAMINATION: Primary | ICD-10-CM

## 2020-07-03 DIAGNOSIS — I10 ESSENTIAL HYPERTENSION: ICD-10-CM

## 2020-07-03 DIAGNOSIS — Z01.818 PREOP EXAM FOR INTERNAL MEDICINE: ICD-10-CM

## 2020-07-03 DIAGNOSIS — R73.03 PREDIABETES: ICD-10-CM

## 2020-07-03 DIAGNOSIS — Z01.818 PREOP EXAM FOR INTERNAL MEDICINE: Primary | ICD-10-CM

## 2020-07-03 DIAGNOSIS — Z91.09 ENVIRONMENTAL ALLERGIES: ICD-10-CM

## 2020-07-03 DIAGNOSIS — E78.5 HYPERLIPIDEMIA, UNSPECIFIED HYPERLIPIDEMIA TYPE: ICD-10-CM

## 2020-07-03 DIAGNOSIS — M16.11 PRIMARY OSTEOARTHRITIS OF RIGHT HIP: ICD-10-CM

## 2020-07-03 LAB
ALBUMIN SERPL-MCNC: 3.6 G/DL (ref 3.4–5)
ALBUMIN/GLOB SERPL: 0.9 {RATIO} (ref 1–2)
ALP LIVER SERPL-CCNC: 124 U/L (ref 50–130)
ALT SERPL-CCNC: 24 U/L (ref 13–56)
ANION GAP SERPL CALC-SCNC: 8 MMOL/L (ref 0–18)
APTT PPP: 28.3 SECONDS (ref 23.2–35.3)
AST SERPL-CCNC: 11 U/L (ref 15–37)
BASOPHILS # BLD AUTO: 0.04 X10(3) UL (ref 0–0.2)
BASOPHILS NFR BLD AUTO: 0.5 %
BILIRUB SERPL-MCNC: 0.9 MG/DL (ref 0.1–2)
BILIRUB UR QL: NEGATIVE
BUN BLD-MCNC: 11 MG/DL (ref 7–18)
BUN/CREAT SERPL: 15.7 (ref 10–20)
CALCIUM BLD-MCNC: 8.7 MG/DL (ref 8.5–10.1)
CHLORIDE SERPL-SCNC: 108 MMOL/L (ref 98–112)
CO2 SERPL-SCNC: 25 MMOL/L (ref 21–32)
COLOR UR: YELLOW
CREAT BLD-MCNC: 0.7 MG/DL (ref 0.55–1.02)
DEPRECATED RDW RBC AUTO: 41.4 FL (ref 35.1–46.3)
EOSINOPHIL # BLD AUTO: 0.05 X10(3) UL (ref 0–0.7)
EOSINOPHIL NFR BLD AUTO: 0.7 %
ERYTHROCYTE [DISTWIDTH] IN BLOOD BY AUTOMATED COUNT: 13.2 % (ref 11–15)
GLOBULIN PLAS-MCNC: 4.2 G/DL (ref 2.8–4.4)
GLUCOSE BLD-MCNC: 138 MG/DL (ref 70–99)
GLUCOSE UR-MCNC: NEGATIVE MG/DL
HCT VFR BLD AUTO: 36.2 % (ref 35–48)
HGB BLD-MCNC: 12.3 G/DL (ref 12–16)
HGB UR QL STRIP.AUTO: NEGATIVE
IMM GRANULOCYTES # BLD AUTO: 0.03 X10(3) UL (ref 0–1)
IMM GRANULOCYTES NFR BLD: 0.4 %
INR BLD: 1.05 (ref 0.9–1.2)
LEUKOCYTE ESTERASE UR QL STRIP.AUTO: NEGATIVE
LYMPHOCYTES # BLD AUTO: 2.4 X10(3) UL (ref 1–4)
LYMPHOCYTES NFR BLD AUTO: 31.7 %
M PROTEIN MFR SERPL ELPH: 7.8 G/DL (ref 6.4–8.2)
MCH RBC QN AUTO: 29.4 PG (ref 26–34)
MCHC RBC AUTO-ENTMCNC: 34 G/DL (ref 31–37)
MCV RBC AUTO: 86.4 FL (ref 80–100)
MONOCYTES # BLD AUTO: 0.43 X10(3) UL (ref 0.1–1)
MONOCYTES NFR BLD AUTO: 5.7 %
NEUTROPHILS # BLD AUTO: 4.62 X10 (3) UL (ref 1.5–7.7)
NEUTROPHILS # BLD AUTO: 4.62 X10(3) UL (ref 1.5–7.7)
NEUTROPHILS NFR BLD AUTO: 61 %
NITRITE UR QL STRIP.AUTO: NEGATIVE
OSMOLALITY SERPL CALC.SUM OF ELEC: 294 MOSM/KG (ref 275–295)
PATIENT FASTING Y/N/NP: NO
PH UR: 5 [PH] (ref 5–8)
PLATELET # BLD AUTO: 229 10(3)UL (ref 150–450)
POTASSIUM SERPL-SCNC: 3.8 MMOL/L (ref 3.5–5.1)
PROT UR-MCNC: NEGATIVE MG/DL
PROTHROMBIN TIME: 13.5 SECONDS (ref 11.8–14.5)
RBC # BLD AUTO: 4.19 X10(6)UL (ref 3.8–5.3)
RBC #/AREA URNS AUTO: 2 /HPF
SODIUM SERPL-SCNC: 141 MMOL/L (ref 136–145)
SP GR UR STRIP: 1.03 (ref 1–1.03)
UROBILINOGEN UR STRIP-ACNC: <2
WBC # BLD AUTO: 7.6 X10(3) UL (ref 4–11)
WBC #/AREA URNS AUTO: 0 /HPF

## 2020-07-03 PROCEDURE — 85730 THROMBOPLASTIN TIME PARTIAL: CPT

## 2020-07-03 PROCEDURE — 93005 ELECTROCARDIOGRAM TRACING: CPT

## 2020-07-03 PROCEDURE — 85025 COMPLETE CBC W/AUTO DIFF WBC: CPT

## 2020-07-03 PROCEDURE — 93010 ELECTROCARDIOGRAM REPORT: CPT | Performed by: INTERNAL MEDICINE

## 2020-07-03 PROCEDURE — 85610 PROTHROMBIN TIME: CPT

## 2020-07-03 PROCEDURE — 87641 MR-STAPH DNA AMP PROBE: CPT

## 2020-07-03 PROCEDURE — 99214 OFFICE O/P EST MOD 30 MIN: CPT | Performed by: INTERNAL MEDICINE

## 2020-07-03 PROCEDURE — 81001 URINALYSIS AUTO W/SCOPE: CPT

## 2020-07-03 PROCEDURE — 36415 COLL VENOUS BLD VENIPUNCTURE: CPT

## 2020-07-03 PROCEDURE — 80053 COMPREHEN METABOLIC PANEL: CPT

## 2020-07-03 NOTE — PROGRESS NOTES
HPI:    Patient ID: Jeronimo Nicholas is a 62year old female. HPI    PreOP clearance   Right hip osteoarthritis, severe  Plan right hip arthroplasty date to be determined   CONCLUSION: xray  1.  Moderate to severe osteoarthritis right hip showing mild prog tablet 1   • ENALAPRIL MALEATE 10 MG Oral Tab TAKE 1 TABLET(10 MG) BY MOUTH EVERY DAY 90 tablet 3   • lidocaine 5 % External Ointment   3   • Estradiol (ESTRACE) 0.1 MG/GM Vaginal Cream Apply 1/2 gram vaginally 2 times per week.  1 Tube 0   • Saline (AYR NA NEEDLE BIOPSY LEFT  2013   •       x3 w/PPTL   • OTHER SURGICAL HISTORY Right     thigh cyst removal   • PESSARY     • [facility] N/A 2019    Performed by Dori Marie DO at [address] [place] [address] Cardiovascular: Normal rate, regular rhythm, S1 normal, S2 normal, normal heart sounds and intact distal pulses. Exam reveals no gallop. No murmur heard. Pulmonary/Chest: Effort normal and breath sounds normal. No respiratory distress.  She has no whee mmol/L 8   BUN      7 - 18 mg/dL 11   CREATININE      0.55 - 1.02 mg/dL 0.70   BUN/CREAT Ratio      10.0 - 20.0 15.7   CALCIUM      8.5 - 10.1 mg/dL 8.7   CALCULATED OSMOLALITY      275 - 295 mOsm/kg 294   eGFR NON-AFR.  AMERICAN      >=60 94   eGFR  physical examination patient has no contraindication to undergo planned surgery under general anesthesia.   Labs urinalysis EKG reviewed findings within normal  Patient may proceed with surgery as planned    (M16.11) Primary osteoarthritis of right hip  Joey

## 2020-07-04 LAB — MRSA DNA SPEC QL NAA+PROBE: NEGATIVE

## 2020-07-05 VITALS
HEART RATE: 92 BPM | HEIGHT: 61 IN | SYSTOLIC BLOOD PRESSURE: 136 MMHG | DIASTOLIC BLOOD PRESSURE: 92 MMHG | TEMPERATURE: 99 F | BODY MASS INDEX: 29.45 KG/M2 | WEIGHT: 156 LBS

## 2020-07-06 ENCOUNTER — TELEPHONE (OUTPATIENT)
Dept: UROLOGY | Facility: HOSPITAL | Age: 60
End: 2020-07-06

## 2020-07-06 NOTE — TELEPHONE ENCOUNTER
patient called, is going to have hip surgery at the end of this month, does not have a date yet, wanted to call to verify how often she should be using the Estrace cream, states is only using with pessary insertion, she is changing it every 2 weeks, instru

## 2020-07-08 ENCOUNTER — TELEPHONE (OUTPATIENT)
Dept: ORTHOPEDICS CLINIC | Facility: CLINIC | Age: 60
End: 2020-07-08

## 2020-07-08 NOTE — TELEPHONE ENCOUNTER
Pt calling to set up surgery. Also pt requesting a note be faxed to pt that pt is scheduled for surgery. Fax number is [phone].   Call pt

## 2020-07-09 NOTE — TELEPHONE ENCOUNTER
S/w pt. Informed her I had no idea she was suppose to have surgery until I saw her message. I double checked Dr Vianney Dukes surgery scheduling forms, and I couldn't find her sx sheet.   I placed new surgery form on O'Lawrence's desk this morning for nathan

## 2020-07-09 NOTE — TELEPHONE ENCOUNTER
Pt calling to see what day surgery will be and pt will need a note HR stating when surgery will be and how long she will be out for surgery please advise

## 2020-07-17 ENCOUNTER — OFFICE VISIT (OUTPATIENT)
Dept: UROLOGY | Facility: HOSPITAL | Age: 60
End: 2020-07-17
Attending: OBSTETRICS & GYNECOLOGY
Payer: COMMERCIAL

## 2020-07-17 VITALS
HEIGHT: 61 IN | WEIGHT: 156 LBS | DIASTOLIC BLOOD PRESSURE: 68 MMHG | TEMPERATURE: 98 F | BODY MASS INDEX: 29.45 KG/M2 | SYSTOLIC BLOOD PRESSURE: 138 MMHG | RESPIRATION RATE: 20 BRPM

## 2020-07-17 DIAGNOSIS — Z98.890 POST-OPERATIVE STATE: ICD-10-CM

## 2020-07-17 DIAGNOSIS — N95.2 POSTMENOPAUSAL ATROPHIC VAGINITIS: ICD-10-CM

## 2020-07-17 DIAGNOSIS — N81.84 PELVIC MUSCLE WASTING: ICD-10-CM

## 2020-07-17 DIAGNOSIS — N81.11 CYSTOCELE, MIDLINE: Primary | ICD-10-CM

## 2020-07-17 PROCEDURE — 99212 OFFICE O/P EST SF 10 MIN: CPT

## 2020-07-17 NOTE — PROGRESS NOTES
She is s/p Post-Op Summary  Procedure Date: 07/22/19  Procedure Name: Vaginal Hysterectomy;Uterosacral Ligament Suspension;Cystoscopy; Bilateral Salpingectomy;Mid-urethral Sling; Enterocele Repair(anterior colporrhaphy, posterior colporrhaphy)  Post-Op Sympt

## 2020-07-17 NOTE — PATIENT INSTRUCTIONS
Cont vag estrogen twice weekly  Cont pessary with home care  Plan for urine testing one week before hip surgery, we will call you with results    KRISTINA Cornejo [id number]    Removal  1.  Wash hand
No significant past surgical history

## 2020-07-21 ENCOUNTER — MED REC SCAN ONLY (OUTPATIENT)
Dept: ORTHOPEDICS CLINIC | Facility: CLINIC | Age: 60
End: 2020-07-21

## 2020-07-21 ENCOUNTER — TELEPHONE (OUTPATIENT)
Dept: ORTHOPEDICS CLINIC | Facility: CLINIC | Age: 60
End: 2020-07-21

## 2020-07-21 NOTE — TELEPHONE ENCOUNTER
[employer] faxed FMLA Forms to  [facility] for Dr Celine Head to complete. Pt has not signed HIPPA or pd $25 fee. Scanned to CAROL and brought originals to CAROL @ [facility].

## 2020-07-24 ENCOUNTER — TELEPHONE (OUTPATIENT)
Dept: INTERNAL MEDICINE CLINIC | Facility: CLINIC | Age: 60
End: 2020-07-24

## 2020-07-24 DIAGNOSIS — M16.11 PRIMARY OSTEOARTHRITIS OF RIGHT HIP: Primary | ICD-10-CM

## 2020-07-24 NOTE — TELEPHONE ENCOUNTER
Patient called in stating that she is having hip surgery on 8/17/20 and she states that she was told to have her PCP order a shower chair from [address] [place] [address]. She would like to have this ready before she has the surgery so she is asking for the order to be faxed to [address] [place] [address]. She states that she contacted her insurance and they will cover it. Please advise.

## 2020-07-28 NOTE — TELEPHONE ENCOUNTER
Dr. Jam Stone,     Please sign off on form: Disability, start 8/17/20 - pending post op appt  -Highlight the patient and hit \"Chart\" button.   -In Chart Review, w/in the Encounter tab - click 1 time on the Telephone call encounter for 7/21/2020 Scroll down

## 2020-07-31 NOTE — TELEPHONE ENCOUNTER
Disab completed and faxed to [employer] [phone]. Refund request for $25 sent.  Sent pt [employer] message

## 2020-08-03 ENCOUNTER — TELEPHONE (OUTPATIENT)
Dept: ORTHOPEDICS CLINIC | Facility: CLINIC | Age: 60
End: 2020-08-03

## 2020-08-03 NOTE — TELEPHONE ENCOUNTER
Spoke to pt and she states that she mostly stands at work. 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     -- Corin Nova, please provide estimated end day of leave and how long she will be out.  Thanks

## 2020-08-03 NOTE — TELEPHONE ENCOUNTER
LMTCB on pt's preferred # asking for her to let us know type of work she does so we may write the appropriate letter for her.

## 2020-08-03 NOTE — TELEPHONE ENCOUNTER
Per patient employer needs note with how long she will be out and estimated end date of leave. Please send to:   [employer] at fax: [phone]

## 2020-08-04 NOTE — TELEPHONE ENCOUNTER
LMTCB on pt's preferred # informing that Corin Munson, gave instructions for note. Asked pt to call back to discuss note before faxing to her work .

## 2020-08-05 NOTE — TELEPHONE ENCOUNTER
Discussed with patient. Letter completed and faxed to Alan, [employer] [phone], case # [id number] [facility]. Confirmation received.

## 2020-08-07 ENCOUNTER — APPOINTMENT (OUTPATIENT)
Dept: LAB | Facility: HOSPITAL | Age: 60
End: 2020-08-07
Attending: OBSTETRICS & GYNECOLOGY
Payer: COMMERCIAL

## 2020-08-07 DIAGNOSIS — N81.11 CYSTOCELE, MIDLINE: ICD-10-CM

## 2020-08-07 PROCEDURE — 87086 URINE CULTURE/COLONY COUNT: CPT

## 2020-08-10 ENCOUNTER — TELEPHONE (OUTPATIENT)
Dept: ORTHOPEDICS CLINIC | Facility: CLINIC | Age: 60
End: 2020-08-10

## 2020-08-11 NOTE — H&P
ORTHO SURGERY H&P  Scott Rodriguez is a 56year old female. MRN is [id number]. CC: Right hip pain    HPI: 61-year-old female complains of right hip and groin pain. Progressive symptoms for the past 6 months. Atraumatic in onset.   Symptoms worsened in SURGICAL HISTORY Right 2013    thigh cyst removal   • PESSARY  2006   • [facility] N/A 7/22/2019    Performed by Argenis Jack DO at [facility] OR   • [address] [place] [address]   • UTEROSACRAL LIGAMENT SUSPENSION N/A 7/22/2019 Yes          16oz soda chocolate daily        Occupational Exposure: Not Asked        Hobby Hazards: Not Asked        Sleep Concern: Not Asked        Stress Concern: Not Asked        Weight Concern: Not Asked        Special Diet: Not Asked        Back Care 1 spray by Nasal route 3 (three) times daily. 50 mL 0   • docusate sodium 100 MG Oral Cap Take 100 mg by mouth 2 (two) times daily. 60 capsule 0   • CALCIUM OR Take 1 capsule by mouth daily.        • loratadine (CLARITIN) 10 MG Oral Tab Take 10 mg by mouth ANIONGAP 8 07/03/2020    GFR >60 11/16/2015    GFRNAA 94 07/03/2020    GFRAA 108 07/03/2020    CA 8.7 07/03/2020    OSMOCALC 294 07/03/2020    ALKPHO 124 07/03/2020    AST 11 (L) 07/03/2020    ALT 24 07/03/2020    ALKPHOS 102 (H) 08/01/2016    BILT 0.9 07/ limited to infection, joint stiffness, failure of the procedure, mechanical symptoms related to the surgery, blood clot, neurovascular injury, anesthetic complications, and loss of life or limb.  In addition, the patient has no contraindications to the surg

## 2020-08-13 NOTE — TELEPHONE ENCOUNTER
Dr. Latia Horton,     Please sign off on form: RTW  -Highlight the patient and hit \"Chart\" button.   -In Chart Review, w/in the Encounter tab - click 1 time on the Telephone call encounter for 8/10/2020 Scroll down the telephone encounter.  -Click \"scan on\"

## 2020-08-15 ENCOUNTER — LAB ENCOUNTER (OUTPATIENT)
Dept: LAB | Facility: HOSPITAL | Age: 60
End: 2020-08-15
Attending: ORTHOPAEDIC SURGERY
Payer: COMMERCIAL

## 2020-08-15 ENCOUNTER — LAB ENCOUNTER (OUTPATIENT)
Dept: LAB | Age: 60
End: 2020-08-15
Attending: ORTHOPAEDIC SURGERY
Payer: COMMERCIAL

## 2020-08-15 DIAGNOSIS — Z01.818 PREOP TESTING: ICD-10-CM

## 2020-08-15 LAB
ANTIBODY SCREEN: NEGATIVE
RH BLOOD TYPE: POSITIVE

## 2020-08-15 PROCEDURE — 86901 BLOOD TYPING SEROLOGIC RH(D): CPT

## 2020-08-15 PROCEDURE — 86850 RBC ANTIBODY SCREEN: CPT

## 2020-08-15 PROCEDURE — 36415 COLL VENOUS BLD VENIPUNCTURE: CPT

## 2020-08-15 PROCEDURE — 86900 BLOOD TYPING SEROLOGIC ABO: CPT

## 2020-08-16 LAB — SARS-COV-2 RNA RESP QL NAA+PROBE: NOT DETECTED

## 2020-08-17 ENCOUNTER — ANESTHESIA EVENT (OUTPATIENT)
Dept: SURGERY | Facility: HOSPITAL | Age: 60
End: 2020-08-17
Payer: COMMERCIAL

## 2020-08-17 ENCOUNTER — APPOINTMENT (OUTPATIENT)
Dept: GENERAL RADIOLOGY | Facility: HOSPITAL | Age: 60
End: 2020-08-17
Attending: ORTHOPAEDIC SURGERY
Payer: COMMERCIAL

## 2020-08-17 ENCOUNTER — HOSPITAL ENCOUNTER (OUTPATIENT)
Facility: HOSPITAL | Age: 60
Discharge: HOME HEALTH | End: 2020-08-19
Attending: ORTHOPAEDIC SURGERY | Admitting: ORTHOPAEDIC SURGERY
Payer: COMMERCIAL

## 2020-08-17 ENCOUNTER — ANESTHESIA (OUTPATIENT)
Dept: SURGERY | Facility: HOSPITAL | Age: 60
End: 2020-08-17
Payer: COMMERCIAL

## 2020-08-17 DIAGNOSIS — M16.11 PRIMARY OSTEOARTHRITIS OF RIGHT HIP: ICD-10-CM

## 2020-08-17 DIAGNOSIS — Z01.818 PREOP TESTING: Primary | ICD-10-CM

## 2020-08-17 PROCEDURE — 99232 SBSQ HOSP IP/OBS MODERATE 35: CPT | Performed by: HOSPITALIST

## 2020-08-17 PROCEDURE — 0SR90JA REPLACEMENT OF RIGHT HIP JOINT WITH SYNTHETIC SUBSTITUTE, UNCEMENTED, OPEN APPROACH: ICD-10-PCS | Performed by: ORTHOPAEDIC SURGERY

## 2020-08-17 PROCEDURE — 3078F DIAST BP <80 MM HG: CPT | Performed by: HOSPITALIST

## 2020-08-17 PROCEDURE — 3008F BODY MASS INDEX DOCD: CPT | Performed by: HOSPITALIST

## 2020-08-17 PROCEDURE — 3074F SYST BP LT 130 MM HG: CPT | Performed by: HOSPITALIST

## 2020-08-17 PROCEDURE — 76000 FLUOROSCOPY <1 HR PHYS/QHP: CPT | Performed by: ORTHOPAEDIC SURGERY

## 2020-08-17 DEVICE — FEMORAL HEAD Ø 36 SIZE S
Type: IMPLANTABLE DEVICE | Site: HIP | Status: FUNCTIONAL
Brand: MECTACER BIOLOX DELTA FEMORAL BALL HEAD

## 2020-08-17 DEVICE — TOTAL HIP W/CER HEAD: Type: IMPLANTABLE DEVICE | Status: FUNCTIONAL

## 2020-08-17 DEVICE — FLAT PE  HC LINER Ø 36 / E
Type: IMPLANTABLE DEVICE | Site: HIP | Status: FUNCTIONAL
Brand: MPACT ACETABULAR SYSTEM

## 2020-08-17 RX ORDER — MORPHINE SULFATE 2 MG/ML
1 INJECTION, SOLUTION INTRAMUSCULAR; INTRAVENOUS EVERY 2 HOUR PRN
Status: DISCONTINUED | OUTPATIENT
Start: 2020-08-17 | End: 2020-08-19

## 2020-08-17 RX ORDER — BISACODYL 10 MG
10 SUPPOSITORY, RECTAL RECTAL
Status: DISCONTINUED | OUTPATIENT
Start: 2020-08-17 | End: 2020-08-19

## 2020-08-17 RX ORDER — SODIUM CHLORIDE, SODIUM LACTATE, POTASSIUM CHLORIDE, CALCIUM CHLORIDE 600; 310; 30; 20 MG/100ML; MG/100ML; MG/100ML; MG/100ML
INJECTION, SOLUTION INTRAVENOUS CONTINUOUS
Status: DISCONTINUED | OUTPATIENT
Start: 2020-08-17 | End: 2020-08-17 | Stop reason: HOSPADM

## 2020-08-17 RX ORDER — HYDROMORPHONE HYDROCHLORIDE 1 MG/ML
0.2 INJECTION, SOLUTION INTRAMUSCULAR; INTRAVENOUS; SUBCUTANEOUS EVERY 5 MIN PRN
Status: DISCONTINUED | OUTPATIENT
Start: 2020-08-17 | End: 2020-08-17 | Stop reason: HOSPADM

## 2020-08-17 RX ORDER — SCOLOPAMINE TRANSDERMAL SYSTEM 1 MG/1
1 PATCH, EXTENDED RELEASE TRANSDERMAL ONCE
Status: DISCONTINUED | OUTPATIENT
Start: 2020-08-17 | End: 2020-08-19

## 2020-08-17 RX ORDER — DIPHENHYDRAMINE HCL 25 MG
25 CAPSULE ORAL EVERY 4 HOURS PRN
Status: ACTIVE | OUTPATIENT
Start: 2020-08-17 | End: 2020-08-18

## 2020-08-17 RX ORDER — HYDROCODONE BITARTRATE AND ACETAMINOPHEN 7.5; 325 MG/1; MG/1
2 TABLET ORAL EVERY 6 HOURS PRN
Status: DISCONTINUED | OUTPATIENT
Start: 2020-08-17 | End: 2020-08-19

## 2020-08-17 RX ORDER — LIDOCAINE HYDROCHLORIDE 10 MG/ML
INJECTION, SOLUTION INFILTRATION; PERINEURAL
Status: COMPLETED | OUTPATIENT
Start: 2020-08-17 | End: 2020-08-17

## 2020-08-17 RX ORDER — MORPHINE SULFATE 1 MG/ML
INJECTION, SOLUTION EPIDURAL; INTRATHECAL; INTRAVENOUS
Status: COMPLETED | OUTPATIENT
Start: 2020-08-17 | End: 2020-08-17

## 2020-08-17 RX ORDER — NALOXONE HYDROCHLORIDE 0.4 MG/ML
80 INJECTION, SOLUTION INTRAMUSCULAR; INTRAVENOUS; SUBCUTANEOUS AS NEEDED
Status: DISCONTINUED | OUTPATIENT
Start: 2020-08-17 | End: 2020-08-17 | Stop reason: HOSPADM

## 2020-08-17 RX ORDER — ASPIRIN 325 MG
325 TABLET, DELAYED RELEASE (ENTERIC COATED) ORAL ONCE
Status: COMPLETED | OUTPATIENT
Start: 2020-08-17 | End: 2020-08-17

## 2020-08-17 RX ORDER — LIDOCAINE HYDROCHLORIDE 10 MG/ML
INJECTION, SOLUTION EPIDURAL; INFILTRATION; INTRACAUDAL; PERINEURAL AS NEEDED
Status: DISCONTINUED | OUTPATIENT
Start: 2020-08-17 | End: 2020-08-17 | Stop reason: SURG

## 2020-08-17 RX ORDER — OXYCODONE HCL 10 MG/1
10 TABLET, FILM COATED, EXTENDED RELEASE ORAL ONCE
Status: COMPLETED | OUTPATIENT
Start: 2020-08-17 | End: 2020-08-17

## 2020-08-17 RX ORDER — NALOXONE HYDROCHLORIDE 0.4 MG/ML
0.08 INJECTION, SOLUTION INTRAMUSCULAR; INTRAVENOUS; SUBCUTANEOUS
Status: ACTIVE | OUTPATIENT
Start: 2020-08-17 | End: 2020-08-18

## 2020-08-17 RX ORDER — HYDROCODONE BITARTRATE AND ACETAMINOPHEN 7.5; 325 MG/1; MG/1
1 TABLET ORAL EVERY 4 HOURS PRN
Status: DISCONTINUED | OUTPATIENT
Start: 2020-08-17 | End: 2020-08-19

## 2020-08-17 RX ORDER — METOCLOPRAMIDE HYDROCHLORIDE 5 MG/ML
10 INJECTION INTRAMUSCULAR; INTRAVENOUS EVERY 6 HOURS PRN
Status: DISCONTINUED | OUTPATIENT
Start: 2020-08-17 | End: 2020-08-19

## 2020-08-17 RX ORDER — HYDROMORPHONE HYDROCHLORIDE 1 MG/ML
0.6 INJECTION, SOLUTION INTRAMUSCULAR; INTRAVENOUS; SUBCUTANEOUS
Status: ACTIVE | OUTPATIENT
Start: 2020-08-17 | End: 2020-08-18

## 2020-08-17 RX ORDER — SODIUM CHLORIDE 0.9 % (FLUSH) 0.9 %
10 SYRINGE (ML) INJECTION AS NEEDED
Status: DISCONTINUED | OUTPATIENT
Start: 2020-08-17 | End: 2020-08-19

## 2020-08-17 RX ORDER — ACETAMINOPHEN 500 MG
1000 TABLET ORAL ONCE
Status: COMPLETED | OUTPATIENT
Start: 2020-08-17 | End: 2020-08-17

## 2020-08-17 RX ORDER — CELECOXIB 200 MG/1
200 CAPSULE ORAL ONCE
Status: COMPLETED | OUTPATIENT
Start: 2020-08-17 | End: 2020-08-17

## 2020-08-17 RX ORDER — SODIUM PHOSPHATE, DIBASIC AND SODIUM PHOSPHATE, MONOBASIC 7; 19 G/133ML; G/133ML
1 ENEMA RECTAL ONCE AS NEEDED
Status: DISCONTINUED | OUTPATIENT
Start: 2020-08-17 | End: 2020-08-19

## 2020-08-17 RX ORDER — CEFAZOLIN SODIUM/WATER 2 G/20 ML
2 SYRINGE (ML) INTRAVENOUS ONCE
Status: COMPLETED | OUTPATIENT
Start: 2020-08-17 | End: 2020-08-17

## 2020-08-17 RX ORDER — BUPIVACAINE HYDROCHLORIDE 7.5 MG/ML
INJECTION, SOLUTION INTRASPINAL
Status: COMPLETED | OUTPATIENT
Start: 2020-08-17 | End: 2020-08-17

## 2020-08-17 RX ORDER — GABAPENTIN 300 MG/1
300 CAPSULE ORAL NIGHTLY
Status: DISCONTINUED | OUTPATIENT
Start: 2020-08-17 | End: 2020-08-19

## 2020-08-17 RX ORDER — POLYETHYLENE GLYCOL 3350 17 G/17G
17 POWDER, FOR SOLUTION ORAL DAILY PRN
Status: DISCONTINUED | OUTPATIENT
Start: 2020-08-17 | End: 2020-08-19

## 2020-08-17 RX ORDER — ONDANSETRON 2 MG/ML
4 INJECTION INTRAMUSCULAR; INTRAVENOUS ONCE AS NEEDED
Status: ACTIVE | OUTPATIENT
Start: 2020-08-17 | End: 2020-08-17

## 2020-08-17 RX ORDER — DIPHENHYDRAMINE HYDROCHLORIDE 50 MG/ML
12.5 INJECTION INTRAMUSCULAR; INTRAVENOUS EVERY 4 HOURS PRN
Status: ACTIVE | OUTPATIENT
Start: 2020-08-17 | End: 2020-08-18

## 2020-08-17 RX ORDER — HYDROMORPHONE HYDROCHLORIDE 1 MG/ML
0.4 INJECTION, SOLUTION INTRAMUSCULAR; INTRAVENOUS; SUBCUTANEOUS EVERY 5 MIN PRN
Status: DISCONTINUED | OUTPATIENT
Start: 2020-08-17 | End: 2020-08-17 | Stop reason: HOSPADM

## 2020-08-17 RX ORDER — PROCHLORPERAZINE EDISYLATE 5 MG/ML
5 INJECTION INTRAMUSCULAR; INTRAVENOUS ONCE AS NEEDED
Status: DISCONTINUED | OUTPATIENT
Start: 2020-08-17 | End: 2020-08-17 | Stop reason: HOSPADM

## 2020-08-17 RX ORDER — ASPIRIN 325 MG
325 TABLET ORAL 2 TIMES DAILY
Status: DISCONTINUED | OUTPATIENT
Start: 2020-08-17 | End: 2020-08-19

## 2020-08-17 RX ORDER — CELECOXIB 200 MG/1
200 CAPSULE ORAL EVERY 12 HOURS SCHEDULED
Status: DISCONTINUED | OUTPATIENT
Start: 2020-08-17 | End: 2020-08-19

## 2020-08-17 RX ORDER — HALOPERIDOL 5 MG/ML
0.5 INJECTION INTRAMUSCULAR ONCE AS NEEDED
Status: ACTIVE | OUTPATIENT
Start: 2020-08-17 | End: 2020-08-17

## 2020-08-17 RX ORDER — HYDROCODONE BITARTRATE AND ACETAMINOPHEN 5; 325 MG/1; MG/1
2 TABLET ORAL AS NEEDED
Status: DISCONTINUED | OUTPATIENT
Start: 2020-08-17 | End: 2020-08-17 | Stop reason: HOSPADM

## 2020-08-17 RX ORDER — OXYCODONE HCL 10 MG/1
10 TABLET, FILM COATED, EXTENDED RELEASE ORAL EVERY 12 HOURS
Status: DISCONTINUED | OUTPATIENT
Start: 2020-08-17 | End: 2020-08-19

## 2020-08-17 RX ORDER — DEXTROSE, SODIUM CHLORIDE, AND POTASSIUM CHLORIDE 5; .45; .15 G/100ML; G/100ML; G/100ML
INJECTION INTRAVENOUS CONTINUOUS
Status: DISCONTINUED | OUTPATIENT
Start: 2020-08-17 | End: 2020-08-19

## 2020-08-17 RX ORDER — MORPHINE SULFATE 4 MG/ML
4 INJECTION, SOLUTION INTRAMUSCULAR; INTRAVENOUS EVERY 10 MIN PRN
Status: DISCONTINUED | OUTPATIENT
Start: 2020-08-17 | End: 2020-08-17 | Stop reason: HOSPADM

## 2020-08-17 RX ORDER — HYDROCODONE BITARTRATE AND ACETAMINOPHEN 5; 325 MG/1; MG/1
1 TABLET ORAL AS NEEDED
Status: DISCONTINUED | OUTPATIENT
Start: 2020-08-17 | End: 2020-08-17 | Stop reason: HOSPADM

## 2020-08-17 RX ORDER — DOCUSATE SODIUM 100 MG/1
100 CAPSULE, LIQUID FILLED ORAL 2 TIMES DAILY
Status: DISCONTINUED | OUTPATIENT
Start: 2020-08-17 | End: 2020-08-19

## 2020-08-17 RX ORDER — SODIUM CHLORIDE 9 MG/ML
INJECTION, SOLUTION INTRAVENOUS CONTINUOUS PRN
Status: COMPLETED | OUTPATIENT
Start: 2020-08-17 | End: 2020-08-17

## 2020-08-17 RX ORDER — HYDROCODONE BITARTRATE AND ACETAMINOPHEN 7.5; 325 MG/1; MG/1
1 TABLET ORAL EVERY 6 HOURS PRN
Status: DISCONTINUED | OUTPATIENT
Start: 2020-08-17 | End: 2020-08-19

## 2020-08-17 RX ORDER — ECHINACEA PURPUREA EXTRACT 125 MG
1 TABLET ORAL 3 TIMES DAILY PRN
Status: DISCONTINUED | OUTPATIENT
Start: 2020-08-17 | End: 2020-08-19

## 2020-08-17 RX ORDER — DIPHENHYDRAMINE HYDROCHLORIDE 50 MG/ML
25 INJECTION INTRAMUSCULAR; INTRAVENOUS ONCE AS NEEDED
Status: ACTIVE | OUTPATIENT
Start: 2020-08-17 | End: 2020-08-17

## 2020-08-17 RX ORDER — TRANEXAMIC ACID 10 MG/ML
1000 INJECTION, SOLUTION INTRAVENOUS ONCE
Status: COMPLETED | OUTPATIENT
Start: 2020-08-17 | End: 2020-08-17

## 2020-08-17 RX ORDER — MIDAZOLAM HYDROCHLORIDE 1 MG/ML
INJECTION INTRAMUSCULAR; INTRAVENOUS AS NEEDED
Status: DISCONTINUED | OUTPATIENT
Start: 2020-08-17 | End: 2020-08-17 | Stop reason: SURG

## 2020-08-17 RX ORDER — SENNOSIDES 8.6 MG
17.2 TABLET ORAL NIGHTLY
Status: DISCONTINUED | OUTPATIENT
Start: 2020-08-17 | End: 2020-08-19

## 2020-08-17 RX ORDER — MAGNESIUM HYDROXIDE 1200 MG/15ML
LIQUID ORAL CONTINUOUS PRN
Status: COMPLETED | OUTPATIENT
Start: 2020-08-17 | End: 2020-08-17

## 2020-08-17 RX ORDER — DIPHENHYDRAMINE HCL 25 MG
25 CAPSULE ORAL EVERY 4 HOURS PRN
Status: DISCONTINUED | OUTPATIENT
Start: 2020-08-17 | End: 2020-08-19

## 2020-08-17 RX ORDER — ONDANSETRON 2 MG/ML
4 INJECTION INTRAMUSCULAR; INTRAVENOUS ONCE AS NEEDED
Status: DISCONTINUED | OUTPATIENT
Start: 2020-08-17 | End: 2020-08-17 | Stop reason: HOSPADM

## 2020-08-17 RX ORDER — MORPHINE SULFATE 4 MG/ML
2 INJECTION, SOLUTION INTRAMUSCULAR; INTRAVENOUS EVERY 10 MIN PRN
Status: DISCONTINUED | OUTPATIENT
Start: 2020-08-17 | End: 2020-08-17 | Stop reason: HOSPADM

## 2020-08-17 RX ORDER — SODIUM CHLORIDE, SODIUM LACTATE, POTASSIUM CHLORIDE, CALCIUM CHLORIDE 600; 310; 30; 20 MG/100ML; MG/100ML; MG/100ML; MG/100ML
INJECTION, SOLUTION INTRAVENOUS CONTINUOUS
Status: DISCONTINUED | OUTPATIENT
Start: 2020-08-17 | End: 2020-08-19

## 2020-08-17 RX ORDER — EPHEDRINE SULFATE 50 MG/ML
INJECTION, SOLUTION INTRAVENOUS AS NEEDED
Status: DISCONTINUED | OUTPATIENT
Start: 2020-08-17 | End: 2020-08-17 | Stop reason: SURG

## 2020-08-17 RX ORDER — ENALAPRIL MALEATE 10 MG/1
10 TABLET ORAL DAILY
Status: DISCONTINUED | OUTPATIENT
Start: 2020-08-18 | End: 2020-08-18

## 2020-08-17 RX ORDER — CEFAZOLIN SODIUM/WATER 2 G/20 ML
2 SYRINGE (ML) INTRAVENOUS EVERY 8 HOURS
Status: COMPLETED | OUTPATIENT
Start: 2020-08-17 | End: 2020-08-18

## 2020-08-17 RX ORDER — MORPHINE SULFATE 10 MG/ML
6 INJECTION, SOLUTION INTRAMUSCULAR; INTRAVENOUS EVERY 10 MIN PRN
Status: DISCONTINUED | OUTPATIENT
Start: 2020-08-17 | End: 2020-08-17 | Stop reason: HOSPADM

## 2020-08-17 RX ORDER — DIPHENHYDRAMINE HYDROCHLORIDE 50 MG/ML
12.5 INJECTION INTRAMUSCULAR; INTRAVENOUS EVERY 4 HOURS PRN
Status: DISCONTINUED | OUTPATIENT
Start: 2020-08-17 | End: 2020-08-17 | Stop reason: HOSPADM

## 2020-08-17 RX ORDER — MORPHINE SULFATE 2 MG/ML
2 INJECTION, SOLUTION INTRAMUSCULAR; INTRAVENOUS EVERY 2 HOUR PRN
Status: DISCONTINUED | OUTPATIENT
Start: 2020-08-17 | End: 2020-08-19

## 2020-08-17 RX ORDER — ONDANSETRON 2 MG/ML
4 INJECTION INTRAMUSCULAR; INTRAVENOUS EVERY 4 HOURS PRN
Status: DISCONTINUED | OUTPATIENT
Start: 2020-08-17 | End: 2020-08-19

## 2020-08-17 RX ORDER — ACETAMINOPHEN 325 MG/1
650 TABLET ORAL EVERY 6 HOURS PRN
Status: DISCONTINUED | OUTPATIENT
Start: 2020-08-17 | End: 2020-08-19

## 2020-08-17 RX ORDER — PROCHLORPERAZINE EDISYLATE 5 MG/ML
5 INJECTION INTRAMUSCULAR; INTRAVENOUS ONCE AS NEEDED
Status: ACTIVE | OUTPATIENT
Start: 2020-08-17 | End: 2020-08-17

## 2020-08-17 RX ORDER — DIPHENHYDRAMINE HCL 25 MG
25 CAPSULE ORAL EVERY 4 HOURS PRN
Status: DISCONTINUED | OUTPATIENT
Start: 2020-08-17 | End: 2020-08-17 | Stop reason: HOSPADM

## 2020-08-17 RX ORDER — HYDROMORPHONE HYDROCHLORIDE 1 MG/ML
0.4 INJECTION, SOLUTION INTRAMUSCULAR; INTRAVENOUS; SUBCUTANEOUS
Status: ACTIVE | OUTPATIENT
Start: 2020-08-17 | End: 2020-08-18

## 2020-08-17 RX ORDER — PHENYLEPHRINE HCL 10 MG/ML
VIAL (ML) INJECTION AS NEEDED
Status: DISCONTINUED | OUTPATIENT
Start: 2020-08-17 | End: 2020-08-17 | Stop reason: SURG

## 2020-08-17 RX ORDER — MORPHINE SULFATE 4 MG/ML
4 INJECTION, SOLUTION INTRAMUSCULAR; INTRAVENOUS EVERY 2 HOUR PRN
Status: DISCONTINUED | OUTPATIENT
Start: 2020-08-17 | End: 2020-08-19

## 2020-08-17 RX ORDER — HYDROMORPHONE HYDROCHLORIDE 1 MG/ML
0.6 INJECTION, SOLUTION INTRAMUSCULAR; INTRAVENOUS; SUBCUTANEOUS EVERY 5 MIN PRN
Status: DISCONTINUED | OUTPATIENT
Start: 2020-08-17 | End: 2020-08-17 | Stop reason: HOSPADM

## 2020-08-17 RX ORDER — GABAPENTIN 600 MG/1
600 TABLET ORAL ONCE
Status: COMPLETED | OUTPATIENT
Start: 2020-08-17 | End: 2020-08-17

## 2020-08-17 RX ORDER — DIPHENHYDRAMINE HYDROCHLORIDE 50 MG/ML
12.5 INJECTION INTRAMUSCULAR; INTRAVENOUS EVERY 4 HOURS PRN
Status: DISCONTINUED | OUTPATIENT
Start: 2020-08-17 | End: 2020-08-19

## 2020-08-17 RX ADMIN — MIDAZOLAM HYDROCHLORIDE 2 MG: 1 INJECTION INTRAMUSCULAR; INTRAVENOUS at 10:35:00

## 2020-08-17 RX ADMIN — SODIUM CHLORIDE, SODIUM LACTATE, POTASSIUM CHLORIDE, CALCIUM CHLORIDE: 600; 310; 30; 20 INJECTION, SOLUTION INTRAVENOUS at 12:18:00

## 2020-08-17 RX ADMIN — PHENYLEPHRINE HCL 100 MCG: 10 MG/ML VIAL (ML) INJECTION at 12:25:00

## 2020-08-17 RX ADMIN — SODIUM CHLORIDE, SODIUM LACTATE, POTASSIUM CHLORIDE, CALCIUM CHLORIDE: 600; 310; 30; 20 INJECTION, SOLUTION INTRAVENOUS at 13:18:00

## 2020-08-17 RX ADMIN — PHENYLEPHRINE HCL 100 MCG: 10 MG/ML VIAL (ML) INJECTION at 11:30:00

## 2020-08-17 RX ADMIN — PHENYLEPHRINE HCL 200 MCG: 10 MG/ML VIAL (ML) INJECTION at 12:14:00

## 2020-08-17 RX ADMIN — TRANEXAMIC ACID 1000 MG: 10 INJECTION, SOLUTION INTRAVENOUS at 11:10:00

## 2020-08-17 RX ADMIN — PHENYLEPHRINE HCL 100 MCG: 10 MG/ML VIAL (ML) INJECTION at 11:48:00

## 2020-08-17 RX ADMIN — SODIUM CHLORIDE, SODIUM LACTATE, POTASSIUM CHLORIDE, CALCIUM CHLORIDE: 600; 310; 30; 20 INJECTION, SOLUTION INTRAVENOUS at 12:19:00

## 2020-08-17 RX ADMIN — BUPIVACAINE HYDROCHLORIDE 1.5 ML: 7.5 INJECTION, SOLUTION INTRASPINAL at 11:00:00

## 2020-08-17 RX ADMIN — LIDOCAINE HYDROCHLORIDE 50 MG: 10 INJECTION, SOLUTION EPIDURAL; INFILTRATION; INTRACAUDAL; PERINEURAL at 11:04:00

## 2020-08-17 RX ADMIN — EPHEDRINE SULFATE 10 MG: 50 INJECTION, SOLUTION INTRAVENOUS at 12:34:00

## 2020-08-17 RX ADMIN — LIDOCAINE HYDROCHLORIDE 3 ML: 10 INJECTION, SOLUTION INFILTRATION; PERINEURAL at 11:00:00

## 2020-08-17 RX ADMIN — PHENYLEPHRINE HCL 100 MCG: 10 MG/ML VIAL (ML) INJECTION at 11:58:00

## 2020-08-17 RX ADMIN — PHENYLEPHRINE HCL 100 MCG: 10 MG/ML VIAL (ML) INJECTION at 12:05:00

## 2020-08-17 RX ADMIN — MORPHINE SULFATE 0.25 MG: 1 INJECTION, SOLUTION EPIDURAL; INTRATHECAL; INTRAVENOUS at 11:00:00

## 2020-08-17 RX ADMIN — CEFAZOLIN SODIUM/WATER 2 G: 2 G/20 ML SYRINGE (ML) INTRAVENOUS at 10:59:00

## 2020-08-17 RX ADMIN — SODIUM CHLORIDE, SODIUM LACTATE, POTASSIUM CHLORIDE, CALCIUM CHLORIDE: 600; 310; 30; 20 INJECTION, SOLUTION INTRAVENOUS at 10:31:00

## 2020-08-17 NOTE — PROGRESS NOTES
[facility] HOSP - [facility]    Progress Note    Tobias Carter Patient Status:  Hospital Outpatient Surgery    7/10/1960 MRN [id number]   Location [address] UNIT Attending Shivani Carreon MD   Hosp Day # 0 PCP Kyra Ayala Essential hypertension  CONT HOME MEDS, MONITOR.              Results:     Lab Results   Component Value Date    WBC 7.6 07/03/2020    HGB 12.3 07/03/2020    HCT 36.2 07/03/2020    .0 07/03/2020    CREATSERUM 0.70 07/03/2020    BUN 11 07/03/2020

## 2020-08-17 NOTE — HOME CARE LIAISON
[facility] unable to accept due to staffing shortage in patient's service area. Will re-refer and update. [address] [place] [address] notified.  Thank you for this referral.     Update: Re-referred to and accepted by [facility]

## 2020-08-17 NOTE — ANESTHESIA POSTPROCEDURE EVALUATION
Patient: Lindy Crowder    Procedure Summary     Date:  08/17/20 Room / Location:  [facility] OR  / [facility] OR    Anesthesia Start:  5808 Anesthesia Stop:  1943    Procedure:  HIP TOTAL REPLACEMENT (Right Hip) Diagnosis:  (primary osteoarthritis of right

## 2020-08-17 NOTE — ADDENDUM NOTE
Addendum  created 08/17/20 1800 by Jennifer Tai MD    Review and Sign - Ready for Procedure, Review and Sign - Signed

## 2020-08-17 NOTE — OPERATIVE REPORT
Operative Note    Patient Name: Teddy Marin    Preoperative Diagnosis: primary osteoarthritis of right hip    Postoperative Diagnosis: primary osteoarthritis of right hip    Primary Surgeon: Alem Sharma MD     Assistant: Alcario Bloch    Procedures: R

## 2020-08-17 NOTE — CM/SW NOTE
SW received MDO for fresh post op. ABDI met with pt to complete an initial assessment. SW confirmed pt's address and phone number with the pt. Pt lives in a 1 level  condo w/o elevators with spouse.  There is/are 10 steps into the home and 0 steps to the bedr

## 2020-08-17 NOTE — ANESTHESIA PREPROCEDURE EVALUATION
Anesthesia PreOp Note    HPI:     Vanessa Jones is a 56year old female who presents for preoperative consultation requested by: Rupali Glover MD    Date of Surgery: 8/17/2020    Procedure(s):  HIP TOTAL REPLACEMENT  Indication: primary osteoarthriti N/A 2017    Performed by Arina Ruiz MD at [facility] MAIN OR   • FOOT SURGERY      bunionectomy   • HYSTERECTOMY     • LEG/ANKLE SURGERY PROC UNLISTED Left     to repair flat foot   • NEEDLE BIOPSY LEFT  2013   •       x3 w/PPTL injection 12.5 mg, 12.5 mg, Intravenous, Q4H PRN, Alexia Bradley PA-C    Or  diphenhydrAMINE (BENADRYL) cap/tab 25 mg, 25 mg, Oral, Q4H PRN, Alexia Bradley PA-C  lactated ringers infusion, , Intravenous, Continuous, Alley Nunez MD    No current on file    Relationships      Social connections:        Talks on phone: Not on file        Gets together: Not on file        Attends [employer] service: Not on file        Active member of club or organization: Not on file        Attends meetings of clubs Her oral temperature is 98.9 °F (37.2 °C). Her blood pressure is 155/66 and her pulse is 88. Her respiration is 20 and oxygen saturation is 97%.     08/14/20  1509 08/17/20  0801 08/17/20  0836   BP:  (!) 174/80 155/66   Pulse:  88    Resp:  20    Temp:  98

## 2020-08-17 NOTE — ANESTHESIA PROCEDURE NOTES
Spinal Block  Date/Time: 8/17/2020 11:00 AM  Performed by: Bhavana Prasad CRNA  Authorized by: Oscar Tai MD       General Information and Staff    Start Time:  8/17/2020 10:40 AM  End Time:  8/17/2020 11:00 AM  Anesthesiologist:  Nikkie Tai

## 2020-08-18 LAB
ANION GAP SERPL CALC-SCNC: 7 MMOL/L (ref 0–18)
BUN BLD-MCNC: 10 MG/DL (ref 7–18)
BUN/CREAT SERPL: 15.6 (ref 10–20)
CALCIUM BLD-MCNC: 8.1 MG/DL (ref 8.5–10.1)
CHLORIDE SERPL-SCNC: 106 MMOL/L (ref 98–112)
CO2 SERPL-SCNC: 25 MMOL/L (ref 21–32)
CREAT BLD-MCNC: 0.64 MG/DL (ref 0.55–1.02)
DEPRECATED RDW RBC AUTO: 40.5 FL (ref 35.1–46.3)
ERYTHROCYTE [DISTWIDTH] IN BLOOD BY AUTOMATED COUNT: 12.7 % (ref 11–15)
GLUCOSE BLD-MCNC: 122 MG/DL (ref 70–99)
HCT VFR BLD AUTO: 29.5 % (ref 35–48)
HGB BLD-MCNC: 10.1 G/DL (ref 12–16)
MCH RBC QN AUTO: 30.1 PG (ref 26–34)
MCHC RBC AUTO-ENTMCNC: 34.2 G/DL (ref 31–37)
MCV RBC AUTO: 88.1 FL (ref 80–100)
OSMOLALITY SERPL CALC.SUM OF ELEC: 286 MOSM/KG (ref 275–295)
PLATELET # BLD AUTO: 163 10(3)UL (ref 150–450)
POTASSIUM SERPL-SCNC: 4.1 MMOL/L (ref 3.5–5.1)
RBC # BLD AUTO: 3.35 X10(6)UL (ref 3.8–5.3)
SODIUM SERPL-SCNC: 138 MMOL/L (ref 136–145)
WBC # BLD AUTO: 7.5 X10(3) UL (ref 4–11)

## 2020-08-18 PROCEDURE — 99213 OFFICE O/P EST LOW 20 MIN: CPT | Performed by: HOSPITALIST

## 2020-08-18 RX ORDER — ASPIRIN 325 MG
325 TABLET ORAL 2 TIMES DAILY
Qty: 58 TABLET | Refills: 0 | Status: SHIPPED | OUTPATIENT
Start: 2020-08-18 | End: 2020-11-19 | Stop reason: ALTCHOICE

## 2020-08-18 RX ORDER — HYDROCODONE BITARTRATE AND ACETAMINOPHEN 7.5; 325 MG/1; MG/1
1 TABLET ORAL EVERY 4 HOURS PRN
Qty: 30 TABLET | Refills: 0 | Status: SHIPPED | OUTPATIENT
Start: 2020-08-18 | End: 2020-12-17

## 2020-08-18 RX ORDER — POLYETHYLENE GLYCOL 3350 17 G/17G
17 POWDER, FOR SOLUTION ORAL DAILY PRN
Qty: 170 G | Refills: 0 | Status: SHIPPED | OUTPATIENT
Start: 2020-08-18 | End: 2020-11-19 | Stop reason: ALTCHOICE

## 2020-08-18 RX ORDER — CELECOXIB 200 MG/1
200 CAPSULE ORAL DAILY
Qty: 14 CAPSULE | Refills: 0 | Status: SHIPPED | OUTPATIENT
Start: 2020-08-18 | End: 2020-12-17

## 2020-08-18 RX ORDER — OXYCODONE HCL 10 MG/1
10 TABLET, FILM COATED, EXTENDED RELEASE ORAL EVERY 12 HOURS
Qty: 6 TABLET | Refills: 0 | Status: SHIPPED | OUTPATIENT
Start: 2020-08-18 | End: 2020-11-19 | Stop reason: ALTCHOICE

## 2020-08-18 NOTE — CM/SW NOTE
11:52 am Update- Plan to dc to home with [facility].      --  11:38am-SW informed the case had actually be referred to [facility] who have accepted the case. Message left to liaison to [facility] to clarify.       --  10:06am-RN indicate

## 2020-08-18 NOTE — PAYOR COMM NOTE
--------------  ADMISSION REVIEW     Payor: [facility]  Subscriber #:  [id number]  Authorization Number: [id number]    Admit date: 8/17/20  Admit time: 26       Admitting Physician: Nick Dowling MD  Attending Physician:  Wallis Lombard, MD  Primary Care Cloteal QUIQUE Marshall      aspirin EC EC tab 325 mg     Date Action Dose Route User    8/17/2020 1350 Given 325 mg Oral Clarissaa Barthel, RN      Bupivacaine in Dextrose (MARCAINE) 0.75-8.25 % injection     Date Action Dose Route User    8/17/2020 1100 Given 1.5 mL Intravenous Leon Mora, QUIQUE      lidocaine PF (XYLOCAINE) 1% injection     Date Action Dose Route User    8/17/2020 1104 Given 50 mg Injection Ang Ellis CRNA      lidocaine (XYLOCAINE) 1% injection     Date Action Dose Route User    8/17/2020 110 Date Action Dose Route User    8/17/2020 1131 New Bag 1000 mL (none) (Right Hip) Marina Harden MD      tranexamic acid (CYKLOKAPRON) IVPB premix 1,000 mg     Date Action Dose Route User    8/17/2020 1110 Given 1000 mg Intravenous [place] Moots, CRNA

## 2020-08-18 NOTE — PROGRESS NOTES
[facility]  Progress Note     Hortensia Navarro  : 7/10/1960    Status: Outpatient in a Bed  Day #: 0    Attending: Faye Benson MD  PCP: Amalia Smith MD      Assessment and Plan     OA right hip   -s/p R hip arthroplasty  -pain control  -D BID   • aspirin  325 mg Oral BID   • celecoxib  200 mg Oral Q12H   • oxyCODONE HCl ER  10 mg Oral 2 times per day   • Enalapril Maleate  10 mg Oral Daily      PRN Meds: Naloxone HCl, acetaminophen, HYDROcodone-acetaminophen, HYDROcodone-acetaminophen, HYDR

## 2020-08-18 NOTE — PAYOR COMM NOTE
--------------  CONTINUED STAY REVIEW    Payor: [facility]  Subscriber #:  [id number]  Authorization Number: [id number]    Admit date: N/A  Admit time: N/A    Admitting Physician: Zachary Marrero MD  Attending Physician:  Roman Trujilol MD  Primary Care y

## 2020-08-18 NOTE — OPERATIVE REPORT
[facility]    PATIENT'S NAME: [email or web address], MAXIME   ATTENDING PHYSICIAN: Carson Starks MD   OPERATING PHYSICIAN: Harvey Paniagua MD   PATIENT ACCOUNT#:   [id number]    LOCATION:  [address] [place], [place] [address]r #:   [id number]       DATE OF BIRTH: aspect of the right hip and thigh were prepped and draped in a sterile fashion. Both upper extremities were padded and placed on arm boards.   A warming blanket was applied to the chest.  Next, an anterior approach to the right hip was performed through a around the proximal femur. Sequential releases were performed including the pubofemoral ligament and ischiofemoral ligament. We then broached the femur to a diameter of 2. We left the size 2 AirDroidsa icomplyS broach in situ.   Care was taken to match the amadeo no complications. Dictated By Ruby Peabody.  Irving Suresh MD  d: 08/17/2020 12:47:38  t: 08/17/2020 21:24:32  [facility] [id number]  [facility]/

## 2020-08-18 NOTE — PLAN OF CARE
PT unable to see pt. RN attempted to see Pt twice to initiate ambulation but pt too dizzy and drowsy.   Endorsed ambulation to HS shift

## 2020-08-18 NOTE — PROGRESS NOTES
Subjective: No complaints. Pain controlled. Doing well on Norco.  No allergic reaction.       Objective:    Patient Vitals for the past 24 hrs:   BP Temp Temp src Pulse Resp SpO2   08/18/20 0735 101/60 98.2 °F (36.8 °C) Oral 70 16 97 %   08/18/20 0358 97/5

## 2020-08-18 NOTE — DISCHARGE SUMMARY
[facility]  Discharge Summary     Scott Rodriguez  : 7/10/1960    Status: Outpatient in a Bed  Day #: 0    Attending: Mallory Harris MD  PCP: Ros Mario MD     Date of Admission: 2020  Date of Discharge: 2020     [address] [place] [address] Tabs      Take 1 tablet (325 mg total) by mouth 2 (two) times daily. Quantity:  58 tablet  Refills:  0     celecoxib 200 MG Caps  Commonly known as:  CeleBREX      Take 1 capsule (200 mg total) by mouth daily.    Quantity:  14 capsule  Refills:  0     HYD Tabs  · celecoxib 200 MG Caps  · HYDROcodone-acetaminophen 7.5-325 MG Tabs  · oxyCODONE HCl ER 10 MG T12a  · PEG 3350 17 g Pack       Follow-up Information     Lian Naqvi MD In 2 weeks.     Specialty:  SURGERY, ORTHOPEDIC  Contact information:  [id number]

## 2020-08-18 NOTE — ANESTHESIA POST-OP FOLLOW-UP NOTE
[facility]   Acute Pain Rounds Note  2020    Patient name: Sherry Alvarado 56year old female  : 7/10/1960  MRN: [id number]    Diagnosis: (M16.11) Primary osteoarthritis of right hip    S/P: Right total hip arthroplasty POD #1    Jeremy Baldwin

## 2020-08-18 NOTE — OCCUPATIONAL THERAPY NOTE
OCCUPATIONAL THERAPY EVALUATION - INPATIENT      Room Number: 407/407-A  Evaluation Date: 8/18/2020  Type of Evaluation: Initial       Physician Order: IP Consult to Occupational Therapy  Reason for Therapy: ADL/IADL Dysfunction and Discharge Planning    O likely have the abilities to progress back to home safely with family support. Patient left up in chair with all needs in reach; stable at exit.      DISCHARGE RECOMMENDATIONS  OT Discharge Recommendations: Home  OT Device Recommendations: TBD    PLAN bunionectomy   • HIP TOTAL REPLACEMENT Right 2020    Performed by Danyell Mg MD at [facility] OR   • HYSTERECTOMY     • LEG/ANKLE SURGERY PROC UNLISTED Left     to repair flat foot   • NEEDLE BIOPSY LEFT  2013   •       x3 w/PP ‘6-Clicks’ Inpatient Daily Activity Short Form  How much help from another person does the patient currently need…  -   Putting on and taking off regular lower body clothing?: A Little  -   Bathing (including washing, rinsing, drying)?: A Little  -   Toile

## 2020-08-18 NOTE — PHYSICAL THERAPY NOTE
PHYSICAL THERAPY HIP EVALUATION - INPATIENT     Room Number: 407/407-A  Evaluation Date: 8/18/2020  Type of Evaluation: Initial  Physician Order: PT Eval and Treat    Presenting Problem: R THR (anterior approach)  Reason for Therapy: Mobility Dysfunction a Pt cont with slow mary, needing cues for sequencing and to stay on task. No dizziness reported this afternoon. Pt needed Mod A for sit->supine and was left in bed with all needs in reach, alarm on, RN aware.  Educated pt on PT POC, safety, activity and d uses pessary   • Uterine prolapse 4/17/2015    Donut pessary -- self cleaning        Past Surgical History  Past Surgical History:   Procedure Laterality Date   • ANTERIOR POSTERIOR REPAIR N/A 7/22/2019    Performed by Marleni Reddy DO at [address] [place] [address] Lower Extremity: Weight Bearing as Tolerated       PAIN ASSESSMENT  Ratin  Location: R hip  Management Techniques:  Activity promotion;Relaxation;Repositioning(received pain meds prior to session)    COGNITION  · Overall Cognitive Status:  WFL - within activity tolerated  Gait training  Strengthening  Transfer training    Patient End of Session: In bed;Needs met;Call light within reach;RN aware of session/findings; All patient questions and concerns addressed; Alarm set;SCDs in place    CURRENT GOALS    Go

## 2020-08-19 VITALS
WEIGHT: 154 LBS | RESPIRATION RATE: 20 BRPM | OXYGEN SATURATION: 97 % | HEART RATE: 89 BPM | HEIGHT: 61 IN | SYSTOLIC BLOOD PRESSURE: 128 MMHG | BODY MASS INDEX: 29.07 KG/M2 | TEMPERATURE: 98 F | DIASTOLIC BLOOD PRESSURE: 64 MMHG

## 2020-08-19 LAB
DEPRECATED RDW RBC AUTO: 41.2 FL (ref 35.1–46.3)
ERYTHROCYTE [DISTWIDTH] IN BLOOD BY AUTOMATED COUNT: 13 % (ref 11–15)
HCT VFR BLD AUTO: 29.8 % (ref 35–48)
HGB BLD-MCNC: 10.2 G/DL (ref 12–16)
MCH RBC QN AUTO: 30 PG (ref 26–34)
MCHC RBC AUTO-ENTMCNC: 34.2 G/DL (ref 31–37)
MCV RBC AUTO: 87.6 FL (ref 80–100)
PLATELET # BLD AUTO: 177 10(3)UL (ref 150–450)
RBC # BLD AUTO: 3.4 X10(6)UL (ref 3.8–5.3)
WBC # BLD AUTO: 8.1 X10(3) UL (ref 4–11)

## 2020-08-19 PROCEDURE — 99213 OFFICE O/P EST LOW 20 MIN: CPT | Performed by: HOSPITALIST

## 2020-08-19 NOTE — PROGRESS NOTES
Subjective: No complaints. Pain controlled.     Objective:    Patient Vitals for the past 24 hrs:   BP Temp Temp src Pulse Resp SpO2   08/19/20 0951 128/64 — — 89 20 97 %   08/19/20 0427 110/64 98 °F (36.7 °C) Oral 80 20 95 %   08/18/20 2028 127/63 — — 88 1

## 2020-08-19 NOTE — PLAN OF CARE
Pt up with PT. Got dizzy & \"felt like I was going to pass out\". Hemovac out. Voiding. Pain controlled with Oxy per MAR.   Problem: Patient Centered Care  Goal: Patient preferences are identified and integrated in the patient's plan of care  Description fall precautions as indicated by assessment.  - Educate pt/family on patient safety including physical limitations  - Instruct pt to call for assistance with activity based on assessment  - Modify environment to reduce risk of injury  - Provide assistive d

## 2020-08-19 NOTE — PROGRESS NOTES
[facility]  Progress Note     Maik Mora  : 7/10/1960    Status: Outpatient in a Bed  Day #: 0    Attending: Josué Dominguez MD  PCP: Carrie Gloria MD      Assessment and Plan     OA right hip   -s/p R hip arthroplasty  -pain control  -D Nightly   • Senna  17.2 mg Oral Nightly   • docusate sodium  100 mg Oral BID   • aspirin  325 mg Oral BID   • celecoxib  200 mg Oral Q12H   • oxyCODONE HCl ER  10 mg Oral 2 times per day      PRN Meds: acetaminophen, HYDROcodone-acetaminophen, HYDROcodone-

## 2020-08-19 NOTE — PHYSICAL THERAPY NOTE
PHYSICAL THERAPY TREATMENT NOTE - INPATIENT     Room Number: 407/407-A       Presenting Problem: R THR (anterior approach)    Problem List  Principal Problem:    Primary osteoarthritis of right hip  Active Problems:    Essential hypertension      PHYSICAL (including adjusting bedclothes, sheets and blankets)?: A Little   -   Sitting down on and standing up from a chair with arms (e.g., wheelchair, bedside commode, etc.): None   -   Moving from lying on back to sitting on the side of the bed?: A Little   How #6 Patient independently performs home exercise program for ROM/strengthening per the instructions provided in preparation for discharge.    Goal #6  Current Status In progress

## 2020-08-19 NOTE — PLAN OF CARE
Flor Rom is pod 2 r thr. Cms intact, mallory diet, pain controlled with oxy er, oob with one assist and RW. Safety intact, no s/s infection-brittany drsg c/d/I-afebrile.  Home with [facility]-refer to d/c instructions/record  Problem: Patient Centered Care  Goal: Patient pr appropriate  Outcome: Adequate for Discharge     Problem: RISK FOR INFECTION - ADULT  Goal: Absence of fever/infection during anticipated neutropenic period  Description  INTERVENTIONS  - Monitor WBC  - Administer growth factors as ordered  - Implement meg

## 2020-08-20 ENCOUNTER — TELEPHONE (OUTPATIENT)
Dept: ORTHOPEDICS CLINIC | Facility: CLINIC | Age: 60
End: 2020-08-20

## 2020-08-20 ENCOUNTER — TELEPHONE (OUTPATIENT)
Dept: UROLOGY | Facility: HOSPITAL | Age: 60
End: 2020-08-20

## 2020-08-20 DIAGNOSIS — Z96.649 HISTORY OF TOTAL HIP REPLACEMENT, UNSPECIFIED LATERALITY: Primary | ICD-10-CM

## 2020-08-20 NOTE — TELEPHONE ENCOUNTER
patient called back, she had hip surgery on Monday, asking if she can put the pessary back in, she does it herself, told patient if she can get it in without difficulty she can put in, she just cannot open her legs too wide, also suggest she can check with

## 2020-08-20 NOTE — TELEPHONE ENCOUNTER
Spoke with patient she is using [facility]. Faxed over order for raised toilet seat to them including LOV notes and face sheet, and order.

## 2020-08-25 ENCOUNTER — TELEPHONE (OUTPATIENT)
Dept: ORTHOPEDICS CLINIC | Facility: CLINIC | Age: 60
End: 2020-08-25

## 2020-08-25 NOTE — TELEPHONE ENCOUNTER
Per pt home health nurse noticed yesterday there is a rash on right leg, where surgery was. Pt states rash does no bother her nor does it hurt. Wanting to speak to Dr. Malou Beaulieu.  Please advise

## 2020-08-25 NOTE — TELEPHONE ENCOUNTER
S/w pt and she states [facility] RN noticed a rash at the distal end of the incision maybe 1/2 the size of her hand. She denies any pain, itching, warmth, swelling, fever or chills. She states the area looks light pink and little pimples.  She applied neosporin on

## 2020-08-27 ENCOUNTER — OFFICE VISIT (OUTPATIENT)
Dept: ORTHOPEDICS CLINIC | Facility: CLINIC | Age: 60
End: 2020-08-27
Payer: COMMERCIAL

## 2020-08-27 DIAGNOSIS — M16.11 PRIMARY OSTEOARTHRITIS OF RIGHT HIP: ICD-10-CM

## 2020-08-27 DIAGNOSIS — Z96.649 HISTORY OF TOTAL HIP REPLACEMENT, UNSPECIFIED LATERALITY: ICD-10-CM

## 2020-08-27 DIAGNOSIS — Z47.89 ORTHOPEDIC AFTERCARE: Primary | ICD-10-CM

## 2020-08-27 PROCEDURE — 1111F DSCHRG MED/CURRENT MED MERGE: CPT | Performed by: ORTHOPAEDIC SURGERY

## 2020-08-27 PROCEDURE — 99024 POSTOP FOLLOW-UP VISIT: CPT | Performed by: ORTHOPAEDIC SURGERY

## 2020-08-27 NOTE — PROGRESS NOTES
NURSING INTAKE COMMENTS: Patient presents with:  Post-Op: s/p right ARIADNA -- Sx on 08/17/20. Concerned about rash below surgical site. Per  patient, no rashes present on the rest of her body. Rates pain 0/10. Taking Norco for pain. Denies fever or calf pain. Performed by Steve Dumont MD at [facility] MAIN OR   • HYSTERECTOMY     • LEG/ANKLE SURGERY PROC UNLISTED Left     to repair flat foot   • NEEDLE BIOPSY LEFT  2013   •       x3 w/PPTL   • OTHER SURGICAL HISTORY Right     thigh cyst Problem Relation Age of Onset   • Diabetes Mother         type 2   • Lipids Mother         hyperlipidemia   • Eye Problems Mother         eye issues   • Heart Disorder Mother         per ng CABG   • Hypertension Mother    • Musculo-skelatal Disorder Moth clotting disorders, blood transfusion  ENDOCRINE: denies autoimmune disease, thyroid issues, or diabetes  ALLERGY: denies asthma, seasonal allergies    Physical Examination:    There were no vitals taken for this visit.   Constitutional: appears well hydrat is putting cool compresses on it as well discontinue Neosporin on the rash. She has a regular scheduled postoperative visit next week. We will obtain x-rays remove staples and reevaluate her rash. All of her questions were asked and answered.     Follow

## 2020-09-03 ENCOUNTER — OFFICE VISIT (OUTPATIENT)
Dept: ORTHOPEDICS CLINIC | Facility: CLINIC | Age: 60
End: 2020-09-03
Payer: COMMERCIAL

## 2020-09-03 ENCOUNTER — HOSPITAL ENCOUNTER (OUTPATIENT)
Dept: GENERAL RADIOLOGY | Facility: HOSPITAL | Age: 60
Discharge: HOME | End: 2020-09-03
Attending: ORTHOPAEDIC SURGERY
Payer: COMMERCIAL

## 2020-09-03 DIAGNOSIS — Z47.89 ORTHOPEDIC AFTERCARE: ICD-10-CM

## 2020-09-03 DIAGNOSIS — Z47.89 ORTHOPEDIC AFTERCARE: Primary | ICD-10-CM

## 2020-09-03 DIAGNOSIS — Z96.649 HISTORY OF TOTAL HIP REPLACEMENT, UNSPECIFIED LATERALITY: ICD-10-CM

## 2020-09-03 PROCEDURE — 73502 X-RAY EXAM HIP UNI 2-3 VIEWS: CPT | Performed by: ORTHOPAEDIC SURGERY

## 2020-09-03 PROCEDURE — 99024 POSTOP FOLLOW-UP VISIT: CPT | Performed by: ORTHOPAEDIC SURGERY

## 2020-09-03 NOTE — PROGRESS NOTES
NURSING INTAKE COMMENTS: Patient presents with:  Post-Op: s/p right ARIADNA -- Sx on 8/17/20. Feeling well. Feels has intermittent throbbing. Not taking pain medication anymore. doing PT twice per week.        HPI: This 56year old female presents today with co OR   • HYSTERECTOMY  2019   • LEG/ANKLE SURGERY PROC UNLISTED Left     to repair flat foot   • NEEDLE BIOPSY LEFT  2013   •       x3 w/PPTL   • OTHER SURGICAL HISTORY Right     thigh cyst removal   • PESSARY     • PUBO VAGINAL SLING N Ointment   3       Codeine                 RASH    Comment:T#3  Family History   Problem Relation Age of Onset   • Diabetes Mother         type 2   • Lipids Mother         hyperlipidemia   • Eye Problems Mother         eye issues   • Heart Disorder Mother anxiety, other psychiatric disorders  HEMATOLOGIC: denies blood clots, anemia, blood clotting disorders, blood transfusion  ENDOCRINE: denies autoimmune disease, thyroid issues, or diabetes  ALLERGY: denies asthma, seasonal allergies    Physical Examinatio are in expected alignment. There is no lucency around the hardware to suggest loosening or infection. Femoral stem appears well seated. Minimal degenerative change of the contralateral left hip. Moderate facet arthropathy at the lumbosacral junction.

## 2020-09-18 ENCOUNTER — OFFICE VISIT (OUTPATIENT)
Dept: INTERNAL MEDICINE CLINIC | Facility: CLINIC | Age: 60
End: 2020-09-18
Payer: COMMERCIAL

## 2020-09-18 VITALS
BODY MASS INDEX: 29.61 KG/M2 | HEART RATE: 84 BPM | WEIGHT: 156.81 LBS | DIASTOLIC BLOOD PRESSURE: 80 MMHG | SYSTOLIC BLOOD PRESSURE: 129 MMHG | HEIGHT: 61 IN | TEMPERATURE: 97 F

## 2020-09-18 DIAGNOSIS — M16.11 PRIMARY OSTEOARTHRITIS OF RIGHT HIP: Primary | ICD-10-CM

## 2020-09-18 DIAGNOSIS — E78.5 HYPERLIPIDEMIA, UNSPECIFIED HYPERLIPIDEMIA TYPE: ICD-10-CM

## 2020-09-18 DIAGNOSIS — R73.03 PREDIABETES: ICD-10-CM

## 2020-09-18 DIAGNOSIS — I10 ESSENTIAL HYPERTENSION: ICD-10-CM

## 2020-09-18 PROCEDURE — 3079F DIAST BP 80-89 MM HG: CPT | Performed by: INTERNAL MEDICINE

## 2020-09-18 PROCEDURE — 3008F BODY MASS INDEX DOCD: CPT | Performed by: INTERNAL MEDICINE

## 2020-09-18 PROCEDURE — 3074F SYST BP LT 130 MM HG: CPT | Performed by: INTERNAL MEDICINE

## 2020-09-18 PROCEDURE — 99214 OFFICE O/P EST MOD 30 MIN: CPT | Performed by: INTERNAL MEDICINE

## 2020-09-29 NOTE — PROGRESS NOTES
HPI:    Patient ID: Abel John is a 56year old female.     HPI    Follow up post op  S/p Total hip replacement right  Doing well full weight bearing  Pt walks with a limp   Pain left foot  Ongoing follow upwith podiatry  Denies fever chills  Shortness 2 (two) times daily. 60 capsule 0   • CALCIUM OR Take 1 capsule by mouth daily. • loratadine (CLARITIN) 10 MG Oral Tab Take 10 mg by mouth daily as needed.        • oxyCODONE HCl ER 10 MG Oral Tablet Extended Release 12 hour Abuse-Deterrent Take 1 tab MD at [facility] MAIN OR   • HYSTERECTOMY     • LEG/ANKLE SURGERY PROC UNLISTED Left     to repair flat foot   • NEEDLE BIOPSY LEFT  2013   •       x3 w/PPTL   • OTHER SURGICAL HISTORY Right     thigh cyst removal   • PESSARY     • PUBO discharge. Left eye exhibits no discharge. No scleral icterus. Neck: Neck supple. No thyromegaly present. Cardiovascular: Normal rate, regular rhythm, S1 normal, S2 normal, normal heart sounds and intact distal pulses. Exam reveals no gallop.    No murm

## 2020-10-07 ENCOUNTER — OFFICE VISIT (OUTPATIENT)
Dept: ORTHOPEDICS CLINIC | Facility: CLINIC | Age: 60
End: 2020-10-07
Payer: COMMERCIAL

## 2020-10-07 ENCOUNTER — TELEPHONE (OUTPATIENT)
Dept: ORTHOPEDICS CLINIC | Facility: CLINIC | Age: 60
End: 2020-10-07

## 2020-10-07 ENCOUNTER — HOSPITAL ENCOUNTER (OUTPATIENT)
Dept: GENERAL RADIOLOGY | Facility: HOSPITAL | Age: 60
Discharge: HOME | End: 2020-10-07
Attending: ORTHOPAEDIC SURGERY
Payer: COMMERCIAL

## 2020-10-07 DIAGNOSIS — Z47.89 ORTHOPEDIC AFTERCARE: ICD-10-CM

## 2020-10-07 DIAGNOSIS — Z47.89 ORTHOPEDIC AFTERCARE: Primary | ICD-10-CM

## 2020-10-07 DIAGNOSIS — M16.11 PRIMARY OSTEOARTHRITIS OF RIGHT HIP: ICD-10-CM

## 2020-10-07 PROCEDURE — 99024 POSTOP FOLLOW-UP VISIT: CPT | Performed by: ORTHOPAEDIC SURGERY

## 2020-10-07 PROCEDURE — 73502 X-RAY EXAM HIP UNI 2-3 VIEWS: CPT | Performed by: ORTHOPAEDIC SURGERY

## 2020-10-07 NOTE — TELEPHONE ENCOUNTER
Pt gave  work and well forms to be completed. Pt signed Hugo Sour and forms were given to Catalina Mcfarland in [facility].

## 2020-10-07 NOTE — PROGRESS NOTES
NURSING INTAKE COMMENTS: Patient presents with:  Post-Op: s/p Right ARIADNA f/u -  had sx on 8/17/2020 - states she has her days - has sometimes pain rated as 5/10 on and off - has weakness in her R leg -       HPI: This 56year old female presents today with MAIN OR   • FOOT SURGERY  2010    bunionectomy   • HIP TOTAL REPLACEMENT Right 8/17/2020    Performed by Flaca Groves MD at [facility] MAIN OR   • HYSTERECTOMY  2019   • LEG/ANKLE SURGERY PROC UNLISTED Left 2018    to repair flat foot   • NEEDLE BIOPSY LEFT by mouth daily. • loratadine (CLARITIN) 10 MG Oral Tab Take 10 mg by mouth daily as needed.            Codeine                 RASH    Comment:T#3  Family History   Problem Relation Age of Onset   • Diabetes Mother         type 2   • Lipids Mother weakness, balance issues, dizziness, memory loss  PSYCHIATRIC: denies Hx of depression, anxiety, other psychiatric disorders  HEMATOLOGIC: denies blood clots, anemia, blood clotting disorders, blood transfusion  ENDOCRINE: denies autoimmune disease, thyroi

## 2020-10-09 NOTE — TELEPHONE ENCOUNTER
Completed hand signed RTW faxed to Work [employer] [phone]. Patient notified by phone.

## 2020-10-16 ENCOUNTER — TELEPHONE (OUTPATIENT)
Dept: ORTHOPEDICS CLINIC | Facility: CLINIC | Age: 60
End: 2020-10-16

## 2020-10-16 NOTE — TELEPHONE ENCOUNTER
Pt calling states needs post op note sent [employer] fax # [phone] please advise       Case # [id number] & pt's name

## 2020-10-16 NOTE — TELEPHONE ENCOUNTER
Called pt LMTCB.  If patient needs records faxed related to disability etc. the patient should call the forms department

## 2020-11-02 ENCOUNTER — TELEPHONE (OUTPATIENT)
Dept: ORTHOPEDICS CLINIC | Facility: CLINIC | Age: 60
End: 2020-11-02

## 2020-11-02 NOTE — TELEPHONE ENCOUNTER
Patient requesting note from office visit on 10/7 stating that patient is out of work. Patient will be seen in office 11/18. Patient needs note stating the reason why is is out of work. Please Fax: [phone] Attn: Марина Beaulieu.

## 2020-11-05 NOTE — TELEPHONE ENCOUNTER
Dr Felipe Mejia and Starla Funk - per LOV note, pt will remain off work. .      Patient is requesting work note to explain absence.  OK to write note? pls advise Bill For Surgical Tray: no Performing Laboratory: [id number] Expected Date Of Service: 08/21/2020 Billing Type: Third-Party Bill

## 2020-11-05 NOTE — TELEPHONE ENCOUNTER
OK for note -  reasons: s/p right saqib, pt had hospital stay, narcotics for pain control, use of walker then cane for ambulation, home health PT, outpatient PT for strength, ROM, ambulation. Thanks.

## 2020-11-19 ENCOUNTER — OFFICE VISIT (OUTPATIENT)
Dept: ORTHOPEDICS CLINIC | Facility: CLINIC | Age: 60
End: 2020-11-19
Payer: COMMERCIAL

## 2020-11-19 ENCOUNTER — HOSPITAL ENCOUNTER (OUTPATIENT)
Dept: GENERAL RADIOLOGY | Facility: HOSPITAL | Age: 60
Discharge: HOME | End: 2020-11-19
Attending: ORTHOPAEDIC SURGERY
Payer: COMMERCIAL

## 2020-11-19 VITALS — WEIGHT: 160 LBS | HEIGHT: 61 IN | BODY MASS INDEX: 30.21 KG/M2

## 2020-11-19 DIAGNOSIS — M16.11 PRIMARY OSTEOARTHRITIS OF RIGHT HIP: Primary | ICD-10-CM

## 2020-11-19 DIAGNOSIS — Z47.89 ORTHOPEDIC AFTERCARE: ICD-10-CM

## 2020-11-19 PROCEDURE — 3008F BODY MASS INDEX DOCD: CPT | Performed by: ORTHOPAEDIC SURGERY

## 2020-11-19 PROCEDURE — 99213 OFFICE O/P EST LOW 20 MIN: CPT | Performed by: ORTHOPAEDIC SURGERY

## 2020-11-19 PROCEDURE — 73502 X-RAY EXAM HIP UNI 2-3 VIEWS: CPT | Performed by: ORTHOPAEDIC SURGERY

## 2020-11-19 NOTE — PROGRESS NOTES
NURSING INTAKE COMMENTS: Patient presents with:  Post-Op: right hip replacement 0n 8/17/2020, denies pain ,patient is still going to PT      HPI: This 56year old female presents today with complaints of right hip follow-up.   She is now 3 months postoperat bunionectomy   • HIP TOTAL REPLACEMENT Right 2020    Performed by Maricarmen Cullen MD at [facility] OR   • HYSTERECTOMY     • LEG/ANKLE SURGERY PROC UNLISTED Left     to repair flat foot   • NEEDLE BIOPSY LEFT  2013   •       x3 w/PP Disorders Associated Father         alcoholism   • Hypertension Father    • Glaucoma Maternal Grandmother    • Diabetes Maternal Grandmother    • Hypertension Maternal Grandmother    • Musculo-skelatal Disorder Maternal Grandmother         per viktoria cumminso distress noted  Extremities: She walks with a very short strided dyskinetic gait. No obvious ataxia. Pain with passive range of motion of the right hip. Incision well-healed. She is able to actively straight leg raise with good power.   Unable to flex t Plan:  Diagnoses and all orders for this visit:    Primary osteoarthritis of right hip  -     NEURO - INTERNAL    Orthopedic aftercare  -     XR HIP W OR WO PELVIS 2 OR 3 VIEWS, RIGHT (CPT=73502);  Future        Assessment: Healing right total hip arthropla

## 2020-11-23 ENCOUNTER — TELEPHONE (OUTPATIENT)
Dept: ORTHOPEDICS CLINIC | Facility: CLINIC | Age: 60
End: 2020-11-23

## 2020-11-23 DIAGNOSIS — Z96.649 HISTORY OF TOTAL HIP REPLACEMENT, UNSPECIFIED LATERALITY: Primary | ICD-10-CM

## 2020-11-23 DIAGNOSIS — Z47.89 ORTHOPEDIC AFTERCARE: ICD-10-CM

## 2020-11-23 NOTE — TELEPHONE ENCOUNTER
Form completed. Called pt LMTCB to see how she would like to to get the form- mail it, fax it or pick it up.

## 2020-11-24 ENCOUNTER — PATIENT MESSAGE (OUTPATIENT)
Dept: ADMINISTRATIVE | Age: 60
End: 2020-11-24

## 2020-11-24 NOTE — TELEPHONE ENCOUNTER
Per pt requesting form to be mailed to home. Pt is needing physical therapy to be extended for 4 more weeks.   Please advise

## 2020-11-25 NOTE — TELEPHONE ENCOUNTER
S/w pt and informed her handicapped placard form was mailed to her. Pt states [facility] Order faxed. She had no further concerns.

## 2020-11-30 ENCOUNTER — TELEPHONE (OUTPATIENT)
Dept: ORTHOPEDICS CLINIC | Facility: CLINIC | Age: 60
End: 2020-11-30

## 2020-12-01 ENCOUNTER — OFFICE VISIT (OUTPATIENT)
Dept: NEUROLOGY | Facility: CLINIC | Age: 60
End: 2020-12-01
Payer: COMMERCIAL

## 2020-12-01 ENCOUNTER — TELEPHONE (OUTPATIENT)
Dept: NEUROLOGY | Facility: CLINIC | Age: 60
End: 2020-12-01

## 2020-12-01 VITALS
HEIGHT: 61 IN | HEART RATE: 80 BPM | BODY MASS INDEX: 30.21 KG/M2 | DIASTOLIC BLOOD PRESSURE: 72 MMHG | WEIGHT: 160 LBS | SYSTOLIC BLOOD PRESSURE: 130 MMHG

## 2020-12-01 DIAGNOSIS — R20.0 NUMBNESS AND TINGLING OF RIGHT LOWER EXTREMITY: ICD-10-CM

## 2020-12-01 DIAGNOSIS — R20.2 NUMBNESS AND TINGLING OF RIGHT LOWER EXTREMITY: ICD-10-CM

## 2020-12-01 DIAGNOSIS — R29.898 RIGHT LEG WEAKNESS: Primary | ICD-10-CM

## 2020-12-01 DIAGNOSIS — G89.29 CHRONIC MIDLINE LOW BACK PAIN WITHOUT SCIATICA: ICD-10-CM

## 2020-12-01 DIAGNOSIS — M54.50 CHRONIC MIDLINE LOW BACK PAIN WITHOUT SCIATICA: ICD-10-CM

## 2020-12-01 PROCEDURE — 3078F DIAST BP <80 MM HG: CPT | Performed by: OTHER

## 2020-12-01 PROCEDURE — 3075F SYST BP GE 130 - 139MM HG: CPT | Performed by: OTHER

## 2020-12-01 PROCEDURE — 99244 OFF/OP CNSLTJ NEW/EST MOD 40: CPT | Performed by: OTHER

## 2020-12-01 PROCEDURE — 3008F BODY MASS INDEX DOCD: CPT | Performed by: OTHER

## 2020-12-01 RX ORDER — LORAZEPAM 1 MG/1
1 TABLET ORAL ONCE
Qty: 2 TABLET | Refills: 0 | Status: SHIPPED | OUTPATIENT
Start: 2020-12-01 | End: 2020-12-01

## 2020-12-01 RX ORDER — ACETAMINOPHEN 500 MG
500 TABLET ORAL EVERY 6 HOURS PRN
COMMUNITY
End: 2021-01-08

## 2020-12-01 NOTE — PROGRESS NOTES
HPI:    Patient ID: Chip Cee is a 56year old female. Referring provider: Dr Alta San    Thank you for requesting this consultation to us.  Below is the summary of my evaluation    HPI   Patient is a 56year old female with history of hypertension, h Tiffanie Lepe MD at [facility] ENDO   • CYSTOSCOPY N/A 7/22/2019    Performed by Holden Harper DO at [facility] OR   • CYSTOSCOPY N/A 12/13/2017    Performed by Cayetano Ruffin MD at [facility] OR   • FOOT SURGERY  2010    bunionectomy   • HIP TOTAL REP Cardiovascular: Negative. Gastrointestinal: Negative. Endocrine: Negative. Genitourinary: Negative. Musculoskeletal: Positive for back pain and gait problem. Negative for neck pain and neck stiffness. Skin: Negative.     Allergic/Immunologic mood and affect. Neurological   Awake, alert and oriented to time, place and person. Speech is fluent with intact comprehension, repetition and naming. Normal attention and memory. Higher cortical function intact.   Cranial nerves:   II, III, IV, VI : best of my ability. MD [place] [facility]        No orders of the defined types were placed in this encounter.       Meds This Visit:  Requested Prescriptions      No prescriptions requested or ordered in this encounter

## 2020-12-01 NOTE — TELEPHONE ENCOUNTER
Pt states she spoke to forms dept and was informed that they did not receive the forms. Pt was instructed by forms to ask Dr. Jaylene August to fill out the forms and send them to the forms dept.  Please advise

## 2020-12-01 NOTE — TELEPHONE ENCOUNTER
Pt requesting a note to be sent to pt's fmla that pt is not able to work. Pt does not have a fax number. Will call back with the fax number tomorrow 12-2-20.

## 2020-12-01 NOTE — TELEPHONE ENCOUNTER
S/w pt and she states her [facility] is calling her about her work status. I transferred her call to forms dept.

## 2020-12-01 NOTE — TELEPHONE ENCOUNTER
There are no forms in ortho dept. Per DO LOV she should continue to be off work until next [address]

## 2020-12-01 NOTE — TELEPHONE ENCOUNTER
[employer] for authorization of approval for MRI L-spine wo cpt code 70773. Approval was given with Authorization # [id number] effective 12/01/20 to 12/30/20. Will call Pt. to inform. L/m advising of approval. Can proceed with scheduling appt.

## 2020-12-03 NOTE — TELEPHONE ENCOUNTER
Pt lvm asking about rtw letter. lvm stating letter ayla typed on 11/19/2020 was faxed to work and well on 11/24/2020. Informed pt on vm if she needed any further assistance to call us.

## 2020-12-03 NOTE — TELEPHONE ENCOUNTER
lmtcb asking for clarification of note. Does she need last office visit note? Or something else? Is she able to see msg/letter in mychart?

## 2020-12-03 NOTE — TELEPHONE ENCOUNTER
S/w pt. She says the off work letter she recv'd is good, but fmla is asking for a treatment letter and outlined what it should say, including treatments ordered by another physician.  Pt is happy with the last office note being faxed to the number requested

## 2020-12-07 ENCOUNTER — TELEPHONE (OUTPATIENT)
Dept: ORTHOPEDICS CLINIC | Facility: CLINIC | Age: 60
End: 2020-12-07

## 2020-12-07 ENCOUNTER — HOSPITAL ENCOUNTER (OUTPATIENT)
Dept: MRI IMAGING | Age: 60
Discharge: HOME | End: 2020-12-07
Attending: Other
Payer: COMMERCIAL

## 2020-12-07 DIAGNOSIS — G89.29 CHRONIC MIDLINE LOW BACK PAIN WITHOUT SCIATICA: ICD-10-CM

## 2020-12-07 DIAGNOSIS — R29.898 RIGHT LEG WEAKNESS: ICD-10-CM

## 2020-12-07 DIAGNOSIS — M54.50 CHRONIC MIDLINE LOW BACK PAIN WITHOUT SCIATICA: ICD-10-CM

## 2020-12-07 PROCEDURE — 72148 MRI LUMBAR SPINE W/O DYE: CPT | Performed by: OTHER

## 2020-12-07 NOTE — TELEPHONE ENCOUNTER
Per pt asking if office knows if handicap placard form can be mailed since the [facility] is closed until next year. Please advise thank you.

## 2020-12-07 NOTE — TELEPHONE ENCOUNTER
S/w pt and advised she look online for information for [facility] and how to get placard or call .  She had no further q's

## 2020-12-07 NOTE — TELEPHONE ENCOUNTER
Zach from 11/19/2020 faxed to work and well at [phone] along with letter from 11/19/2020. Sent Holvi.

## 2020-12-10 ENCOUNTER — TELEPHONE (OUTPATIENT)
Dept: NEUROLOGY | Facility: CLINIC | Age: 60
End: 2020-12-10

## 2020-12-10 NOTE — TELEPHONE ENCOUNTER
----- Message from Kaley Milton MD sent at 12/10/2020  4:27 PM CST -----  Multilevel degenerative changes lumbar spine are most pronounced at the L3-4 level.  Continue physical therapy and follow up in clinic as scheduled    Incidental cystic lesion fo

## 2020-12-11 ENCOUNTER — TELEPHONE (OUTPATIENT)
Dept: INTERNAL MEDICINE CLINIC | Facility: CLINIC | Age: 60
End: 2020-12-11

## 2020-12-11 NOTE — TELEPHONE ENCOUNTER
Patient cancelled appt for physical today. Reports spouse is currently awaiting Covid results, Reports spouse was in contact with someone +Covid on Monday. She will call back for appt once she confirms his results, denies any symptoms or concerns.

## 2020-12-11 NOTE — TELEPHONE ENCOUNTER
Left detailed message for patient notifying her of Dr. Romero Comfort message below. Asked her to call the office if she had questions.

## 2020-12-17 ENCOUNTER — HOSPITAL ENCOUNTER (OUTPATIENT)
Dept: GENERAL RADIOLOGY | Facility: HOSPITAL | Age: 60
Discharge: HOME | End: 2020-12-17
Attending: ORTHOPAEDIC SURGERY
Payer: COMMERCIAL

## 2020-12-17 ENCOUNTER — OFFICE VISIT (OUTPATIENT)
Dept: ORTHOPEDICS CLINIC | Facility: CLINIC | Age: 60
End: 2020-12-17
Payer: COMMERCIAL

## 2020-12-17 DIAGNOSIS — Z47.89 ORTHOPEDIC AFTERCARE: ICD-10-CM

## 2020-12-17 DIAGNOSIS — M41.86 SCOLIOSIS OF LUMBAR REGION DUE TO DEGENERATIVE DISEASE OF SPINE IN ADULT: Primary | ICD-10-CM

## 2020-12-17 PROCEDURE — 99212 OFFICE O/P EST SF 10 MIN: CPT | Performed by: ORTHOPAEDIC SURGERY

## 2020-12-17 PROCEDURE — 73502 X-RAY EXAM HIP UNI 2-3 VIEWS: CPT | Performed by: ORTHOPAEDIC SURGERY

## 2020-12-17 RX ORDER — IBUPROFEN 200 MG
200 TABLET ORAL EVERY 6 HOURS PRN
COMMUNITY
End: 2021-04-28

## 2020-12-17 NOTE — PROGRESS NOTES
NURSING INTAKE COMMENTS: Patient presents with:  Post-Op: s/p right ARIADNA -- Sx on 08/17/20. Rates pain 0/10. Denies any fever or calf pain. Pt has had a MRI of lumbar spine.        HPI: This 56year old female presents today with complaints of right total hi MAIN OR   • FOOT SURGERY  2010    bunionectomy   • HIP TOTAL REPLACEMENT Right 8/17/2020    Performed by Tati Asher MD at [address] [place] [address] MAIN OR   • HYSTERECTOMY  2019   • LEG/ANKLE SURGERY PROC UNLISTED Left 2018    to repair flat foot   • NEEDLE BIOPSY LEFT • Cancer Paternal Uncle         per ng stomach   • Diabetes Cousin    • Breast Cancer Maternal Aunt 72       Social History    Occupational History      Not on file    Tobacco Use      Smoking status: Never Smoker      Smokeless tobacco: Never Used    Dominguez trochanter or the scar. Thigh is soft and nontender nonswollen. Hip flexion strength and knee extension strength are 5 out of 5. Neurological: Light touch sensation intact throughout the lower extremity.   All muscle groups tested show good 5 out of 5 mo diffuse disc bulge with endplate osteophytes and mild facet arthropathy. There is no significant spinal canal or neural foraminal stenosis.   L1-2:  Mild diffuse disc bulge with endplate osteophytes and a small superimposed right foraminal/far lateral disc appearing lesion in the partially imaged left pelvis measuring up to 5.1 cm may be ovarian in origin but is incompletely evaluated on the basis of this exam.  Dedicated pelvic ultrasound and/or pelvic MRI with and without contrast  is suggested for further lumbar region due to degenerative disease of spine in adult  -     NEUROSURGERY - INTERNAL  -     EMG; Future    Orthopedic aftercare  -     XR HIP W OR WO PELVIS 2 OR 3 VIEWS, RIGHT (CPT=73502);  Future        Assessment: Healing right total hip arthroplas

## 2020-12-18 ENCOUNTER — TELEPHONE (OUTPATIENT)
Dept: ORTHOPEDICS CLINIC | Facility: CLINIC | Age: 60
End: 2020-12-18

## 2020-12-18 DIAGNOSIS — Z96.649 HISTORY OF TOTAL HIP REPLACEMENT, UNSPECIFIED LATERALITY: Primary | ICD-10-CM

## 2020-12-18 NOTE — TELEPHONE ENCOUNTER
Per pt PT needs to be extended for another month, asking for additional visits to be put on referral.

## 2020-12-21 ENCOUNTER — TELEPHONE (OUTPATIENT)
Dept: ORTHOPEDICS CLINIC | Facility: CLINIC | Age: 60
End: 2020-12-21

## 2020-12-21 NOTE — TELEPHONE ENCOUNTER
Pt called stating pt is going to continue with physical therapy. Please contact them pt will continue. Office has the information.

## 2020-12-22 NOTE — TELEPHONE ENCOUNTER
New PT order faxed to [facility] in [place] [phone]. Called pt LMOM that order was faxed and CB if needed.

## 2020-12-29 ENCOUNTER — TELEPHONE (OUTPATIENT)
Dept: UROLOGY | Facility: HOSPITAL | Age: 60
End: 2020-12-29

## 2020-12-29 DIAGNOSIS — N95.2 POSTMENOPAUSAL ATROPHIC VAGINITIS: Primary | ICD-10-CM

## 2020-12-29 RX ORDER — ESTRADIOL 0.1 MG/G
CREAM VAGINAL
Qty: 1 TUBE | Refills: 3 | Status: SHIPPED | OUTPATIENT
Start: 2020-12-29 | End: 2022-01-28

## 2020-12-29 NOTE — TELEPHONE ENCOUNTER
Patient called today requesting a refill on her Estrace cream. NIGEL Causey Argue Estrace cream 0.5 GM vaginally 2 times per week with 3 refills. Prescription sent to her preferred pharmacy. Pessary management reviewed.  Ok to leave pessary in place for 1-2

## 2021-01-07 ENCOUNTER — TELEPHONE (OUTPATIENT)
Dept: ORTHOPEDICS CLINIC | Facility: CLINIC | Age: 61
End: 2021-01-07

## 2021-01-07 NOTE — TELEPHONE ENCOUNTER
Spoke with patient. Dr. Heidy Welsh appointment is at 330 on 1/14. Follow up scheduled with  01/20. Requesting note be faxed to extend her off work status until her follow up appointment. Discussed with ALANA Lopez ok to send note.   Faxed to

## 2021-01-07 NOTE — TELEPHONE ENCOUNTER
Pt calling with a question. Does pt need to be seen before or after an appointment with Dr. Rosaura Ruiz. Pt has an appointment scheduled 1-14-20. fmla needs to know the returning date for pt to return to work by 1-18-21.    Please call pt before 1pm

## 2021-01-08 ENCOUNTER — OFFICE VISIT (OUTPATIENT)
Dept: INTERNAL MEDICINE CLINIC | Facility: CLINIC | Age: 61
End: 2021-01-08
Payer: COMMERCIAL

## 2021-01-08 VITALS
SYSTOLIC BLOOD PRESSURE: 124 MMHG | HEART RATE: 85 BPM | DIASTOLIC BLOOD PRESSURE: 80 MMHG | BODY MASS INDEX: 30.78 KG/M2 | HEIGHT: 61 IN | WEIGHT: 163 LBS

## 2021-01-08 DIAGNOSIS — I10 ESSENTIAL HYPERTENSION: ICD-10-CM

## 2021-01-08 DIAGNOSIS — Z78.0 POSTMENOPAUSAL: ICD-10-CM

## 2021-01-08 DIAGNOSIS — Z12.31 VISIT FOR SCREENING MAMMOGRAM: ICD-10-CM

## 2021-01-08 DIAGNOSIS — R73.03 PREDIABETES: ICD-10-CM

## 2021-01-08 DIAGNOSIS — E78.5 HYPERLIPIDEMIA, UNSPECIFIED HYPERLIPIDEMIA TYPE: ICD-10-CM

## 2021-01-08 DIAGNOSIS — Z00.00 PHYSICAL EXAM: Primary | ICD-10-CM

## 2021-01-08 DIAGNOSIS — G62.9 NEUROPATHY: ICD-10-CM

## 2021-01-08 PROCEDURE — 99396 PREV VISIT EST AGE 40-64: CPT | Performed by: INTERNAL MEDICINE

## 2021-01-08 PROCEDURE — 3074F SYST BP LT 130 MM HG: CPT | Performed by: INTERNAL MEDICINE

## 2021-01-08 PROCEDURE — 3079F DIAST BP 80-89 MM HG: CPT | Performed by: INTERNAL MEDICINE

## 2021-01-08 PROCEDURE — 3008F BODY MASS INDEX DOCD: CPT | Performed by: INTERNAL MEDICINE

## 2021-01-14 ENCOUNTER — LAB ENCOUNTER (OUTPATIENT)
Dept: LAB | Age: 61
End: 2021-01-14
Attending: INTERNAL MEDICINE
Payer: COMMERCIAL

## 2021-01-14 DIAGNOSIS — R73.03 PREDIABETES: ICD-10-CM

## 2021-01-14 DIAGNOSIS — I10 ESSENTIAL HYPERTENSION: ICD-10-CM

## 2021-01-14 DIAGNOSIS — G62.9 NEUROPATHY: ICD-10-CM

## 2021-01-14 DIAGNOSIS — E78.5 HYPERLIPIDEMIA, UNSPECIFIED HYPERLIPIDEMIA TYPE: ICD-10-CM

## 2021-01-14 LAB
ALBUMIN SERPL-MCNC: 3.9 G/DL (ref 3.4–5)
ALBUMIN/GLOB SERPL: 0.8 {RATIO} (ref 1–2)
ALP LIVER SERPL-CCNC: 138 U/L
ALT SERPL-CCNC: 31 U/L
ANION GAP SERPL CALC-SCNC: 2 MMOL/L (ref 0–18)
AST SERPL-CCNC: 17 U/L (ref 15–37)
BILIRUB SERPL-MCNC: 2 MG/DL (ref 0.1–2)
BUN BLD-MCNC: 11 MG/DL (ref 7–18)
BUN/CREAT SERPL: 17.7 (ref 10–20)
CALCIUM BLD-MCNC: 9.8 MG/DL (ref 8.5–10.1)
CHLORIDE SERPL-SCNC: 103 MMOL/L (ref 98–112)
CHOLEST SMN-MCNC: 240 MG/DL (ref ?–200)
CO2 SERPL-SCNC: 30 MMOL/L (ref 21–32)
CREAT BLD-MCNC: 0.62 MG/DL
DEPRECATED RDW RBC AUTO: 43.7 FL (ref 35.1–46.3)
ERYTHROCYTE [DISTWIDTH] IN BLOOD BY AUTOMATED COUNT: 14.3 % (ref 11–15)
EST. AVERAGE GLUCOSE BLD GHB EST-MCNC: 137 MG/DL (ref 68–126)
FOLATE SERPL-MCNC: 13.1 NG/ML (ref 8.7–?)
GLOBULIN PLAS-MCNC: 4.6 G/DL (ref 2.8–4.4)
GLUCOSE BLD-MCNC: 100 MG/DL (ref 70–99)
HBA1C MFR BLD HPLC: 6.4 % (ref ?–5.7)
HCT VFR BLD AUTO: 39.9 %
HDLC SERPL-MCNC: 58 MG/DL (ref 40–59)
HGB BLD-MCNC: 12.9 G/DL
LDLC SERPL CALC-MCNC: 162 MG/DL (ref ?–100)
M PROTEIN MFR SERPL ELPH: 8.5 G/DL (ref 6.4–8.2)
MCH RBC QN AUTO: 27.3 PG (ref 26–34)
MCHC RBC AUTO-ENTMCNC: 32.3 G/DL (ref 31–37)
MCV RBC AUTO: 84.5 FL
NONHDLC SERPL-MCNC: 182 MG/DL (ref ?–130)
OSMOLALITY SERPL CALC.SUM OF ELEC: 279 MOSM/KG (ref 275–295)
PATIENT FASTING Y/N/NP: YES
PATIENT FASTING Y/N/NP: YES
PLATELET # BLD AUTO: 272 10(3)UL (ref 150–450)
POTASSIUM SERPL-SCNC: 3.7 MMOL/L (ref 3.5–5.1)
RBC # BLD AUTO: 4.72 X10(6)UL
SODIUM SERPL-SCNC: 135 MMOL/L (ref 136–145)
T4 FREE SERPL-MCNC: 1 NG/DL (ref 0.8–1.7)
TRIGL SERPL-MCNC: 101 MG/DL (ref 30–149)
TSI SER-ACNC: 1.11 MIU/ML (ref 0.36–3.74)
VIT B12 SERPL-MCNC: 549 PG/ML (ref 193–986)
VLDLC SERPL CALC-MCNC: 20 MG/DL (ref 0–30)
WBC # BLD AUTO: 7.7 X10(3) UL (ref 4–11)

## 2021-01-14 PROCEDURE — 83036 HEMOGLOBIN GLYCOSYLATED A1C: CPT

## 2021-01-14 PROCEDURE — 36415 COLL VENOUS BLD VENIPUNCTURE: CPT

## 2021-01-14 PROCEDURE — 82607 VITAMIN B-12: CPT

## 2021-01-14 PROCEDURE — 84443 ASSAY THYROID STIM HORMONE: CPT

## 2021-01-14 PROCEDURE — 80053 COMPREHEN METABOLIC PANEL: CPT

## 2021-01-14 PROCEDURE — 84439 ASSAY OF FREE THYROXINE: CPT

## 2021-01-14 PROCEDURE — 85027 COMPLETE CBC AUTOMATED: CPT

## 2021-01-14 PROCEDURE — 80061 LIPID PANEL: CPT

## 2021-01-14 PROCEDURE — 82746 ASSAY OF FOLIC ACID SERUM: CPT

## 2021-01-18 PROBLEM — N80.9 ENDOMETRIOSIS: Status: RESOLVED | Noted: 2019-07-22 | Resolved: 2021-01-18

## 2021-01-18 PROBLEM — K57.20 PERFORATED DIVERTICULUM OF LARGE INTESTINE: Status: RESOLVED | Noted: 2017-10-20 | Resolved: 2021-01-18

## 2021-01-18 PROBLEM — Z00.00 ROUTINE GENERAL MEDICAL EXAMINATION AT A HEALTH CARE FACILITY: Status: RESOLVED | Noted: 2021-01-08 | Resolved: 2021-01-18

## 2021-01-18 NOTE — PROGRESS NOTES
HPI:    Patient ID: Cedrick Burris is a 56year old female.     HPI    Physical exam    Gait abnormality  Ongoing follow up with orthopedic podiatry  And neurology  Diagnosed with neuropathy      /80   Pulse 85   Ht 5' 1\" (1.549 m)   Wt 163 lb (73.9 ENALAPRIL MALEATE 10 MG Oral Tab TAKE 1 TABLET(10 MG) BY MOUTH EVERY DAY 90 tablet 3   • CALCIUM OR Take 1 capsule by mouth daily. • loratadine (CLARITIN) 10 MG Oral Tab Take 10 mg by mouth daily as needed.          Allergies:  Codeine Performed by Anibal Galeas DO at [address] [place] [address] OR   • [address] [place] [address] [place]   • UTEROSACRAL LIGAMENT SUSPENSION N/A 7/22/2019    Performed by Anibal Galeas DO at [address] [place] [address] OR   • [address] [place] [address] normal and breath sounds normal. No respiratory distress. She has no wheezes. She has no rales. She exhibits no tenderness. Abdominal: Soft. Bowel sounds are normal. She exhibits no distension and no mass. There is no hepatosplenomegaly.  There is no abdo Urine Microscopic w Reflex CULTURE      Vitamin R41 [E]      Folic Acid Serum [E]      Meds This Visit:  Requested Prescriptions      No prescriptions requested or ordered in this encounter       Imaging & Referrals:  [facility] DEEPA 2D+3D SCREENING BILAT (CPT

## 2021-01-20 ENCOUNTER — OFFICE VISIT (OUTPATIENT)
Dept: ORTHOPEDICS CLINIC | Facility: CLINIC | Age: 61
End: 2021-01-20
Payer: COMMERCIAL

## 2021-01-20 ENCOUNTER — LAB ENCOUNTER (OUTPATIENT)
Dept: LAB | Facility: HOSPITAL | Age: 61
End: 2021-01-20
Attending: INTERNAL MEDICINE
Payer: COMMERCIAL

## 2021-01-20 DIAGNOSIS — E78.5 HYPERLIPIDEMIA, UNSPECIFIED HYPERLIPIDEMIA TYPE: ICD-10-CM

## 2021-01-20 DIAGNOSIS — R73.03 PREDIABETES: ICD-10-CM

## 2021-01-20 DIAGNOSIS — I10 ESSENTIAL HYPERTENSION: ICD-10-CM

## 2021-01-20 DIAGNOSIS — M16.11 PRIMARY OSTEOARTHRITIS OF RIGHT HIP: Primary | ICD-10-CM

## 2021-01-20 DIAGNOSIS — G62.9 NEUROPATHY: ICD-10-CM

## 2021-01-20 LAB
RBC #/AREA URNS AUTO: 1 /HPF
WBC #/AREA URNS AUTO: 1 /HPF

## 2021-01-20 PROCEDURE — 99213 OFFICE O/P EST LOW 20 MIN: CPT | Performed by: ORTHOPAEDIC SURGERY

## 2021-01-20 PROCEDURE — 81015 MICROSCOPIC EXAM OF URINE: CPT

## 2021-01-20 RX ORDER — OXYCODONE HCL 10 MG/1
TABLET, FILM COATED, EXTENDED RELEASE ORAL
COMMUNITY
End: 2021-04-28

## 2021-01-20 RX ORDER — POLYETHYLENE GLYCOL 3350 17 G/17G
POWDER, FOR SOLUTION ORAL
COMMUNITY
End: 2021-04-28

## 2021-01-20 RX ORDER — PENICILLIN V POTASSIUM 500 MG/1
TABLET ORAL
COMMUNITY
End: 2021-04-28

## 2021-01-20 RX ORDER — HYDROCODONE BITARTRATE AND ACETAMINOPHEN 7.5; 325 MG/1; MG/1
TABLET ORAL
COMMUNITY
End: 2021-04-28

## 2021-01-20 RX ORDER — LORAZEPAM 1 MG/1
TABLET ORAL
COMMUNITY
End: 2021-04-28

## 2021-01-20 RX ORDER — CELECOXIB 200 MG/1
CAPSULE ORAL
COMMUNITY
End: 2021-04-28

## 2021-01-20 NOTE — PROGRESS NOTES
Carmen Yoo MDNURSING INTAKE COMMENTS: Patient presents with:  Back Pain: LS f/u - she had Right ARIADNA on 8/17/2020 - she states she was seen by Dr Karyle Norma on 1/14/21 and states she has no report of the visit  - she did not had her EMG - states her margoth José Miguel Gresham MD at [facility] ENDO   • CYSTOSCOPY N/A 7/22/2019    Performed by Ishmael Bhagat DO at [facility] OR   • CYSTOSCOPY N/A 12/13/2017    Performed by Jessica Robles MD at [facility] OR   • FOOT SURGERY  2010    bunionectomy   • HIP TOTAL REPLACEMENT Rig eye issues   • Heart Disorder Mother         per ng CABG   • Hypertension Mother    • Musculo-skelatal Disorder Mother         per ng osteoporosis   • Alcohol and Other Disorders Associated Father         alcoholism   • Hypertension Father    • Glaucoma Ma allergies    Physical Examination:    There were no vitals taken for this visit. Constitutional: appears well hydrated, alert and responsive, no acute distress noted  Extremities: She walks without a limp.   She has a difficult time getting up from the [place]

## 2021-01-27 ENCOUNTER — HOSPITAL ENCOUNTER (OUTPATIENT)
Dept: BONE DENSITY | Age: 61
Discharge: HOME | End: 2021-01-27
Attending: INTERNAL MEDICINE
Payer: COMMERCIAL

## 2021-01-27 DIAGNOSIS — Z78.0 POSTMENOPAUSAL: ICD-10-CM

## 2021-01-27 PROCEDURE — 77080 DXA BONE DENSITY AXIAL: CPT | Performed by: INTERNAL MEDICINE

## 2021-02-02 ENCOUNTER — PATIENT MESSAGE (OUTPATIENT)
Dept: INTERNAL MEDICINE CLINIC | Facility: CLINIC | Age: 61
End: 2021-02-02

## 2021-02-02 NOTE — TELEPHONE ENCOUNTER
From: Ev Voss  To: Jelani Williamson MD  Sent: 2/2/2021 12:35 PM CST  Subject: Test Results Question    Dr. Donis Zamorano can you please let me know if my bone test I had on January 27th. came out good.  I got the test results on my chart but I don't underst

## 2021-02-08 ENCOUNTER — TELEPHONE (OUTPATIENT)
Dept: ORTHOPEDICS CLINIC | Facility: CLINIC | Age: 61
End: 2021-02-08

## 2021-02-08 NOTE — TELEPHONE ENCOUNTER
Pt called stating pt went for a 2nd opinion at [facility] spinal care with a chiropractor. Wanted Dr. Lisa Hendrix to know.

## 2021-02-12 ENCOUNTER — OFFICE VISIT (OUTPATIENT)
Dept: UROLOGY | Facility: HOSPITAL | Age: 61
End: 2021-02-12
Attending: OBSTETRICS & GYNECOLOGY
Payer: COMMERCIAL

## 2021-02-12 VITALS
HEIGHT: 61 IN | BODY MASS INDEX: 30.78 KG/M2 | SYSTOLIC BLOOD PRESSURE: 132 MMHG | DIASTOLIC BLOOD PRESSURE: 68 MMHG | WEIGHT: 163 LBS | TEMPERATURE: 99 F | RESPIRATION RATE: 20 BRPM

## 2021-02-12 DIAGNOSIS — N99.3 PROLAPSE OF VAGINAL VAULT AFTER HYSTERECTOMY: ICD-10-CM

## 2021-02-12 DIAGNOSIS — N81.11 CYSTOCELE, MIDLINE: ICD-10-CM

## 2021-02-12 DIAGNOSIS — Z98.890 POST-OPERATIVE STATE: ICD-10-CM

## 2021-02-12 DIAGNOSIS — N95.2 POSTMENOPAUSAL ATROPHIC VAGINITIS: Primary | ICD-10-CM

## 2021-02-12 DIAGNOSIS — N81.84 PELVIC MUSCLE WASTING: ICD-10-CM

## 2021-02-12 PROCEDURE — 99212 OFFICE O/P EST SF 10 MIN: CPT

## 2021-02-12 NOTE — PROGRESS NOTES
Patient presents to follow up bulge    She is currently using pessary, with home care  Doesn't want surgery, having back issues  Wants to try another style pessary  +sexually active  No vag bleeding      /68   Temp 98.6 °F (37 °C)   Resp 20   Ht 61\"

## 2021-02-17 NOTE — TELEPHONE ENCOUNTER
Audrey yang received in forms dept. Logged for processing. Sent Xanofihart message for missing hipaa.

## 2021-02-17 NOTE — TELEPHONE ENCOUNTER
Pt called asking if we have received [facility] disability paperwork. Told pt we have not, gave pt our fax number. Advised to send [employer] to chiropractor as that is where she is currently being treated.  Pt states that the paperwork is for when she was first disabl

## 2021-02-19 ENCOUNTER — TELEPHONE (OUTPATIENT)
Dept: ORTHOPEDICS CLINIC | Facility: CLINIC | Age: 61
End: 2021-02-19

## 2021-02-19 NOTE — TELEPHONE ENCOUNTER
[employer] mailed additional Short Term Disability Forms to  [facility] for Dr Marjorie Saez to complete. Unsure if pt signed HIPPA or pd $25 fee. Scanned to [facility] and brought originals to [employer] @ [facility].

## 2021-02-22 NOTE — TELEPHONE ENCOUNTER
Pt called to check if the forms department received her chiropractic notes, advised pt the forms department has not received any notes, pt provided fax number where notes were faxed and it seems as if they were faxed directly to [employer], pt verbalized underst

## 2021-02-25 NOTE — TELEPHONE ENCOUNTER
Dr. Carley Ruiz,     Please sign off on form: Fmla  -Highlight the patient and hit \"Chart\" button.   -In Chart Review, w/in the Encounter tab - click 1 time on the Telephone call encounter for 12/21/2020 Scroll down the telephone encounter.  -Click \"scan on

## 2021-03-01 NOTE — TELEPHONE ENCOUNTER
Spoke with pt and informed that the forms department did not receive her chiropractic notes, advised pt that per our conversation on 2/22/2021 chiropractic notes were faxed to [address], pt verbalized understanding and will contact [facility] to follow up.

## 2021-03-17 ENCOUNTER — TELEPHONE (OUTPATIENT)
Dept: ORTHOPEDICS CLINIC | Facility: CLINIC | Age: 61
End: 2021-03-17

## 2021-03-17 NOTE — TELEPHONE ENCOUNTER
Pt states handicap placard expires at the end of the month, pt asking what to do, requesting to speak to RN. Please call thank you.

## 2021-03-17 NOTE — TELEPHONE ENCOUNTER
Pt notified that Aneesh Parker would complete form. Pt requesting the max of 6 months. Informed her I would discuss with rosario and she would like it mailed to her home. Address confirmed.

## 2021-03-24 ENCOUNTER — HOSPITAL ENCOUNTER (OUTPATIENT)
Dept: MAMMOGRAPHY | Age: 61
Discharge: HOME | End: 2021-03-24
Attending: INTERNAL MEDICINE
Payer: COMMERCIAL

## 2021-03-24 DIAGNOSIS — Z12.31 VISIT FOR SCREENING MAMMOGRAM: ICD-10-CM

## 2021-03-24 PROCEDURE — 77063 BREAST TOMOSYNTHESIS BI: CPT | Performed by: INTERNAL MEDICINE

## 2021-03-24 PROCEDURE — 77067 SCR MAMMO BI INCL CAD: CPT | Performed by: INTERNAL MEDICINE

## 2021-03-25 ENCOUNTER — PATIENT MESSAGE (OUTPATIENT)
Dept: INTERNAL MEDICINE CLINIC | Facility: CLINIC | Age: 61
End: 2021-03-25

## 2021-03-25 NOTE — TELEPHONE ENCOUNTER
From: Abdifatah Parry  To: Ananth Shankar MD  Sent: 3/25/2021 2:36 PM CDT  Subject: Other    I received on my chart that they scheduled me an appointment for a covid vaccine, but it doesn't say when or where I have to go.    can you please check that for m

## 2021-03-26 NOTE — TELEPHONE ENCOUNTER
marta message recommendation sent to pt.      Future Appointments   Date Time Provider Robin Velasco   4/16/2021 11:00 AM Dalton GONZALEZ [facility]   4/28/2021 12:00 PM Xochilt Murrell MD [facility] Mary Mosquera

## 2021-04-16 ENCOUNTER — OFFICE VISIT (OUTPATIENT)
Dept: UROLOGY | Facility: HOSPITAL | Age: 61
End: 2021-04-16
Attending: OBSTETRICS & GYNECOLOGY
Payer: COMMERCIAL

## 2021-04-16 VITALS — BODY MASS INDEX: 31 KG/M2 | RESPIRATION RATE: 16 BRPM | WEIGHT: 163 LBS

## 2021-04-16 DIAGNOSIS — N95.2 POSTMENOPAUSAL ATROPHIC VAGINITIS: ICD-10-CM

## 2021-04-16 DIAGNOSIS — N81.84 PELVIC MUSCLE WASTING: ICD-10-CM

## 2021-04-16 DIAGNOSIS — N99.3 PROLAPSE OF VAGINAL VAULT AFTER HYSTERECTOMY: Primary | ICD-10-CM

## 2021-04-16 PROCEDURE — 57160 INSERT PESSARY/OTHER DEVICE: CPT

## 2021-04-16 PROCEDURE — 99212 OFFICE O/P EST SF 10 MIN: CPT

## 2021-04-16 NOTE — PROGRESS NOTES
Patient presents to follow up bulge    She is currently using pessary - not supportive  Unsure about surgery, wants info      Resp 16   Wt 163 lb (73.9 kg)   BMI 30.80 kg/m²     GEN: NAD  CV: RRR  Pulm: nl effort  Abd: soft    PELVIC EXAM:  Ext.  Gen: some

## 2021-04-17 ENCOUNTER — IMMUNIZATION (OUTPATIENT)
Dept: LAB | Facility: HOSPITAL | Age: 61
End: 2021-04-17
Attending: EMERGENCY MEDICINE
Payer: COMMERCIAL

## 2021-04-17 DIAGNOSIS — Z23 NEED FOR VACCINATION: Primary | ICD-10-CM

## 2021-04-17 PROCEDURE — 0011A SARSCOV2 VAC 100MCG/0.5ML IM: CPT

## 2021-04-28 ENCOUNTER — OFFICE VISIT (OUTPATIENT)
Dept: DERMATOLOGY CLINIC | Facility: CLINIC | Age: 61
End: 2021-04-28
Payer: COMMERCIAL

## 2021-04-28 DIAGNOSIS — D22.9 MULTIPLE NEVI: ICD-10-CM

## 2021-04-28 DIAGNOSIS — L82.0 INFLAMED SEBORRHEIC KERATOSIS: Primary | ICD-10-CM

## 2021-04-28 DIAGNOSIS — L65.9 ALOPECIA: ICD-10-CM

## 2021-04-28 DIAGNOSIS — L82.1 SEBORRHEIC KERATOSES: ICD-10-CM

## 2021-04-28 DIAGNOSIS — D23.9 BENIGN NEOPLASM OF SKIN, UNSPECIFIED LOCATION: ICD-10-CM

## 2021-04-28 DIAGNOSIS — L72.0 MILIA: ICD-10-CM

## 2021-04-28 PROCEDURE — 99213 OFFICE O/P EST LOW 20 MIN: CPT | Performed by: DERMATOLOGY

## 2021-04-30 ENCOUNTER — OFFICE VISIT (OUTPATIENT)
Dept: UROLOGY | Facility: HOSPITAL | Age: 61
End: 2021-04-30
Attending: OBSTETRICS & GYNECOLOGY
Payer: COMMERCIAL

## 2021-04-30 VITALS
DIASTOLIC BLOOD PRESSURE: 62 MMHG | BODY MASS INDEX: 30.78 KG/M2 | HEIGHT: 61 IN | WEIGHT: 163 LBS | RESPIRATION RATE: 20 BRPM | TEMPERATURE: 99 F | SYSTOLIC BLOOD PRESSURE: 136 MMHG

## 2021-04-30 DIAGNOSIS — N81.84 PELVIC MUSCLE WASTING: ICD-10-CM

## 2021-04-30 DIAGNOSIS — N95.2 POSTMENOPAUSAL ATROPHIC VAGINITIS: ICD-10-CM

## 2021-04-30 DIAGNOSIS — N99.3 PROLAPSE OF VAGINAL VAULT AFTER HYSTERECTOMY: Primary | ICD-10-CM

## 2021-04-30 PROCEDURE — 99212 OFFICE O/P EST SF 10 MIN: CPT

## 2021-04-30 NOTE — PROGRESS NOTES
Patient presents to follow up bulge    She is currently using pessary  Happy with current pessary  No vag bleeding, no discharge    She reports +improvement   Joffre with pessary, happy    /62   Temp 98.6 °F (37 °C)   Resp 20   Ht 61\"   Wt 163

## 2021-05-03 NOTE — PROGRESS NOTES
Lindy Crowder is a 56year old female.   HPI:     CC:  Patient presents with:  Moles: pt c/o mole on RT knee, mole has grown, is dry,  denies personal and family HX of skin cancer, LOV 3/27/2019  Bump: pt c/o of bumps on RT eyelid,  has used warm compresse Hypertension Mother    • Musculo-skelatal Disorder Mother         per ng osteoporosis   • Alcohol and Other Disorders Associated Father         alcoholism   • Hypertension Father    • Glaucoma Maternal Grandmother    • Diabetes Maternal Grandmother    • Hy prolapse 4/17/2015    Donut pessary -- self cleaning      Past Surgical History:   Procedure Laterality Date   • BIOPSY OF UTERUS LINING  10/2007    neg endometrial biopsy   • COLONOSCOPY N/A 2/20/2018    Procedure: COLONOSCOPY;  Surgeon: Yue Encarnacion, feeding: Not Asked        Reaction to local anesthetic: No    Social History Narrative      Not on file    Social Determinants of Health  Financial Resource Strain:       Difficulty of Paying Living Expenses:   Food Insecurity:       Worried About Running 3/27/2019  Bump: pt c/o of bumps on RT eyelid,  has used warm compresses which are not helping,     Past notes/ records and appropriate/relevant lab results including pathology and past body maps reviewed.  Updated and new information noted in current visit pathophysiology discussed. Over-the-counter retinol products may cause peeling irritation. Consider prescription Retin-A if worsening. Follow-up with ophthalmology if these become more bothersome.   Discussed pathophysiology    History of seborrheic derm

## 2021-05-05 ENCOUNTER — PATIENT MESSAGE (OUTPATIENT)
Dept: INTERNAL MEDICINE CLINIC | Facility: CLINIC | Age: 61
End: 2021-05-05

## 2021-05-05 NOTE — TELEPHONE ENCOUNTER
From: Lindy Crowder  To: Beckie Orozco MD  Sent: 5/5/2021 1:34 PM CDT  Subject: Non-Urgent Medical Question    Good afternoon, I would like to know if I have osteoporosis?     Thank you,    Lindy Crowder

## 2021-05-07 NOTE — TELEPHONE ENCOUNTER
Returned patients call asking for status of form due in 5 days. Called patient LM that we have no form. Asked if she needed her last OV faxed to call back.

## 2021-05-10 ENCOUNTER — TELEPHONE (OUTPATIENT)
Dept: ORTHOPEDICS CLINIC | Facility: CLINIC | Age: 61
End: 2021-05-10

## 2021-05-10 NOTE — TELEPHONE ENCOUNTER
An FMLA was faxed out on 3/2 but noted in the 12/21 encounter. See 12/21 forms encounter. I LM for patient with this information and told her to call back if this is not what she is referring to.

## 2021-05-10 NOTE — TELEPHONE ENCOUNTER
Social security disability forms received in forms department, faxed to [facility], confirmation received, scanned copy into pt's chart.

## 2021-05-10 NOTE — TELEPHONE ENCOUNTER
Patient called and LM on Friday about forms needing to be sent and due in 5 days. LMx2 to call back with more information due to not looking at 12/21 encounter for new info. . LM message today.   Monday that an FMLA was faxed out on 3/2 and if that was not t

## 2021-05-12 NOTE — TELEPHONE ENCOUNTER
Pt called to check the status of disability forms, informed pt that these particular forms are handled through medical records, pt verbalized understanding and will contact medical records.

## 2021-05-14 ENCOUNTER — OFFICE VISIT (OUTPATIENT)
Dept: UROLOGY | Facility: HOSPITAL | Age: 61
End: 2021-05-14
Attending: OBSTETRICS & GYNECOLOGY
Payer: COMMERCIAL

## 2021-05-14 VITALS
DIASTOLIC BLOOD PRESSURE: 62 MMHG | TEMPERATURE: 99 F | HEIGHT: 61 IN | WEIGHT: 163 LBS | SYSTOLIC BLOOD PRESSURE: 130 MMHG | RESPIRATION RATE: 20 BRPM | BODY MASS INDEX: 30.78 KG/M2

## 2021-05-14 DIAGNOSIS — N95.2 POSTMENOPAUSAL ATROPHIC VAGINITIS: ICD-10-CM

## 2021-05-14 DIAGNOSIS — N99.3 VAGINAL VAULT PROLAPSE AFTER HYSTERECTOMY: Primary | ICD-10-CM

## 2021-05-14 PROCEDURE — 99212 OFFICE O/P EST SF 10 MIN: CPT

## 2021-05-14 PROCEDURE — 57160 INSERT PESSARY/OTHER DEVICE: CPT | Performed by: STUDENT IN AN ORGANIZED HEALTH CARE EDUCATION/TRAINING PROGRAM

## 2021-05-14 NOTE — PROGRESS NOTES
Pt presents to follow up bulge  Currently using #5 longstem gellhorn pessary   Reports the bulge is coming around pessary  Pessary not comfortable with intercourse  Denies discharge  Denies bleeding    Pt is utilizing office care for management  Using vagi

## 2021-05-15 ENCOUNTER — IMMUNIZATION (OUTPATIENT)
Dept: LAB | Facility: HOSPITAL | Age: 61
End: 2021-05-15
Attending: EMERGENCY MEDICINE
Payer: COMMERCIAL

## 2021-05-15 DIAGNOSIS — Z23 NEED FOR VACCINATION: Primary | ICD-10-CM

## 2021-05-15 PROCEDURE — 0012A SARSCOV2 VAC 100MCG/0.5ML IM: CPT

## 2021-05-17 NOTE — TELEPHONE ENCOUNTER
Pt called asking if Dr. Wang Child has completed disability forms. Confirmed with pt she was talking about the social security disability forms. Informed pt we do not handle that request and that was sent to [employer].  Uploaded copy of what was sent to [employer]

## 2021-05-24 ENCOUNTER — OFFICE VISIT (OUTPATIENT)
Dept: UROLOGY | Facility: HOSPITAL | Age: 61
End: 2021-05-24
Attending: STUDENT IN AN ORGANIZED HEALTH CARE EDUCATION/TRAINING PROGRAM
Payer: COMMERCIAL

## 2021-05-24 VITALS — RESPIRATION RATE: 18 BRPM | HEIGHT: 61 IN | BODY MASS INDEX: 30.78 KG/M2 | WEIGHT: 163 LBS

## 2021-05-24 DIAGNOSIS — N89.8 DISCHARGE OF VAGINA: ICD-10-CM

## 2021-05-24 DIAGNOSIS — N95.2 POSTMENOPAUSAL ATROPHIC VAGINITIS: ICD-10-CM

## 2021-05-24 DIAGNOSIS — N99.3 VAGINAL VAULT PROLAPSE AFTER HYSTERECTOMY: Primary | ICD-10-CM

## 2021-05-24 DIAGNOSIS — N81.84 PELVIC MUSCLE WASTING: ICD-10-CM

## 2021-05-24 PROCEDURE — 87106 FUNGI IDENTIFICATION YEAST: CPT | Performed by: STUDENT IN AN ORGANIZED HEALTH CARE EDUCATION/TRAINING PROGRAM

## 2021-05-24 PROCEDURE — 87808 TRICHOMONAS ASSAY W/OPTIC: CPT | Performed by: STUDENT IN AN ORGANIZED HEALTH CARE EDUCATION/TRAINING PROGRAM

## 2021-05-24 PROCEDURE — 99212 OFFICE O/P EST SF 10 MIN: CPT | Performed by: STUDENT IN AN ORGANIZED HEALTH CARE EDUCATION/TRAINING PROGRAM

## 2021-05-24 PROCEDURE — 87205 SMEAR GRAM STAIN: CPT | Performed by: STUDENT IN AN ORGANIZED HEALTH CARE EDUCATION/TRAINING PROGRAM

## 2021-05-24 NOTE — PROGRESS NOTES
Pt presents to follow up bulge  Doing well with #6 cube pessary  Denies discharge  Denies bleeding  Bowels reg  No s/sx of UTI     Pt is utilizing office care for the management of her pessary   Pt is using vaginal estrogen cream    She reports her pessary

## 2021-05-26 ENCOUNTER — TELEPHONE (OUTPATIENT)
Dept: UROLOGY | Facility: HOSPITAL | Age: 61
End: 2021-05-26

## 2021-05-26 DIAGNOSIS — N76.0 BACTERIAL VAGINOSIS: ICD-10-CM

## 2021-05-26 DIAGNOSIS — B37.3 VAGINAL YEAST INFECTION: Primary | ICD-10-CM

## 2021-05-26 DIAGNOSIS — B96.89 BACTERIAL VAGINOSIS: ICD-10-CM

## 2021-05-26 RX ORDER — METRONIDAZOLE 7.5 MG/G
5 GEL VAGINAL NIGHTLY
Qty: 70 G | Refills: 3 | Status: SHIPPED | OUTPATIENT
Start: 2021-05-26 | End: 2021-05-31

## 2021-05-26 RX ORDER — FLUCONAZOLE 150 MG/1
150 TABLET ORAL DAILY
Qty: 2 TABLET | Refills: 0 | Status: SHIPPED | OUTPATIENT
Start: 2021-05-26 | End: 2021-05-27

## 2021-05-26 NOTE — TELEPHONE ENCOUNTER
TC to pt following up test results  Genital vaginosis screen shows BV and yeast, pt informed  Will Rx metrogel and diflucan to pt's preferred pharmacy  Encouraged to call with questions or concerns.

## 2021-05-27 NOTE — TELEPHONE ENCOUNTER
TC from pt regarding questions about medications  Pt instructed to use metrogel 5 g at bedtime x 5 days for BV, then can use prn for discharge  All questions answered  Pt understands and agrees to plan  Follow up as scheduled, sooner prn

## 2021-06-11 ENCOUNTER — OFFICE VISIT (OUTPATIENT)
Dept: UROLOGY | Facility: HOSPITAL | Age: 61
End: 2021-06-11
Attending: STUDENT IN AN ORGANIZED HEALTH CARE EDUCATION/TRAINING PROGRAM
Payer: COMMERCIAL

## 2021-06-11 VITALS — HEIGHT: 61 IN | WEIGHT: 163 LBS | TEMPERATURE: 97 F | BODY MASS INDEX: 30.78 KG/M2

## 2021-06-11 DIAGNOSIS — N81.9 VAGINAL VAULT PROLAPSE: Primary | ICD-10-CM

## 2021-06-11 DIAGNOSIS — N95.2 POSTMENOPAUSAL ATROPHIC VAGINITIS: ICD-10-CM

## 2021-06-11 DIAGNOSIS — N81.84 PELVIC MUSCLE WASTING: ICD-10-CM

## 2021-06-11 PROCEDURE — 99212 OFFICE O/P EST SF 10 MIN: CPT

## 2021-06-11 RX ORDER — OMEGA-3 FATTY ACIDS/FISH OIL 300-1000MG
CAPSULE ORAL AS NEEDED
COMMUNITY

## 2021-06-11 NOTE — PROGRESS NOTES
Pt presents to follow up bulge  Doing well with #6 cube pessary  Some discharge  Denies bleeding  No s/sx of UTI  Bowels regular    Pessary is supportive, happy  Considering surgery       Vitals:   06/11/21  1225   Temp: 97.3 °F (36.3 °C)       GENERAL EXA

## 2021-06-18 ENCOUNTER — OFFICE VISIT (OUTPATIENT)
Dept: INTERNAL MEDICINE CLINIC | Facility: CLINIC | Age: 61
End: 2021-06-18
Payer: COMMERCIAL

## 2021-06-18 VITALS
RESPIRATION RATE: 22 BRPM | HEIGHT: 61 IN | DIASTOLIC BLOOD PRESSURE: 76 MMHG | WEIGHT: 163.31 LBS | BODY MASS INDEX: 30.83 KG/M2 | HEART RATE: 87 BPM | SYSTOLIC BLOOD PRESSURE: 128 MMHG

## 2021-06-18 DIAGNOSIS — R01.1 HEART MURMUR: ICD-10-CM

## 2021-06-18 DIAGNOSIS — R42 DIZZINESS: ICD-10-CM

## 2021-06-18 DIAGNOSIS — E86.0 DEHYDRATION: ICD-10-CM

## 2021-06-18 DIAGNOSIS — Z09 HOSPITAL DISCHARGE FOLLOW-UP: Primary | ICD-10-CM

## 2021-06-18 PROCEDURE — 99214 OFFICE O/P EST MOD 30 MIN: CPT | Performed by: INTERNAL MEDICINE

## 2021-06-18 PROCEDURE — 3078F DIAST BP <80 MM HG: CPT | Performed by: INTERNAL MEDICINE

## 2021-06-18 PROCEDURE — 3074F SYST BP LT 130 MM HG: CPT | Performed by: INTERNAL MEDICINE

## 2021-06-18 PROCEDURE — 3008F BODY MASS INDEX DOCD: CPT | Performed by: INTERNAL MEDICINE

## 2021-06-18 NOTE — PROGRESS NOTES
Patient ID: Hillary Cisneros is a 56year old female. Patient presents with:  ER F/U: [facility] 6/15/21, Dehydration        HISTORY OF PRESENT ILLNESS:   HPI  Patient presents for above. Here for ER follow-up.   On 6/18/2021 patient had a dizzy spell while teoin FOOT SURGERY  2010    bunionectomy   • HIP REPLACEMENT SURGERY      RT hip surgery 2020   • HYSTERECTOMY  2019   • LEG/ANKLE SURGERY PROC UNLISTED Left 2018    to repair flat foot   • NEEDLE BIOPSY LEFT  2013   •       x3 w/PPTL   • OTHER WHEELER Physical therapy 3 times per week.   Walking daily        Bike Helmet: Not Asked        Seat Belt: Not Asked        Self-Exams: Not Asked        Grew up on a farm: Not Asked        History of tanning: Not Asked        Outdoor occupation: Not Asked        P flat.      Palpations: Abdomen is soft. Neurological:      General: No focal deficit present. Mental Status: She is alert. Psychiatric:         Mood and Affect: Mood normal.           ASSESSMENT/PLAN:   1.  Hospital discharge follow-up  · Data revi

## 2021-06-21 ENCOUNTER — NURSE TRIAGE (OUTPATIENT)
Dept: INTERNAL MEDICINE CLINIC | Facility: CLINIC | Age: 61
End: 2021-06-21

## 2021-06-21 NOTE — TELEPHONE ENCOUNTER
Left message to call back. Please transfer to triage. 1st attempt. Pt has not viewed our message to call us back.

## 2021-06-21 NOTE — TELEPHONE ENCOUNTER
----- Message from Teddy Marin sent at 6/21/2021  1:55 PM CDT -----  Regarding: Other  Contact: [phone]  Dr. Oscar Boyd, I wanted to know if it is okay to feel  numbness on my fingers the right side hand and sometimes tingling?     Thank you for your time

## 2021-06-22 ENCOUNTER — OFFICE VISIT (OUTPATIENT)
Dept: INTERNAL MEDICINE CLINIC | Facility: CLINIC | Age: 61
End: 2021-06-22
Payer: COMMERCIAL

## 2021-06-22 ENCOUNTER — TELEPHONE (OUTPATIENT)
Dept: ORTHOPEDICS CLINIC | Facility: CLINIC | Age: 61
End: 2021-06-22

## 2021-06-22 VITALS
HEIGHT: 61 IN | SYSTOLIC BLOOD PRESSURE: 131 MMHG | WEIGHT: 164 LBS | DIASTOLIC BLOOD PRESSURE: 78 MMHG | HEART RATE: 85 BPM | TEMPERATURE: 98 F | BODY MASS INDEX: 30.96 KG/M2

## 2021-06-22 DIAGNOSIS — M65.9 TENOSYNOVITIS OF RIGHT WRIST: Primary | ICD-10-CM

## 2021-06-22 PROCEDURE — 3078F DIAST BP <80 MM HG: CPT | Performed by: INTERNAL MEDICINE

## 2021-06-22 PROCEDURE — 3075F SYST BP GE 130 - 139MM HG: CPT | Performed by: INTERNAL MEDICINE

## 2021-06-22 PROCEDURE — 99214 OFFICE O/P EST MOD 30 MIN: CPT | Performed by: INTERNAL MEDICINE

## 2021-06-22 PROCEDURE — 3008F BODY MASS INDEX DOCD: CPT | Performed by: INTERNAL MEDICINE

## 2021-06-22 NOTE — PROGRESS NOTES
History of Present Illness   Patient ID: Marysol Fan is a 56year old female. Chief Complaint: Numbness (x3 days R hand numbness/tingling)      HPI   Very pleasant 39-year-old female who presents for right hand numbness.   She states on Sunday her sympt Normal range of motion. Right wrist: Tenderness present. No swelling, bony tenderness, snuff box tenderness or crepitus. Normal pulse. Left wrist: Normal.      Right hand: No swelling.  Decreased sensation of the ulnar distribution, median distrib Problems:  Patient Active Problem List    Primary osteoarthritis of right hip      Essential hypertension       Reviewed Social History:  Social History    Tobacco Use      Smoking status: Never Smoker      Smokeless tobacco: Never Used    Vaping Use or a moist towel warmed in a microwave. Try each and use the method that feels best, for 15 to 20 minutes several times a day. · Rest the inflamed joint and protect it from movement.   · You may use over-the-counter ibuprofen or naproxen to treat pain and to the side of the elbow and forearm, along the thumb side of the arm. The pain is caused by damage to the tendons that bend the wrist back and away from the palm. · Medial epicondylitis. This is most often known as golfer's or baseball elbow.  It causes p image. Tendons can’t be seen on an X-ray, but they can show bone. This test can check for arthritis, calcifications, and other problems. How are tendonitis and tenosynovitis treated?   Treatment may include:  · Changing your activities  · Icing the area to and what the results could mean. · Know what to expect if you do not take the medicine or have the test or procedure. · If you have a follow-up appointment, write down the date, time, and purpose for that visit.   · Know how you can contact your provider

## 2021-06-22 NOTE — TELEPHONE ENCOUNTER
Per pt asking if note can be given stating she was put on disability by Dr. Vivian Wyman, note can be put on mychart. Please advise thank you.

## 2021-06-22 NOTE — TELEPHONE ENCOUNTER
Pt called forms dept for documentation from Dr. Darrell Lawson to prove he put her on disability. I transferred pt to Dr. Lindy Lorenzo office to see if he could type a letter for her.

## 2021-06-22 NOTE — TELEPHONE ENCOUNTER
Per CT, unable to take patient at this time for CT angio level 1, will call back and let the HUC/RN know when there is room available for her scan.     Pt called us back and she stated that since Sunday she has been experiencing numbness and tingling on her right hand. She is able to grab stuff and move her hand. Pt is concern and will like to get seen today. Pt was given a appt to see Dr. Jarad Rene today.

## 2021-06-22 NOTE — PATIENT INSTRUCTIONS
Tendonitis  A tendon is the thick fibrous cord that joins muscle to bone and allows joints to move. When a tendon becomes inflamed, it is called tendonitis. This can occur from overuse, injury, or infection.  This usually involves the shoulders, forearm, information is not intended as a substitute for professional medical care. Always follow your healthcare professional's instructions. Tendonitis and Tenosynovitis  What are tendonitis and tenosynovitis?   Tendons are strong cords of tissue that conne arthritis, or infection. What are the symptoms of tendonitis and tenosynovitis?   Symptoms may include:  · Pain in the tendon when moved  · Swelling from fluid and inflammation  · A grating feeling when moving the joint  The symptoms of tendonitis can see exercise. · Treatment may include changing your activities, icing the area to reduce pain, and using a splint to limit movement.     Next steps  Tips to help you get the most from a visit to your healthcare provider:   · Know the reason for your visit and

## 2021-06-23 ENCOUNTER — PATIENT MESSAGE (OUTPATIENT)
Dept: ORTHOPEDICS CLINIC | Facility: CLINIC | Age: 61
End: 2021-06-23

## 2021-06-23 NOTE — TELEPHONE ENCOUNTER
From: Ev Voss  To: Ministerio Cadet MD  Sent: 6/23/2021 2:18 PM CDT  Subject: Other    I wanted to know if you talked to Dr. Mat Weiss about the letter I need saying that he has put me on disability? Please let me know when this will be done.

## 2021-06-28 ENCOUNTER — PATIENT MESSAGE (OUTPATIENT)
Dept: INTERNAL MEDICINE CLINIC | Facility: CLINIC | Age: 61
End: 2021-06-28

## 2021-06-28 NOTE — TELEPHONE ENCOUNTER
From: John Garrison  To: Mellody Kehr, MD  Sent: 6/28/2021 12:29 PM CDT  Subject: Non-Urgent Medical Question    Good afternoon, please let Dr. Whitney Barrientos know I am feeling better I have no more numbness or tingling on my right hand fingers.     have a nice day Quality 130: Documentation Of Current Medications In The Medical Record: Current Medications Documented Quality 111:Pneumonia Vaccination Status For Older Adults: Pneumococcal Vaccination Previously Received Quality 110: Preventive Care And Screening: Influenza Immunization: Influenza Immunization Administered during Influenza season Quality 226: Preventive Care And Screening: Tobacco Use: Screening And Cessation Intervention: Patient screened for tobacco use and is an ex/non-smoker Detail Level: Detailed

## 2021-07-06 ENCOUNTER — HOSPITAL ENCOUNTER (OUTPATIENT)
Dept: CV DIAGNOSTICS | Age: 61
Discharge: HOME | End: 2021-07-06
Attending: INTERNAL MEDICINE
Payer: COMMERCIAL

## 2021-07-06 DIAGNOSIS — R01.1 HEART MURMUR: ICD-10-CM

## 2021-07-06 PROCEDURE — 93306 TTE W/DOPPLER COMPLETE: CPT | Performed by: INTERNAL MEDICINE

## 2021-07-19 ENCOUNTER — OFFICE VISIT (OUTPATIENT)
Dept: UROLOGY | Facility: HOSPITAL | Age: 61
End: 2021-07-19
Attending: OBSTETRICS & GYNECOLOGY
Payer: COMMERCIAL

## 2021-07-19 VITALS
DIASTOLIC BLOOD PRESSURE: 62 MMHG | HEIGHT: 61 IN | BODY MASS INDEX: 30.96 KG/M2 | WEIGHT: 164 LBS | RESPIRATION RATE: 20 BRPM | SYSTOLIC BLOOD PRESSURE: 132 MMHG

## 2021-07-19 DIAGNOSIS — N95.2 POSTMENOPAUSAL ATROPHIC VAGINITIS: ICD-10-CM

## 2021-07-19 DIAGNOSIS — N99.3 PROLAPSE OF VAGINAL VAULT AFTER HYSTERECTOMY: Primary | ICD-10-CM

## 2021-07-19 DIAGNOSIS — N81.84 PELVIC MUSCLE WASTING: ICD-10-CM

## 2021-07-19 PROCEDURE — 99212 OFFICE O/P EST SF 10 MIN: CPT

## 2021-07-19 NOTE — PROGRESS NOTES
Pt presents to follow up bulge  Doing well with #6 cube pessary  Denies discharge  Denies bleeding  No s/sx of UTI  Bowels regular     Pessary is supportive, happy  Considering surgery    Vitals:   07/19/21  1220   BP: 132/62   Resp: 20       GENERAL EXAM:

## 2021-08-04 ENCOUNTER — TELEPHONE (OUTPATIENT)
Dept: UROLOGY | Facility: HOSPITAL | Age: 61
End: 2021-08-04

## 2021-08-04 NOTE — TELEPHONE ENCOUNTER
Patient called with a few questions regarding her upcoming UDS test scheduled for 08/30/2021. Patient wanting to know how to prepare for UDS testing? Informed patient she needs to come to the office the day of testing with a \"comfortable full bladder\".

## 2021-08-10 ENCOUNTER — TELEPHONE (OUTPATIENT)
Dept: ORTHOPEDICS CLINIC | Facility: CLINIC | Age: 61
End: 2021-08-10

## 2021-08-13 RX ORDER — ENALAPRIL MALEATE 10 MG/1
10 TABLET ORAL DAILY
Qty: 90 TABLET | Refills: 1 | Status: SHIPPED | OUTPATIENT
Start: 2021-08-13 | End: 2022-02-14

## 2021-08-13 RX ORDER — ENALAPRIL MALEATE 10 MG/1
TABLET ORAL
Qty: 90 TABLET | Refills: 3 | OUTPATIENT
Start: 2021-08-13

## 2021-08-13 NOTE — TELEPHONE ENCOUNTER
Refill passed per [place] [facility] protocol. Requested Prescriptions   Pending Prescriptions Disp Refills    enalapril 10 MG Oral Tab 90 tablet 3     Sig: Take 1 tablet (10 mg total) by mouth daily.         Hypertensive Medications Protocol Passed - 8/13/2021 11:34 AM        Passed - CMP or BMP in past 12 months        Passed - Appointment in past 6 or next 3 months        Passed - GFR Non- > 50     Lab Results   Component Value Date    GFRNAA 97 01/14/2021                   Recent Outpatient Visits              3 weeks ago Prolapse of vaginal vault after hysterectomy    [facility] Janel Perez PA-C    Office Visit    1 month ago Tenosynovitis of right wrist    Danielito Davis MD    Office Visit    1 month ago Hospital discharge follow-up    [facility], [facility], [address] Kiki Aguilera MD    Office Visit    2 months ago Vaginal vault prolapse    [facility] Janel Perez PA-C    Office Visit    2 months ago Vaginal vault prolapse after hysterectomy    [facility] Janel Perez PA-C    Office Visit          Future Appointments         Provider Department Appt Notes    In 2 weeks [place] [facility] uds + pessary check (alfonso) schedule uds f/u appt

## 2021-08-24 NOTE — TELEPHONE ENCOUNTER
Per pt declined soonest appt 9/8 and is requesting to speak to Dr. Rona Sue about disability forms that need to be sent to her .  Please advise

## 2021-08-24 NOTE — TELEPHONE ENCOUNTER
Spoke to pt about work duty form. Pt hasn't seen Scottie since Jan/2021. Told pt she needs a current visit to see how she is progressing and we need specific restrictions. Transferred to Ortho to make an appt.     She stated that in Jan Dr. Sujey Briceno

## 2021-08-25 NOTE — TELEPHONE ENCOUNTER
This needs to be handled by clinical staff, not the Forms dept. We received a work restrictions form and pt last saw  In Jan/2021. I told pt to make an appt to discuss specific restrictions.  Cannot complete form until current visit is made or if dr Yasmany Smith

## 2021-08-30 ENCOUNTER — OFFICE VISIT (OUTPATIENT)
Dept: UROLOGY | Facility: HOSPITAL | Age: 61
End: 2021-08-30
Attending: OBSTETRICS & GYNECOLOGY
Payer: COMMERCIAL

## 2021-08-30 VITALS
RESPIRATION RATE: 20 BRPM | BODY MASS INDEX: 30.96 KG/M2 | WEIGHT: 164 LBS | SYSTOLIC BLOOD PRESSURE: 128 MMHG | HEIGHT: 61 IN | DIASTOLIC BLOOD PRESSURE: 64 MMHG

## 2021-08-30 DIAGNOSIS — N99.3 PROLAPSE OF VAGINAL VAULT AFTER HYSTERECTOMY: Primary | ICD-10-CM

## 2021-08-30 LAB
CONTROL RUN WITHIN 24 HOURS?: YES
NITRITE URINE: NEGATIVE

## 2021-08-30 PROCEDURE — 51726 COMPLEX CYSTOMETROGRAM: CPT

## 2021-08-30 PROCEDURE — 81002 URINALYSIS NONAUTO W/O SCOPE: CPT

## 2021-08-30 PROCEDURE — 51741 ELECTRO-UROFLOWMETRY FIRST: CPT

## 2021-08-30 PROCEDURE — 87086 URINE CULTURE/COLONY COUNT: CPT

## 2021-08-30 PROCEDURE — 51797 INTRAABDOMINAL PRESSURE TEST: CPT

## 2021-08-30 PROCEDURE — 99212 OFFICE O/P EST SF 10 MIN: CPT

## 2021-08-30 PROCEDURE — 51729 CYSTOMETROGRAM W/VP&UP: CPT

## 2021-08-30 NOTE — PATIENT INSTRUCTIONS
[address] [place], [place] [address]  [facility]: [phone]       Urodynamic Testing Discharge Instructions: There are NO dietary or activity restrictions. You may resume your normal schedule.       You supplement.      _____Fiber Supplement  (Metamucil, Citrucel, Benefiber, etc)    Begin with 1 dose (as instructed on the package) every morning.     If the stool is too hard, or if the stool consists of small, hard, marbles, you may need to increase to 1 do to urinate. The catheter will be removed and you will be able to go to the bathroom to empty your bladder. Will the catheter need to be replaced? You will need to urinate a specific amount, depending on how much you were initially able to hold.   Your PELVIC MEDICINE     Post-Operative Guidelines    · AVOID CONSTIPATION - Take Miralax: one capful in water or juice each morning. You can also take each evening if needed. · No lifting over 10 lbs.  (1 gallon of milk is 8 lbs.)  · It is okay to walk up and

## 2021-08-30 NOTE — PROGRESS NOTES
S:     Patient here for UDS / Pessary check     vaginal bleeding No    Discharge No     Patient happy with pessary. Bowels Constipated using over the counter colace when needed.      Vaginal estrogen cream-Using    O:   /64   Resp 20   Ht 61\"

## 2021-08-30 NOTE — PROCEDURES
Patient here for urodynamic testing. Procedure explained and confirmed by patient. See evaluation form for results. Both verbal and written discharge instructions were given.   Patient tolerated procedure well and will follow up with Dr. Daysi Mack void    mL  Maximum Flow Rate:                    na          mL/sec  Average flow rate:                  na           mL/sec  Post-void Residual:              250        mL  Pattern:  []  Normal  []  Poor flow     []  Intermittent  [x]  Other  Void:   [] Na  mL/sec  Pressure Detrusor (at maximum flow):        Na     cm H2O  Post void residual:    250           mL  Voiding mechanism:  []  Abnormal  [x]  Normal  []  Strain to void   []  Weak detrusor  Void:   [x]  Typical   []  Atypical    Additional Notes:

## 2021-09-01 NOTE — TELEPHONE ENCOUNTER
Pt CB and I advised sara in phone room to assist pt in making appt as per messages below from forms dept.  Pt scheduled appt on 9/16/21

## 2021-09-10 ENCOUNTER — OFFICE VISIT (OUTPATIENT)
Dept: INTERNAL MEDICINE CLINIC | Facility: CLINIC | Age: 61
End: 2021-09-10
Payer: COMMERCIAL

## 2021-09-10 ENCOUNTER — LAB ENCOUNTER (OUTPATIENT)
Dept: LAB | Facility: HOSPITAL | Age: 61
End: 2021-09-10
Attending: INTERNAL MEDICINE
Payer: COMMERCIAL

## 2021-09-10 VITALS
WEIGHT: 157 LBS | TEMPERATURE: 98 F | SYSTOLIC BLOOD PRESSURE: 156 MMHG | DIASTOLIC BLOOD PRESSURE: 81 MMHG | HEART RATE: 103 BPM | BODY MASS INDEX: 29.64 KG/M2 | HEIGHT: 61 IN

## 2021-09-10 DIAGNOSIS — J06.9 VIRAL UPPER RESPIRATORY TRACT INFECTION: Primary | ICD-10-CM

## 2021-09-10 DIAGNOSIS — J06.9 VIRAL UPPER RESPIRATORY TRACT INFECTION: ICD-10-CM

## 2021-09-10 PROCEDURE — 3079F DIAST BP 80-89 MM HG: CPT | Performed by: INTERNAL MEDICINE

## 2021-09-10 PROCEDURE — 99213 OFFICE O/P EST LOW 20 MIN: CPT | Performed by: INTERNAL MEDICINE

## 2021-09-10 PROCEDURE — 3077F SYST BP >= 140 MM HG: CPT | Performed by: INTERNAL MEDICINE

## 2021-09-10 PROCEDURE — 3008F BODY MASS INDEX DOCD: CPT | Performed by: INTERNAL MEDICINE

## 2021-09-11 LAB — SARS-COV-2 RNA RESP QL NAA+PROBE: NOT DETECTED

## 2021-09-13 NOTE — PROGRESS NOTES
HPI:    Patient ID: Lindy Crowder is a 59year old female. HPI    Ear pain sore throat    /81 (BP Location: Right arm, Patient Position: Sitting, Cuff Size: adult)   Pulse 103   Temp 98.4 °F (36.9 °C) (Oral)   Ht 5' 1\" (1.549 m)   Wt 157 lb (71. CALCIUM OR Take 1 capsule by mouth daily. • loratadine (CLARITIN) 10 MG Oral Tab Take 10 mg by mouth daily as needed.          Allergies:  Codeine                 RASH    Comment:T#3  Acetaminophen-Codei*    RASH    HISTORY:  Past Medical History:   D Alcohol and Other Disorders Associated Father         alcoholism   • Hypertension Father    • Glaucoma Maternal Grandmother    • Diabetes Maternal Grandmother    • Hypertension Maternal Grandmother    • Musculo-skelatal Disorder Maternal Grandmother Prescriptions      No prescriptions requested or ordered in this encounter       Imaging & Referrals:  None        [id number]

## 2021-09-16 ENCOUNTER — HOSPITAL ENCOUNTER (OUTPATIENT)
Dept: GENERAL RADIOLOGY | Facility: HOSPITAL | Age: 61
Discharge: HOME | End: 2021-09-16
Attending: ORTHOPAEDIC SURGERY
Payer: COMMERCIAL

## 2021-09-16 ENCOUNTER — OFFICE VISIT (OUTPATIENT)
Dept: ORTHOPEDICS CLINIC | Facility: CLINIC | Age: 61
End: 2021-09-16
Payer: COMMERCIAL

## 2021-09-16 DIAGNOSIS — Z47.89 ORTHOPEDIC AFTERCARE: ICD-10-CM

## 2021-09-16 DIAGNOSIS — M16.11 PRIMARY OSTEOARTHRITIS OF RIGHT HIP: Primary | ICD-10-CM

## 2021-09-16 PROCEDURE — 99213 OFFICE O/P EST LOW 20 MIN: CPT | Performed by: ORTHOPAEDIC SURGERY

## 2021-09-16 PROCEDURE — 73502 X-RAY EXAM HIP UNI 2-3 VIEWS: CPT | Performed by: ORTHOPAEDIC SURGERY

## 2021-09-16 NOTE — PROGRESS NOTES
NURSING INTAKE COMMENTS: Patient presents with: Follow - Up: Right Total hip, denies any pain, pt states she has no strength      HPI: This 59year old female presents today with complaints of right hip follow-up.   She is now slightly more than 1 year pos 2006   • TONSILLECTOMY  1970   • TUBAL LIGATION  1992     Current Outpatient Medications   Medication Sig Dispense Refill   • enalapril 10 MG Oral Tab Take 1 tablet (10 mg total) by mouth daily. 90 tablet 1   • Ibuprofen 200 MG Oral Cap Take by mouth. breath, cough, asthma, wheezing  CARDIOVASCULAR: denies chest pain, leg cramps with exertion, palpitations, leg swelling  GI: denies abdominal pain, nausea, vomiting, diarrhea, constipation, hematochezia, worsening heartburn or stomach ulcers  : denies d FINDINGS:  BONES: No acute fracture dislocation. Right hip prosthesis in anatomic position. Mild left hip osteoarthritis. Levoscoliosis and multilevel lumbar spondylosis. SOFT TISSUES: Negative. No visible soft tissue swelling. EFFUSION: None visible.  OT

## 2021-10-05 NOTE — TELEPHONE ENCOUNTER
Per Dr. Kathy Gauthier note he will not be completed and disability forms:  \"From the standpoint of her hip she has no reason to expect long-term disability benefits. \"  Called pt to inform of 's message and she understood.  Will mail back forms to pt

## 2021-10-08 ENCOUNTER — OFFICE VISIT (OUTPATIENT)
Dept: UROLOGY | Facility: HOSPITAL | Age: 61
End: 2021-10-08
Attending: OBSTETRICS & GYNECOLOGY
Payer: COMMERCIAL

## 2021-10-08 VITALS — BODY MASS INDEX: 29.64 KG/M2 | HEIGHT: 61 IN | WEIGHT: 157 LBS | RESPIRATION RATE: 20 BRPM

## 2021-10-08 DIAGNOSIS — N99.3 VAGINAL VAULT PROLAPSE AFTER HYSTERECTOMY: Primary | ICD-10-CM

## 2021-10-08 DIAGNOSIS — N81.84 PELVIC MUSCLE WASTING: ICD-10-CM

## 2021-10-08 DIAGNOSIS — R33.9 INCOMPLETE BLADDER EMPTYING: ICD-10-CM

## 2021-10-08 DIAGNOSIS — Z98.890 POST-OPERATIVE STATE: ICD-10-CM

## 2021-10-08 DIAGNOSIS — N95.2 POSTMENOPAUSAL ATROPHIC VAGINITIS: ICD-10-CM

## 2021-10-08 PROCEDURE — 99212 OFFICE O/P EST SF 10 MIN: CPT

## 2021-10-08 PROCEDURE — 51701 INSERT BLADDER CATHETER: CPT

## 2021-10-08 NOTE — PROGRESS NOTES
Pt presents to follow up bulge  Doing well with #6 cube pessary  Some discharge, uses metrogel prn  Denies bleeding  Denies s/sx of UTI  Bowels regular    Pt is using vaginal estrogen cream twice weekly    Happy with pessary, feels supported  Some interest

## 2021-10-08 NOTE — PROGRESS NOTES
Pt presents w/ initial c/o bulge  Urodynamic testing undergone without complication.   Results reviewed with patient  0/250cc & 125/250cc  skilled nursing 301cc  No DO  No YOSEPH     Discussed with patient mgmt options for POP, incomplete bladder emptying  Biggest bother

## 2021-10-09 ENCOUNTER — PATIENT MESSAGE (OUTPATIENT)
Dept: INTERNAL MEDICINE CLINIC | Facility: CLINIC | Age: 61
End: 2021-10-09

## 2021-10-10 NOTE — TELEPHONE ENCOUNTER
Please see [employer] message and advise if safe to take Celecoxib. Per med history, patient has taken this before.

## 2021-10-11 ENCOUNTER — PATIENT MESSAGE (OUTPATIENT)
Dept: INTERNAL MEDICINE CLINIC | Facility: CLINIC | Age: 61
End: 2021-10-11

## 2021-10-12 ENCOUNTER — PATIENT MESSAGE (OUTPATIENT)
Dept: INTERNAL MEDICINE CLINIC | Facility: CLINIC | Age: 61
End: 2021-10-12

## 2021-10-15 ENCOUNTER — TELEPHONE (OUTPATIENT)
Dept: UROLOGY | Facility: HOSPITAL | Age: 61
End: 2021-10-15

## 2021-10-15 NOTE — TELEPHONE ENCOUNTER
TC from pt with some questions re surgery  Pt considering proceeding RAL sacrocolpopexy  All questions regarding surgery answered, benefits and risks of USVVS vs. RAL sacrocolpopexy rediscussed w/ pt   Pt given number for surgical scheduler   Will plan to

## 2021-10-21 ENCOUNTER — TELEPHONE (OUTPATIENT)
Dept: ORTHOPEDICS CLINIC | Facility: CLINIC | Age: 61
End: 2021-10-21

## 2021-10-21 NOTE — TELEPHONE ENCOUNTER
Per pt her handicap placard expires at the end of the month and asking if she can get a new one for 6 months.  Please advise

## 2021-10-25 ENCOUNTER — TELEPHONE (OUTPATIENT)
Dept: INTERNAL MEDICINE CLINIC | Facility: CLINIC | Age: 61
End: 2021-10-25

## 2021-10-25 NOTE — TELEPHONE ENCOUNTER
Patient called and advised that she applied for Disability, and she had to hire an . The  needs some forms filled out that Dr. Bia Molina will not fill them out. Per patient, Dr. Mat Weiss told patient Elijah Score is done with her\".  He suggest

## 2021-10-27 NOTE — TELEPHONE ENCOUNTER
Denied. She is over 1 year post-op from [facility], doing well, with no complaints from an orthopedic standpoint.  Dr. Janet Olivarez has referred her to neurology and physiatry for ongoing weakness in her leg - but this has significantly improved as well, and she has n

## 2021-10-29 ENCOUNTER — TELEPHONE (OUTPATIENT)
Dept: INTERNAL MEDICINE CLINIC | Facility: CLINIC | Age: 61
End: 2021-10-29

## 2021-10-29 NOTE — TELEPHONE ENCOUNTER
Pt is requesting for a disability placard. Current one expires 10/31/21. Pt states Dr. Steffanie Uriarte usually signs this, however, was denied because she is better. Pt states she is still having trouble with her ankles.  Pt state she did see her Podiatrist and

## 2021-10-30 ENCOUNTER — PATIENT MESSAGE (OUTPATIENT)
Dept: INTERNAL MEDICINE CLINIC | Facility: CLINIC | Age: 61
End: 2021-10-30

## 2021-10-31 NOTE — TELEPHONE ENCOUNTER
From: Tenzin Davis  To: Renita Paulson MD  Sent: 10/30/2021 10:46 PM CDT  Subject: medical forms    I received some forms on Saturday regarding my health issue they came from the medical records/correspondence do you know what I have to do with them?

## 2021-11-01 NOTE — TELEPHONE ENCOUNTER
Verified name and . Patient states that she received all of her medical records in the mail. She states she doesn't know why she received them. She states that she has recently applied for disability.  She was advised that she may have signed a medical

## 2021-11-03 NOTE — TELEPHONE ENCOUNTER
Left a message to call back. mychart message sent. Call center please call and schedule an appointment. Thank You. [employer] message sent to the patient.

## 2021-11-09 ENCOUNTER — TELEPHONE (OUTPATIENT)
Dept: ADMINISTRATIVE | Age: 61
End: 2021-11-09

## 2021-11-11 ENCOUNTER — MED REC SCAN ONLY (OUTPATIENT)
Dept: INTERNAL MEDICINE CLINIC | Facility: CLINIC | Age: 61
End: 2021-11-11

## 2021-11-19 ENCOUNTER — TELEPHONE (OUTPATIENT)
Dept: UROLOGY | Facility: HOSPITAL | Age: 61
End: 2021-11-19

## 2021-11-19 NOTE — TELEPHONE ENCOUNTER
TC from patient questioning if she should continue to use the metrogel after the 5 days. Reviewing H Mina previous notes patient to use Metrogel as needed for discharge. Pt states she currently has no discharge. Pt only to use as needed.   Pt verbaliz

## 2021-11-22 ENCOUNTER — PATIENT MESSAGE (OUTPATIENT)
Dept: INTERNAL MEDICINE CLINIC | Facility: CLINIC | Age: 61
End: 2021-11-22

## 2021-12-06 ENCOUNTER — OFFICE VISIT (OUTPATIENT)
Dept: INTERNAL MEDICINE CLINIC | Facility: CLINIC | Age: 61
End: 2021-12-06
Payer: COMMERCIAL

## 2021-12-06 VITALS
BODY MASS INDEX: 29.83 KG/M2 | SYSTOLIC BLOOD PRESSURE: 144 MMHG | WEIGHT: 158 LBS | DIASTOLIC BLOOD PRESSURE: 84 MMHG | HEART RATE: 77 BPM | HEIGHT: 61 IN

## 2021-12-06 DIAGNOSIS — R26.9 GAIT ABNORMALITY: ICD-10-CM

## 2021-12-06 DIAGNOSIS — M21.969 ANKLE JOINT DEFORMITY, UNSPECIFIED LATERALITY: ICD-10-CM

## 2021-12-06 DIAGNOSIS — M47.816 OSTEOARTHRITIS OF LUMBAR SPINE, UNSPECIFIED SPINAL OSTEOARTHRITIS COMPLICATION STATUS: Primary | ICD-10-CM

## 2021-12-06 DIAGNOSIS — Z96.649 STATUS POST HIP REPLACEMENT, UNSPECIFIED LATERALITY: ICD-10-CM

## 2021-12-06 DIAGNOSIS — M48.061 SPINAL STENOSIS OF LUMBAR REGION WITHOUT NEUROGENIC CLAUDICATION: ICD-10-CM

## 2021-12-06 PROCEDURE — 3077F SYST BP >= 140 MM HG: CPT | Performed by: INTERNAL MEDICINE

## 2021-12-06 PROCEDURE — 99214 OFFICE O/P EST MOD 30 MIN: CPT | Performed by: INTERNAL MEDICINE

## 2021-12-06 PROCEDURE — 3008F BODY MASS INDEX DOCD: CPT | Performed by: INTERNAL MEDICINE

## 2021-12-06 PROCEDURE — 3079F DIAST BP 80-89 MM HG: CPT | Performed by: INTERNAL MEDICINE

## 2021-12-10 ENCOUNTER — OFFICE VISIT (OUTPATIENT)
Dept: UROLOGY | Facility: HOSPITAL | Age: 61
End: 2021-12-10
Attending: STUDENT IN AN ORGANIZED HEALTH CARE EDUCATION/TRAINING PROGRAM
Payer: COMMERCIAL

## 2021-12-10 VITALS
BODY MASS INDEX: 29.83 KG/M2 | DIASTOLIC BLOOD PRESSURE: 62 MMHG | HEIGHT: 61 IN | RESPIRATION RATE: 20 BRPM | SYSTOLIC BLOOD PRESSURE: 132 MMHG | WEIGHT: 158 LBS

## 2021-12-10 DIAGNOSIS — R33.9 INCOMPLETE BLADDER EMPTYING: ICD-10-CM

## 2021-12-10 DIAGNOSIS — N95.2 POSTMENOPAUSAL ATROPHIC VAGINITIS: ICD-10-CM

## 2021-12-10 DIAGNOSIS — N99.3 VAGINAL VAULT PROLAPSE AFTER HYSTERECTOMY: Primary | ICD-10-CM

## 2021-12-10 DIAGNOSIS — N81.84 PELVIC MUSCLE WASTING: ICD-10-CM

## 2021-12-10 PROCEDURE — 99212 OFFICE O/P EST SF 10 MIN: CPT

## 2021-12-10 NOTE — PROGRESS NOTES
Pt presents to follow up bulge  Doing well with #6 cube pessary  Some discharge, uses metrogel prn  Denies bleeding  Denies s/sx of UTI  Bowels-regular    Pt is using vaginal estrogen cream twice weekly    Surgery scheduled 1/27/2021  Will continue pessary

## 2021-12-16 NOTE — PROGRESS NOTES
HPI:    Patient ID: Miak Mora is a 59year old female.     HPI  Patient complain of chornic pain  Hips  And ankle  Pt having a hard time  Cannot sit too long  Had trouble walking walks with a cane  She would like form filled out re her functional capac subarticular zone narrowing at L3-4.       3. Mild right neural foraminal stenosis at L1-2.  Mild to moderate right neural foraminal stenosis at L3.  Mild left neural foraminal stenosis at L5-S1.       4.  Facet arthropathy throughout the lumbar spine, most mouth daily. • enalapril 10 MG Oral Tab Take 1 tablet (10 mg total) by mouth daily. 90 tablet 1   • Ibuprofen 200 MG Oral Cap Take by mouth. • Estradiol (ESTRACE) 0.1 MG/GM Vaginal Cream Apply 0.5 gram vaginally 2 times per week.  1 Tube 3   • CALCI type 2   • Lipids Mother         hyperlipidemia   • Eye Problems Mother         eye issues   • Heart Disorder Mother         per ng CABG   • Hypertension Mother    • Musculo-skelatal Disorder Mother         per ng osteoporosis   • Alcohol and Other Dis deformity, unspecified laterality  Plan: chronic    (R26.9) Gait abnormality  Plan: chronic    Pt with chronic gait abnormality and pain associated mainly with lumbar spinal stenosis  Seen by neurosurgereon and neurology  Pt underwent physical therapy  Hx

## 2021-12-28 ENCOUNTER — LAB ENCOUNTER (OUTPATIENT)
Dept: LAB | Age: 61
End: 2021-12-28
Attending: INTERNAL MEDICINE
Payer: COMMERCIAL

## 2021-12-28 ENCOUNTER — OFFICE VISIT (OUTPATIENT)
Dept: INTERNAL MEDICINE CLINIC | Facility: CLINIC | Age: 61
End: 2021-12-28
Payer: COMMERCIAL

## 2021-12-28 VITALS
HEIGHT: 61 IN | DIASTOLIC BLOOD PRESSURE: 83 MMHG | WEIGHT: 158 LBS | HEART RATE: 103 BPM | BODY MASS INDEX: 29.83 KG/M2 | SYSTOLIC BLOOD PRESSURE: 155 MMHG

## 2021-12-28 DIAGNOSIS — I10 ESSENTIAL HYPERTENSION: ICD-10-CM

## 2021-12-28 DIAGNOSIS — Z01.818 PREOP EXAMINATION: Primary | ICD-10-CM

## 2021-12-28 DIAGNOSIS — Z01.818 PREOP EXAMINATION: ICD-10-CM

## 2021-12-28 LAB
ALBUMIN SERPL-MCNC: 3.8 G/DL (ref 3.4–5)
ALBUMIN/GLOB SERPL: 0.8 {RATIO} (ref 1–2)
ALP LIVER SERPL-CCNC: 133 U/L
ALT SERPL-CCNC: 30 U/L
ANION GAP SERPL CALC-SCNC: 5 MMOL/L (ref 0–18)
AST SERPL-CCNC: 19 U/L (ref 15–37)
BASOPHILS # BLD AUTO: 0.08 X10(3) UL (ref 0–0.2)
BASOPHILS NFR BLD AUTO: 1 %
BILIRUB SERPL-MCNC: 0.7 MG/DL (ref 0.1–2)
BILIRUB UR QL: NEGATIVE
BUN BLD-MCNC: 14 MG/DL (ref 7–18)
BUN/CREAT SERPL: 21.9 (ref 10–20)
CALCIUM BLD-MCNC: 9.3 MG/DL (ref 8.5–10.1)
CHLORIDE SERPL-SCNC: 107 MMOL/L (ref 98–112)
CLARITY UR: CLEAR
CO2 SERPL-SCNC: 24 MMOL/L (ref 21–32)
COLOR UR: YELLOW
CREAT BLD-MCNC: 0.64 MG/DL
DEPRECATED RDW RBC AUTO: 41 FL (ref 35.1–46.3)
EOSINOPHIL # BLD AUTO: 0.05 X10(3) UL (ref 0–0.7)
EOSINOPHIL NFR BLD AUTO: 0.6 %
ERYTHROCYTE [DISTWIDTH] IN BLOOD BY AUTOMATED COUNT: 12.6 % (ref 11–15)
FASTING STATUS PATIENT QL REPORTED: NO
GLOBULIN PLAS-MCNC: 4.6 G/DL (ref 2.8–4.4)
GLUCOSE BLD-MCNC: 114 MG/DL (ref 70–99)
GLUCOSE UR-MCNC: NEGATIVE MG/DL
HCT VFR BLD AUTO: 39.6 %
HGB BLD-MCNC: 13.3 G/DL
HGB UR QL STRIP.AUTO: NEGATIVE
IMM GRANULOCYTES # BLD AUTO: 0.04 X10(3) UL (ref 0–1)
IMM GRANULOCYTES NFR BLD: 0.5 %
KETONES UR-MCNC: NEGATIVE MG/DL
LYMPHOCYTES # BLD AUTO: 2.39 X10(3) UL (ref 1–4)
LYMPHOCYTES NFR BLD AUTO: 29.4 %
MCH RBC QN AUTO: 29.9 PG (ref 26–34)
MCHC RBC AUTO-ENTMCNC: 33.6 G/DL (ref 31–37)
MCV RBC AUTO: 89 FL
MONOCYTES # BLD AUTO: 0.46 X10(3) UL (ref 0.1–1)
MONOCYTES NFR BLD AUTO: 5.7 %
NEUTROPHILS # BLD AUTO: 5.1 X10 (3) UL (ref 1.5–7.7)
NEUTROPHILS # BLD AUTO: 5.1 X10(3) UL (ref 1.5–7.7)
NEUTROPHILS NFR BLD AUTO: 62.8 %
NITRITE UR QL STRIP.AUTO: NEGATIVE
OSMOLALITY SERPL CALC.SUM OF ELEC: 283 MOSM/KG (ref 275–295)
PH UR: 6 [PH] (ref 5–8)
PLATELET # BLD AUTO: 217 10(3)UL (ref 150–450)
POTASSIUM SERPL-SCNC: 4 MMOL/L (ref 3.5–5.1)
PROT SERPL-MCNC: 8.4 G/DL (ref 6.4–8.2)
PROT UR-MCNC: NEGATIVE MG/DL
RBC # BLD AUTO: 4.45 X10(6)UL
SODIUM SERPL-SCNC: 136 MMOL/L (ref 136–145)
SP GR UR STRIP: 1.03 (ref 1–1.03)
UROBILINOGEN UR STRIP-ACNC: <2
WBC # BLD AUTO: 8.1 X10(3) UL (ref 4–11)

## 2021-12-28 PROCEDURE — 3008F BODY MASS INDEX DOCD: CPT | Performed by: INTERNAL MEDICINE

## 2021-12-28 PROCEDURE — 3079F DIAST BP 80-89 MM HG: CPT | Performed by: INTERNAL MEDICINE

## 2021-12-28 PROCEDURE — 93005 ELECTROCARDIOGRAM TRACING: CPT

## 2021-12-28 PROCEDURE — 87086 URINE CULTURE/COLONY COUNT: CPT

## 2021-12-28 PROCEDURE — 36415 COLL VENOUS BLD VENIPUNCTURE: CPT

## 2021-12-28 PROCEDURE — 87077 CULTURE AEROBIC IDENTIFY: CPT

## 2021-12-28 PROCEDURE — 81001 URINALYSIS AUTO W/SCOPE: CPT

## 2021-12-28 PROCEDURE — 87186 SC STD MICRODIL/AGAR DIL: CPT

## 2021-12-28 PROCEDURE — 85730 THROMBOPLASTIN TIME PARTIAL: CPT

## 2021-12-28 PROCEDURE — 99244 OFF/OP CNSLTJ NEW/EST MOD 40: CPT | Performed by: INTERNAL MEDICINE

## 2021-12-28 PROCEDURE — 85025 COMPLETE CBC W/AUTO DIFF WBC: CPT

## 2021-12-28 PROCEDURE — 85610 PROTHROMBIN TIME: CPT

## 2021-12-28 PROCEDURE — 3077F SYST BP >= 140 MM HG: CPT | Performed by: INTERNAL MEDICINE

## 2021-12-28 PROCEDURE — 80053 COMPREHEN METABOLIC PANEL: CPT

## 2021-12-28 PROCEDURE — 93010 ELECTROCARDIOGRAM REPORT: CPT | Performed by: INTERNAL MEDICINE

## 2021-12-28 NOTE — PROGRESS NOTES
HPI:    Patient ID: Marysol Fan is a 59year old female.     HPI    Marysol Fan presents for preoperative clearance for: XI ROBOT-ASSISTED LAPAROSCOPIC SACRAL COLPOPEXY,ANTERIOR POSTERIOR ENTEROCELE REPAIR, CYSTOSCOPY  Performing Physician:Glen Sitting, Cuff Size: adult)   Pulse 103   Ht 5' 1\" (1.549 m)   Wt 158 lb (71.7 kg)   BMI 29.85 kg/m²   Wt Readings from Last 6 Encounters:  12/28/21 : 158 lb (71.7 kg)  12/10/21 : 158 lb (71.7 kg)  12/06/21 : 158 lb (71.7 kg)  10/08/21 : 157 lb (71.2 kg) capsule by mouth daily. • loratadine 10 MG Oral Tab Take 10 mg by mouth daily as needed.          Allergies:  Codeine                 RASH    Comment:T#3  Acetaminophen-Codei*    RASH    HISTORY:  Past Medical History:   Diagnosis Date   • Collette story Associated Father         alcoholism   • Hypertension Father    • Glaucoma Maternal Grandmother    • Diabetes Maternal Grandmother    • Hypertension Maternal Grandmother    • Musculo-skelatal Disorder Maternal Grandmother         per ng osteoporosis   • Masha Weeks adenopathy. Skin:     Findings: No erythema or rash. Neurological:      Mental Status: She is alert. Gait: Gait abnormal (walks with a cane).        Component      Latest Ref Rng & Units 12/28/2021   WBC      4.0 - 11.0 x10(3) uL 8.1   RBC      3.8 (L)   Patient Fasting for CMP?        No   Color Urine      Yellow Yellow   Clarity Urine      Clear Clear   Spec Gravity      1.001 - 1.030 1.028   Glucose Urine      Negative mg/dL Negative   Bilirubin Urine      Negative Negative   Ketones, UA      Negat ordered in this encounter       Imaging & Referrals:  None        [id number]

## 2021-12-29 LAB
APTT PPP: 27.4 SECONDS (ref 23.3–35.6)
INR BLD: 0.97 (ref 0.8–1.2)
PROTHROMBIN TIME: 13 SECONDS (ref 11.6–14.8)

## 2021-12-30 DIAGNOSIS — R82.79 POSITIVE URINE CULTURE: Primary | ICD-10-CM

## 2022-01-06 ENCOUNTER — TELEPHONE (OUTPATIENT)
Dept: INTERNAL MEDICINE CLINIC | Facility: CLINIC | Age: 62
End: 2022-01-06

## 2022-01-06 ENCOUNTER — PATIENT MESSAGE (OUTPATIENT)
Dept: OTHER | Age: 62
End: 2022-01-06

## 2022-01-06 NOTE — TELEPHONE ENCOUNTER
Please advise . Has not repeated the follow up urine tests. Is scheduled for surgery.     Message -----   From: Clifford Krishnan   Sent: 1/5/2022  10:55 PM CST   To: Jeremy Aguillon Customer Response Pool   Subject: medication

## 2022-01-07 NOTE — TELEPHONE ENCOUNTER
Kim sent, see LAB 12/28/21 with order for repeat urine test after antibiotic completion. From: Patricia Guzmán  To: Isauro File  Sent: 1/6/2022  4:52 PM CST  Subject: Message from Dr. Adrian Chao,    In regards to your question, Demond Jenkins says yes you can have sexual intercourse.     Thank you,    Gume Lowery

## 2022-01-08 NOTE — TELEPHONE ENCOUNTER
Pt did view [email or web address] message. Did not complete the repeat urine testing yet. Closing this encounter.

## 2022-01-12 ENCOUNTER — LAB ENCOUNTER (OUTPATIENT)
Dept: LAB | Age: 62
End: 2022-01-12
Attending: INTERNAL MEDICINE
Payer: COMMERCIAL

## 2022-01-12 DIAGNOSIS — R82.79 POSITIVE URINE CULTURE: ICD-10-CM

## 2022-01-12 LAB
BILIRUB UR QL: NEGATIVE
COLOR UR: YELLOW
GLUCOSE UR-MCNC: NEGATIVE MG/DL
HGB UR QL STRIP.AUTO: NEGATIVE
KETONES UR-MCNC: NEGATIVE MG/DL
NITRITE UR QL STRIP.AUTO: NEGATIVE
PH UR: 6 [PH] (ref 5–8)
PROT UR-MCNC: NEGATIVE MG/DL
SP GR UR STRIP: 1.02 (ref 1–1.03)
UROBILINOGEN UR STRIP-ACNC: <2

## 2022-01-12 PROCEDURE — 87086 URINE CULTURE/COLONY COUNT: CPT

## 2022-01-12 PROCEDURE — 81001 URINALYSIS AUTO W/SCOPE: CPT

## 2022-01-20 PROBLEM — Z01.818 PRE-OP TESTING: Status: ACTIVE | Noted: 2022-01-20

## 2022-01-21 ENCOUNTER — TELEPHONE (OUTPATIENT)
Dept: UROLOGY | Facility: HOSPITAL | Age: 62
End: 2022-01-21

## 2022-01-21 RX ORDER — SOFT LENS RINSE,STORE SOLUTION
SOLUTION, NON-ORAL MISCELLANEOUS AS NEEDED
Status: ON HOLD | COMMUNITY
End: 2022-01-27

## 2022-01-21 RX ORDER — ACETAMINOPHEN 500 MG
1000 TABLET ORAL ONCE
Status: CANCELLED | OUTPATIENT
Start: 2022-01-21 | End: 2022-01-21

## 2022-01-21 NOTE — TELEPHONE ENCOUNTER
Patient called asking if she should stop taking her Celecoxib. Informed patient she should stop this medication 7 days prior to her surgical date. Patient verbalized understanding. Encouraged to call with questions or concerns.

## 2022-01-24 ENCOUNTER — LAB ENCOUNTER (OUTPATIENT)
Dept: LAB | Facility: HOSPITAL | Age: 62
End: 2022-01-24
Attending: OBSTETRICS & GYNECOLOGY
Payer: COMMERCIAL

## 2022-01-24 DIAGNOSIS — Z01.818 PRE-OP TESTING: ICD-10-CM

## 2022-01-26 LAB — SARS-COV-2 RNA RESP QL NAA+PROBE: NOT DETECTED

## 2022-01-26 NOTE — H&P
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 Thorough discussion of surgical risks, benefits, and alternatives including, but not limited to bleeding/clots, infection, injury to nearby organs (urethra, bladder, ureters, bowel, blood vessels), mesh erosion, dyspareunia, de tereza UI, worsening UI, recurrence, voiding dysfunction, and pain. Discussed pain mgmt and potential need for narcotics. Discussed addiction potential with narcotics. IL  reviewed. Plan to proceed with RAL sacrocolpopexy, poss A&P repair, cysto (+cath) as consented  All questions answered.    [facility]  [phone]

## 2022-01-27 ENCOUNTER — ANESTHESIA (OUTPATIENT)
Dept: SURGERY | Facility: HOSPITAL | Age: 62
End: 2022-01-27
Payer: COMMERCIAL

## 2022-01-27 ENCOUNTER — HOSPITAL ENCOUNTER (OUTPATIENT)
Facility: HOSPITAL | Age: 62
Discharge: HOME | End: 2022-01-28
Attending: OBSTETRICS & GYNECOLOGY | Admitting: OBSTETRICS & GYNECOLOGY
Payer: COMMERCIAL

## 2022-01-27 ENCOUNTER — ANESTHESIA EVENT (OUTPATIENT)
Dept: SURGERY | Facility: HOSPITAL | Age: 62
End: 2022-01-27
Payer: COMMERCIAL

## 2022-01-27 DIAGNOSIS — Z01.818 PRE-OP TESTING: Primary | ICD-10-CM

## 2022-01-27 PROCEDURE — 0WQNXZZ REPAIR FEMALE PERINEUM, EXTERNAL APPROACH: ICD-10-PCS | Performed by: OBSTETRICS & GYNECOLOGY

## 2022-01-27 PROCEDURE — S0028 INJECTION, FAMOTIDINE, 20 MG: HCPCS | Performed by: NURSE ANESTHETIST, CERTIFIED REGISTERED

## 2022-01-27 PROCEDURE — 8E0W4CZ ROBOTIC ASSISTED PROCEDURE OF TRUNK REGION, PERCUTANEOUS ENDOSCOPIC APPROACH: ICD-10-PCS | Performed by: OBSTETRICS & GYNECOLOGY

## 2022-01-27 PROCEDURE — 0JQC0ZZ REPAIR PELVIC REGION SUBCUTANEOUS TISSUE AND FASCIA, OPEN APPROACH: ICD-10-PCS | Performed by: OBSTETRICS & GYNECOLOGY

## 2022-01-27 PROCEDURE — 0USG7ZZ REPOSITION VAGINA, VIA NATURAL OR ARTIFICIAL OPENING: ICD-10-PCS | Performed by: OBSTETRICS & GYNECOLOGY

## 2022-01-27 PROCEDURE — 0UQF0ZZ REPAIR CUL-DE-SAC, OPEN APPROACH: ICD-10-PCS | Performed by: OBSTETRICS & GYNECOLOGY

## 2022-01-27 PROCEDURE — 0DNW4ZZ RELEASE PERITONEUM, PERCUTANEOUS ENDOSCOPIC APPROACH: ICD-10-PCS | Performed by: OBSTETRICS & GYNECOLOGY

## 2022-01-27 RX ORDER — MIDAZOLAM HYDROCHLORIDE 1 MG/ML
INJECTION INTRAMUSCULAR; INTRAVENOUS AS NEEDED
Status: DISCONTINUED | OUTPATIENT
Start: 2022-01-27 | End: 2022-01-27 | Stop reason: SURG

## 2022-01-27 RX ORDER — NALOXONE HYDROCHLORIDE 0.4 MG/ML
80 INJECTION, SOLUTION INTRAMUSCULAR; INTRAVENOUS; SUBCUTANEOUS AS NEEDED
Status: DISCONTINUED | OUTPATIENT
Start: 2022-01-27 | End: 2022-01-27 | Stop reason: HOSPADM

## 2022-01-27 RX ORDER — CEFAZOLIN SODIUM/WATER 2 G/20 ML
2 SYRINGE (ML) INTRAVENOUS ONCE
Status: COMPLETED | OUTPATIENT
Start: 2022-01-27 | End: 2022-01-27

## 2022-01-27 RX ORDER — KETOROLAC TROMETHAMINE 30 MG/ML
INJECTION, SOLUTION INTRAMUSCULAR; INTRAVENOUS AS NEEDED
Status: DISCONTINUED | OUTPATIENT
Start: 2022-01-27 | End: 2022-01-27 | Stop reason: SURG

## 2022-01-27 RX ORDER — KETOROLAC TROMETHAMINE 30 MG/ML
30 INJECTION, SOLUTION INTRAMUSCULAR; INTRAVENOUS EVERY 6 HOURS
Status: COMPLETED | OUTPATIENT
Start: 2022-01-27 | End: 2022-01-28

## 2022-01-27 RX ORDER — ENALAPRIL MALEATE 10 MG/1
10 TABLET ORAL DAILY
Status: DISCONTINUED | OUTPATIENT
Start: 2022-01-27 | End: 2022-01-28

## 2022-01-27 RX ORDER — SCOLOPAMINE TRANSDERMAL SYSTEM 1 MG/1
1 PATCH, EXTENDED RELEASE TRANSDERMAL
Status: DISCONTINUED | OUTPATIENT
Start: 2022-01-27 | End: 2022-01-30

## 2022-01-27 RX ORDER — BUPIVACAINE HYDROCHLORIDE 5 MG/ML
INJECTION, SOLUTION EPIDURAL; INTRACAUDAL AS NEEDED
Status: DISCONTINUED | OUTPATIENT
Start: 2022-01-27 | End: 2022-01-27

## 2022-01-27 RX ORDER — ONDANSETRON 2 MG/ML
4 INJECTION INTRAMUSCULAR; INTRAVENOUS AS NEEDED
Status: DISCONTINUED | OUTPATIENT
Start: 2022-01-27 | End: 2022-01-27 | Stop reason: HOSPADM

## 2022-01-27 RX ORDER — METOCLOPRAMIDE HYDROCHLORIDE 5 MG/ML
10 INJECTION INTRAMUSCULAR; INTRAVENOUS AS NEEDED
Status: DISCONTINUED | OUTPATIENT
Start: 2022-01-27 | End: 2022-01-27 | Stop reason: HOSPADM

## 2022-01-27 RX ORDER — ONDANSETRON 2 MG/ML
INJECTION INTRAMUSCULAR; INTRAVENOUS AS NEEDED
Status: DISCONTINUED | OUTPATIENT
Start: 2022-01-27 | End: 2022-01-27 | Stop reason: SURG

## 2022-01-27 RX ORDER — DIPHENHYDRAMINE HYDROCHLORIDE 50 MG/ML
INJECTION INTRAMUSCULAR; INTRAVENOUS AS NEEDED
Status: DISCONTINUED | OUTPATIENT
Start: 2022-01-27 | End: 2022-01-27 | Stop reason: SURG

## 2022-01-27 RX ORDER — SODIUM CHLORIDE, SODIUM LACTATE, POTASSIUM CHLORIDE, CALCIUM CHLORIDE 600; 310; 30; 20 MG/100ML; MG/100ML; MG/100ML; MG/100ML
INJECTION, SOLUTION INTRAVENOUS CONTINUOUS
Status: DISCONTINUED | OUTPATIENT
Start: 2022-01-27 | End: 2022-01-27 | Stop reason: HOSPADM

## 2022-01-27 RX ORDER — SODIUM CHLORIDE, SODIUM LACTATE, POTASSIUM CHLORIDE, CALCIUM CHLORIDE 600; 310; 30; 20 MG/100ML; MG/100ML; MG/100ML; MG/100ML
INJECTION, SOLUTION INTRAVENOUS CONTINUOUS
Status: DISCONTINUED | OUTPATIENT
Start: 2022-01-27 | End: 2022-01-28

## 2022-01-27 RX ORDER — BUPIVACAINE HYDROCHLORIDE AND EPINEPHRINE 2.5; 5 MG/ML; UG/ML
INJECTION, SOLUTION EPIDURAL; INFILTRATION; INTRACAUDAL; PERINEURAL AS NEEDED
Status: DISCONTINUED | OUTPATIENT
Start: 2022-01-27 | End: 2022-01-27

## 2022-01-27 RX ORDER — GLYCOPYRROLATE 0.2 MG/ML
INJECTION, SOLUTION INTRAMUSCULAR; INTRAVENOUS AS NEEDED
Status: DISCONTINUED | OUTPATIENT
Start: 2022-01-27 | End: 2022-01-27 | Stop reason: SURG

## 2022-01-27 RX ORDER — MEPERIDINE HYDROCHLORIDE 25 MG/ML
12.5 INJECTION INTRAMUSCULAR; INTRAVENOUS; SUBCUTANEOUS AS NEEDED
Status: DISCONTINUED | OUTPATIENT
Start: 2022-01-27 | End: 2022-01-27 | Stop reason: HOSPADM

## 2022-01-27 RX ORDER — FAMOTIDINE 10 MG/ML
INJECTION, SOLUTION INTRAVENOUS AS NEEDED
Status: DISCONTINUED | OUTPATIENT
Start: 2022-01-27 | End: 2022-01-27 | Stop reason: SURG

## 2022-01-27 RX ORDER — DEXAMETHASONE SODIUM PHOSPHATE 4 MG/ML
VIAL (ML) INJECTION AS NEEDED
Status: DISCONTINUED | OUTPATIENT
Start: 2022-01-27 | End: 2022-01-27 | Stop reason: SURG

## 2022-01-27 RX ORDER — ONDANSETRON 4 MG/1
4 TABLET, FILM COATED ORAL EVERY 8 HOURS PRN
Status: DISCONTINUED | OUTPATIENT
Start: 2022-01-27 | End: 2022-01-28

## 2022-01-27 RX ORDER — NEOSTIGMINE METHYLSULFATE 1 MG/ML
INJECTION INTRAVENOUS AS NEEDED
Status: DISCONTINUED | OUTPATIENT
Start: 2022-01-27 | End: 2022-01-27 | Stop reason: SURG

## 2022-01-27 RX ORDER — SCOLOPAMINE TRANSDERMAL SYSTEM 1 MG/1
PATCH, EXTENDED RELEASE TRANSDERMAL
Status: DISCONTINUED
Start: 2022-01-27 | End: 2022-01-28

## 2022-01-27 RX ORDER — CETIRIZINE HYDROCHLORIDE 10 MG/1
10 TABLET ORAL DAILY
Status: DISCONTINUED | OUTPATIENT
Start: 2022-01-27 | End: 2022-01-28

## 2022-01-27 RX ORDER — DIPHENHYDRAMINE HYDROCHLORIDE 50 MG/ML
12.5 INJECTION INTRAMUSCULAR; INTRAVENOUS AS NEEDED
Status: DISCONTINUED | OUTPATIENT
Start: 2022-01-27 | End: 2022-01-27 | Stop reason: HOSPADM

## 2022-01-27 RX ORDER — TRAMADOL HYDROCHLORIDE 50 MG/1
50 TABLET ORAL EVERY 6 HOURS
Status: DISCONTINUED | OUTPATIENT
Start: 2022-01-27 | End: 2022-01-28

## 2022-01-27 RX ORDER — LIDOCAINE HYDROCHLORIDE 10 MG/ML
INJECTION, SOLUTION EPIDURAL; INFILTRATION; INTRACAUDAL; PERINEURAL AS NEEDED
Status: DISCONTINUED | OUTPATIENT
Start: 2022-01-27 | End: 2022-01-27 | Stop reason: SURG

## 2022-01-27 RX ORDER — EPHEDRINE SULFATE 50 MG/ML
INJECTION INTRAVENOUS AS NEEDED
Status: DISCONTINUED | OUTPATIENT
Start: 2022-01-27 | End: 2022-01-27 | Stop reason: SURG

## 2022-01-27 RX ORDER — ROCURONIUM BROMIDE 10 MG/ML
INJECTION, SOLUTION INTRAVENOUS AS NEEDED
Status: DISCONTINUED | OUTPATIENT
Start: 2022-01-27 | End: 2022-01-27 | Stop reason: SURG

## 2022-01-27 RX ORDER — IBUPROFEN 600 MG/1
600 TABLET ORAL EVERY 6 HOURS SCHEDULED
Status: DISCONTINUED | OUTPATIENT
Start: 2022-01-28 | End: 2022-01-28

## 2022-01-27 RX ORDER — ONDANSETRON 2 MG/ML
4 INJECTION INTRAMUSCULAR; INTRAVENOUS EVERY 8 HOURS PRN
Status: DISCONTINUED | OUTPATIENT
Start: 2022-01-27 | End: 2022-01-28

## 2022-01-27 RX ORDER — MIDAZOLAM HYDROCHLORIDE 1 MG/ML
1 INJECTION INTRAMUSCULAR; INTRAVENOUS EVERY 5 MIN PRN
Status: DISCONTINUED | OUTPATIENT
Start: 2022-01-27 | End: 2022-01-27 | Stop reason: HOSPADM

## 2022-01-27 RX ADMIN — DIPHENHYDRAMINE HYDROCHLORIDE 25 MG: 50 INJECTION INTRAMUSCULAR; INTRAVENOUS at 10:56:00

## 2022-01-27 RX ADMIN — ROCURONIUM BROMIDE 10 MG: 10 INJECTION, SOLUTION INTRAVENOUS at 11:03:00

## 2022-01-27 RX ADMIN — NEOSTIGMINE METHYLSULFATE 4 MG: 1 INJECTION INTRAVENOUS at 13:44:00

## 2022-01-27 RX ADMIN — SODIUM CHLORIDE, SODIUM LACTATE, POTASSIUM CHLORIDE, CALCIUM CHLORIDE: 600; 310; 30; 20 INJECTION, SOLUTION INTRAVENOUS at 12:01:00

## 2022-01-27 RX ADMIN — GLYCOPYRROLATE 0.6 MG: 0.2 INJECTION, SOLUTION INTRAMUSCULAR; INTRAVENOUS at 13:44:00

## 2022-01-27 RX ADMIN — SODIUM CHLORIDE, SODIUM LACTATE, POTASSIUM CHLORIDE, CALCIUM CHLORIDE: 600; 310; 30; 20 INJECTION, SOLUTION INTRAVENOUS at 10:34:00

## 2022-01-27 RX ADMIN — ROCURONIUM BROMIDE 20 MG: 10 INJECTION, SOLUTION INTRAVENOUS at 11:50:00

## 2022-01-27 RX ADMIN — MIDAZOLAM HYDROCHLORIDE 2 MG: 1 INJECTION INTRAMUSCULAR; INTRAVENOUS at 10:34:00

## 2022-01-27 RX ADMIN — KETOROLAC TROMETHAMINE 30 MG: 30 INJECTION, SOLUTION INTRAMUSCULAR; INTRAVENOUS at 13:44:00

## 2022-01-27 RX ADMIN — ONDANSETRON 4 MG: 2 INJECTION INTRAMUSCULAR; INTRAVENOUS at 13:44:00

## 2022-01-27 RX ADMIN — CEFAZOLIN SODIUM/WATER 2 G: 2 G/20 ML SYRINGE (ML) INTRAVENOUS at 10:45:00

## 2022-01-27 RX ADMIN — EPHEDRINE SULFATE 5 MG: 50 INJECTION INTRAVENOUS at 11:10:00

## 2022-01-27 RX ADMIN — FAMOTIDINE 20 MG: 10 INJECTION, SOLUTION INTRAVENOUS at 10:56:00

## 2022-01-27 RX ADMIN — LIDOCAINE HYDROCHLORIDE 50 MG: 10 INJECTION, SOLUTION EPIDURAL; INFILTRATION; INTRACAUDAL; PERINEURAL at 10:38:00

## 2022-01-27 RX ADMIN — SODIUM CHLORIDE, SODIUM LACTATE, POTASSIUM CHLORIDE, CALCIUM CHLORIDE: 600; 310; 30; 20 INJECTION, SOLUTION INTRAVENOUS at 14:09:00

## 2022-01-27 RX ADMIN — ROCURONIUM BROMIDE 40 MG: 10 INJECTION, SOLUTION INTRAVENOUS at 10:38:00

## 2022-01-27 RX ADMIN — DEXAMETHASONE SODIUM PHOSPHATE 8 MG: 4 MG/ML VIAL (ML) INJECTION at 10:50:00

## 2022-01-27 NOTE — ANESTHESIA PREPROCEDURE EVALUATION
PRE-OP EVALUATION    Patient Name: Lian Sanders    Admit Diagnosis: VAGINAL VAULT PROLAPSE    Pre-op Diagnosis: VAGINAL VAULT PROLAPSE    XI ROBOT-ASSISTED LAPAROSCOPIC SACRAL COLPOPEXY,ANTERIOR POSTERIOR ENTEROCELE REPAIR, CYSTOSCOPY    Anesthesia Proce 3, Past Week at Unknown time  CALCIUM OR, Take 1 capsule by mouth daily. , Disp: , Rfl: , 1/26/2022 at 0800  loratadine 10 MG Oral Tab, Take 10 mg by mouth daily as needed.   , Disp: , Rfl: , Past Week at Unknown time  Homeopathic Products (EARACHE DROPS O MCH 29.9 12/28/2021    MCHC 33.6 12/28/2021    RDW 12.6 12/28/2021    .0 12/28/2021     Lab Results   Component Value Date     12/28/2021    K 4.0 12/28/2021     12/28/2021    CO2 24.0 12/28/2021    BUN 14 12/28/2021    CREATSERUM 0.64 1

## 2022-01-27 NOTE — ANESTHESIA POSTPROCEDURE EVALUATION
[address] Patient Status:  Outpatient in a Bed   Age/Gender 59year old female MRN [id number]   [facility] SURGERY Attending Justin Boucher, [address] [facility] Day # 0 PCP Dori Wiley MD       Anesthesia Post-op Note    XI

## 2022-01-27 NOTE — ANESTHESIA PROCEDURE NOTES
Airway  Date/Time: 1/27/2022 10:40 AM  Urgency: elective    Airway not difficult    General Information and Staff    Patient location during procedure: OR  Anesthesiologist: Yair Johansen MD  Resident/CRNA: Javier Curran CRNA  Performed: CRNA     Indic

## 2022-01-28 VITALS
SYSTOLIC BLOOD PRESSURE: 119 MMHG | BODY MASS INDEX: 30.21 KG/M2 | TEMPERATURE: 98 F | RESPIRATION RATE: 18 BRPM | OXYGEN SATURATION: 99 % | WEIGHT: 160 LBS | HEART RATE: 72 BPM | HEIGHT: 61 IN | DIASTOLIC BLOOD PRESSURE: 60 MMHG

## 2022-01-28 PROBLEM — N81.9 VAGINAL VAULT PROLAPSE: Status: ACTIVE | Noted: 2022-01-28

## 2022-01-28 LAB
BASOPHILS # BLD AUTO: 0.04 X10(3) UL (ref 0–0.2)
BASOPHILS NFR BLD AUTO: 0.4 %
EOSINOPHIL # BLD AUTO: 0.03 X10(3) UL (ref 0–0.7)
EOSINOPHIL NFR BLD AUTO: 0.3 %
ERYTHROCYTE [DISTWIDTH] IN BLOOD BY AUTOMATED COUNT: 12.6 %
HCT VFR BLD AUTO: 30.3 %
HGB BLD-MCNC: 10.3 G/DL
IMM GRANULOCYTES # BLD AUTO: 0.07 X10(3) UL (ref 0–1)
IMM GRANULOCYTES NFR BLD: 0.7 %
LYMPHOCYTES # BLD AUTO: 2.55 X10(3) UL (ref 1–4)
LYMPHOCYTES NFR BLD AUTO: 24.2 %
MCH RBC QN AUTO: 30.1 PG (ref 26–34)
MCHC RBC AUTO-ENTMCNC: 34 G/DL (ref 31–37)
MCV RBC AUTO: 88.6 FL
MONOCYTES # BLD AUTO: 0.64 X10(3) UL (ref 0.1–1)
MONOCYTES NFR BLD AUTO: 6.1 %
NEUTROPHILS # BLD AUTO: 7.22 X10 (3) UL (ref 1.5–7.7)
NEUTROPHILS # BLD AUTO: 7.22 X10(3) UL (ref 1.5–7.7)
NEUTROPHILS NFR BLD AUTO: 68.3 %
PLATELET # BLD AUTO: 217 10(3)UL (ref 150–450)
RBC # BLD AUTO: 3.42 X10(6)UL
WBC # BLD AUTO: 10.6 X10(3) UL (ref 4–11)

## 2022-01-28 PROCEDURE — 85025 COMPLETE CBC W/AUTO DIFF WBC: CPT | Performed by: OBSTETRICS & GYNECOLOGY

## 2022-01-28 RX ORDER — IBUPROFEN 600 MG/1
600 TABLET ORAL EVERY 6 HOURS
Qty: 40 TABLET | Refills: 0 | Status: SHIPPED | OUTPATIENT
Start: 2022-01-28

## 2022-01-28 RX ORDER — TRAMADOL HYDROCHLORIDE 50 MG/1
50 TABLET ORAL EVERY 6 HOURS PRN
Qty: 28 TABLET | Refills: 0 | Status: SHIPPED | OUTPATIENT
Start: 2022-01-28

## 2022-01-28 NOTE — PROGRESS NOTES
Operative findings discussed.     Pain controlled, tolerating general diet  Abd soft, NT  urine clear in Vazquez    POD 1 - doing well    PLAN -  Vazquez/leg bag teaching  Home today  F/U 1 week for catheter removal  D/C instructions reviewed

## 2022-01-28 NOTE — PLAN OF CARE
A&Ox4. VSS. RA. . GI: Abdomen soft, nondistended. Passing gas. Denies nausea at this time. : Vazquez catheter intact. Pain controlled with scheduled pain medications. Up with standby assist and a walker. Incisions: 4 lap sites intact--no drainage. Dorota pad intact with scant serosanguinous drainage. Diet: Tolerating water and [employer]. IVF running per order. All appropriate safety measures in place. All questions and concerns addressed. Will continue to monitor.     Problem: Patient/Family Goals  Goal: Patient/Family Long Term Goal  Description: Patient's Long Term Goal: Discharge    Interventions:  -Tolerate pain  -Tolerate diet  - See additional Care Plan goals for specific interventions  Outcome: Progressing  Goal: Patient/Family Short Term Goal  Description: Patient's Short Term Goal: Comfort    Interventions:   -PRN pain medication  - See additional Care Plan goals for specific interventions  Outcome: Progressing     Problem: PAIN - ADULT  Goal: Verbalizes/displays adequate comfort level or patient's stated pain goal  Description: INTERVENTIONS:  - Encourage pt to monitor pain and request assistance  - Assess pain using appropriate pain scale  - Administer analgesics based on type and severity of pain and evaluate response  - Implement non-pharmacological measures as appropriate and evaluate response  - Consider cultural and social influences on pain and pain management  - Manage/alleviate anxiety  - Utilize distraction and/or relaxation techniques  - Monitor for opioid side effects  - Notify MD/LIP if interventions unsuccessful or patient reports new pain  - Anticipate increased pain with activity and pre-medicate as appropriate  Outcome: Progressing     Problem: RISK FOR INFECTION - ADULT  Goal: Absence of fever/infection during anticipated neutropenic period  Description: INTERVENTIONS  - Monitor WBC  - Administer growth factors as ordered  - Implement neutropenic guidelines  Outcome: Progressing     Problem: SAFETY ADULT - FALL  Goal: Free from fall injury  Description: INTERVENTIONS:  - Assess pt frequently for physical needs  - Identify cognitive and physical deficits and behaviors that affect risk of falls.   - [place] fall precautions as indicated by assessment.  - Educate pt/family on patient safety including physical limitations  - Instruct pt to call for assistance with activity based on assessment  - Modify environment to reduce risk of injury  - Provide assistive devices as appropriate  - Consider OT/PT consult to assist with strengthening/mobility  - Encourage toileting schedule  Outcome: Progressing     Problem: DISCHARGE PLANNING  Goal: Discharge to home or other facility with appropriate resources  Description: INTERVENTIONS:  - Identify barriers to discharge w/pt and caregiver  - Include patient/family/discharge partner in discharge planning  - Arrange for needed discharge resources and transportation as appropriate  - Identify discharge learning needs (meds, wound care, etc)  - Arrange for interpreters to assist at discharge as needed  - Consider post-discharge preferences of patient/family/discharge partner  - Complete POLST form as appropriate  - Assess patient's ability to be responsible for managing their own health  - Refer to Case Management Department for coordinating discharge planning if the patient needs post-hospital services based on physician/LIP order or complex needs related to functional status, cognitive ability or social support system  Outcome: Progressing

## 2022-01-28 NOTE — PLAN OF CARE
Pt c/o of mild abd cramp. Dorota pad with scant ss drainage, pad changed. Incision cdi eith glue. Pt encouraged to walk halls, and use Is. Poc updated, pt verbalized understanding.   Problem: PAIN - ADULT  Goal: Verbalizes/displays adequate comfort level or patient's stated pain goal  Description: INTERVENTIONS:  - Encourage pt to monitor pain and request assistance  - Assess pain using appropriate pain scale  - Administer analgesics based on type and severity of pain and evaluate response  - Implement non-pharmacological measures as appropriate and evaluate response  - Consider cultural and social influences on pain and pain management  - Manage/alleviate anxiety  - Utilize distraction and/or relaxation techniques  - Monitor for opioid side effects  - Notify MD/LIP if interventions unsuccessful or patient reports new pain  - Anticipate increased pain with activity and pre-medicate as appropriate  Outcome: Adequate for Discharge

## 2022-01-28 NOTE — PLAN OF CARE
Patient alert and orientedx4. On room air. 94% on Spo2. Pt report incisional pain 5/10 and cramps. Pain medications administered per MAR. Denies passing flatus. Report belching. Vazquez, yellow/ clear urine. Dorota-pad in place with scant sanguineous drainage. 4 lap sites with skin glue. All questions and concerns addressed. POC updated, pt verbalized understanding.        Problem: Patient/Family Goals  Goal: Patient/Family Long Term Goal  Description: Patient's Long Term Goal: Discharge    Interventions:  -Tolerate pain  -Tolerate diet  - See additional Care Plan goals for specific interventions  Outcome: Progressing  Goal: Patient/Family Short Term Goal  Description: Patient's Short Term Goal: Comfort    Interventions:   -PRN pain medication  - See additional Care Plan goals for specific interventions  Outcome: Progressing     Problem: PAIN - ADULT  Goal: Verbalizes/displays adequate comfort level or patient's stated pain goal  Description: INTERVENTIONS:  - Encourage pt to monitor pain and request assistance  - Assess pain using appropriate pain scale  - Administer analgesics based on type and severity of pain and evaluate response  - Implement non-pharmacological measures as appropriate and evaluate response  - Consider cultural and social influences on pain and pain management  - Manage/alleviate anxiety  - Utilize distraction and/or relaxation techniques  - Monitor for opioid side effects  - Notify MD/LIP if interventions unsuccessful or patient reports new pain  - Anticipate increased pain with activity and pre-medicate as appropriate  Outcome: Progressing     Problem: RISK FOR INFECTION - ADULT  Goal: Absence of fever/infection during anticipated neutropenic period  Description: INTERVENTIONS  - Monitor WBC  - Administer growth factors as ordered  - Implement neutropenic guidelines  Outcome: Progressing     Problem: SAFETY ADULT - FALL  Goal: Free from fall injury  Description: INTERVENTIONS:  - Assess pt frequently for physical needs  - Identify cognitive and physical deficits and behaviors that affect risk of falls.   - [place] fall precautions as indicated by assessment.  - Educate pt/family on patient safety including physical limitations  - Instruct pt to call for assistance with activity based on assessment  - Modify environment to reduce risk of injury  - Provide assistive devices as appropriate  - Consider OT/PT consult to assist with strengthening/mobility  - Encourage toileting schedule  Outcome: Progressing     Problem: DISCHARGE PLANNING  Goal: Discharge to home or other facility with appropriate resources  Description: INTERVENTIONS:  - Identify barriers to discharge w/pt and caregiver  - Include patient/family/discharge partner in discharge planning  - Arrange for needed discharge resources and transportation as appropriate  - Identify discharge learning needs (meds, wound care, etc)  - Arrange for interpreters to assist at discharge as needed  - Consider post-discharge preferences of patient/family/discharge partner  - Complete POLST form as appropriate  - Assess patient's ability to be responsible for managing their own health  - Refer to Case Management Department for coordinating discharge planning if the patient needs post-hospital services based on physician/LIP order or complex needs related to functional status, cognitive ability or social support system  Outcome: Progressing

## 2022-01-29 ENCOUNTER — TELEPHONE (OUTPATIENT)
Dept: UROLOGY | Facility: HOSPITAL | Age: 62
End: 2022-01-29

## 2022-01-29 NOTE — TELEPHONE ENCOUNTER
TC to pt following surgery  Doing well, no complaints  Denies CP or SOB  Pain controlled with PO meds (IBP, tylenol, tramadol)  Catheter in place, draining freely  Awaiting BM  No heavy vaginal bleeding, some spotting  Tolerates ambulation, PO diet  Review

## 2022-02-03 ENCOUNTER — OFFICE VISIT (OUTPATIENT)
Dept: UROLOGY | Facility: HOSPITAL | Age: 62
End: 2022-02-03
Attending: OBSTETRICS & GYNECOLOGY
Payer: COMMERCIAL

## 2022-02-03 ENCOUNTER — TELEPHONE (OUTPATIENT)
Dept: UROLOGY | Facility: HOSPITAL | Age: 62
End: 2022-02-03

## 2022-02-03 VITALS — WEIGHT: 160 LBS | BODY MASS INDEX: 30.21 KG/M2 | RESPIRATION RATE: 20 BRPM | HEIGHT: 61 IN | TEMPERATURE: 97 F

## 2022-02-03 DIAGNOSIS — Z98.890 POST-OPERATIVE STATE: Primary | ICD-10-CM

## 2022-02-03 PROCEDURE — 99212 OFFICE O/P EST SF 10 MIN: CPT

## 2022-02-03 NOTE — PATIENT INSTRUCTIONS
Voiding Trial Instructions  You have passed your voiding trial at 08:30. Please make sure you are drinking some water today. You can take your Motrin to help with any swelling from the catheter. It is important to try and empty your bladder every two hours during the day. Try and empty again at 10:30. If you are unable to empty, try and drink a glass of water and try again 30-60 minutes later. If you have been unable to empty your bladder by 12:30, which is 4 hours after leaving the office, you will most likely be uncomfortable and you will need to come back into the office. If it is after 4pm, go to an urgent care or emergency room to have a catheter placed again. Please call our office at [phone] if you have any questions or concerns.

## 2022-02-03 NOTE — PROGRESS NOTES
Patient here for voiding trial.   Procedure Date: 1/28/22  Procedure Name: Diagnostic Laparoscopy, Robotic assisted laparoscopic lysis of adhesions, Anterior repair, Cystourethroscopy, Uterosacral vaginal vault suspension, Enterocele repair, Posterior repair    Doing well no complaints  BMs regular  Using Miralax and Other dulcolax  for bowel mgmt  Pain  Using Ibuprofen for pain mgmt  Tolerates ambulation & diet     Gen: NAD  Pulm:nl effort  : No active bleeding. Discharge scant, Surgical sites-WNL, Boyd intact and draining clear yellow urine. Disconnected boyd catheter from drainage tubing. Using a 60 cc Mary syringe, 250ml sterile water was instilled per gravity, balloon deflated using 10 cc syringe, and catheter removed. Pt up to bathroom and voided 300mL. Patient passes voiding trial.  Patient  tolerated procedure well. Reviewed post op restrictions and bowel management  Reviewed signs and sx of urinary retention and UTI. Discussed return to work/activity plans  Follow-up with Dr. Veena Willingham in 3 weeks.      All questions answered  She understands and agrees to plan

## 2022-02-03 NOTE — TELEPHONE ENCOUNTER
Patient called with a voiding update. Reports she has voided multiple times since leaving the office today. Reports voiding in \"normal amounts\". Denies UTI symptoms. Patient states she became slightly lightheaded after washing dishes this afternoon. She reports laying down and is feeling better. Po hydration encouraged. Rest when tired as she just had surgery. Not to over do it.

## 2022-02-07 ENCOUNTER — TELEPHONE (OUTPATIENT)
Dept: UROLOGY | Facility: HOSPITAL | Age: 62
End: 2022-02-07

## 2022-02-07 NOTE — TELEPHONE ENCOUNTER
Pt left message asking if ok to resume vaginal estrogen cream. Returned pt's call and left message in return stating pt is to wait until she is 6 weeks post-op before resuming vaginal estrogen cream. Instructed pt to call if she has any further questions or concerns.

## 2022-02-09 NOTE — TELEPHONE ENCOUNTER
Patient called reports feeling a bump near rectum. Patient reports having a hemorrhoid. Patient asking if she is able to use Preporation H on Hemorrhoid. Informed patient she may use as directed on package. Patient reports bowel movement are soft and easy daily. Denies straining with bowel movements. Po hydration encouraged. Patient verbalized understanding. Encouraged to call with questions or concerns.

## 2022-02-13 ENCOUNTER — PATIENT MESSAGE (OUTPATIENT)
Dept: INTERNAL MEDICINE CLINIC | Facility: CLINIC | Age: 62
End: 2022-02-13

## 2022-02-14 RX ORDER — ENALAPRIL MALEATE 10 MG/1
TABLET ORAL
Qty: 90 TABLET | Refills: 1 | OUTPATIENT
Start: 2022-02-14

## 2022-02-14 RX ORDER — ENALAPRIL MALEATE 10 MG/1
10 TABLET ORAL DAILY
Qty: 90 TABLET | Refills: 1 | Status: SHIPPED | OUTPATIENT
Start: 2022-02-14

## 2022-02-14 NOTE — TELEPHONE ENCOUNTER
Lety Isaacs RN 2/14/2022 10:31 AM CST        ----- Message -----  From: Edwin Jernigan  Sent: 2/13/2022 1:57 PM CST  To: Em Rn Triage  Subject: Refill Enalapril     I only have 9 pills left I can't refill it without Dr. Fernandez Heading approval. Can you please ask her if I can refill it again?     Thank you,  Mrs. Edwin Jernigan

## 2022-02-25 ENCOUNTER — OFFICE VISIT (OUTPATIENT)
Dept: UROLOGY | Facility: HOSPITAL | Age: 62
End: 2022-02-25
Attending: OBSTETRICS & GYNECOLOGY
Payer: COMMERCIAL

## 2022-02-25 VITALS — BODY MASS INDEX: 30.21 KG/M2 | RESPIRATION RATE: 20 BRPM | HEIGHT: 61 IN | TEMPERATURE: 98 F | WEIGHT: 160 LBS

## 2022-02-25 DIAGNOSIS — Z98.890 POST-OPERATIVE STATE: Primary | ICD-10-CM

## 2022-02-25 PROCEDURE — 99212 OFFICE O/P EST SF 10 MIN: CPT

## 2022-02-28 ENCOUNTER — TELEPHONE (OUTPATIENT)
Dept: UROLOGY | Facility: HOSPITAL | Age: 62
End: 2022-02-28

## 2022-02-28 NOTE — TELEPHONE ENCOUNTER
Pt called asking if she can resume going to her chiropractor. Pt explained a tight belt is placed around her abdomen. Instructed pt to wait until after she is 6 weeks post-op to go to her chiropractor. Pt verbalized understanding.

## 2022-03-07 ENCOUNTER — TELEPHONE (OUTPATIENT)
Dept: INTERNAL MEDICINE CLINIC | Facility: CLINIC | Age: 62
End: 2022-03-07

## 2022-03-07 NOTE — TELEPHONE ENCOUNTER
Patient is requesting an order for mammogram. Last exam was  3/24/21. Mammogram ordered.  Patient has been notified

## 2022-03-28 ENCOUNTER — TELEPHONE (OUTPATIENT)
Dept: UROLOGY | Facility: HOSPITAL | Age: 62
End: 2022-03-28

## 2022-03-28 NOTE — TELEPHONE ENCOUNTER
Pt informed that she can begin riding stationary bike but should start slowly and to listen to her body. If patient is experiencing pain,discomfort, dizziness,lightheadness she should cease. Pt verbalized understanding. Encouraged patient to call with any questions or concerns.

## 2022-04-07 ENCOUNTER — HOSPITAL ENCOUNTER (OUTPATIENT)
Dept: MAMMOGRAPHY | Facility: HOSPITAL | Age: 62
Discharge: HOME | End: 2022-04-07
Attending: INTERNAL MEDICINE
Payer: COMMERCIAL

## 2022-04-07 DIAGNOSIS — Z12.31 ENCOUNTER FOR MAMMOGRAM TO ESTABLISH BASELINE MAMMOGRAM: ICD-10-CM

## 2022-04-07 PROCEDURE — 77067 SCR MAMMO BI INCL CAD: CPT | Performed by: INTERNAL MEDICINE

## 2022-04-07 PROCEDURE — 77063 BREAST TOMOSYNTHESIS BI: CPT | Performed by: INTERNAL MEDICINE

## 2022-05-02 ENCOUNTER — OFFICE VISIT (OUTPATIENT)
Dept: INTERNAL MEDICINE CLINIC | Facility: CLINIC | Age: 62
End: 2022-05-02
Payer: COMMERCIAL

## 2022-05-02 VITALS
DIASTOLIC BLOOD PRESSURE: 81 MMHG | HEIGHT: 61 IN | SYSTOLIC BLOOD PRESSURE: 147 MMHG | BODY MASS INDEX: 30.02 KG/M2 | HEART RATE: 73 BPM | WEIGHT: 159 LBS

## 2022-05-02 DIAGNOSIS — K64.0 GRADE I HEMORRHOIDS: Primary | ICD-10-CM

## 2022-05-02 PROCEDURE — 99213 OFFICE O/P EST LOW 20 MIN: CPT | Performed by: NURSE PRACTITIONER

## 2022-05-02 PROCEDURE — 3008F BODY MASS INDEX DOCD: CPT | Performed by: NURSE PRACTITIONER

## 2022-05-02 PROCEDURE — 3077F SYST BP >= 140 MM HG: CPT | Performed by: NURSE PRACTITIONER

## 2022-05-02 PROCEDURE — 3079F DIAST BP 80-89 MM HG: CPT | Performed by: NURSE PRACTITIONER

## 2022-05-03 ENCOUNTER — PATIENT MESSAGE (OUTPATIENT)
Dept: INTERNAL MEDICINE CLINIC | Facility: CLINIC | Age: 62
End: 2022-05-03

## 2022-05-04 NOTE — TELEPHONE ENCOUNTER
Routed to [facility] for advise, thanks.       Future Appointments   Date Time Provider Robin Velasco   5/27/2022 12:30 PM DO GISELE Queen EM [facility]

## 2022-05-04 NOTE — TELEPHONE ENCOUNTER
From: Rin Leos  To: CARRIE Merida  Sent: 5/3/2022 8:19 PM CDT  Subject: hemorrhoids    I want to know if I can use the hemorrhoids medicated wipes after I have bowel movement?

## 2022-05-04 NOTE — TELEPHONE ENCOUNTER
From: Floridalma Burnette  To: CARRIE Garcia  Sent: 5/3/2022 3:41 PM CDT  Subject: hemorrhoids    I forgot to ask another question can I have apple vinegar gummies, do those help for rectal hemorrhoids? how about fiber choice gummies?     Thank you for your time,  Stephany Lee

## 2022-05-16 RX ORDER — ENALAPRIL MALEATE 10 MG/1
10 TABLET ORAL DAILY
Qty: 90 TABLET | Refills: 1 | Status: SHIPPED | OUTPATIENT
Start: 2022-05-16

## 2022-05-25 ENCOUNTER — PATIENT MESSAGE (OUTPATIENT)
Dept: INTERNAL MEDICINE CLINIC | Facility: CLINIC | Age: 62
End: 2022-05-25

## 2022-05-26 NOTE — TELEPHONE ENCOUNTER
From: William Giradlo  To: Christian Candelario MD  Sent: 5/25/2022 11:00 PM CDT  Subject: gummies vitamins    I want to know if I can take Vitamin C and Zinc the same day.  I am traveling can I take Dramamine or should I take Lorazepam that my brother-in-law subscribe me he is a psychiatrist.     Thank you for your time,  Selvin Montalvo

## 2022-05-27 ENCOUNTER — OFFICE VISIT (OUTPATIENT)
Dept: UROLOGY | Facility: HOSPITAL | Age: 62
End: 2022-05-27
Attending: OBSTETRICS & GYNECOLOGY
Payer: COMMERCIAL

## 2022-05-27 VITALS
BODY MASS INDEX: 30.02 KG/M2 | SYSTOLIC BLOOD PRESSURE: 126 MMHG | HEIGHT: 61 IN | WEIGHT: 159 LBS | DIASTOLIC BLOOD PRESSURE: 76 MMHG

## 2022-05-27 DIAGNOSIS — N95.2 POSTMENOPAUSAL ATROPHIC VAGINITIS: ICD-10-CM

## 2022-05-27 DIAGNOSIS — Z98.890 POST-OPERATIVE STATE: ICD-10-CM

## 2022-05-27 DIAGNOSIS — N95.2 POSTMENOPAUSAL ATROPHIC VAGINITIS: Primary | ICD-10-CM

## 2022-05-27 DIAGNOSIS — N81.84 PELVIC MUSCLE WASTING: ICD-10-CM

## 2022-05-27 PROCEDURE — 99212 OFFICE O/P EST SF 10 MIN: CPT

## 2022-05-27 RX ORDER — ESTRADIOL 0.1 MG/G
CREAM VAGINAL
Qty: 42.5 G | Refills: 3 | Status: SHIPPED | OUTPATIENT
Start: 2022-05-27

## 2022-06-25 ENCOUNTER — PATIENT MESSAGE (OUTPATIENT)
Dept: INTERNAL MEDICINE CLINIC | Facility: CLINIC | Age: 62
End: 2022-06-25

## 2022-06-25 NOTE — TELEPHONE ENCOUNTER
juniIzooble message sent to pt.        Future Appointments   Date Time Provider Robin Velasco   8/19/2022 11:30 AM Nena Walker [facility]

## 2022-06-26 NOTE — TELEPHONE ENCOUNTER
Carmela August IDPH query but no available informations. Appinyhart sent. From: Vonda Berkowitz  Sent: 6/25/2022  6:58 PM CDT  To: Em Rn Triage  Subject: RE: Non-Urgent Medical Question    this afternoon I took the shingle vaccination at [place] no charge. Please let Dr. Roslyn Lanes know.       Thank you,  Vonda Berkowitz

## 2022-08-18 RX ORDER — ENALAPRIL MALEATE 10 MG/1
10 TABLET ORAL DAILY
Qty: 90 TABLET | Refills: 1 | Status: SHIPPED | OUTPATIENT
Start: 2022-08-18

## 2022-08-19 ENCOUNTER — OFFICE VISIT (OUTPATIENT)
Dept: UROLOGY | Facility: HOSPITAL | Age: 62
End: 2022-08-19
Attending: STUDENT IN AN ORGANIZED HEALTH CARE EDUCATION/TRAINING PROGRAM
Payer: COMMERCIAL

## 2022-08-19 VITALS — RESPIRATION RATE: 18 BRPM | BODY MASS INDEX: 30.02 KG/M2 | HEIGHT: 61 IN | WEIGHT: 159 LBS

## 2022-08-19 DIAGNOSIS — Z98.890 POST-OPERATIVE STATE: ICD-10-CM

## 2022-08-19 DIAGNOSIS — N81.84 PELVIC MUSCLE WASTING: ICD-10-CM

## 2022-08-19 DIAGNOSIS — N95.2 POSTMENOPAUSAL ATROPHIC VAGINITIS: Primary | ICD-10-CM

## 2022-08-19 PROCEDURE — 99212 OFFICE O/P EST SF 10 MIN: CPT

## 2022-09-13 ENCOUNTER — MED REC SCAN ONLY (OUTPATIENT)
Dept: INTERNAL MEDICINE CLINIC | Facility: CLINIC | Age: 62
End: 2022-09-13

## 2022-09-14 ENCOUNTER — TELEPHONE (OUTPATIENT)
Dept: INTERNAL MEDICINE CLINIC | Facility: CLINIC | Age: 62
End: 2022-09-14

## 2022-09-14 DIAGNOSIS — Z23 NEED FOR TDAP VACCINATION: Primary | ICD-10-CM

## 2022-09-14 NOTE — TELEPHONE ENCOUNTER
Patient requesting a TDAP. No evidence that TDAP was given to this patient. IDPH query conducted. Order pended.

## 2022-09-28 ENCOUNTER — TELEPHONE (OUTPATIENT)
Dept: INTERNAL MEDICINE CLINIC | Facility: CLINIC | Age: 62
End: 2022-09-28

## 2022-09-28 LAB — AMB EXT COVID-19 RESULT: DETECTED

## 2022-09-28 NOTE — TELEPHONE ENCOUNTER
Patient wanted to know if could take robitussin DM and tylenol at the same time. Advised patient that ok to take at the same time? Patient stated that has a slight cold. Advised patient for patient's that have high blood pressure coricidin hbp is recommended. Patient verbalized understanding and stated that already took the robitussin DM. Stated that she will monitor her blood pressure if any concerns then will call us back.

## 2022-09-29 ENCOUNTER — PATIENT MESSAGE (OUTPATIENT)
Dept: INTERNAL MEDICINE CLINIC | Facility: CLINIC | Age: 62
End: 2022-09-29

## 2022-10-01 NOTE — TELEPHONE ENCOUNTER
Advised patient of Dr. Ag Feliciano note. Patient verbalized understanding. Patient stated that one day systolic blood pressure was 150 but when took it again was 140. Advised patient that should definitely do the coricidin HBP instead of the Robitussin DM. Patient verbalized understanding.

## 2022-10-05 ENCOUNTER — TELEPHONE (OUTPATIENT)
Dept: INTERNAL MEDICINE CLINIC | Facility: CLINIC | Age: 62
End: 2022-10-05

## 2022-10-05 NOTE — TELEPHONE ENCOUNTER
Patient called and states that she heard  that the flu this year is very strong and they are encouraging flu vaccine. States that [id number], she had received the flu shot and she had bad reactions after 2 weeks=shortness of breath,chest tightness, very uncomfortable. Per patient , Dr Addi Taylor knows about it . Patient would like to asks Dr Addi Taylor ,if   it is ok for her to receive the flu shot this year. Her last flu shot was [id number] (see above) . RN=PATIENT REQUESTED TO SEND THE RESPOND VIA GROU.PS,THANKS.         Please reply to pool: CARLOS Lynn Channel

## 2022-10-11 NOTE — TELEPHONE ENCOUNTER
Noted.    Future Appointments   Date Time Provider Robin Velasco   11/17/2022  2:00 PM aRven Terry MD [facility]   2/3/2023 11:30 AM DO GISELE Briggs  [address]

## 2022-10-17 ENCOUNTER — PATIENT MESSAGE (OUTPATIENT)
Dept: INTERNAL MEDICINE CLINIC | Facility: CLINIC | Age: 62
End: 2022-10-17

## 2022-10-18 NOTE — TELEPHONE ENCOUNTER
Patient called office. Patient's date of birth and full name both confirmed. She wanted to discuss the instructions from [facility]. Denies any symptoms currently. Provided education Ibuprofen - advised to follow label instructions and take with food. And if symptoms come back, needs evaluation. If after hours, WIC or IC. She verbalizes understanding of all information, and agreeable to plan.

## 2022-10-18 NOTE — TELEPHONE ENCOUNTER
From: Ghazal Jones  To: Sienna Connolly MD  Sent: 10/17/2022 11:05 PM CDT  Subject: earache    I want to know if I can take Advil for my earache and if I can how many can I take and in how many hours apart. I have no headache or fever.     Thank you for your time,  Ghazal Jones

## 2022-10-27 ENCOUNTER — OFFICE VISIT (OUTPATIENT)
Dept: INTERNAL MEDICINE CLINIC | Facility: CLINIC | Age: 62
End: 2022-10-27
Payer: COMMERCIAL

## 2022-10-27 VITALS
TEMPERATURE: 98 F | HEART RATE: 96 BPM | HEIGHT: 61 IN | WEIGHT: 157 LBS | DIASTOLIC BLOOD PRESSURE: 90 MMHG | BODY MASS INDEX: 29.64 KG/M2 | SYSTOLIC BLOOD PRESSURE: 163 MMHG

## 2022-10-27 DIAGNOSIS — I10 ESSENTIAL HYPERTENSION: ICD-10-CM

## 2022-10-27 DIAGNOSIS — R51.9 GENERALIZED HEADACHE: ICD-10-CM

## 2022-10-27 DIAGNOSIS — H92.01 RIGHT EAR PAIN: Primary | ICD-10-CM

## 2022-10-27 DIAGNOSIS — Z91.09 ENVIRONMENTAL ALLERGIES: ICD-10-CM

## 2022-10-27 PROCEDURE — 99214 OFFICE O/P EST MOD 30 MIN: CPT | Performed by: PHYSICIAN ASSISTANT

## 2022-10-27 PROCEDURE — 3080F DIAST BP >= 90 MM HG: CPT | Performed by: PHYSICIAN ASSISTANT

## 2022-10-27 PROCEDURE — 3008F BODY MASS INDEX DOCD: CPT | Performed by: PHYSICIAN ASSISTANT

## 2022-10-27 PROCEDURE — 3077F SYST BP >= 140 MM HG: CPT | Performed by: PHYSICIAN ASSISTANT

## 2022-10-27 NOTE — PATIENT INSTRUCTIONS
Patient is to take Claritin daily for two weeks   Nasal spray daily for one week  Excedrin for her headache as needed  Warm compress to the head  Monitor BP twice a day for the next week  Monitor for worsening HA, new onset change to vision or weakness

## 2022-11-07 DIAGNOSIS — N95.2 POSTMENOPAUSAL ATROPHIC VAGINITIS: ICD-10-CM

## 2022-11-09 RX ORDER — ESTRADIOL 0.1 MG/G
CREAM VAGINAL
Qty: 42.5 G | Refills: 3 | Status: SHIPPED | OUTPATIENT
Start: 2022-11-09

## 2022-11-11 ENCOUNTER — TELEPHONE (OUTPATIENT)
Dept: INTERNAL MEDICINE CLINIC | Facility: CLINIC | Age: 62
End: 2022-11-11

## 2022-11-11 NOTE — TELEPHONE ENCOUNTER
Patient calling with condition update =     Asking if she can continue Claritin, as she just completed two weeks. Reports ear pain and sore throat has resolved, reports headache      Per OV note with Antonieta 10/27/22 =    1. Right ear pain  Patient is to take Claritin daily for two weeks   Nasal spray daily for one week      Also advised Tylenol for headaches, push fluids.   Reports blood pressure medications had recently changed dosage      Future Appointments   Date Time Provider Robin Velasco   11/17/2022  2:00 PM Reba Figueroa MD [facility] Monae   11/29/2022  3:00 PM Violeta Chacko MD [facility] Lynsey Parry

## 2022-11-14 RX ORDER — ENALAPRIL MALEATE 10 MG/1
10 TABLET ORAL DAILY
Qty: 90 TABLET | Refills: 1 | Status: SHIPPED | OUTPATIENT
Start: 2022-11-14

## 2022-11-14 NOTE — TELEPHONE ENCOUNTER
Left message to call back or she can check her [employer] account for Dr. Geni Estrella advise on the Claritin.

## 2022-11-15 NOTE — TELEPHONE ENCOUNTER
.Dr Ruiz=per patient request, FYI.      Future Appointments   Date Time Provider Robin Kritsa   11/17/2022  2:00 PM Johanna Kan MD [facility]   11/29/2022  3:00 PM Stephan Larson MD [facility]   2/3/2023 11:30 AM Camilo Vaz  [address]

## 2022-11-17 ENCOUNTER — OFFICE VISIT (OUTPATIENT)
Dept: ALLERGY | Facility: CLINIC | Age: 62
End: 2022-11-17
Payer: COMMERCIAL

## 2022-11-17 VITALS
WEIGHT: 157 LBS | SYSTOLIC BLOOD PRESSURE: 147 MMHG | OXYGEN SATURATION: 96 % | DIASTOLIC BLOOD PRESSURE: 85 MMHG | HEIGHT: 61 IN | HEART RATE: 90 BPM | BODY MASS INDEX: 29.64 KG/M2

## 2022-11-17 DIAGNOSIS — T50.B95A ADVERSE EFFECT OF INFLUENZA VACCINE, INITIAL ENCOUNTER: Primary | ICD-10-CM

## 2022-11-17 PROCEDURE — 99203 OFFICE O/P NEW LOW 30 MIN: CPT | Performed by: ALLERGY & IMMUNOLOGY

## 2022-11-17 PROCEDURE — 3079F DIAST BP 80-89 MM HG: CPT | Performed by: ALLERGY & IMMUNOLOGY

## 2022-11-17 PROCEDURE — 3077F SYST BP >= 140 MM HG: CPT | Performed by: ALLERGY & IMMUNOLOGY

## 2022-11-17 PROCEDURE — 3008F BODY MASS INDEX DOCD: CPT | Performed by: ALLERGY & IMMUNOLOGY

## 2022-11-17 RX ORDER — POLYETHYLENE GLYCOL 3350 17 G/17G
POWDER, FOR SOLUTION ORAL
COMMUNITY

## 2022-11-17 RX ORDER — COVID-19 MOLECULAR TEST ASSAY
1 KIT MISCELLANEOUS AS DIRECTED
COMMUNITY
Start: 2022-09-28

## 2022-11-17 RX ORDER — CELECOXIB 200 MG/1
200 CAPSULE ORAL DAILY
COMMUNITY
Start: 2022-06-22

## 2022-11-29 ENCOUNTER — OFFICE VISIT (OUTPATIENT)
Dept: DERMATOLOGY CLINIC | Facility: CLINIC | Age: 62
End: 2022-11-29
Payer: COMMERCIAL

## 2022-11-29 ENCOUNTER — OFFICE VISIT (OUTPATIENT)
Dept: INTERNAL MEDICINE CLINIC | Facility: CLINIC | Age: 62
End: 2022-11-29
Payer: COMMERCIAL

## 2022-11-29 VITALS
RESPIRATION RATE: 20 BRPM | WEIGHT: 162 LBS | BODY MASS INDEX: 30.58 KG/M2 | HEIGHT: 61 IN | TEMPERATURE: 99 F | DIASTOLIC BLOOD PRESSURE: 82 MMHG | HEART RATE: 88 BPM | SYSTOLIC BLOOD PRESSURE: 132 MMHG

## 2022-11-29 DIAGNOSIS — I10 ESSENTIAL HYPERTENSION: Primary | ICD-10-CM

## 2022-11-29 DIAGNOSIS — L82.1 SEBORRHEIC KERATOSIS: Primary | ICD-10-CM

## 2022-11-29 DIAGNOSIS — Z91.09 ENVIRONMENTAL ALLERGIES: ICD-10-CM

## 2022-11-29 DIAGNOSIS — M16.11 PRIMARY OSTEOARTHRITIS OF RIGHT HIP: ICD-10-CM

## 2022-11-29 PROCEDURE — 3075F SYST BP GE 130 - 139MM HG: CPT | Performed by: INTERNAL MEDICINE

## 2022-11-29 PROCEDURE — 3079F DIAST BP 80-89 MM HG: CPT | Performed by: INTERNAL MEDICINE

## 2022-11-29 PROCEDURE — 17110 DESTRUCTION B9 LES UP TO 14: CPT | Performed by: PHYSICIAN ASSISTANT

## 2022-11-29 PROCEDURE — 99214 OFFICE O/P EST MOD 30 MIN: CPT | Performed by: INTERNAL MEDICINE

## 2022-11-29 PROCEDURE — 99213 OFFICE O/P EST LOW 20 MIN: CPT | Performed by: PHYSICIAN ASSISTANT

## 2022-11-29 PROCEDURE — 3008F BODY MASS INDEX DOCD: CPT | Performed by: INTERNAL MEDICINE

## 2022-11-29 RX ORDER — CELECOXIB 200 MG/1
200 CAPSULE ORAL DAILY
Qty: 90 CAPSULE | Refills: 0 | Status: SHIPPED | OUTPATIENT
Start: 2022-11-29

## 2022-11-29 RX ORDER — ENALAPRIL MALEATE 20 MG/1
20 TABLET ORAL DAILY
Qty: 90 TABLET | Refills: 0 | COMMUNITY
Start: 2022-11-29 | End: 2022-11-29

## 2022-11-29 RX ORDER — ENALAPRIL MALEATE 20 MG/1
20 TABLET ORAL DAILY
Qty: 90 TABLET | Refills: 0 | COMMUNITY
Start: 2022-11-29

## 2022-11-30 PROBLEM — M21.42 PES PLANUS OF LEFT FOOT: Status: ACTIVE | Noted: 2022-11-30

## 2022-11-30 PROBLEM — Z01.818 PRE-OP TESTING: Status: RESOLVED | Noted: 2022-01-20 | Resolved: 2022-11-30

## 2022-12-29 ENCOUNTER — NURSE TRIAGE (OUTPATIENT)
Dept: INTERNAL MEDICINE CLINIC | Facility: CLINIC | Age: 62
End: 2022-12-29

## 2023-01-04 ENCOUNTER — PATIENT MESSAGE (OUTPATIENT)
Dept: INTERNAL MEDICINE CLINIC | Facility: CLINIC | Age: 63
End: 2023-01-04

## 2023-01-04 ENCOUNTER — OFFICE VISIT (OUTPATIENT)
Dept: INTERNAL MEDICINE CLINIC | Facility: CLINIC | Age: 63
End: 2023-01-04
Payer: COMMERCIAL

## 2023-01-04 ENCOUNTER — HOSPITAL ENCOUNTER (OUTPATIENT)
Dept: GENERAL RADIOLOGY | Age: 63
Discharge: HOME | End: 2023-01-04
Attending: PHYSICIAN ASSISTANT
Payer: COMMERCIAL

## 2023-01-04 VITALS
HEIGHT: 61 IN | SYSTOLIC BLOOD PRESSURE: 169 MMHG | BODY MASS INDEX: 30.02 KG/M2 | HEART RATE: 85 BPM | WEIGHT: 159 LBS | OXYGEN SATURATION: 99 % | DIASTOLIC BLOOD PRESSURE: 90 MMHG

## 2023-01-04 DIAGNOSIS — M25.511 ACUTE PAIN OF RIGHT SHOULDER: Primary | ICD-10-CM

## 2023-01-04 DIAGNOSIS — I10 ESSENTIAL HYPERTENSION: ICD-10-CM

## 2023-01-04 DIAGNOSIS — M25.511 ACUTE PAIN OF RIGHT SHOULDER: ICD-10-CM

## 2023-01-04 PROCEDURE — 73030 X-RAY EXAM OF SHOULDER: CPT | Performed by: PHYSICIAN ASSISTANT

## 2023-01-04 PROCEDURE — 3008F BODY MASS INDEX DOCD: CPT | Performed by: PHYSICIAN ASSISTANT

## 2023-01-04 PROCEDURE — 3077F SYST BP >= 140 MM HG: CPT | Performed by: PHYSICIAN ASSISTANT

## 2023-01-04 PROCEDURE — 99214 OFFICE O/P EST MOD 30 MIN: CPT | Performed by: PHYSICIAN ASSISTANT

## 2023-01-04 PROCEDURE — 3080F DIAST BP >= 90 MM HG: CPT | Performed by: PHYSICIAN ASSISTANT

## 2023-01-05 ENCOUNTER — PATIENT MESSAGE (OUTPATIENT)
Dept: INTERNAL MEDICINE CLINIC | Facility: CLINIC | Age: 63
End: 2023-01-05

## 2023-01-05 NOTE — TELEPHONE ENCOUNTER
From: Tere Billings  To: Alice Schneider PA-C  Sent: 1/4/2023 9:31 PM CST  Subject: Question regarding XR SHOULDER, COMPLETE (MIN 2 VIEWS), RIGHT (CPT=73030)    What should I do if I have arthritis on my right shoulder?

## 2023-01-06 NOTE — TELEPHONE ENCOUNTER
Stacie Brooks RN 1/6/2023 10:54 AM CST        ----- Message -----  From: Lex Vera  Sent: 1/5/2023 9:55 PM CST  To: Maria Luisa Rn Triage  Subject: Gummy vitamins     Can I take some gummy vitamins for arthritis?

## 2023-01-26 ENCOUNTER — MED REC SCAN ONLY (OUTPATIENT)
Dept: INTERNAL MEDICINE CLINIC | Facility: CLINIC | Age: 63
End: 2023-01-26

## 2023-01-27 RX ORDER — ENALAPRIL MALEATE 20 MG/1
20 TABLET ORAL DAILY
Qty: 90 TABLET | Refills: 1 | Status: SHIPPED | OUTPATIENT
Start: 2023-01-27

## 2023-01-28 NOTE — TELEPHONE ENCOUNTER
Rx listed as historical, pls advise, thanks in advance. Please review refill protocol failed/ no protocol  Requested Prescriptions   Pending Prescriptions Disp Refills    enalapril 20 MG Oral Tab 90 tablet 0     Sig: Take 1 tablet (20 mg total) by mouth daily. Hypertensive Medications Protocol Failed - 1/27/2023  2:54 PM        Failed - Last BP reading less than 140/90     BP Readings from Last 1 Encounters:  01/04/23 : (!) 169/90              Failed - CMP or BMP in past 6 months     No results found for this or any previous visit (from the past 4392 hour(s)).             Failed - EGFRCR or GFRNAA > 50     GFR Evaluation            Passed - In person appointment in the past 12 or next 3 months     Recent Outpatient Visits              3 weeks ago [facility] [address] [place] [address]    Office Visit    1 month ago Seborrheic keratosis    [address] [facility] Jelena King PA-C    Office Visit    1 month ago [facility]    Office Visit    2 months ago Adverse effect of influenza vaccine, initial encounter    [address] Alex Mabry MD    Office Visit    3 months ago [facility] ear [facility] [address] [place] [address] Alex Aguilera PA-C    Office Visit          Future Appointments         Provider Department Appt Notes    In 1 week Tony Lawson DO [facility] for Pelvic [address] [facility] Urogynecology yearly               Passed - In person appointment or virtual visit in the past 6 months     Recent Outpatient Visits              3 weeks ago [facility] right [facility] [address] [place] [address] [facility]    Office Visit    1 month ago [facility]borrheic keratosis    [facility], [address] [facility] [place]    Office Visit    1 month ago Essential hypertension    Eun Veliz MD    Office Visit    2 months ago Adverse effect of influenza vaccine, initial encounter    [address] Alex Mabry MD    Office Visit    3 months ago [facility], [address] [facility] Alex Aguilera [place], [place]    Office Visit          Future Appointments         Provider Department Appt Notes    In 1 week Khris Portillo DO [facility] for Pelvic [address] [facility] Urogynecology yearly

## 2023-02-03 ENCOUNTER — OFFICE VISIT (OUTPATIENT)
Dept: UROLOGY | Facility: HOSPITAL | Age: 63
End: 2023-02-03
Attending: OBSTETRICS & GYNECOLOGY
Payer: MEDICARE

## 2023-02-03 VITALS
BODY MASS INDEX: 30.02 KG/M2 | DIASTOLIC BLOOD PRESSURE: 60 MMHG | TEMPERATURE: 98 F | WEIGHT: 159 LBS | HEIGHT: 61 IN | RESPIRATION RATE: 18 BRPM | SYSTOLIC BLOOD PRESSURE: 132 MMHG

## 2023-02-03 DIAGNOSIS — Z98.890 POST-OPERATIVE STATE: ICD-10-CM

## 2023-02-03 DIAGNOSIS — N81.84 PELVIC MUSCLE WASTING: Primary | ICD-10-CM

## 2023-02-03 DIAGNOSIS — N95.2 POSTMENOPAUSAL ATROPHIC VAGINITIS: ICD-10-CM

## 2023-02-03 PROCEDURE — 99212 OFFICE O/P EST SF 10 MIN: CPT

## 2023-03-09 ENCOUNTER — TELEPHONE (OUTPATIENT)
Dept: UROLOGY | Facility: HOSPITAL | Age: 63
End: 2023-03-09

## 2023-03-09 NOTE — TELEPHONE ENCOUNTER
TC from pt asking if she can decrease estrogen cream from 2x/wk to 1x/wk as she has a hard time remembering to do it twice a week. Explained to pt it is prescribed twice weekly in order to be effective. Explained benefits of vaginal estrogen and recommended setting repeating alarm on phone for the same 2 days each week to help her remember. Encouraged to call with any further questions or concerns. Pt verbalized understanding.

## 2023-04-14 ENCOUNTER — NURSE TRIAGE (OUTPATIENT)
Dept: INTERNAL MEDICINE CLINIC | Facility: CLINIC | Age: 63
End: 2023-04-14

## 2023-04-19 ENCOUNTER — OFFICE VISIT (OUTPATIENT)
Dept: INTERNAL MEDICINE CLINIC | Facility: CLINIC | Age: 63
End: 2023-04-19

## 2023-04-19 ENCOUNTER — LAB ENCOUNTER (OUTPATIENT)
Dept: LAB | Age: 63
End: 2023-04-19
Attending: INTERNAL MEDICINE
Payer: MEDICARE

## 2023-04-19 VITALS
DIASTOLIC BLOOD PRESSURE: 80 MMHG | BODY MASS INDEX: 27.94 KG/M2 | HEART RATE: 94 BPM | SYSTOLIC BLOOD PRESSURE: 118 MMHG | WEIGHT: 148 LBS | HEIGHT: 61 IN

## 2023-04-19 DIAGNOSIS — J02.9 VIRAL PHARYNGITIS: ICD-10-CM

## 2023-04-19 DIAGNOSIS — I10 ESSENTIAL HYPERTENSION: ICD-10-CM

## 2023-04-19 DIAGNOSIS — R73.01 ABNORMAL FASTING GLUCOSE: ICD-10-CM

## 2023-04-19 DIAGNOSIS — I10 ESSENTIAL HYPERTENSION: Primary | ICD-10-CM

## 2023-04-19 LAB
ALBUMIN SERPL-MCNC: 3.8 G/DL (ref 3.4–5)
ALBUMIN/GLOB SERPL: 0.9 {RATIO} (ref 1–2)
ALP LIVER SERPL-CCNC: 152 U/L
ALT SERPL-CCNC: 54 U/L
ANION GAP SERPL CALC-SCNC: 11 MMOL/L (ref 0–18)
AST SERPL-CCNC: 35 U/L (ref 15–37)
BILIRUB SERPL-MCNC: 2 MG/DL (ref 0.1–2)
BUN BLD-MCNC: 8 MG/DL (ref 7–18)
BUN/CREAT SERPL: 10.4 (ref 10–20)
CALCIUM BLD-MCNC: 9.3 MG/DL (ref 8.5–10.1)
CHLORIDE SERPL-SCNC: 100 MMOL/L (ref 98–112)
CO2 SERPL-SCNC: 24 MMOL/L (ref 21–32)
CREAT BLD-MCNC: 0.77 MG/DL
DEPRECATED RDW RBC AUTO: 36 FL (ref 35.1–46.3)
ERYTHROCYTE [DISTWIDTH] IN BLOOD BY AUTOMATED COUNT: 11.7 % (ref 11–15)
FASTING STATUS PATIENT QL REPORTED: YES
GFR SERPLBLD BASED ON 1.73 SQ M-ARVRAT: 86 ML/MIN/1.73M2 (ref 60–?)
GLOBULIN PLAS-MCNC: 4.1 G/DL (ref 2.8–4.4)
GLUCOSE BLD-MCNC: 334 MG/DL (ref 70–99)
HCT VFR BLD AUTO: 38.2 %
HGB BLD-MCNC: 13.4 G/DL
MCH RBC QN AUTO: 30.2 PG (ref 26–34)
MCHC RBC AUTO-ENTMCNC: 35.1 G/DL (ref 31–37)
MCV RBC AUTO: 86 FL
OSMOLALITY SERPL CALC.SUM OF ELEC: 291 MOSM/KG (ref 275–295)
PLATELET # BLD AUTO: 197 10(3)UL (ref 150–450)
POTASSIUM SERPL-SCNC: 4.2 MMOL/L (ref 3.5–5.1)
PROT SERPL-MCNC: 7.9 G/DL (ref 6.4–8.2)
RBC # BLD AUTO: 4.44 X10(6)UL
SODIUM SERPL-SCNC: 135 MMOL/L (ref 136–145)
WBC # BLD AUTO: 6.1 X10(3) UL (ref 4–11)

## 2023-04-19 PROCEDURE — 99214 OFFICE O/P EST MOD 30 MIN: CPT | Performed by: INTERNAL MEDICINE

## 2023-04-19 PROCEDURE — 36415 COLL VENOUS BLD VENIPUNCTURE: CPT

## 2023-04-19 PROCEDURE — 3008F BODY MASS INDEX DOCD: CPT | Performed by: INTERNAL MEDICINE

## 2023-04-19 PROCEDURE — 85027 COMPLETE CBC AUTOMATED: CPT

## 2023-04-19 PROCEDURE — 80053 COMPREHEN METABOLIC PANEL: CPT

## 2023-04-19 PROCEDURE — 3074F SYST BP LT 130 MM HG: CPT | Performed by: INTERNAL MEDICINE

## 2023-04-19 PROCEDURE — 3079F DIAST BP 80-89 MM HG: CPT | Performed by: INTERNAL MEDICINE

## 2023-04-21 ENCOUNTER — TELEPHONE (OUTPATIENT)
Dept: INTERNAL MEDICINE CLINIC | Facility: CLINIC | Age: 63
End: 2023-04-21

## 2023-04-21 ENCOUNTER — LAB ENCOUNTER (OUTPATIENT)
Dept: LAB | Age: 63
End: 2023-04-21
Attending: INTERNAL MEDICINE
Payer: MEDICARE

## 2023-04-21 DIAGNOSIS — I10 ESSENTIAL HYPERTENSION: ICD-10-CM

## 2023-04-21 DIAGNOSIS — R73.9 HYPERGLYCEMIA: ICD-10-CM

## 2023-04-21 DIAGNOSIS — R73.01 ABNORMAL FASTING GLUCOSE: ICD-10-CM

## 2023-04-21 LAB
ANION GAP SERPL CALC-SCNC: 12 MMOL/L (ref 0–18)
BILIRUB UR QL: NEGATIVE
BUN BLD-MCNC: 13 MG/DL (ref 7–18)
BUN/CREAT SERPL: 16.3 (ref 10–20)
CALCIUM BLD-MCNC: 9.5 MG/DL (ref 8.5–10.1)
CHLORIDE SERPL-SCNC: 99 MMOL/L (ref 98–112)
CHOLEST SERPL-MCNC: 212 MG/DL (ref ?–200)
CLARITY UR: CLEAR
CO2 SERPL-SCNC: 24 MMOL/L (ref 21–32)
CREAT BLD-MCNC: 0.8 MG/DL
EST. AVERAGE GLUCOSE BLD GHB EST-MCNC: 246 MG/DL (ref 68–126)
FASTING PATIENT LIPID ANSWER: YES
FASTING STATUS PATIENT QL REPORTED: YES
GFR SERPLBLD BASED ON 1.73 SQ M-ARVRAT: 82 ML/MIN/1.73M2 (ref 60–?)
GLUCOSE BLD-MCNC: 408 MG/DL (ref 70–99)
GLUCOSE UR-MCNC: >1000 MG/DL
HBA1C MFR BLD: 10.2 % (ref ?–5.7)
HDLC SERPL-MCNC: 49 MG/DL (ref 40–59)
HGB UR QL STRIP.AUTO: NEGATIVE
KETONES UR-MCNC: 40 MG/DL
LDLC SERPL CALC-MCNC: 139 MG/DL (ref ?–100)
LEUKOCYTE ESTERASE UR QL STRIP.AUTO: NEGATIVE
NITRITE UR QL STRIP.AUTO: NEGATIVE
NONHDLC SERPL-MCNC: 163 MG/DL (ref ?–130)
OSMOLALITY SERPL CALC.SUM OF ELEC: 297 MOSM/KG (ref 275–295)
PH UR: 5 [PH] (ref 5–8)
POTASSIUM SERPL-SCNC: 4 MMOL/L (ref 3.5–5.1)
PROT UR-MCNC: NEGATIVE MG/DL
SODIUM SERPL-SCNC: 135 MMOL/L (ref 136–145)
SP GR UR STRIP: >1.03 (ref 1–1.03)
T4 FREE SERPL-MCNC: 1.1 NG/DL (ref 0.8–1.7)
TRIGL SERPL-MCNC: 133 MG/DL (ref 30–149)
TSI SER-ACNC: 0.91 MIU/ML (ref 0.36–3.74)
UROBILINOGEN UR STRIP-ACNC: NORMAL
VLDLC SERPL CALC-MCNC: 25 MG/DL (ref 0–30)
YEAST UR QL: PRESENT /HPF

## 2023-04-21 PROCEDURE — 80048 BASIC METABOLIC PNL TOTAL CA: CPT

## 2023-04-21 PROCEDURE — 84443 ASSAY THYROID STIM HORMONE: CPT

## 2023-04-21 PROCEDURE — 36415 COLL VENOUS BLD VENIPUNCTURE: CPT

## 2023-04-21 PROCEDURE — 3046F HEMOGLOBIN A1C LEVEL >9.0%: CPT | Performed by: NURSE PRACTITIONER

## 2023-04-21 PROCEDURE — 83036 HEMOGLOBIN GLYCOSYLATED A1C: CPT

## 2023-04-21 PROCEDURE — 80061 LIPID PANEL: CPT

## 2023-04-21 PROCEDURE — 84439 ASSAY OF FREE THYROXINE: CPT

## 2023-04-23 ENCOUNTER — PATIENT MESSAGE (OUTPATIENT)
Dept: INTERNAL MEDICINE CLINIC | Facility: CLINIC | Age: 63
End: 2023-04-23

## 2023-04-24 ENCOUNTER — TELEPHONE (OUTPATIENT)
Dept: ENDOCRINOLOGY CLINIC | Facility: CLINIC | Age: 63
End: 2023-04-24

## 2023-04-24 ENCOUNTER — TELEPHONE (OUTPATIENT)
Dept: INTERNAL MEDICINE CLINIC | Facility: CLINIC | Age: 63
End: 2023-04-24

## 2023-04-24 RX ORDER — METFORMIN HYDROCHLORIDE 500 MG/1
500 TABLET, EXTENDED RELEASE ORAL 2 TIMES DAILY WITH MEALS
Qty: 180 TABLET | Refills: 0 | Status: SHIPPED | OUTPATIENT
Start: 2023-04-24

## 2023-04-24 NOTE — TELEPHONE ENCOUNTER
Called patient, no answer, left detailed message regarding appointment opening on Thursday 4/27 at 2:30pm    Awaiting call back

## 2023-04-24 NOTE — TELEPHONE ENCOUNTER
Patient has been referred to us as a new consult for T2DM. Since her current BG readings are very high she needs to be seen soon. Please offer apt for Thursday 4/27 at 8am.     Thank you!

## 2023-04-24 NOTE — TELEPHONE ENCOUNTER
Patient is requesting a prescription for a new Blood Glucose monitoring system. .    One Touch Berio system  One Touch Berio Test Strips & Delica lancets    Please use Ricky in [address] on file

## 2023-04-24 NOTE — TELEPHONE ENCOUNTER
Metformin was changed to 500 mg ER tabs BID per [employer] message on 4/23/23. It was called in by Dr. Janet Conn. Dr. Janet Conn, please review and sign pended Rx to reflect the correct medication on MAR.

## 2023-04-24 NOTE — TELEPHONE ENCOUNTER
Patient was called by Dr. Jimenez Early and has agreed to take apt for Thursday 4/27 at 2:30pm.     Please add her on my schedule. Thank you!

## 2023-04-24 NOTE — TELEPHONE ENCOUNTER
DIONI Correa/Dr. Mullins    Patient unable to make Thursday at [address] [facility]. MD approval slot for Dr. Vahid Espinal on Wednesday 5/3/23 at 12:00pm, is this okay? Also offered 5/16/23 at 10am with DIONI [address] [place] [address] but is not the soonest appointment.

## 2023-04-25 ENCOUNTER — PATIENT MESSAGE (OUTPATIENT)
Dept: INTERNAL MEDICINE CLINIC | Facility: CLINIC | Age: 63
End: 2023-04-25

## 2023-04-26 NOTE — TELEPHONE ENCOUNTER
From: Yasmin Corbin  To: Kulwinder Hinds MD  Sent: 4/25/2023 2:04 PM CDT  Subject: dairy    can I drink milk?

## 2023-04-26 NOTE — TELEPHONE ENCOUNTER
From: Ghazal Jones  To: Sienna Connolly MD  Sent: 4/25/2023 2:01 PM CDT  Subject: Metformin     Dr. Kristi Phelps what time should I take the medicine I know it is twice a day. Please give me the times also before meals or after.    or call me [phone]   Thank you,  Shyla Miranda
9.5

## 2023-04-26 NOTE — TELEPHONE ENCOUNTER
From: Linda Sanchez  To: Esteban Valenzuela MD  Sent: 4/25/2023 2:13 PM CDT  Subject: check sugar    How often should I check my sugar?

## 2023-04-26 NOTE — TELEPHONE ENCOUNTER
Spoke to patient. Patient has an appointment with  tomorrow. Patient was given phone number to diabetic educator and sent information on diabetic diet. Patient also advised to check out the American Diabetes Association website. . diabetes. org    Mychart message sent about Type 2 Diabetes and Food Choices.

## 2023-04-27 ENCOUNTER — OFFICE VISIT (OUTPATIENT)
Dept: ENDOCRINOLOGY CLINIC | Facility: CLINIC | Age: 63
End: 2023-04-27

## 2023-04-27 ENCOUNTER — OFFICE VISIT (OUTPATIENT)
Dept: INTERNAL MEDICINE CLINIC | Facility: CLINIC | Age: 63
End: 2023-04-27

## 2023-04-27 VITALS
WEIGHT: 145 LBS | SYSTOLIC BLOOD PRESSURE: 155 MMHG | BODY MASS INDEX: 27.38 KG/M2 | DIASTOLIC BLOOD PRESSURE: 77 MMHG | HEART RATE: 103 BPM | HEIGHT: 61 IN

## 2023-04-27 VITALS
WEIGHT: 147 LBS | BODY MASS INDEX: 28 KG/M2 | SYSTOLIC BLOOD PRESSURE: 144 MMHG | HEART RATE: 101 BPM | DIASTOLIC BLOOD PRESSURE: 67 MMHG

## 2023-04-27 DIAGNOSIS — E11.9 TYPE 2 DIABETES MELLITUS WITHOUT COMPLICATION, WITHOUT LONG-TERM CURRENT USE OF INSULIN (HCC): ICD-10-CM

## 2023-04-27 DIAGNOSIS — E11.65 UNCONTROLLED TYPE 2 DIABETES MELLITUS WITH HYPERGLYCEMIA (HCC): Primary | ICD-10-CM

## 2023-04-27 DIAGNOSIS — I10 ESSENTIAL HYPERTENSION: Primary | ICD-10-CM

## 2023-04-27 DIAGNOSIS — E78.5 HYPERLIPIDEMIA, UNSPECIFIED HYPERLIPIDEMIA TYPE: ICD-10-CM

## 2023-04-27 PROCEDURE — 3008F BODY MASS INDEX DOCD: CPT | Performed by: INTERNAL MEDICINE

## 2023-04-27 PROCEDURE — 3078F DIAST BP <80 MM HG: CPT | Performed by: INTERNAL MEDICINE

## 2023-04-27 PROCEDURE — 99214 OFFICE O/P EST MOD 30 MIN: CPT | Performed by: INTERNAL MEDICINE

## 2023-04-27 PROCEDURE — 3078F DIAST BP <80 MM HG: CPT | Performed by: NURSE PRACTITIONER

## 2023-04-27 PROCEDURE — 3077F SYST BP >= 140 MM HG: CPT | Performed by: NURSE PRACTITIONER

## 2023-04-27 PROCEDURE — 3077F SYST BP >= 140 MM HG: CPT | Performed by: INTERNAL MEDICINE

## 2023-04-27 PROCEDURE — 99214 OFFICE O/P EST MOD 30 MIN: CPT | Performed by: NURSE PRACTITIONER

## 2023-04-27 RX ORDER — GLIMEPIRIDE 4 MG/1
4 TABLET ORAL
Qty: 90 TABLET | Refills: 0 | Status: SHIPPED | OUTPATIENT
Start: 2023-04-27

## 2023-04-27 RX ORDER — SEMAGLUTIDE 1.34 MG/ML
0.25 INJECTION, SOLUTION SUBCUTANEOUS WEEKLY
Qty: 9 ML | Refills: 0 | Status: SHIPPED | OUTPATIENT
Start: 2023-04-27

## 2023-04-27 NOTE — PATIENT INSTRUCTIONS
A1C: 10.2% on 4/21/2023  Blood glucose: 413 in clinic today    Medications:   - increase Metformin  --> 1,000mg with breakfast          500--> 1,000mg with dinner   - start glimepiride 4mg  once daily in AM   - start ozempic 0.25mg once weekly for 3 weeks  --> week 4: increase 0.5mg once weekly     - lets work on limiting carbohydrates to 45gm per meal/ max 135gm per day  -avoid eating snacks between meals  -avoid eating sweets or soda/fruit juices or Gatorade or energy drinks   -eat dinner at least 2 hours prior to going to bed at night  - increase (aerobic) exercise to at least 4 days per week for at least 30 mins each session    -avoid going more than 2 consecutive days of no exercise    - schedule apt with diabetes learning center to review low carb diet - [phone]     A1C goal:  <7.0%    Blood sugar testing:  Test your blood sugar 2 times daily   Recommended times to test: Before breakfast (fasting) or 2hrs after meals     Blood sugar targets:  Before breakfast:   (preferably < 110)  Before meals OR 2 hours after meals: <180 (preferably <150)     Call for persistent blood sugars < 75 or > 200

## 2023-04-28 ENCOUNTER — TELEPHONE (OUTPATIENT)
Dept: ENDOCRINOLOGY CLINIC | Facility: CLINIC | Age: 63
End: 2023-04-28

## 2023-05-01 ENCOUNTER — TELEPHONE (OUTPATIENT)
Dept: ENDOCRINOLOGY CLINIC | Facility: CLINIC | Age: 63
End: 2023-05-01

## 2023-05-01 DIAGNOSIS — E11.65 UNCONTROLLED TYPE 2 DIABETES MELLITUS WITH HYPERGLYCEMIA (HCC): Primary | ICD-10-CM

## 2023-05-01 NOTE — TELEPHONE ENCOUNTER
[address]  Confirmed with RN they have both RX on file. Glimepiride is ready for  but would have to order ozempic. Pt was notified and advised patient to contact pharmacy and ask when ETA for her ozempic. Pt voiced understanding. Pt also requested to reschedule her 6/13/23 appt as she would like to go to [facility] location.      Future Appointments   Date Time Provider Robin Velasco   6/19/2023  1:30 PM Joy Flowers, CARRIE ECEZRAENDO KENDRICK ADO
Patient is calling because pharmacy told her that the ozempic dosing has been changed by .  Patient would like to know if she should still take it since her sugars have been in the 200s
Patient states pharmacy did not Glimepiride rx and was also told that Ozempic has been discontinued. Please call. Thank you.
1

## 2023-05-02 ENCOUNTER — TELEPHONE (OUTPATIENT)
Dept: ENDOCRINOLOGY CLINIC | Facility: CLINIC | Age: 63
End: 2023-05-02

## 2023-05-02 RX ORDER — LANCETS 30 GAUGE
EACH MISCELLANEOUS
Qty: 300 EACH | Refills: 0 | Status: SHIPPED | OUTPATIENT
Start: 2023-05-02

## 2023-05-02 RX ORDER — BLOOD SUGAR DIAGNOSTIC
STRIP MISCELLANEOUS
Qty: 300 STRIP | Refills: 0 | Status: SHIPPED | OUTPATIENT
Start: 2023-05-02

## 2023-05-02 RX ORDER — SEMAGLUTIDE 0.68 MG/ML
INJECTION, SOLUTION SUBCUTANEOUS
Qty: 9 ML | Refills: 0 | Status: SHIPPED | OUTPATIENT
Start: 2023-05-02 | End: 2023-08-15

## 2023-05-02 NOTE — TELEPHONE ENCOUNTER
[address] [address] noted. Yes, she should proceed with taking ozempic 0.25mg once weekly for 3 weeks, then call to give me an update on her BG readings at that time to determine if we will continue on the same dose or adjust her medications based on BG readings. Her current BG readings are improving because of increased dose of MTF and start of glimepiride 4mg daily. Our goal is to eventually wean her off glimepiride once ozempic dose is at optimal level that seems to control her BG readings. Please remind patient that her BG readings ideally should be  fasting and <150 2hrs after meals. If she notes at any point that her BG readings are in 80s or less to call sooner and notify me. Thank you!

## 2023-05-02 NOTE — TELEPHONE ENCOUNTER
Patient wants to know if she really needs to use Ozempic. Patient states her blood sugars has been \"going down. \"  Please call. Thank you.

## 2023-05-02 NOTE — TELEPHONE ENCOUNTER
DIONI Odell,  At [address] dtd 23 patient to start ozempic (0.25mg weekly for 3 weeks then 0.5mg weekly) - pharmacy did not have old pen so new RX sent for ozempic 2mg/3ml pens today    BG readings:  23  Fastin    23  Fastin  2 hrs after breakfast: 183  2 hrs after lunch: 183  2 hrs after dinner: 165    23  Fastin  2 hrs after breakfast: 195  2 hrs after lunch: 157  2 hrs after dinner: 203    Patient asking if she needs to still start ozempic - please advise -thanks

## 2023-05-02 NOTE — TELEPHONE ENCOUNTER
LOV: 4/27/23  F/U: 6/19/23  Spoke to patient - requesting RXs go to [facility] for onetouch verio test strips, lancets and ozempic 2mg/3ml sent to [facility]

## 2023-05-04 NOTE — TELEPHONE ENCOUNTER
Called patient and relayed APN's message as outlined below. Pt voiced understanding and will  ozempic today from the pharmacy. Pt voiced understanding to call back with BG update.

## 2023-05-05 ENCOUNTER — PATIENT MESSAGE (OUTPATIENT)
Dept: INTERNAL MEDICINE CLINIC | Facility: CLINIC | Age: 63
End: 2023-05-05

## 2023-05-10 ENCOUNTER — APPOINTMENT (OUTPATIENT)
Dept: ENDOCRINOLOGY | Age: 63
End: 2023-05-10
Attending: INTERNAL MEDICINE
Payer: MEDICARE

## 2023-05-11 NOTE — TELEPHONE ENCOUNTER
Patient calling, confirmed name and . She asks if it is ok to inject her Ozempic through her clothing. Advised her to not inject Ozempic through her clothing. Patient verbalized understanding and agrees.

## 2023-05-12 ENCOUNTER — TELEPHONE (OUTPATIENT)
Dept: ENDOCRINOLOGY CLINIC | Facility: CLINIC | Age: 63
End: 2023-05-12

## 2023-05-12 NOTE — TELEPHONE ENCOUNTER
Patient states that she has diabetes and is concerned about having headache and ear ache, so she wants to know if it is ok to take Tylenol. Patient states that her sugar levels are better.

## 2023-05-12 NOTE — TELEPHONE ENCOUNTER
Called patient, no answer, left   Relcy message sent, awaiting patient's response       -increase Metformin  --> 1,000mg twice daily .   -start glimepiride 4mg  once daily in AM  -start ozempic 0.25mg once weekly for 3 weeks, then week 4: increase 0.5mg once weekly -

## 2023-05-24 NOTE — TELEPHONE ENCOUNTER
[id number] Debi Haywood noted. Please offer apt for Thursday 6/8 at 2:30pm. This is an add on to my schedule. Thank you!

## 2023-05-24 NOTE — TELEPHONE ENCOUNTER
rn called patient and reviewed instructions below per provider,  Verbalized understanding  Pt wants to see Jamal Carrizales at [facility] office , attempted to book  No jairo available  Justin Andrew next available is 7/18???  Pt has jairo at [facility] 6/7/23

## 2023-05-24 NOTE — TELEPHONE ENCOUNTER
Discussed labs with patient.  See f telephone note Patient checking status on message. Please call. Thank you.

## 2023-05-25 ENCOUNTER — APPOINTMENT (OUTPATIENT)
Dept: ENDOCRINOLOGY | Facility: HOSPITAL | Age: 63
End: 2023-05-25
Attending: INTERNAL MEDICINE
Payer: MEDICARE

## 2023-05-31 ENCOUNTER — OFFICE VISIT (OUTPATIENT)
Dept: ENDOCRINOLOGY CLINIC | Facility: CLINIC | Age: 63
End: 2023-05-31

## 2023-05-31 VITALS
BODY MASS INDEX: 27 KG/M2 | SYSTOLIC BLOOD PRESSURE: 133 MMHG | DIASTOLIC BLOOD PRESSURE: 81 MMHG | HEART RATE: 91 BPM | WEIGHT: 141 LBS

## 2023-05-31 DIAGNOSIS — E11.65 UNCONTROLLED TYPE 2 DIABETES MELLITUS WITH HYPERGLYCEMIA (HCC): Primary | ICD-10-CM

## 2023-05-31 LAB
CARTRIDGE LOT#: ABNORMAL NUMERIC
GLUCOSE BLOOD: 101
HEMOGLOBIN A1C: 8.2 % (ref 4.3–5.6)
TEST STRIP LOT #: NORMAL NUMERIC

## 2023-05-31 PROCEDURE — 3075F SYST BP GE 130 - 139MM HG: CPT | Performed by: NURSE PRACTITIONER

## 2023-05-31 PROCEDURE — 3079F DIAST BP 80-89 MM HG: CPT | Performed by: NURSE PRACTITIONER

## 2023-05-31 PROCEDURE — 83036 HEMOGLOBIN GLYCOSYLATED A1C: CPT | Performed by: NURSE PRACTITIONER

## 2023-05-31 PROCEDURE — 99214 OFFICE O/P EST MOD 30 MIN: CPT | Performed by: NURSE PRACTITIONER

## 2023-05-31 PROCEDURE — 3052F HG A1C>EQUAL 8.0%<EQUAL 9.0%: CPT | Performed by: NURSE PRACTITIONER

## 2023-05-31 PROCEDURE — 82947 ASSAY GLUCOSE BLOOD QUANT: CPT | Performed by: NURSE PRACTITIONER

## 2023-05-31 NOTE — PATIENT INSTRUCTIONS
A1C: 8.2% today --> decrease from 10.2% on 4/27/2023  Blood glucose: 101 in clinic today  Endocrinology fax# [phone]    Medications:   -continue with Metformin ER 1,000mg twice daily   -continue with Glimepiride 4mg once daily in AM   -continue with Ozempic 0.5mg once weekly     - ok to eat meats - beef/hot dog/ beef stew/ chicken/ fish/ tuna   - 6'' wheat bread with subway sandwich   - carrots or tomatoes or broccoli are ok to eat   - ok to use keto crackers with lentil or bean soup  - substitute regular pizza for cauliflower pizza  - substitute regular tortillas for almond flour or napal or spinach tortillas     - repeat labs in July - have them drawn fasting 10-12hrs     Weight:  Wt Readings from Last 6 Encounters:  05/31/23 : 141 lb (64 kg)  04/27/23 : 147 lb (66.7 kg)  04/27/23 : 145 lb (65.8 kg)  04/19/23 : 148 lb (67.1 kg)  02/03/23 : 159 lb (72.1 kg)  01/04/23 : 159 lb (72.1 kg)      A1C goal:  <7.0%    Blood sugar testing:  Test your blood sugar 2 times daily   Recommended times to test: Before breakfast (fasting) or 2hrs after meals     Blood sugar targets:  Before breakfast:   (preferably < 110)  Before meals OR 2 hours after meals: <180 (preferably <150)     Call for persistent blood sugars < 75 or > 200

## 2023-06-07 ENCOUNTER — HOSPITAL ENCOUNTER (OUTPATIENT)
Dept: ENDOCRINOLOGY | Facility: HOSPITAL | Age: 63
Discharge: HOME | End: 2023-06-07
Attending: INTERNAL MEDICINE
Payer: MEDICARE

## 2023-06-07 ENCOUNTER — APPOINTMENT (OUTPATIENT)
Dept: ENDOCRINOLOGY | Facility: HOSPITAL | Age: 63
End: 2023-06-07
Attending: INTERNAL MEDICINE
Payer: MEDICARE

## 2023-06-07 DIAGNOSIS — E11.9 NEWLY DIAGNOSED DIABETES (HCC): Primary | ICD-10-CM

## 2023-06-07 PROCEDURE — 97802 MEDICAL NUTRITION INDIV IN: CPT

## 2023-06-08 ENCOUNTER — TELEPHONE (OUTPATIENT)
Dept: ENDOCRINOLOGY CLINIC | Facility: CLINIC | Age: 63
End: 2023-06-08

## 2023-06-08 RX ORDER — METFORMIN HYDROCHLORIDE 500 MG/1
500 TABLET, EXTENDED RELEASE ORAL 2 TIMES DAILY WITH MEALS
Qty: 180 TABLET | Refills: 0 | Status: SHIPPED | OUTPATIENT
Start: 2023-06-08

## 2023-06-08 NOTE — TELEPHONE ENCOUNTER
Pt states she only has 5 capsules of Metfromin ER left and it says no refills. She has some regular Metformin 500mg tablets. Can she take this medication? Please call. OK to send message to My Chart. Refills to [facility].

## 2023-06-08 NOTE — TELEPHONE ENCOUNTER
Patient wants to know if she needs to stop taking the regular Metformin and start taking the Metformin ER?

## 2023-06-08 NOTE — TELEPHONE ENCOUNTER
lov 5/31/23  Fu in 3 months  Sent my chart to book fu jairo   metfomin ER originally done by dr Pranav Silver do not recommnend she switches back and forth between ER and regular metformin right?   Pended ,

## 2023-06-09 ENCOUNTER — TELEPHONE (OUTPATIENT)
Dept: ENDOCRINOLOGY CLINIC | Facility: CLINIC | Age: 63
End: 2023-06-09

## 2023-06-09 NOTE — TELEPHONE ENCOUNTER
Spoke to patient, she is currently taking Metformin ER and will continue to take. Patient states taking Ozempic 0.5mg on Fridays at 8pm, requested to put in [facility] [address]. Today will be her 3rd dose. Erythromycin Pregnancy And Lactation Text: This medication is Pregnancy Category B and is considered safe during pregnancy. It is also excreted in breast milk.

## 2023-06-09 NOTE — TELEPHONE ENCOUNTER
rx states twice daily because it was written by Dr. Michael Trammell before patient had consultation with me. During our initially OV on 4/27 we discussed to increase MTF to 1,000mg twice daily and at [address] [place] [address] we said to continue with same regimen. Patient should be taking MTF ER 1,000mg twice daily. Thank you!

## 2023-06-09 NOTE — TELEPHONE ENCOUNTER
DIONI Correa,     Please advise dose    Metformin ER Rx: 500mg BID  Metformin ER LOV 5/31: 1,000mg twice daily

## 2023-06-09 NOTE — TELEPHONE ENCOUNTER
She may continue with whichever formulation version she has been taking up until now. During my office visit I was under the impression that she has been taking ER 1,000 twice daily and was tolerating that well. Her BG readings had also been well controlled on this formulation, thus she should continue the same formulation. Thank you!

## 2023-06-13 ENCOUNTER — TELEPHONE (OUTPATIENT)
Dept: ENDOCRINOLOGY CLINIC | Facility: CLINIC | Age: 63
End: 2023-06-13

## 2023-06-13 NOTE — TELEPHONE ENCOUNTER
Spoke to patient -she was asking if she needs to keep log on ozempic box - RN advised patient that log is her preference, if it helps her to remember when to administer ozempic - patient stated understanding and denied further questions

## 2023-06-13 NOTE — TELEPHONE ENCOUNTER
Patient calling states it is her last week with 0.25mg states if she has to keep logging she does not have another box. Please call.

## 2023-06-21 ENCOUNTER — TELEPHONE (OUTPATIENT)
Dept: ENDOCRINOLOGY CLINIC | Facility: CLINIC | Age: 63
End: 2023-06-21

## 2023-06-26 ENCOUNTER — TELEPHONE (OUTPATIENT)
Dept: ENDOCRINOLOGY CLINIC | Facility: CLINIC | Age: 63
End: 2023-06-26

## 2023-06-26 NOTE — TELEPHONE ENCOUNTER
Pt has questions about current medications  and states she prefers to take the pill version of diabetic medicine instead of injection if possible please call

## 2023-06-27 ENCOUNTER — TELEPHONE (OUTPATIENT)
Dept: INTERNAL MEDICINE CLINIC | Facility: CLINIC | Age: 63
End: 2023-06-27

## 2023-06-27 DIAGNOSIS — Z12.31 SCREENING MAMMOGRAM FOR BREAST CANCER: Primary | ICD-10-CM

## 2023-06-29 RX ORDER — ORAL SEMAGLUTIDE 14 MG/1
14 TABLET ORAL DAILY
Qty: 90 TABLET | Refills: 0 | Status: SHIPPED | OUTPATIENT
Start: 2023-06-29 | End: 2023-09-27

## 2023-06-29 NOTE — TELEPHONE ENCOUNTER
DIONI Correa,     Please advise regarding patient question. It is in regards of Ozempics substitute as an oral medication.

## 2023-06-29 NOTE — TELEPHONE ENCOUNTER
[id number] Debi Haywood noted. Ok to transition to Rybelsus 14mg once daily in AM.     She should start this 1 week after her most recent injection day. Please review administration technique. Administer on an empty stomach, ?30 minutes before the first food, beverage, or other oral medications of the day with ?4 oz of plain water only. The  recommends eating 30 to 60 minutes after the dose. Swallow tablets whole; do not split, crush, or chew. Rx sent to pharmacy. Thank you!

## 2023-07-07 ENCOUNTER — TELEPHONE (OUTPATIENT)
Dept: ENDOCRINOLOGY CLINIC | Facility: CLINIC | Age: 63
End: 2023-07-07

## 2023-07-07 NOTE — TELEPHONE ENCOUNTER
Pharmacy calling regards new script for Rybelsus 14MG, states usually for this medication start at 3MG, then [facility] and then 14MG. Asking if pt has been taking this medication already because script is for 14MG for first time, and if she will be taking it with metformin and ozempic. Please call.

## 2023-07-10 NOTE — TELEPHONE ENCOUNTER
Yes, that is correct. Also her ozempic 0.5mg weekly dose equates/is  comparable to Rybelsus 14mg daily. And yes she should continue to take MTF while also taking Rybelsus. Thank you!

## 2023-07-10 NOTE — TELEPHONE ENCOUNTER
Kristine Guillermo,     Please see below. RN will respond to pharmacy accordingly regarding metformin and that will NOT be on ozempic while on Rybelsus. For Rybelsus -- you prescribed 14 mg because patient has been on ozempic before and not GLP naive, correct?

## 2023-07-11 DIAGNOSIS — E11.65 UNCONTROLLED TYPE 2 DIABETES MELLITUS WITH HYPERGLYCEMIA (HCC): ICD-10-CM

## 2023-07-11 RX ORDER — BLOOD SUGAR DIAGNOSTIC
STRIP MISCELLANEOUS
Qty: 300 STRIP | Refills: 1 | Status: SHIPPED | OUTPATIENT
Start: 2023-07-11

## 2023-07-14 ENCOUNTER — TELEPHONE (OUTPATIENT)
Dept: ENDOCRINOLOGY CLINIC | Facility: CLINIC | Age: 63
End: 2023-07-14

## 2023-07-14 NOTE — TELEPHONE ENCOUNTER
Murphy Hall,    Per notes: [id number] Debi Haywood to transition to Rybelsus 14mg once daily in AM.      She should start this 1 week after her most recent injection day.      Above reiterated to patient - patient currently taking enalapril 20 mg daily

## 2023-07-14 NOTE — TELEPHONE ENCOUNTER
Patient is calling to ask when she should start taking the rybelsus medication. Patient states that she is on blood pressure medication.        Call or mychart message

## 2023-07-14 NOTE — TELEPHONE ENCOUNTER
Replied to patient's [employer] message with notes and instructions provided by Demetri Quijano below.

## 2023-07-14 NOTE — TELEPHONE ENCOUNTER
[id number] Debi Haywood  noted. Ok to continue with both of these medications. Please clarify rybelsus administration technique with patient     Administer on an empty stomach, ?30 minutes before the first food, beverage, or other oral medications of the day with ?4 oz of plain water only. The  recommends eating 30 to 60 minutes after the dose. Swallow tablets whole; do not split, crush, or chew. She should wait 30-60mins after taking rybelsus to take enalapril. Thank you!

## 2023-07-17 ENCOUNTER — TELEPHONE (OUTPATIENT)
Dept: ENDOCRINOLOGY CLINIC | Facility: CLINIC | Age: 63
End: 2023-07-17

## 2023-07-17 NOTE — TELEPHONE ENCOUNTER
Pt returning RN's call. Pt informed [employer] message sent. She will review and call back if she has additional questions.

## 2023-07-17 NOTE — TELEPHONE ENCOUNTER
Yes patient should be taking the following:     - Metformin ER 1,000mg twice daily   - Glimepiride 4mg  once daily in AM   - Rybelsus 14mg once daily in AM       Thank you!

## 2023-07-17 NOTE — TELEPHONE ENCOUNTER
Patient calling states will be taking Rybelsus 14MG now and wants to know if continues taking 1000MG or aable to take 500MG now, asking as well if pt needs to continue taking glimepiride 4MG, please call.

## 2023-07-17 NOTE — TELEPHONE ENCOUNTER
Demetri Quijano,    Please advise. Patient was told previously to continue all medications as prescribed with Rybelsus with the exception of stopping Ozempic - I just wanted to confirm prior to calling patient back.     - continue with Metformin ER 1,000mg twice daily   - continue with Glimepiride 4mg  once daily in AM

## 2023-07-19 ENCOUNTER — APPOINTMENT (OUTPATIENT)
Dept: ENDOCRINOLOGY | Facility: HOSPITAL | Age: 63
End: 2023-07-19
Attending: INTERNAL MEDICINE
Payer: MEDICARE

## 2023-07-20 ENCOUNTER — HOSPITAL ENCOUNTER (OUTPATIENT)
Dept: GENERAL RADIOLOGY | Facility: HOSPITAL | Age: 63
Discharge: HOME | End: 2023-07-20
Attending: ORTHOPAEDIC SURGERY
Payer: MEDICARE

## 2023-07-20 ENCOUNTER — OFFICE VISIT (OUTPATIENT)
Dept: ORTHOPEDICS CLINIC | Facility: CLINIC | Age: 63
End: 2023-07-20

## 2023-07-20 DIAGNOSIS — Z47.89 ORTHOPEDIC AFTERCARE: ICD-10-CM

## 2023-07-20 DIAGNOSIS — S70.11XA CONTUSION OF RIGHT ANTERIOR THIGH, INITIAL ENCOUNTER: Primary | ICD-10-CM

## 2023-07-20 PROCEDURE — 99214 OFFICE O/P EST MOD 30 MIN: CPT | Performed by: ORTHOPAEDIC SURGERY

## 2023-07-20 PROCEDURE — 73502 X-RAY EXAM HIP UNI 2-3 VIEWS: CPT | Performed by: ORTHOPAEDIC SURGERY

## 2023-07-20 NOTE — PROGRESS NOTES
NURSING INTAKE COMMENTS: Patient presents with:  Hip Pain: Right side hip pain, had a fall 7/1 and 7/3, feels a throbbing pain most in the morning, denies any pain now,       HPI: This 70year old female presents today with complaints of right thigh pain. She had 2 falls at home in early July. She was walking in her home and slipped and landed on her right side. She had some pain over the anterolateral thigh since that time. She does not have pain at rest.  She has no significant pain with weightbearing. She feels an occasional throbbing in the area. Does have a history of diabetes. She had a right total hip arthroplasty nearly 3 years ago. No numbness or tingling. No mechanical clicking or popping. No history of numbness. She does have back pain. She takes Tylenol intermittently for the back pain.     Past Medical History:   Diagnosis Date    Anxiety state     Bartholin cyst 1994    per ng excision of bartholins cyst    Bunion 2010    per ng bunionectomy, bilateral feet    Cyst of skin 2013    per ng rt thigh cyst removal    Diverticulitis 2017    hosp x 3 days    Diverticulosis     Fibroids 2003    Hemorrhoids 2012    per ng colonoscopy    High blood pressure     High cholesterol     History of blood transfusion 1986    @[facility]    History of bone density study 12/2004    \"normal dexa '04, '07\"    Hx of motion sickness     Osteoarthritis     Perforated diverticulum of large intestine 10/20/2017    Postmenopausal bleeding 2007    per ng neg endometrial biopsy    Tonsillitis     tonsillectomy 1970    Uterine prolapse 2006    uses pessary    Uterine prolapse 4/17/2015    Donut pessary -- self cleaning     Visual impairment     contacts and glasses     Past Surgical History:   Procedure Laterality Date    BIOPSY OF UTERUS LINING  10/2007    neg endometrial biopsy    COLONOSCOPY N/A 2/20/2018    Procedure: COLONOSCOPY;  Surgeon: Marsha Scanlon MD;  Location: [facility] ENDO    FOOT SURGERY  2010 bunionectomy    HIP REPLACEMENT SURGERY      RT hip surgery 2020    HYSTERECTOMY  2019    LEG/ANKLE SURGERY PROC UNLISTED Left 2018    to repair flat foot    NEEDLE BIOPSY LEFT  2013          x3 w/PPTL    OTHER SURGICAL HISTORY Right 2013    thigh cyst removal    PESSARY  2006    TONSILLECTOMY  1970    TUBAL LIGATION  1992     Current Outpatient Medications   Medication Sig Dispense Refill    Glucose Blood (ONETOUCH VERIO) In Vitro Strip USE  STRIP TO CHECK GLUCOSE THREE TIMES DAILY 300 strip 1    Semaglutide (RYBELSUS) 14 MG Oral Tab Take 14 mg by mouth daily. 90 tablet 0    metFORMIN  MG Oral Tablet 24 Hr Take 1 tablet (500 mg total) by mouth 2 (two) times daily with meals. 180 tablet 0    Lancets Does not apply Misc Use as directed to check blood glucose 3 times per day - DX code: E11.65 - no insulin 300 each 0    glimepiride 4 MG Oral Tab Take 1 tablet (4 mg total) by mouth every morning before breakfast. 90 tablet 0    CALCIUM-VITAMINS C & D OR Take by mouth.      enalapril 20 MG Oral Tab Take 1 tablet (20 mg total) by mouth daily. 90 tablet 1    celecoxib 200 MG Oral Cap Take 1 capsule (200 mg total) by mouth daily. 90 capsule 0    Polyethylene Glycol 3350 17 GM/SCOOP Oral Powder       estradiol 0.1 MG/GM Vaginal Cream APPLY 0.5 GRAM VAGINALLY 2 TIMES EVERY WEEK 42.5 g 3    Saline (AYR NASAL MIST ALLERGY/SINUS NA) 1 spray by Nasal route daily as needed. Polyvinyl Alcohol-Povidone (REFRESH OP) Apply 1 drop to eye daily as needed. Inulin (FIBER CHOICE PREBIOTIC FIBER OR) Take 1 capsule by mouth daily. Multiple Vitamins-Minerals (AIRBORNE OR) Take 1 tablet by mouth daily. Multiple Vitamins-Minerals (ZINC OR) Take 1 tablet by mouth daily. Homeopathic Products (EARACHE DROPS OT) Place 1 drop in ear(s) daily as needed. Ibuprofen 200 MG Oral Cap Take by mouth as needed. loratadine 10 MG Oral Tab Take 1 tablet (10 mg total) by mouth daily as needed. Codeine                 RASH    Comment:T#3  Acetaminophen-Codei*    RASH    Comment:Tolerates plain Tylenol             Tolerates plain Tylenol             Tolerates plain Tylenol  Family History   Problem Relation Age of Onset    Alcohol and Other Disorders Associated Father         alcoholism    Hypertension Father     Diabetes Mother         type 2    Lipids Mother         hyperlipidemia    Eye Problems Mother         eye issues    Heart Disorder Mother         per ng CABG    Hypertension Mother     Musculo-skelatal Disorder Mother         per ng osteoporosis    Allergies Mother         medication    Glaucoma Maternal Grandmother     Diabetes Maternal Grandmother     Hypertension Maternal Grandmother     Musculo-skelatal Disorder Maternal Grandmother         per ng osteoporosis    Allergies Sister     Diabetes Cousin     Breast Cancer Maternal Aunt 55    Breast Cancer Maternal Aunt 72    Cancer Paternal Uncle         per ng stomach    Diabetes Other         mGA       Social History    Occupational History      Not on file    Tobacco Use      Smoking status: Never      Smokeless tobacco: Never    Vaping Use      Vaping Use: Never used    Substance and Sexual Activity      Alcohol use: No      Drug use: No      Sexual activity: Yes        Birth control/protection: Tubal Ligation       Review of Systems:  GENERAL: denies fevers, chills, night sweats, fatigue, unintentional weight loss/gain  SKIN: denies skin lesions, open sores, rash  HEENT:denies recent vision change, new nasal congestion,hearing loss, tinnitus, sore throat, headaches  RESPIRATORY: denies new shortness of breath, cough, asthma, wheezing  CARDIOVASCULAR: denies chest pain, leg cramps with exertion, palpitations, leg swelling  GI: denies abdominal pain, nausea, vomiting, diarrhea, constipation, hematochezia, worsening heartburn or stomach ulcers  : denies dysuria, hematuria, incontinence, increased frequency, urgency, difficulty urinating  MUSCULOSKELETAL: denies musculoskeletal complaints other than in HPI  NEURO: denies numbness, tingling, weakness, balance issues, dizziness, memory loss  PSYCHIATRIC: denies Hx of depression, anxiety, other psychiatric disorders  HEMATOLOGIC: denies blood clots, anemia, blood clotting disorders, blood transfusion  ENDOCRINE: denies autoimmune disease, thyroid issues, or diabetes  ALLERGY: denies asthma, seasonal allergies    Physical Examination:    There were no vitals taken for this visit. Constitutional: appears well hydrated, alert and responsive, no acute distress noted  Extremities: She walks without a limp. There is no Trendelenburg sign. She is able to actively straight leg raise on the right without any problem or pain. No tenderness over the greater trochanter or ASIS. Medial lateral compression of the pelvis produces no pain. Passive flexion internal rotation of the hip produces no pain. Passive external rotation produces no pain. Minimal tenderness over the vastus lateralis and tensor fascia lotta musculature. Incision is well-healed with no erythema. No palpable fluctuance in the thigh. Compartments are soft. No calf tenderness or swelling. Neurological: Light touch and pinprick sensation intact throughout the lower extremities. Ankle dorsiflexion plantarflexion EHL knee extension and hip flexion strength are 5 out of 5 bilaterally. No clonus. Imaging:   X-rays of the right hip obtained in the office today show well-positioned well fixed total hip arthroplasty. No periprosthetic lucency or fracture.     Labs:  Lab Results   Component Value Date    WBC 6.1 04/19/2023    HGB 13.4 04/19/2023    .0 04/19/2023      Lab Results   Component Value Date     (H) 04/21/2023    BUN 13 04/21/2023    CREATSERUM 0.80 04/21/2023    GFR >60 11/16/2015    GFRNAA 95 12/28/2021    GFRAA 110 12/28/2021        Assessment and Plan:  Diagnoses and all orders for this visit:    Contusion of right anterior thigh, initial encounter    Orthopedic aftercare  -     XR HIP W OR WO PELVIS 2 OR 3 VIEWS, RIGHT (CPT=73502); Future        Assessment: Right thigh contusion after fall    Plan: I reassured her that her hip implant is stable and appears unremarkable on exam and on radiographs. I suspect that there is a soft tissue contusion possible bone contusion. She will likely feel better in the next 3 to 4 weeks. I advised against high impact exercise and repetitive loading of the hip for now. Suggested icing and oral anti-inflammatory medications as medically tolerated. Follow-up with me again as needed. Follow Up: Return if symptoms worsen or fail to improve.     Antonio Huerta MD

## 2023-07-24 RX ORDER — METFORMIN HYDROCHLORIDE 500 MG/1
500 TABLET, EXTENDED RELEASE ORAL 2 TIMES DAILY WITH MEALS
Qty: 180 TABLET | Refills: 0 | Status: SHIPPED | OUTPATIENT
Start: 2023-07-24

## 2023-07-25 ENCOUNTER — TELEPHONE (OUTPATIENT)
Dept: ENDOCRINOLOGY CLINIC | Facility: CLINIC | Age: 63
End: 2023-07-25

## 2023-07-25 RX ORDER — GLIMEPIRIDE 4 MG/1
4 TABLET ORAL
Qty: 90 TABLET | Refills: 0 | Status: SHIPPED | OUTPATIENT
Start: 2023-07-25

## 2023-07-25 NOTE — TELEPHONE ENCOUNTER
Patient is calling states that she needs a refill on the following medication       Disp Refills Start End    glimepiride 4 MG Oral Tab 90 tablet 0 4/27/2023     Sig - Route:  Take 1 tablet (4 mg total) by mouth every morning before breakfast. - Oral    Sent to pharmacy as: Glimepiride 4 MG Oral Tablet (Amaryl)    E-Prescribing Status: Receipt confirmed by pharmacy (4/27/2023  3:31 PM CDT)

## 2023-08-02 ENCOUNTER — HOSPITAL ENCOUNTER (OUTPATIENT)
Dept: ENDOCRINOLOGY | Facility: HOSPITAL | Age: 63
Discharge: HOME | End: 2023-08-02
Attending: INTERNAL MEDICINE
Payer: MEDICARE

## 2023-08-02 VITALS — WEIGHT: 133 LBS | BODY MASS INDEX: 25 KG/M2

## 2023-08-02 DIAGNOSIS — E11.9 NEWLY DIAGNOSED DIABETES (HCC): Primary | ICD-10-CM

## 2023-08-02 PROCEDURE — 97803 MED NUTRITION INDIV SUBSEQ: CPT

## 2023-08-05 DIAGNOSIS — E11.65 UNCONTROLLED TYPE 2 DIABETES MELLITUS WITH HYPERGLYCEMIA (HCC): ICD-10-CM

## 2023-08-07 ENCOUNTER — TELEPHONE (OUTPATIENT)
Dept: ENDOCRINOLOGY CLINIC | Facility: CLINIC | Age: 63
End: 2023-08-07

## 2023-08-07 RX ORDER — LANCETS 33 GAUGE
1 EACH MISCELLANEOUS 3 TIMES DAILY
Qty: 300 EACH | Refills: 0 | Status: SHIPPED | OUTPATIENT
Start: 2023-08-07

## 2023-08-07 NOTE — TELEPHONE ENCOUNTER
Sarha Fay noted. We can repeat her A1C during September apt. Please clarify patient's concern regarding her BG readings. Thank you!

## 2023-08-07 NOTE — TELEPHONE ENCOUNTER
Pt needs to get dental work but dentist informed her that her A1C needs to be below 7. Last blood test resulted in a 8.2 in May. She is requesting an order for an A1C to see where she is at now.   Please call

## 2023-08-07 NOTE — TELEPHONE ENCOUNTER
Juwan Rodriguez,    Please advise. Patient had A1c done on 5/31/23 - insurance would pay for another test on or after 8/31/23 - I did tell patient this. She is requesting to see you sooner than October. Patient booked earlier appt - 9/12 where she can get another A1c done. Patient is having implants placed. Is concerned about blood sugar levels and dentist is requesting for clearance/A1c to be less than 7.

## 2023-08-08 NOTE — TELEPHONE ENCOUNTER
DIONI Odell (FYI)  Spoke to patient - she stated she only checks fasting BG and they have been between ; she stated she is losing weight on rybelsus   Patient stated understanding that A1c cannot be done until after 8/31/23 d/t insurance - patient stated she will be at f/u on 9/11 so that she can get clearance for dental work  Thanks

## 2023-08-08 NOTE — TELEPHONE ENCOUNTER
Bruno López noted. BG readings are stable at this time. Will review DM care plan during upcoming f/u apt. Thank you!

## 2023-08-10 ENCOUNTER — HOSPITAL ENCOUNTER (OUTPATIENT)
Dept: MAMMOGRAPHY | Age: 63
Discharge: HOME | End: 2023-08-10
Attending: INTERNAL MEDICINE
Payer: MEDICARE

## 2023-08-10 DIAGNOSIS — Z12.31 SCREENING MAMMOGRAM FOR BREAST CANCER: ICD-10-CM

## 2023-08-10 LAB — HM MAMMOGRAPHY BILATERAL: NORMAL

## 2023-08-10 PROCEDURE — 77067 SCR MAMMO BI INCL CAD: CPT | Performed by: INTERNAL MEDICINE

## 2023-08-10 PROCEDURE — 77063 BREAST TOMOSYNTHESIS BI: CPT | Performed by: INTERNAL MEDICINE

## 2023-08-11 RX ORDER — ENALAPRIL MALEATE 20 MG/1
20 TABLET ORAL DAILY
Qty: 90 TABLET | Refills: 0 | Status: SHIPPED | OUTPATIENT
Start: 2023-08-11 | End: 2023-08-11

## 2023-08-11 RX ORDER — ENALAPRIL MALEATE 20 MG/1
20 TABLET ORAL DAILY
Qty: 100 TABLET | Refills: 0 | Status: SHIPPED | OUTPATIENT
Start: 2023-08-11

## 2023-08-11 NOTE — TELEPHONE ENCOUNTER
Per pharmacy on file, they received the rx med Enalapril but per patient insurance they need [id number] now instead of [id number],  so that it can be covered. Please advise.

## 2023-08-11 NOTE — TELEPHONE ENCOUNTER
Pt has appt on 8-29-23. Refill passed per [facility] protocol   Requested Prescriptions   Pending Prescriptions Disp Refills    enalapril 20 MG Oral Tab 90 tablet 1     Sig: Take 1 tablet (20 mg total) by mouth daily.        Hypertensive Medications Protocol Passed - 8/10/2023 11:01 AM        Passed - In person appointment in the past 12 or next 3 months     Recent Outpatient Visits              3 weeks ago Contusion of right anterior thigh, initial encounter    [address] [facility], Jori Adler MD    Office Visit    2 months ago Uncontrolled type 2 diabetes mellitus with hyperglycemia [facility])    [address], [facility]    Office Visit    3 months ago Uncontrolled type 2 diabetes mellitus with hyperglycemia [facility])    [address], [address] [facility]    Office Visit    3 months ago Essential hypertension    Colby Pittman MD    Office Visit    3 months ago Essential hypertension    [address], Carlos Hall MD    Office Visit          Future Appointments         Provider Department Appt Notes    In 1 week Jacqueline Valle DO [facility] for [facility]vic [address] [facility] Urogynecology vaginal bump    In 2 weeks Tereso Schwab, MD [address], [address] [facility] 226 px    In 1 month [address], [place]     In 6 months Jacqueline Valle [facility] [address] [facility] Urogynecology yearly               Passed - Last BP reading less than 140/90     BP Readings from Last 1 Encounters:  05/31/23 : 133/81              Passed - CMP or BMP in past 6 months     Recent Results (from the past 4392 hour(s))   BASIC METABOLIC PANEL (8)    Collection Time: 04/21/23 10:58 AM   Result Value Ref Range    Glucose 408 (H) 70 - 99 mg/dL    Sodium 135 (L) 136 - 145 mmol/L    Potassium 4.0 3.5 - 5.1 mmol/L    Chloride 99 98 - 112 mmol/L    CO2 24.0 21.0 - 32.0 mmol/L    Anion Gap 12 0 - 18 mmol/L    BUN 13 7 - 18 mg/dL    Creatinine 0.80 0.55 - 1.02 mg/dL    BUN/CREA Ratio 16.3 10.0 - 20.0    Calcium, Total 9.5 8.5 - 10.1 mg/dL    Calculated Osmolality 297 (H) 275 - 295 mOsm/kg    eGFR-Cr 82 >=60 mL/min/1.73m2    Patient Fasting for BMP? Yes      *Note: Due to a large number of results and/or encounters for the requested time period, some results have not been displayed. A complete set of results can be found in Results Review.                Passed - In person appointment or virtual visit in the past 6 months     Recent Outpatient Visits              3 weeks ago Contusion of right anterior thigh, initial encounter    [address] [facility], Gayle Coley MD    Office Visit    2 months ago Uncontrolled type 2 diabetes mellitus with hyperglycemia [facility])    [address], [facility], [place]    Office Visit    3 months ago Uncontrolled type 2 diabetes mellitus with hyperglycemia [facility])    [address], [address] [facility]    Office Visit    3 months ago Essential hypertension    Paris Kirkpatrick MD    Office Visit    3 months ago Essential hypertension    [address] [facility], Yael Fernando MD    Office Visit          Future Appointments         Provider Department Appt Notes    In 1 week Kristina Puente DO [facility] [address] [facility] Urogynecology vaginal bump    In 2 weeks Alysha Tyson MD [address], [address] [facility] 226 px    In 1 month [address], [place]     In 6 months Kristina Pu[facility] DO [facility] [address] [facility] Urogynecology yearly               Passed - [facility] > 50     GFR Evaluation  EGFRCR: 82 , resulted on 4/21/2023

## 2023-08-23 ENCOUNTER — OFFICE VISIT (OUTPATIENT)
Dept: UROLOGY | Facility: HOSPITAL | Age: 63
End: 2023-08-23
Attending: OBSTETRICS & GYNECOLOGY
Payer: MEDICARE

## 2023-08-23 ENCOUNTER — HOSPITAL ENCOUNTER (OUTPATIENT)
Dept: MAMMOGRAPHY | Facility: HOSPITAL | Age: 63
Discharge: HOME | End: 2023-08-23
Attending: INTERNAL MEDICINE
Payer: MEDICARE

## 2023-08-23 ENCOUNTER — HOSPITAL ENCOUNTER (OUTPATIENT)
Dept: ULTRASOUND IMAGING | Facility: HOSPITAL | Age: 63
Discharge: HOME | End: 2023-08-23
Attending: INTERNAL MEDICINE
Payer: MEDICARE

## 2023-08-23 VITALS — HEIGHT: 61 IN | BODY MASS INDEX: 25.11 KG/M2 | WEIGHT: 133 LBS | RESPIRATION RATE: 16 BRPM

## 2023-08-23 DIAGNOSIS — R92.8 ABNORMAL MAMMOGRAM: ICD-10-CM

## 2023-08-23 DIAGNOSIS — Z98.890 POST-OPERATIVE STATE: Primary | ICD-10-CM

## 2023-08-23 DIAGNOSIS — L72.0 EPIDERMAL INCLUSION CYST: ICD-10-CM

## 2023-08-23 DIAGNOSIS — N81.84 PELVIC MUSCLE WASTING: ICD-10-CM

## 2023-08-23 DIAGNOSIS — N95.2 POSTMENOPAUSAL ATROPHIC VAGINITIS: ICD-10-CM

## 2023-08-23 PROCEDURE — 99212 OFFICE O/P EST SF 10 MIN: CPT

## 2023-08-23 PROCEDURE — 77061 BREAST TOMOSYNTHESIS UNI: CPT | Performed by: INTERNAL MEDICINE

## 2023-08-23 PROCEDURE — 77065 DX MAMMO INCL CAD UNI: CPT | Performed by: INTERNAL MEDICINE

## 2023-08-23 PROCEDURE — 76642 ULTRASOUND BREAST LIMITED: CPT | Performed by: INTERNAL MEDICINE

## 2023-08-24 DIAGNOSIS — R92.8 ABNORMAL MAMMOGRAM: Primary | ICD-10-CM

## 2023-09-11 ENCOUNTER — TELEPHONE (OUTPATIENT)
Dept: ENDOCRINOLOGY CLINIC | Facility: CLINIC | Age: 63
End: 2023-09-11

## 2023-09-11 ENCOUNTER — OFFICE VISIT (OUTPATIENT)
Dept: ENDOCRINOLOGY CLINIC | Facility: CLINIC | Age: 63
End: 2023-09-11

## 2023-09-11 VITALS
HEART RATE: 82 BPM | HEIGHT: 60.98 IN | DIASTOLIC BLOOD PRESSURE: 82 MMHG | BODY MASS INDEX: 24.35 KG/M2 | SYSTOLIC BLOOD PRESSURE: 150 MMHG | WEIGHT: 129 LBS

## 2023-09-11 DIAGNOSIS — E11.65 UNCONTROLLED TYPE 2 DIABETES MELLITUS WITH HYPERGLYCEMIA (HCC): Primary | ICD-10-CM

## 2023-09-11 LAB
CARTRIDGE LOT#: NORMAL NUMERIC
GLUCOSE BLOOD: 102
GLUCOSE BLOOD: 78
HEMOGLOBIN A1C: 4.9 % (ref 4.3–5.6)
TEST STRIP LOT #: NORMAL NUMERIC
TEST STRIP LOT #: NORMAL NUMERIC

## 2023-09-11 PROCEDURE — 82947 ASSAY GLUCOSE BLOOD QUANT: CPT | Performed by: NURSE PRACTITIONER

## 2023-09-11 PROCEDURE — 99214 OFFICE O/P EST MOD 30 MIN: CPT | Performed by: NURSE PRACTITIONER

## 2023-09-11 PROCEDURE — 3044F HG A1C LEVEL LT 7.0%: CPT | Performed by: NURSE PRACTITIONER

## 2023-09-11 PROCEDURE — 3077F SYST BP >= 140 MM HG: CPT | Performed by: NURSE PRACTITIONER

## 2023-09-11 PROCEDURE — 83036 HEMOGLOBIN GLYCOSYLATED A1C: CPT | Performed by: NURSE PRACTITIONER

## 2023-09-11 PROCEDURE — 3079F DIAST BP 80-89 MM HG: CPT | Performed by: NURSE PRACTITIONER

## 2023-09-11 PROCEDURE — 3008F BODY MASS INDEX DOCD: CPT | Performed by: NURSE PRACTITIONER

## 2023-09-11 NOTE — PATIENT INSTRUCTIONS
A1C: 4.9% today -->decreased from 8.2% on 5/31/2023  Blood glucose: 53 --> 78--> 102 in clinic today    Endocrinology fax# [phone]    Medications:   - continue with Metformin ER 1,000mg twice daily   - stop Glimepiride   - continue with Rybelsus 14mg once daily in AM     - continue to follow a low carb diet   - continue to stay active as currently doing     - please give me an update on your blood glucose readings in 2-3 weeks     A1C goal:  <7.5%    Blood sugar testing:  Test your blood sugar 2 times daily   Recommended times to test: Before breakfast (fasting) and alternate with 2hrs after meals     Blood sugar targets:  Before breakfast:   (preferably < 110)  Before meals OR 2 hours after meals: <180 (preferably <150)     Call for persistent blood sugars < 75 or > 200

## 2023-09-14 ENCOUNTER — TELEPHONE (OUTPATIENT)
Dept: INTERNAL MEDICINE CLINIC | Facility: CLINIC | Age: 63
End: 2023-09-14

## 2023-09-20 ENCOUNTER — PATIENT MESSAGE (OUTPATIENT)
Dept: INTERNAL MEDICINE CLINIC | Facility: CLINIC | Age: 63
End: 2023-09-20

## 2023-09-20 ENCOUNTER — LAB ENCOUNTER (OUTPATIENT)
Dept: LAB | Age: 63
End: 2023-09-20
Attending: INTERNAL MEDICINE
Payer: MEDICARE

## 2023-09-20 DIAGNOSIS — E11.9 TYPE 2 DIABETES MELLITUS WITHOUT COMPLICATION, WITHOUT LONG-TERM CURRENT USE OF INSULIN (HCC): ICD-10-CM

## 2023-09-20 DIAGNOSIS — E11.9 DIABETES MELLITUS (HCC): Primary | ICD-10-CM

## 2023-09-20 LAB
ALT SERPL-CCNC: 28 U/L
ANION GAP SERPL CALC-SCNC: 6 MMOL/L (ref 0–18)
AST SERPL-CCNC: 11 U/L (ref 15–37)
BUN BLD-MCNC: 12 MG/DL (ref 7–18)
BUN/CREAT SERPL: 20.3 (ref 10–20)
CALCIUM BLD-MCNC: 9.1 MG/DL (ref 8.5–10.1)
CHLORIDE SERPL-SCNC: 104 MMOL/L (ref 98–112)
CHOLEST SERPL-MCNC: 197 MG/DL (ref ?–200)
CO2 SERPL-SCNC: 26 MMOL/L (ref 21–32)
CREAT BLD-MCNC: 0.59 MG/DL
EGFRCR SERPLBLD CKD-EPI 2021: 100 ML/MIN/1.73M2 (ref 60–?)
FASTING PATIENT LIPID ANSWER: YES
FASTING STATUS PATIENT QL REPORTED: YES
GLUCOSE BLD-MCNC: 93 MG/DL (ref 70–99)
HDLC SERPL-MCNC: 46 MG/DL (ref 40–59)
LDLC SERPL CALC-MCNC: 136 MG/DL (ref ?–100)
NONHDLC SERPL-MCNC: 151 MG/DL (ref ?–130)
OSMOLALITY SERPL CALC.SUM OF ELEC: 281 MOSM/KG (ref 275–295)
POTASSIUM SERPL-SCNC: 3.8 MMOL/L (ref 3.5–5.1)
SODIUM SERPL-SCNC: 136 MMOL/L (ref 136–145)
TRIGL SERPL-MCNC: 83 MG/DL (ref 30–149)
VLDLC SERPL CALC-MCNC: 15 MG/DL (ref 0–30)

## 2023-09-20 PROCEDURE — 84460 ALANINE AMINO (ALT) (SGPT): CPT

## 2023-09-20 PROCEDURE — 80048 BASIC METABOLIC PNL TOTAL CA: CPT | Performed by: INTERNAL MEDICINE

## 2023-09-20 PROCEDURE — 36415 COLL VENOUS BLD VENIPUNCTURE: CPT | Performed by: INTERNAL MEDICINE

## 2023-09-20 PROCEDURE — 84450 TRANSFERASE (AST) (SGOT): CPT

## 2023-09-20 PROCEDURE — 80061 LIPID PANEL: CPT

## 2023-09-22 NOTE — TELEPHONE ENCOUNTER
Result note from labs collected 9/20/23:   Lisa Hope MD  9/21/2023  3:26 AM CDT       Send letter and copy of test result.   Bmp  Blood sugar potassium calcium  Kidney function  all normal

## 2023-09-27 ENCOUNTER — TELEPHONE (OUTPATIENT)
Dept: INTERNAL MEDICINE CLINIC | Facility: CLINIC | Age: 63
End: 2023-09-27

## 2023-09-27 NOTE — TELEPHONE ENCOUNTER
Patient is calling and states that she called to schedule her appointment for an ultrasound of her forehead. She states that a fax was sent from a dermatology office and they need it to be filled out to perform the ultrasound. Please call patient and update her. She states if she does not answer leave her a voicemail.

## 2023-09-27 NOTE — TELEPHONE ENCOUNTER
Patient calling back, stated fax was from [facility] office, stated fax should have been sent to office. Needs forms filled out to be able to make appointment.

## 2023-09-28 NOTE — TELEPHONE ENCOUNTER
No forms have been received . Called Pt to notify no answer , left detailed message  on confidential VM with fax number informing to have forms fax .

## 2023-10-02 RX ORDER — ORAL SEMAGLUTIDE 14 MG/1
1 TABLET ORAL DAILY
Qty: 90 TABLET | Refills: 0 | Status: SHIPPED | OUTPATIENT
Start: 2023-10-02

## 2023-10-02 NOTE — TELEPHONE ENCOUNTER
Request from [id number] [facility] received for Pt to have Ultrasound soft tissue head and neck  R/O cyst vs Lipoma of forehead . Order placed in Dr Claribel Boles office . Order needs to be entered in EPIC please enter appropriate order .

## 2023-10-03 NOTE — TELEPHONE ENCOUNTER
Ultrasound soft tissue head and neck  R/O cyst vs Lipoma of forehead     This does not make any sense   forehaed cyst or lipoma  And head and nec US ordered?

## 2023-10-04 NOTE — TELEPHONE ENCOUNTER
REQUEST DENIED  DOES NOT MAKE SENSE  DX LIPOMA OF FOREHEAD TO GET NECK ULTRASOUND    WILL BE HAPPY TO ASSES PATIENT AND ORDER NECESSARY TEST  OR REFER TO ENT IF NEEDED

## 2023-10-06 ENCOUNTER — TELEPHONE (OUTPATIENT)
Dept: ENDOCRINOLOGY CLINIC | Facility: CLINIC | Age: 63
End: 2023-10-06

## 2023-10-06 NOTE — TELEPHONE ENCOUNTER
Pt calling with 2 weeks of BG readings   2 hrs after breakfast - 2 hrs after diner     9/12 - 125 - 107    9/13 - 121 - 114    9/14 - 100 - 104    9/15 - 97 - 94    9/16 - 94 - 91    9/17 - 92 - 91    9/18 - 100 - 96    9/19 - 108 - 98    9/20 - 90 - 109    9/21 - 114 - 103    9/22 - 121 - 116    9/23 - 120 - 89    9/24 - 103 - 101    9/25 - 130 - 112    9/26 - 96 - 95    9/27 - 116 - 115    9/28 - 112 - 110    9/29 - 110 - 92    9/30 - 121 - 98    10/1 - 123 - 106    She is also asking to be seen sooner than   February - she is going to [facility] on 12/12 - is asking if she can be seen before that

## 2023-10-06 NOTE — TELEPHONE ENCOUNTER
[id number] Debi Haywood noted. BG readings reviewed and they all seem normal within goal. No changes to medications at this time. Ok to offer apt on Tuesday 12/5 at 1:45pm at [facility]. Thank you!

## 2023-10-06 NOTE — TELEPHONE ENCOUNTER
Kristine Guillermo,    Please see update.      Current medications:  continue with Metformin ER 1,000mg twice daily   stop Glimepiride  continue with Rybelsus 14mg once daily in AM

## 2023-10-10 NOTE — TELEPHONE ENCOUNTER
[facility] [address] [place] [address],     Please advise regarding scheduling.  12/5 at 1:45 is filled

## 2023-10-11 ENCOUNTER — TELEPHONE (OUTPATIENT)
Dept: ENDOCRINOLOGY CLINIC | Facility: CLINIC | Age: 63
End: 2023-10-11

## 2023-10-11 RX ORDER — ORAL SEMAGLUTIDE 14 MG/1
1 TABLET ORAL DAILY
Qty: 90 TABLET | Refills: 1 | Status: SHIPPED | OUTPATIENT
Start: 2023-10-11

## 2023-10-11 NOTE — TELEPHONE ENCOUNTER
Called pt to schedule 12/6 pt agreed also pt requesting a 2 weeks supply for rybelsus due to pt going on vacation.

## 2023-10-11 NOTE — TELEPHONE ENCOUNTER
Pt called for refill/2 wk supply. Pt is not getting her medication from pt assistance program for another 2 wks. Please call. Current Outpatient Medications:     Semaglutide (RYBELSUS) 14 MG Oral Tab, Take 1 tablet by mouth daily. , Disp: 90 tablet, Rfl:

## 2023-10-14 ENCOUNTER — NURSE TRIAGE (OUTPATIENT)
Dept: INTERNAL MEDICINE CLINIC | Facility: CLINIC | Age: 63
End: 2023-10-14

## 2023-10-14 NOTE — TELEPHONE ENCOUNTER
Patient indicated that has had a lump on her forehead for years and saw the dermatologist since it was itchy and he ordered an ultrasound. Patient wanted to know if could schedule the ultrasound through [address] [place] [address] patient number to scheduling.

## 2023-10-19 ENCOUNTER — TELEPHONE (OUTPATIENT)
Dept: ENDOCRINOLOGY CLINIC | Facility: CLINIC | Age: 63
End: 2023-10-19

## 2023-10-19 NOTE — TELEPHONE ENCOUNTER
Patient is calling states that she sent the form for the assistance program for the next year.     Fax [phone]

## 2023-10-23 RX ORDER — GLIMEPIRIDE 4 MG/1
4 TABLET ORAL
Qty: 90 TABLET | Refills: 0 | Status: SHIPPED | OUTPATIENT
Start: 2023-10-23

## 2023-10-24 ENCOUNTER — OFFICE VISIT (OUTPATIENT)
Dept: INTERNAL MEDICINE CLINIC | Facility: CLINIC | Age: 63
End: 2023-10-24

## 2023-10-24 VITALS
WEIGHT: 124 LBS | DIASTOLIC BLOOD PRESSURE: 75 MMHG | RESPIRATION RATE: 16 BRPM | HEIGHT: 61 IN | HEART RATE: 88 BPM | BODY MASS INDEX: 23.41 KG/M2 | SYSTOLIC BLOOD PRESSURE: 123 MMHG

## 2023-10-24 DIAGNOSIS — D49.2 NEOPLASM OF SKIN OF FOREHEAD: Primary | ICD-10-CM

## 2023-10-24 PROCEDURE — 99213 OFFICE O/P EST LOW 20 MIN: CPT | Performed by: NURSE PRACTITIONER

## 2023-10-24 PROCEDURE — 3078F DIAST BP <80 MM HG: CPT | Performed by: NURSE PRACTITIONER

## 2023-10-24 PROCEDURE — 3074F SYST BP LT 130 MM HG: CPT | Performed by: NURSE PRACTITIONER

## 2023-10-24 PROCEDURE — 3008F BODY MASS INDEX DOCD: CPT | Performed by: NURSE PRACTITIONER

## 2023-10-27 ENCOUNTER — TELEPHONE (OUTPATIENT)
Dept: ENDOCRINOLOGY CLINIC | Facility: CLINIC | Age: 63
End: 2023-10-27

## 2023-10-30 ENCOUNTER — TELEPHONE (OUTPATIENT)
Dept: ENDOCRINOLOGY CLINIC | Facility: CLINIC | Age: 63
End: 2023-10-30

## 2023-10-30 NOTE — TELEPHONE ENCOUNTER
Ok lets monitor BG levels off Rybelsus. She has likely entered the coverage gap. You can add to wait list for sooner appointment. Send BG levels in 2 weeks to review off the medication. Thanks.

## 2023-10-30 NOTE — TELEPHONE ENCOUNTER
Andressa Trevino to patient who stated that Julieta Bautista is too experience with insurance, over $700. She is running low on her current supply. She stated that her blood sugars have been well controlled- fasting today was 98. I told patient that you are out of the office and will advise further at that time. Patient is wondering if she can be seen sooner than 12/6 for an appointment.

## 2023-11-02 ENCOUNTER — TELEPHONE (OUTPATIENT)
Dept: INTERNAL MEDICINE CLINIC | Facility: CLINIC | Age: 63
End: 2023-11-02

## 2023-11-02 ENCOUNTER — TELEPHONE (OUTPATIENT)
Dept: CASE MANAGEMENT | Age: 63
End: 2023-11-02

## 2023-11-02 DIAGNOSIS — R32 URINARY INCONTINENCE, UNSPECIFIED TYPE: ICD-10-CM

## 2023-11-02 DIAGNOSIS — N99.3 PROLAPSE OF VAGINAL VAULT AFTER HYSTERECTOMY: Primary | ICD-10-CM

## 2023-11-02 NOTE — TELEPHONE ENCOUNTER
Dr. Timothy Munroe,     Patient called requesting referral to Dr. Tirso Brooks. Pended referral please review diagnosis and sign off if you agree. Thank you.   Yasir Bolanos

## 2023-11-02 NOTE — TELEPHONE ENCOUNTER
Patient is requesting referral.     Name of specialist and specialty department : Dr. Alyssia Carroll, Urogynecology  Reason for visit with the specialist: yearly for incontinence, originally prolapsed bladder  Address of the specialist office: [facility]   Appointment date: 12/01/23         [facility] informed patient the turnaround time for referral is 5-7 business days.  Patient was informed to check their [employer] account for referral status.

## 2023-11-03 NOTE — TELEPHONE ENCOUNTER
Patient's PCP with [facility] is Dr. Juan Mustafa. A referral can not be processed at this time. Patient will need to call [facility] and change their PCP to Dr. Tawanda Mason. Referral canceled and can be reopened when PCP has been changed with [facility].

## 2023-11-06 NOTE — TELEPHONE ENCOUNTER
Unfortunately I do not have any sooner apts, would recommend that she is placed on a waiting list incase a cancellation/sooner apt comes up that she will be able to take. Thank you!

## 2023-11-07 ENCOUNTER — HOSPITAL ENCOUNTER (OUTPATIENT)
Dept: ULTRASOUND IMAGING | Facility: HOSPITAL | Age: 63
Discharge: HOME | End: 2023-11-07
Payer: MEDICARE

## 2023-11-07 DIAGNOSIS — D49.2 NEOPLASM OF SKIN OF FOREHEAD: ICD-10-CM

## 2023-11-07 PROCEDURE — 76536 US EXAM OF HEAD AND NECK: CPT

## 2023-11-21 ENCOUNTER — TELEPHONE (OUTPATIENT)
Dept: INTERNAL MEDICINE CLINIC | Facility: CLINIC | Age: 63
End: 2023-11-21

## 2023-11-21 NOTE — TELEPHONE ENCOUNTER
Reached patient to discuss statin use in diabetes. Discussed guidelines and recommendations for statin therapy in persons with diabetes. Discussed risk vs benefit. Did also review cholesterol numbers with patient. Let her know her LDL was above goal at 137 and a statin medication would help to lower that number to less than 100 and also reduce risk for heart attacks and strokes. Patient is hesitant to start a new medication without discussing with provider. Recommended she discuss with endo APRN at upcoming appointment in December. Patient also mentions to me the Rybelsus is quite expensive for her. Let her know that unfortunately, that class of medications is not available as generic yet. She plans to discuss this at endo appointment as well. Patient denies any other questions or concerns with medications at this time.

## 2023-11-21 NOTE — TELEPHONE ENCOUNTER
[id number] Debi welsh. Thank you this update. Will discuss hyperlipidemia and rybelsus at the upcoming apt on 12/6. Thank you!

## 2023-11-21 NOTE — TELEPHONE ENCOUNTER
Dr. Stacie Delgadillo- patient asking if she would need to repeat 2 step shingles vaccine as she only had one injection 06/2022. Please Advise  Thank you    Patient calling asking if she will need the 2 step Shingles vaccine as she had last one injection 06/2022.   Was not aware at that time that she needed a second vaccine    Patient states would not be able to get the Shingles vaccine until March/April 2024    Please send response via [employer]

## 2023-11-22 NOTE — TELEPHONE ENCOUNTER
Per patient she is going to [facility] and would like to know if her 2nd Shingle shot can wait until March or April 2024. Please advise and call patient.

## 2023-11-27 NOTE — TELEPHONE ENCOUNTER
Called Pt no answer , left detailed message on confidential VM notifying ok to do Shingles injection when se is able .

## 2023-12-06 ENCOUNTER — LAB ENCOUNTER (OUTPATIENT)
Dept: LAB | Age: 63
End: 2023-12-06
Attending: NURSE PRACTITIONER
Payer: MEDICARE

## 2023-12-06 ENCOUNTER — OFFICE VISIT (OUTPATIENT)
Dept: ENDOCRINOLOGY CLINIC | Facility: CLINIC | Age: 63
End: 2023-12-06
Payer: COMMERCIAL

## 2023-12-06 VITALS
RESPIRATION RATE: 18 BRPM | HEIGHT: 61 IN | HEART RATE: 86 BPM | WEIGHT: 120 LBS | DIASTOLIC BLOOD PRESSURE: 72 MMHG | SYSTOLIC BLOOD PRESSURE: 124 MMHG | BODY MASS INDEX: 22.66 KG/M2

## 2023-12-06 DIAGNOSIS — E11.9 TYPE 2 DIABETES MELLITUS WITHOUT COMPLICATION, WITHOUT LONG-TERM CURRENT USE OF INSULIN (HCC): Primary | ICD-10-CM

## 2023-12-06 DIAGNOSIS — E11.65 UNCONTROLLED TYPE 2 DIABETES MELLITUS WITH HYPERGLYCEMIA (HCC): ICD-10-CM

## 2023-12-06 LAB
CARTRIDGE LOT#: NORMAL NUMERIC
CREAT UR-SCNC: 86.5 MG/DL
GLUCOSE BLOOD: 83
HEMOGLOBIN A1C: 5.4 % (ref 4.3–5.6)
MICROALBUMIN UR-MCNC: <0.3 MG/DL
TEST STRIP LOT #: NORMAL NUMERIC

## 2023-12-06 PROCEDURE — 82947 ASSAY GLUCOSE BLOOD QUANT: CPT | Performed by: NURSE PRACTITIONER

## 2023-12-06 PROCEDURE — 82043 UR ALBUMIN QUANTITATIVE: CPT

## 2023-12-06 PROCEDURE — 83036 HEMOGLOBIN GLYCOSYLATED A1C: CPT | Performed by: NURSE PRACTITIONER

## 2023-12-06 PROCEDURE — 82570 ASSAY OF URINE CREATININE: CPT

## 2023-12-06 PROCEDURE — 3044F HG A1C LEVEL LT 7.0%: CPT | Performed by: NURSE PRACTITIONER

## 2023-12-06 PROCEDURE — 3061F NEG MICROALBUMINURIA REV: CPT | Performed by: NURSE PRACTITIONER

## 2023-12-06 PROCEDURE — 99214 OFFICE O/P EST MOD 30 MIN: CPT | Performed by: NURSE PRACTITIONER

## 2023-12-06 PROCEDURE — 3078F DIAST BP <80 MM HG: CPT | Performed by: NURSE PRACTITIONER

## 2023-12-06 PROCEDURE — 3074F SYST BP LT 130 MM HG: CPT | Performed by: NURSE PRACTITIONER

## 2023-12-06 PROCEDURE — 3008F BODY MASS INDEX DOCD: CPT | Performed by: NURSE PRACTITIONER

## 2023-12-06 RX ORDER — ATORVASTATIN CALCIUM 40 MG/1
40 TABLET, FILM COATED ORAL NIGHTLY
Qty: 90 TABLET | Refills: 0 | Status: SHIPPED | OUTPATIENT
Start: 2023-12-06 | End: 2024-03-05

## 2023-12-06 RX ORDER — BLOOD SUGAR DIAGNOSTIC
STRIP MISCELLANEOUS
Qty: 300 STRIP | Refills: 1 | Status: SHIPPED | OUTPATIENT
Start: 2023-12-06

## 2023-12-06 NOTE — PATIENT INSTRUCTIONS
A1C: 5.4% today --> previously was 4.9% on 9/11/2023  Blood glucose: 83 in clinic today    Medications:   - continue with Metformin ER 1,000mg twice daily   - stop Rybelsus     - if your blood glucose readings are >150 fasting or >180  - 2hrs after meals persistently, start taking Glimepiride 2mg daily in AM    --> if after taking glimepiride 2mg daily continue to stay elevated, then increase glimepiride dose to 4mg daily. Weight:  Wt Readings from Last 6 Encounters:   12/06/23 120 lb (54.4 kg)   10/24/23 124 lb (56.2 kg)   09/11/23 129 lb (58.5 kg)   08/23/23 133 lb (60.3 kg)   08/02/23 133 lb (60.3 kg)   05/31/23 141 lb (64 kg)     A1C goal:  <7.5%    Blood sugar testing:  Test your blood sugar 2 times daily   Recommended times to test: Before breakfast (fasting) and alternate with 2hrs after meals     Blood sugar targets:  Before breakfast:  (preferably < 110)  Before meals OR 2 hours after meals: <180 (preferably <150)     Call for persistent blood sugars < 75 or > 200.

## 2023-12-07 ENCOUNTER — TELEPHONE (OUTPATIENT)
Dept: INTERNAL MEDICINE CLINIC | Facility: CLINIC | Age: 63
End: 2023-12-07

## 2023-12-07 ENCOUNTER — TELEPHONE (OUTPATIENT)
Dept: ENDOCRINOLOGY CLINIC | Facility: CLINIC | Age: 63
End: 2023-12-07

## 2023-12-07 NOTE — TELEPHONE ENCOUNTER
Patient calling (identified name and )  asking if she can take Dramamine with her existing medications and with her history of diabetes. Advised to check with her pharmacist to check for any drug interactions. Patient verbalized understanding and agrees with plan.

## 2023-12-07 NOTE — TELEPHONE ENCOUNTER
Pt states she has 46 pills left of glimperide and also has questions about the directions please call

## 2023-12-08 DIAGNOSIS — E11.65 UNCONTROLLED TYPE 2 DIABETES MELLITUS WITH HYPERGLYCEMIA (HCC): ICD-10-CM

## 2023-12-08 RX ORDER — BLOOD SUGAR DIAGNOSTIC
STRIP MISCELLANEOUS
Qty: 300 STRIP | Refills: 1 | OUTPATIENT
Start: 2023-12-08

## 2023-12-08 NOTE — TELEPHONE ENCOUNTER
Pt also wants to update that she is stopping [facility] today please follow up

## 2023-12-08 NOTE — TELEPHONE ENCOUNTER
Pt calling again.  She is insisting on a call back today.  Pt states that she will stop taking medication tomorrow.   She is very upset that she hasn't received a call back yet.  Please call

## 2023-12-11 NOTE — TELEPHONE ENCOUNTER
DIONI Odell, (FYI)  Spoke to patient - she stopped rybelsus on 12/8/23 and confirms taking MTF ER 1000mg BID  Patient stated understanding to take glimepiride: if your blood glucose readings are >150 fasting or >180  - 2hrs after meals persistently, start taking Glimepiride 2mg daily in AM   Patient stated she leaves for [employer] tomorrow and will be taking dramamine for flight - RN advised no interaction with DM meds or atorvastatin; patient stated she will be bringing glimepiride with her  Fasting BG readings:  12/11 116  12/10 96  12/9 103  12/8 100  Thanks

## 2023-12-11 NOTE — TELEPHONE ENCOUNTER
Agree with RN triage advice below. Reviewed readings which are at goal so will wait for Cass for further recommendations if any.

## 2023-12-20 NOTE — TELEPHONE ENCOUNTER
BG readings reviewed and are stable. Ok to continue taking Metformin ER 1,000mg twice daily and to hold off with glimepiride with guidelines are previously advised.

## 2023-12-26 ENCOUNTER — CLINICAL ABSTRACT (OUTPATIENT)
Dept: CARE COORDINATION | Age: 63
End: 2023-12-26

## 2024-01-05 RX ORDER — METFORMIN HYDROCHLORIDE 500 MG/1
1000 TABLET, EXTENDED RELEASE ORAL 2 TIMES DAILY WITH MEALS
Qty: 360 TABLET | Refills: 0 | Status: SHIPPED | OUTPATIENT
Start: 2024-01-05

## 2024-02-04 RX ORDER — ENALAPRIL MALEATE 20 MG/1
20 TABLET ORAL DAILY
Qty: 100 TABLET | Refills: 3 | Status: SHIPPED | OUTPATIENT
Start: 2024-02-04

## 2024-02-04 NOTE — TELEPHONE ENCOUNTER
Refill passed per [facility] protocol.    Requested Prescriptions   Pending Prescriptions Disp Refills    ENALAPRIL 20 MG Oral Tab [Pharmacy Med Name: Enalapril Maleate 20 MG Oral Tablet] 100 tablet 0     Sig: Take 1 tablet by mouth once daily       Hypertensive Medications Protocol Passed - 2/4/2024  7:35 AM        Passed - In person appointment in the past 12 or next 3 months     Recent Outpatient Visits              2 months ago Type 2 diabetes mellitus without complication, without long-term current use of insulin ([facility])    [facility], Beth Beltran APRN    Office Visit    3 months ago Neoplasm of skin of forehead    [facility] Calista Aleman, CARRIE    Office Visit    4 months ago Uncontrolled type 2 diabetes mellitus with hyperglycemia (HCC)    [facility], Beth Beltran APRN    Office Visit    5 months ago Post-operative [facility] Urogynecology Alyssia Carroll DO    Office Visit    6 months ago Contusion of right anterior thigh, initial encounter    [facility] Harvey Arndt MD    Office Visit          Future Appointments         Provider Department Appt Notes    In 1 month Alyssia Carroll DO [facility] Urogynecology O REFERRAL REQ!! yearly (pt requesting to been seen margaux, due to her going to [place] for a few months)               Passed - Last BP reading less than 140/90     BP Readings from Last 1 Encounters:   12/06/23 124/72               Passed - CMP or BMP in past 6 months     Recent Results (from the past 4392 hour(s))   Basic Metabolic Panel (8)    Collection Time: 09/20/23  1:36 PM   Result Value Ref Range    Glucose 93 70 - 99 mg/dL    Sodium 136 136 - 145 mmol/L    Potassium 3.8 3.5 - 5.1 mmol/L    Chloride 104 98 -  112 mmol/L    CO2 26.0 21.0 - 32.0 mmol/L    Anion Gap 6 0 - 18 mmol/L    BUN 12 7 - 18 mg/dL    Creatinine 0.59 0.55 - 1.02 mg/dL    BUN/CREA Ratio 20.3 (H) 10.0 - 20.0    Calcium, Total 9.1 8.5 - 10.1 mg/dL    Calculated Osmolality 281 275 - 295 mOsm/kg    eGFR-Cr 100 >=60 mL/min/1.73m2    Patient Fasting for BMP? Yes      *Note: Due to a large number of results and/or encounters for the requested time period, some results have not been displayed. A complete set of results can be found in Results Review.               Passed - In person appointment or virtual visit in the past 6 months     Recent Outpatient Visits              2 months ago Type 2 diabetes mellitus without complication, without long-term current use of insulin (HCC)    [facility], Beth Beltran, CARRIE    Office Visit    3 months ago Neoplasm of skin of forehead    [facility] Calista Aleman, APRCHAS    Office Visit    4 months ago Uncontrolled type 2 diabetes mellitus with hyperglycemia (HCC)    [facility], Beth Beltran, APRCHAS    Office Visit    5 months ago Post-operative [facility] Urogynecology Alyssia Carroll DO    Office Visit    6 months ago Contusion of right anterior thigh, initial encounter    [facility] Harvey Arndt MD    Office Visit          Future Appointments         Provider Department Appt Notes    In 1 month Alyssia Carroll DO [facility] Urogynecology O REFERRAL REQ!! yearly (pt requesting to been seen margaux, due to her going to [place] for a few months)               Passed - EGFRCR or GFRNAA > 50     GFR Evaluation  EGFRCR: 100 , resulted on 9/20/2023               Future Appointments         Provider Department Appt Notes    In 1 month Alyssia Carroll DO  [facility] Urogynecology HMO REFERRAL REQ!! yearly (pt requesting to been seen margaux, due to her going to [place] for a few months)            Recent Outpatient Visits              2 months ago Type 2 diabetes mellitus without complication, without long-term current use of insulin (HCC)    [facility], Beth Beltran, CARRIE    Office Visit    3 months ago Neoplasm of skin of forehead    [facility]    Office Visit    4 months ago Uncontrolled type 2 diabetes mellitus with hyperglycemia (HCC)    [facility], Beth Beltran, CARRIE    Office Visit    5 months ago Post-operative [facility]    [facility]ro[facility] Alyssia Carroll DO    Office Visit    6 months ago Contusion of right anterior thigh, initial encounter    [facility] Harvey Arndt MD    Office Visit

## 2024-02-19 ENCOUNTER — NURSE TRIAGE (OUTPATIENT)
Dept: INTERNAL MEDICINE CLINIC | Facility: CLINIC | Age: 64
End: 2024-02-19

## 2024-02-19 ENCOUNTER — TELEPHONE (OUTPATIENT)
Dept: ENDOCRINOLOGY CLINIC | Facility: CLINIC | Age: 64
End: 2024-02-19

## 2024-02-19 NOTE — TELEPHONE ENCOUNTER
Action Requested: Summary for Provider     []  Critical Lab, Recommendations Needed  [] Need Additional Advice  [x]   FYI    []   Need Orders  [] Need Medications Sent to Pharmacy  []  Other     SUMMARY: 3 lb weight loss without trying. She had been in [place] upon onset and returned recently this month. She is asking to see provider on 2/28/24--> Scheduled. Denies any other symptoms. Refer to system/assessment protocol for yes/no answers. [See below for more details]     Reason for call: Weight Loss  Onset: 12/12/23    Patient called states she has gone down in weight, she is seeing ENDO as well for DM. 127# upon onset and has lost 3 lbs. She was in [place] since onset and returned recently. She has not tried to loose weight. Denies any other symptoms. She was insistent in seeing provider on 2/28/24--> scheduled. Patient instructed any new or worsening symptoms [reviewed] seek immediate medical attention. Patient verbalized understanding. No further questions or concerns at this time.    Reason for Disposition   Nursing judgment    Protocols used: No Protocol [id number]      Future Appointments   Date Time Provider Department Center   2/28/2024 11:40 AM Calista Aleman APRN [facility]   2/28/2024  1:00 PM [facility] RM1 [facility] EM [facility]   3/22/2024  1:00 PM Alyssia Carroll DO [facility]   5/1/2024 11:45 AM Beth Correa APRN [facility]

## 2024-02-19 NOTE — TELEPHONE ENCOUNTER
Pt called to schedule a follow up.  First available in May.  Pt is wanting to speak with RN.  She is wanting to know if she can decrease Medication.  Pt states that she has changed diet and BS readings have been really good.  Please call

## 2024-02-21 ENCOUNTER — APPOINTMENT (OUTPATIENT)
Dept: FAMILY MEDICINE | Age: 64
End: 2024-02-21

## 2024-02-23 ENCOUNTER — APPOINTMENT (OUTPATIENT)
Dept: FAMILY MEDICINE | Age: 64
End: 2024-02-23

## 2024-02-23 NOTE — TELEPHONE ENCOUNTER
Pt is asking to take one metformin 500 mg in the morning and one in the afternoon. She has currently been taking 2 in the morning and 2 in the evening.  She states her blood sugar readings have been going well and wanted to see if her medication could be adjusted. Pt states she has also lost about 3lbs as well- has not been trying, states appetite has been low.     02/23- AM(fasting) 104  2/22- AM : 103,  after dinner- 107  2/21-AM after breakfast-132, 129 after dinner  2/20- AM fasting-92, after dinner 111  2/19- AM fasting- 126, after dinner 154

## 2024-02-25 RX ORDER — ATORVASTATIN CALCIUM 40 MG/1
40 TABLET, FILM COATED ORAL NIGHTLY
Qty: 90 TABLET | Refills: 0 | Status: SHIPPED | OUTPATIENT
Start: 2024-02-25

## 2024-02-26 NOTE — TELEPHONE ENCOUNTER
Noted. Her BG readings are stable (but not necessarily low)  Ok to decrease Metformin to 500mg with breakfast and 1,000mg with dinner.     Would not recommend decrease PM dose in order to avoid hyperglycemia the following morning.     If she notices that she develops glucose readings persistently in 80s or less, to call and notify me as this would warrant a need to lower MTF dose.       Thank you!

## 2024-02-28 ENCOUNTER — HOSPITAL ENCOUNTER (OUTPATIENT)
Dept: ULTRASOUND IMAGING | Facility: HOSPITAL | Age: 64
Discharge: HOME | End: 2024-02-28
Attending: INTERNAL MEDICINE
Payer: MEDICARE

## 2024-02-28 ENCOUNTER — HOSPITAL ENCOUNTER (OUTPATIENT)
Dept: MAMMOGRAPHY | Facility: HOSPITAL | Age: 64
Discharge: HOME | End: 2024-02-28
Attending: INTERNAL MEDICINE
Payer: MEDICARE

## 2024-02-28 DIAGNOSIS — R92.8 ABNORMAL MAMMOGRAM: ICD-10-CM

## 2024-02-28 PROCEDURE — 77061 BREAST TOMOSYNTHESIS UNI: CPT | Performed by: INTERNAL MEDICINE

## 2024-02-28 PROCEDURE — 76642 ULTRASOUND BREAST LIMITED: CPT | Performed by: INTERNAL MEDICINE

## 2024-02-28 PROCEDURE — 77065 DX MAMMO INCL CAD UNI: CPT | Performed by: INTERNAL MEDICINE

## 2024-02-28 NOTE — TELEPHONE ENCOUNTER
Recommendations regarding metformin dosing communicated to the patient as stated in the previous note. Patient voiced understanding.

## 2024-02-29 ENCOUNTER — PATIENT MESSAGE (OUTPATIENT)
Dept: ENDOCRINOLOGY CLINIC | Facility: CLINIC | Age: 64
End: 2024-02-29

## 2024-03-01 NOTE — TELEPHONE ENCOUNTER
From: Laquita Norris  To: Beth Correa  Sent: 2/29/2024 5:47 PM CST  Subject: Change Metformin doses    Hi Beth, I am taking Metformin ER 3 times a day one in the morning and 2 in the afternoon. Can I take probiotic 10. if I can when should I take it? how about the gummies adults Multivitamin brand vitafusion.  Thank you for your time  Laquita Norris

## 2024-03-04 RX ORDER — ATORVASTATIN CALCIUM 40 MG/1
40 TABLET, FILM COATED ORAL NIGHTLY
Qty: 90 TABLET | Refills: 1 | Status: SHIPPED | OUTPATIENT
Start: 2024-03-04

## 2024-03-07 ENCOUNTER — TELEPHONE (OUTPATIENT)
Dept: ENDOCRINOLOGY CLINIC | Facility: CLINIC | Age: 64
End: 2024-03-07

## 2024-03-07 NOTE — TELEPHONE ENCOUNTER
Ok noted.     Please ask patient to closely monitor her glucose readings for the next 24hrs. She should eat regular meals with carbs and avoid skipping meals.     Please note that her glimepiride dose will be in her system for the next 24hrs.     Aside from today, her glucose readings had been stable.    Starting Saturday, she should increase MTF to 1,000mg with breakfast and 1,000mg with dinner.       Thank you!

## 2024-03-07 NOTE — TELEPHONE ENCOUNTER
Patient states that her sugar level is high 179, so she wants to know if she needs to take more medication?

## 2024-03-07 NOTE — TELEPHONE ENCOUNTER
DIONI Odell,  Patient states BG has been high:     Fasting  After dinner  3/1 92  130  3/3 97  139  3/4 - 2 hrs after breakfast: 123  3/6 111  113  3/7 156 2pm: 179 - patient took 2 tablets of 2mg glimepiride (4mg total)    Patient states she has been 500mg MTF with breakfast and 1000mg MTF with dinner   Per LOV dtd 12/6/23:  if your blood glucose readings are >150 fasting or >180  - 2hrs after meals persistently, start taking Glimepiride 2mg daily in AM\"     Please advise -thanks       RN's: ok to leave detailed VM

## 2024-03-09 NOTE — TELEPHONE ENCOUNTER
MI - pt sent new message with 3/8/24 glucose numbers of 122 before breakfast and 83 before lunch. DOUG

## 2024-03-27 ENCOUNTER — OFFICE VISIT (OUTPATIENT)
Dept: UROLOGY | Facility: HOSPITAL | Age: 64
End: 2024-03-27
Attending: OBSTETRICS & GYNECOLOGY
Payer: MEDICARE

## 2024-03-27 VITALS — RESPIRATION RATE: 18 BRPM | BODY MASS INDEX: 24.55 KG/M2 | HEIGHT: 61 IN | WEIGHT: 130 LBS | TEMPERATURE: 99 F

## 2024-03-27 DIAGNOSIS — N95.2 POSTMENOPAUSAL ATROPHIC VAGINITIS: ICD-10-CM

## 2024-03-27 DIAGNOSIS — Z98.890 POST-OPERATIVE STATE: ICD-10-CM

## 2024-03-27 DIAGNOSIS — N81.84 PELVIC MUSCLE WASTING: Primary | ICD-10-CM

## 2024-03-27 PROCEDURE — 99212 OFFICE O/P EST SF 10 MIN: CPT

## 2024-03-27 NOTE — PROGRESS NOTES
She is s/p Post-Op Summary  Procedure Date: 01/27/22 (First surgery on  07/22/2019. Vaginal Hysterectomy( Dr. Chamberlain), APER,USLS, APER, MUS, CYSTO)  Procedure Name: Anterior/Posterior/Enterocele Repair;Uterosacral Ligament Suspension;Cystoscopy (Robot assisted lysis of adhesions.)  Post-Op Symptoms: Patient denies pain, YOSEPH, UUI, prolapse symptoms, nausea/vomitting, fevers/chills, bleeding, voiding dysfunction, and defecatory dysfunction.  Do you feel your surgery was successful?: Very successful  Compared to before surgery are you?: Much better  If you could go back to before your surgery, would you do it all over again?: Definitely yes  How satisfied are you with the results of your surgery?: Completely satisfied    Doing well   no complaints  Voids freely  No UTIs  BMs reg  No Pain, denies dyspareunia  DM diag, working on sugar control  +wt loss  No leakage  No bulge  happy    Temp 98.6 °F (37 °C)   Resp 18   Ht 61\"   Wt 130 lb (59 kg)   BMI 24.56 kg/m²   Gen: NAD  CV: RRR  Pulm:nl effort  Abd:soft  : tolerated vaginal exam. Suture site well healed. No active bleeding. Good support    PROLAPSE ASSESSMENT SCALE:                                                 Aa:-2 Ba:-2 C:-9   gh: pb: tvl:9   Ap:-3 Bp:-3 D:         Discussed mgmt of vulvovaginal atrophy with vaginal estrogen cream. Reviewed associated benefits, risks, alternatives, and goals. Recommend low dose twice weekly mgmt   cont vag estrogen     Reviewed bowel management     Discussed daily pelvic exercises    Call with s/sx of UTI  Plan for follow up in 12 months, sooner prn     All questions answered  She understands and agrees to plan    Alyssia Carroll,

## 2024-04-05 ENCOUNTER — OFFICE VISIT (OUTPATIENT)
Dept: INTERNAL MEDICINE CLINIC | Facility: CLINIC | Age: 64
End: 2024-04-05
Payer: COMMERCIAL

## 2024-04-05 VITALS
WEIGHT: 131 LBS | HEIGHT: 61 IN | BODY MASS INDEX: 24.73 KG/M2 | HEART RATE: 77 BPM | DIASTOLIC BLOOD PRESSURE: 84 MMHG | SYSTOLIC BLOOD PRESSURE: 148 MMHG

## 2024-04-05 DIAGNOSIS — E78.5 HYPERLIPIDEMIA, UNSPECIFIED HYPERLIPIDEMIA TYPE: ICD-10-CM

## 2024-04-05 DIAGNOSIS — E11.9 TYPE 2 DIABETES MELLITUS WITHOUT COMPLICATION, WITHOUT LONG-TERM CURRENT USE OF INSULIN (HCC): ICD-10-CM

## 2024-04-05 DIAGNOSIS — H93.13 TINNITUS OF BOTH EARS: ICD-10-CM

## 2024-04-05 DIAGNOSIS — I10 ESSENTIAL HYPERTENSION: Primary | ICD-10-CM

## 2024-04-05 PROCEDURE — 3079F DIAST BP 80-89 MM HG: CPT | Performed by: INTERNAL MEDICINE

## 2024-04-05 PROCEDURE — 3008F BODY MASS INDEX DOCD: CPT | Performed by: INTERNAL MEDICINE

## 2024-04-05 PROCEDURE — 99214 OFFICE O/P EST MOD 30 MIN: CPT | Performed by: INTERNAL MEDICINE

## 2024-04-05 PROCEDURE — 3077F SYST BP >= 140 MM HG: CPT | Performed by: INTERNAL MEDICINE

## 2024-04-05 NOTE — PROGRESS NOTES
HPI:    Patient ID: Laquita Norris is a 63 year old female.    HPI  Follow up    Back pain for a few years  Complain of weight loss  Wt up and down  she was 120 lbs in December to23 and now 131 lbs    Diabetes under control  HTN  Long standing history of hypertension     sympotms  :        Headache no  dizziness        no                             Blurred vision no  palpitaionsSyncope no  Chest pain  no  PND  Orthopnea no  Weakness  No  Low salt diet    yes    Compliance with exercise stays active  Compliance with medication yes                                              /84 (BP Location: Right arm, Patient Position: Sitting, Cuff Size: adult)   Pulse 77   Ht 5' 1\" (1.549 m)   Wt 131 lb (59.4 kg)   BMI 24.75 kg/m²   Wt Readings from Last 6 Encounters:   04/05/24 131 lb (59.4 kg)   03/27/24 130 lb (59 kg)   12/06/23 120 lb (54.4 kg)   10/24/23 124 lb (56.2 kg)   09/11/23 129 lb (58.5 kg)   08/23/23 133 lb (60.3 kg)     Body mass index is 24.75 kg/m².  HGBA1C:    Lab Results   Component Value Date    A1C 5.4 12/06/2023    A1C 4.9 09/11/2023    A1C 8.2 (A) 05/31/2023     (H) 04/21/2023         Review of Systems   Constitutional:  Negative for activity change, chills, fatigue and fever.   HENT:  Negative for ear discharge, nosebleeds, postnasal drip, rhinorrhea, sinus pressure and sore throat.    Eyes:  Negative for pain, discharge and redness.   Respiratory:  Negative for cough, chest tightness, shortness of breath and wheezing.    Cardiovascular:  Negative for chest pain, palpitations and leg swelling.   Gastrointestinal:  Negative for abdominal pain, blood in stool, constipation, diarrhea, nausea and vomiting.   Genitourinary:  Negative for difficulty urinating, dysuria, frequency, hematuria and urgency.   Musculoskeletal:  Positive for arthralgias and gait problem. Negative for back pain and joint swelling.   Skin:  Negative for rash.   Neurological:  Negative for syncope, weakness,  light-headedness and headaches.   Psychiatric/Behavioral:  Negative for dysphoric mood. The patient is not nervous/anxious.          Current Outpatient Medications   Medication Sig Dispense Refill    Acetaminophen (TYLENOL OR) Take by mouth.      enalapril 20 MG Oral Tab Take 1 tablet (20 mg total) by mouth daily. 100 tablet 3    metFORMIN  MG Oral Tablet 24 Hr Take 2 tablets (1,000 mg total) by mouth 2 (two) times daily with meals. 360 tablet 0    Glucose Blood (ONETOUCH VERIO) In Vitro Strip USE  STRIP TO CHECK GLUCOSE THREE TIMES DAILY 300 strip 1    CALCIUM-VITAMINS C & D OR Take by mouth.      celecoxib 200 MG Oral Cap Take 1 capsule (200 mg total) by mouth daily. 90 capsule 0    Polyethylene Glycol 3350 17 GM/SCOOP Oral Powder       estradiol 0.1 MG/GM Vaginal Cream APPLY 0.5 GRAM VAGINALLY 2 TIMES EVERY WEEK 42.5 g 3    Saline (AYR NASAL MIST ALLERGY/SINUS NA) 1 spray by Nasal route daily as needed.      Polyvinyl Alcohol-Povidone (REFRESH OP) Apply 1 drop to eye daily as needed.      Inulin (FIBER CHOICE PREBIOTIC FIBER OR) Take 1 capsule by mouth daily.      Homeopathic Products (EARACHE DROPS OT) Place 1 drop in ear(s) daily as needed.      loratadine 10 MG Oral Tab Take 1 tablet (10 mg total) by mouth daily as needed.      atorvastatin 40 MG Oral Tab Take 1 tablet (40 mg total) by mouth nightly. (Patient not taking: Reported on 4/5/2024) 90 tablet 1    Ibuprofen 200 MG Oral Cap Take by mouth as needed. (Patient not taking: Reported on 4/5/2024)       Allergies:  Allergies   Allergen Reactions    Codeine RASH     T#3    Acetaminophen-Codeine RASH     Tolerates plain Tylenol  Tolerates plain Tylenol  Tolerates plain Tylenol       HISTORY:  Past Medical History:   Diagnosis Date    Anxiety state     Bartholin cyst 1994    per ng excision of bartholins cyst    Bunion 2010    per ng bunionectomy, bilateral feet    Cyst of skin 2013    per ng rt thigh cyst removal    Diverticulitis 2017    hosp x 3 days     Diverticulosis     Fibroids 2003    Hemorrhoids     per ng colonoscopy    High blood pressure     High cholesterol     History of blood transfusion 1986    @[place]    History of bone density study 2004    \"normal dexa '04, '07\"    Hx of motion sickness     Osteoarthritis     Perforated diverticulum of large intestine 10/20/2017    Postmenopausal bleeding 2007    per ng neg endometrial biopsy    Tonsillitis     tonsillectomy 1970    Uterine prolapse 2006    uses pessary    Uterine prolapse 2015    Donut pessary -- self cleaning     Visual impairment     contacts and glasses      Past Surgical History:   Procedure Laterality Date    BIOPSY OF UTERUS LINING  10/2007    neg endometrial biopsy    COLONOSCOPY N/A 2018    Procedure: COLONOSCOPY;  Surgeon: Nini Garcia MD;  Location: [facility] ENDO    FOOT SURGERY      bunionectomy    HIP REPLACEMENT SURGERY      RT hip surgery 2020    HYSTERECTOMY  2019    LEG/ANKLE SURGERY PROC UNLISTED Left     to repair flat foot    NEEDLE BIOPSY LEFT  2013          x3 w/PPTL    OTHER SURGICAL HISTORY Right 2013    thigh cyst removal    PESSARY  2006    TONSILLECTOMY  1970    TUBAL LIGATION  1992      Family History   Problem Relation Age of Onset    Alcohol and Other Disorders Associated Father         alcoholism    Hypertension Father     Diabetes Mother         type 2    Lipids Mother         hyperlipidemia    Eye Problems Mother         eye issues    Heart Disorder Mother         per ng CABG    Hypertension Mother     Musculo-skelatal Disorder Mother         per ng osteoporosis    Allergies Mother         medication    Glaucoma Maternal Grandmother     Diabetes Maternal Grandmother     Hypertension Maternal Grandmother     Musculo-skelatal Disorder Maternal Grandmother         per ng osteoporosis    Allergies Sister     Diabetes Cousin     Breast Cancer Maternal Aunt 46    Breast Cancer Maternal Aunt 65    Cancer Paternal Uncle          per ng stomach    Diabetes Other         mGA      Social History:   Social History     Socioeconomic History    Marital status:    Tobacco Use    Smoking status: Never     Passive exposure: Never    Smokeless tobacco: Never   Vaping Use    Vaping Use: Never used   Substance and Sexual Activity    Alcohol use: No    Drug use: No    Sexual activity: Yes     Birth control/protection: Tubal Ligation   Other Topics Concern    Caffeine Concern No     Comment: 16oz soda chocolate daily    Exercise Yes     Comment: Physical therapy 3 times per week.  Walking daily    Pt has a pacemaker No    Pt has a defibrillator No    Reaction to local anesthetic No        PHYSICAL EXAM:    Physical Exam  Constitutional:       Appearance: She is well-developed. She is not ill-appearing.   HENT:      Right Ear: Ear canal normal.      Left Ear: Ear canal normal.      Mouth/Throat:      Pharynx: Oropharynx is clear.   Eyes:      Extraocular Movements: Extraocular movements intact.      Conjunctiva/sclera: Conjunctivae normal.      Pupils: Pupils are equal, round, and reactive to light.   Cardiovascular:      Rate and Rhythm: Normal rate and regular rhythm.      Heart sounds: Normal heart sounds.   Pulmonary:      Effort: Pulmonary effort is normal.      Breath sounds: Normal breath sounds.   Abdominal:      General: Bowel sounds are normal.      Palpations: Abdomen is soft.   Skin:     General: Skin is warm and dry.   Neurological:      Mental Status: She is alert.      Gait: Gait abnormal.   Psychiatric:         Mood and Affect: Mood normal.       Component      Latest Ref Rng 12/6/2023   HEMOGLOBIN A1C      4.3 - 5.6 % 5.4    Cartridge Lot#      Numeric [id number]    Cartridge Expiration Date      Date 09/30/2025    GLUCOSE BLOOD 83    Test Strip Lot #      Numeric [id number]    Test Strip Expiration Date      Date 03/30/2024    MALB URINE      mg/dL <0.30    CREATININE UR RANDOM      mg/dL 86.50    MALB/CRE CALC --                ASSESSMENT/PLAN:     (I10) Essential hypertension  (primary encounter diagnosis)  Plan: Urinalysis, Routine        Low salt diet advised  Monitor blood pressure daily and record  Follow up in a month    (E11.9) Type 2 diabetes mellitus without complication, without long-term current use of insulin (HCC)  Plan: PODIATRY - INTERNAL, CANCELED: PODIATRY -         INTERNAL        HGBA1C:    Lab Results   Component Value Date    A1C 5.4 12/06/2023    A1C 4.9 09/11/2023    A1C 8.2 (A) 05/31/2023     (H) 04/21/2023     Controlled monitor    (H93.13) Tinnitus of both ears  Plan: ENT - INTERNAL        Chronic  recurrent ENT referral    (E78.5) Hyperlipidemia, unspecified hyperlipidemia type  Plan: CBC With Differential With Platelet, Comp         Metabolic Panel (14), Lipid Panel, TSH and Free        T4        Low cholesterol diet advised  Avoid saturated and trans fats     Body mass index is 24.75 kg/m².  Pt wt is ideal    discussed diet and exercise as tolerated    Medications and most recent test results reviewed  Refill medicaitons  as needed  Potential side effect discussed  Modification of risk for CAD advised    Dietary an lifestyle change  Pt voiced understanding and agrees with plan  Pt given time to ask questions  After Visit Summary handout    Discussed  And given to patient.        Meds This Visit:  Requested Prescriptions      No prescriptions requested or ordered in this encounter       Imaging & Referrals:  None        ID#[id number]

## 2024-04-12 ENCOUNTER — OFFICE VISIT (OUTPATIENT)
Dept: PODIATRY CLINIC | Facility: CLINIC | Age: 64
End: 2024-04-12
Payer: COMMERCIAL

## 2024-04-12 DIAGNOSIS — L60.3 NAIL DYSTROPHY: ICD-10-CM

## 2024-04-12 DIAGNOSIS — E11.9 TYPE 2 DIABETES MELLITUS WITHOUT COMPLICATION, WITHOUT LONG-TERM CURRENT USE OF INSULIN (HCC): Primary | ICD-10-CM

## 2024-04-12 PROCEDURE — 99203 OFFICE O/P NEW LOW 30 MIN: CPT | Performed by: STUDENT IN AN ORGANIZED HEALTH CARE EDUCATION/TRAINING PROGRAM

## 2024-04-12 RX ORDER — METFORMIN HYDROCHLORIDE 500 MG/1
1000 TABLET, EXTENDED RELEASE ORAL 2 TIMES DAILY WITH MEALS
Qty: 360 TABLET | Refills: 1 | Status: SHIPPED | OUTPATIENT
Start: 2024-04-12

## 2024-04-12 NOTE — PROGRESS NOTES
[facility]  Progress Note      Laquita Norris is a 63 year old female.   Chief Complaint   Patient presents with    Diabetic Foot Care     Consult - [facility]- Pt here for foot check and callus care. Throbbing pain on and off.   A1C- 5.4 on 12/6/23.             HPI:   Patient is a pleasant 63-year-old diabetic female presents to clinic for bilateral foot evaluation duration.  Patient has a flatfoot deformity to bilateral lower extremity.  Admits to calluses on the medial aspect of bilateral hallux.  Denies any pedal injuries.  She does admit to walking barefoot when at home.      Allergies: Codeine and Acetaminophen-codeine    Current Outpatient Medications   Medication Sig Dispense Refill    metFORMIN  MG Oral Tablet 24 Hr Take 2 tablets (1,000 mg total) by mouth 2 (two) times daily with meals. 360 tablet 1    Acetaminophen (TYLENOL OR) Take by mouth.      atorvastatin 40 MG Oral Tab Take 1 tablet (40 mg total) by mouth nightly. (Patient not taking: Reported on 4/5/2024) 90 tablet 1    enalapril 20 MG Oral Tab Take 1 tablet (20 mg total) by mouth daily. 100 tablet 3    Glucose Blood (ONETOUCH VERIO) In Vitro Strip USE  STRIP TO CHECK GLUCOSE THREE TIMES DAILY 300 strip 1    CALCIUM-VITAMINS C & D OR Take by mouth.      celecoxib 200 MG Oral Cap Take 1 capsule (200 mg total) by mouth daily. 90 capsule 0    Polyethylene Glycol 3350 17 GM/SCOOP Oral Powder       estradiol 0.1 MG/GM Vaginal Cream APPLY 0.5 GRAM VAGINALLY 2 TIMES EVERY WEEK 42.5 g 3    Saline (AYR NASAL MIST ALLERGY/SINUS NA) 1 spray by Nasal route daily as needed.      Polyvinyl Alcohol-Povidone (REFRESH OP) Apply 1 drop to eye daily as needed.      Inulin (FIBER CHOICE PREBIOTIC FIBER OR) Take 1 capsule by mouth daily.      Homeopathic Products (EARACHE DROPS OT) Place 1 drop in ear(s) daily as needed.      Ibuprofen 200 MG Oral Cap Take by mouth as needed. (Patient not taking: Reported on 4/5/2024)      loratadine 10 MG Oral Tab Take 1  tablet (10 mg total) by mouth daily as needed.        Past Medical History:    Anxiety state    Bartholin cyst    per ng excision of bartholins cyst    Bunion    per ng bunionectomy, bilateral feet    Cyst of skin    per ng rt thigh cyst removal    Diverticulitis    hosp x 3 days    Diverticulosis    Fibroids    Hemorrhoids    per ng colonoscopy    High blood pressure    High cholesterol    History of blood transfusion    @[place]    History of bone density study    \"normal dexa '04, '07\"    Hx of motion sickness    Osteoarthritis    Perforated diverticulum of large intestine    Postmenopausal bleeding    per ng neg endometrial biopsy    Tonsillitis    tonsillectomy 1970    Uterine prolapse    uses pessary    Uterine prolapse    Donut pessary -- self cleaning     Visual impairment    contacts and glasses      Past Surgical History:   Procedure Laterality Date    Biopsy of uterus lining  10/2007    neg endometrial biopsy    Colonoscopy N/A 2018    Procedure: COLONOSCOPY;  Surgeon: Nini Garcia MD;  Location: [facility] ENDO    Foot surgery      bunionectomy    Hip replacement surgery      RT hip surgery 2020    Hysterectomy  2019    Leg/ankle surgery proc unlisted Left     to repair flat foot    Needle biopsy left  2013          x3 w/PPTL    Other surgical history Right 2013    thigh cyst removal    Pessary  2006    Tonsillectomy  1970    Tubal ligation        Family History   Problem Relation Age of Onset    Alcohol and Other Disorders Associated Father         alcoholism    Hypertension Father     Diabetes Mother         type 2    Lipids Mother         hyperlipidemia    Eye Problems Mother         eye issues    Heart Disorder Mother         per ng CABG    Hypertension Mother     Musculo-skelatal Disorder Mother         per ng osteoporosis    Allergies Mother         medication    Glaucoma Maternal Grandmother     Diabetes Maternal Grandmother     Hypertension Maternal  Grandmother     Musculo-skelatal Disorder Maternal Grandmother         per ng osteoporosis    Allergies Sister     Diabetes Cousin     Breast Cancer Maternal Aunt 46    Breast Cancer Maternal Aunt 65    Cancer Paternal Uncle         per ng stomach    Diabetes Other         mGA      Social History     Socioeconomic History    Marital status:    Tobacco Use    Smoking status: Never     Passive exposure: Never    Smokeless tobacco: Never   Vaping Use    Vaping status: Never Used   Substance and Sexual Activity    Alcohol use: No    Drug use: No    Sexual activity: Yes     Birth control/protection: Tubal Ligation   Other Topics Concern    Caffeine Concern No     Comment: 16oz soda chocolate daily    Exercise Yes     Comment: Physical therapy 3 times per week.  Walking daily    Pt has a pacemaker No    Pt has a defibrillator No    Reaction to local anesthetic No           REVIEW OF SYSTEMS:     Denies nause, fever, chills  No calf pain  Denies chest pain or SOB      EXAM:   There were no vitals taken for this visit.  GENERAL: well developed, well nourished, in no apparent distress  EXTREMITIES:   1. Integument: Normal skin temperature and turgor  2. Vascular: Dorsalis pedis two out of four bilateral and posterior tibial pulses two out of   four bilateral, capillary refill normal.   3. Musculoskeletal: All muscle groups are graded 5 out of 5 in the foot and ankle.   4. Neurological: Normal sharp dull sensation; reflexes normal.    Bilateral barefoot skin diabetic exam is normal, visualized feet and the appearance is normal.  Bilateral monofilament/sensation of both feet is normal.  Pulsation pedal pulse exam of both lower legs/feet is normal as well.                ASSESSMENT AND PLAN:   Diagnoses and all orders for this visit:    Type 2 diabetes mellitus without complication, without long-term current use of insulin (HCC)        Plan:       -Patient examined, chart history reviewed.  -Discussed importance of  proper pedal hygiene, regular foot checks, and tight glucose control.  -Sharply debrided nails x10 with a sterile nail nipper achieving a 20% reduction in thickness and length, without incident.   -Ambulate with supportive shoes and inserts and avoid walking barefoot.  -Educated patient on acute signs of infection advised patient to seek immediate medical attention if symptoms arise.      The patient indicates understanding of these issues and agrees to the plan.        Roopa Winkler DPM

## 2024-04-12 NOTE — TELEPHONE ENCOUNTER
Endocrine Refill protocol for metformin    Protocol Criteria:    -Appointment with Endocrinology completed in the last 6 months or scheduled in the next 3 months    -GFR greater than or equal to 40 in the past 12 months     -A1c result <8.5% in the past 6 months      Verify the above has been completed or scheduled in the appropriate timeline. If so can send a 90 day supply with 1 refill.     Last completed office visit:  Next scheduled Follow up: 05/01/24  Last GFR result: 100  Last A1c result: 5.4

## 2024-04-13 DIAGNOSIS — E11.65 UNCONTROLLED TYPE 2 DIABETES MELLITUS WITH HYPERGLYCEMIA (HCC): ICD-10-CM

## 2024-04-14 RX ORDER — LANCETS 33 GAUGE
1 EACH MISCELLANEOUS 3 TIMES DAILY
Qty: 300 EACH | Refills: 0 | Status: SHIPPED | OUTPATIENT
Start: 2024-04-14

## 2024-04-14 NOTE — TELEPHONE ENCOUNTER
LOV: 12/06/2023     Next office visit: 05/01/2024     Last filled: 12/06/2023       Order pended and routed to provider   
General

## 2024-04-16 RX ORDER — ATORVASTATIN CALCIUM 40 MG/1
40 TABLET, FILM COATED ORAL NIGHTLY
COMMUNITY
Start: 2024-04-16

## 2024-04-16 RX ORDER — METFORMIN HYDROCHLORIDE 500 MG/1
1000 TABLET, EXTENDED RELEASE ORAL 2 TIMES DAILY WITH MEALS
COMMUNITY
Start: 2024-04-16 | End: 2024-04-18

## 2024-04-27 ENCOUNTER — PATIENT MESSAGE (OUTPATIENT)
Dept: INTERNAL MEDICINE CLINIC | Facility: CLINIC | Age: 64
End: 2024-04-27

## 2024-04-28 NOTE — TELEPHONE ENCOUNTER
From: Laquita Norrsi  To: Erickson Ruiz  Sent: 4/27/2024 11:47 AM CDT  Subject: mamogram    Hi Dr. Ruiz I wanted to know if I can get a referral for a mammogram on my right and left breast. I had a mammogram in Feb. this year on my left breast only.  I just feel both of my breast uncomfortable.    Thank you for your time  Laquita Norris

## 2024-04-29 ENCOUNTER — NURSE TRIAGE (OUTPATIENT)
Dept: INTERNAL MEDICINE CLINIC | Facility: CLINIC | Age: 64
End: 2024-04-29

## 2024-04-29 NOTE — TELEPHONE ENCOUNTER
Reason for Disposition   Patient wants to be seen    Protocols used: Breast Symptoms-A-[place]  Action Requested: Summary for Provider     []  Critical Lab, Recommendations Needed  [] Need Additional Advice  []   FYI    []   Need Orders  [] Need Medications Sent to Pharmacy  []  Other     SUMMARY: \"Some time I get a pain on my right breast. I would like to get a mammogram so I can have a piece of mine make sure I don't have cancer. \"  Pt reports having intermittent right breast pain especially in the morning over the past month. No swelling, redness, discharge.  She is already scheduled for a left breast Mammogram but wants bilateral mammogram now.  Advised and scheduled office visit to discuss further.     Reason for call: Breast Pain  Onset: Data Unavailable

## 2024-05-03 ENCOUNTER — OFFICE VISIT (OUTPATIENT)
Dept: INTERNAL MEDICINE CLINIC | Facility: CLINIC | Age: 64
End: 2024-05-03
Payer: COMMERCIAL

## 2024-05-03 VITALS
SYSTOLIC BLOOD PRESSURE: 134 MMHG | DIASTOLIC BLOOD PRESSURE: 76 MMHG | HEIGHT: 61 IN | BODY MASS INDEX: 25.11 KG/M2 | HEART RATE: 88 BPM | OXYGEN SATURATION: 98 % | WEIGHT: 133 LBS

## 2024-05-03 DIAGNOSIS — N64.4 PAIN OF RIGHT BREAST: Primary | ICD-10-CM

## 2024-05-03 PROCEDURE — 99213 OFFICE O/P EST LOW 20 MIN: CPT

## 2024-05-03 PROCEDURE — 3008F BODY MASS INDEX DOCD: CPT

## 2024-05-03 PROCEDURE — 3078F DIAST BP <80 MM HG: CPT

## 2024-05-03 PROCEDURE — 3075F SYST BP GE 130 - 139MM HG: CPT

## 2024-05-03 NOTE — PROGRESS NOTES
Subjective:   Laquita Norris is a 63 year old female who presents for Breast Pain (Having on and off pain in right breast for about 3 weeks)     C/c right anterior and lateral breast pain which started 3 weeks ago   Feels it mostly in the morning or the evening   Just uncomfortable, not stabbing or burning pain  No nipple discharge   Sleeps on the right side and sometimes on the left     No losing weight, fever, chills   Some fatigue     Did have a normal screening mammogram 2/2024    History/Other:    Chief Complaint Reviewed and Verified  Nursing Notes Reviewed and   Verified  Tobacco Reviewed  Allergies Reviewed  Medications Reviewed    Problem List Reviewed  Medical History Reviewed  Surgical History   Reviewed  OB Status Reviewed  Family History Reviewed         Tobacco:  She has never smoked tobacco.    Current Outpatient Medications   Medication Sig Dispense Refill    metFORMIN  MG Oral Tablet 24 Hr Take 1 tablet (500 mg total) by mouth daily with breakfast AND 2 tablets (1,000 mg total) daily with dinner.      atorvastatin 40 MG Oral Tab Take 1 tablet (40 mg total) by mouth nightly.      Lancets (ONETOUCH DELICA PLUS [id number]) Does not apply Misc 1 Lancet by Finger stick route 3 (three) times daily. 300 each 0    Acetaminophen (TYLENOL OR) Take by mouth.      enalapril 20 MG Oral Tab Take 1 tablet (20 mg total) by mouth daily. 100 tablet 3    Glucose Blood (ONETOUCH VERIO) In Vitro Strip USE  STRIP TO CHECK GLUCOSE THREE TIMES DAILY 300 strip 1    CALCIUM-VITAMINS C & D OR Take by mouth.      celecoxib 200 MG Oral Cap Take 1 capsule (200 mg total) by mouth daily. 90 capsule 0    estradiol 0.1 MG/GM Vaginal Cream APPLY 0.5 GRAM VAGINALLY 2 TIMES EVERY WEEK 42.5 g 3    Saline (AYR NASAL MIST ALLERGY/SINUS NA) 1 spray by Nasal route daily as needed.      Polyvinyl Alcohol-Povidone (REFRESH OP) Apply 1 drop to eye daily as needed.      Inulin (FIBER CHOICE PREBIOTIC FIBER OR) Take 1 capsule by  mouth daily.      Homeopathic Products (EARACHE DROPS OT) Place 1 drop in ear(s) daily as needed.      loratadine 10 MG Oral Tab Take 1 tablet (10 mg total) by mouth daily as needed.      Polyethylene Glycol 3350 17 GM/SCOOP Oral Powder  (Patient not taking: Reported on 5/3/2024)           Review of Systems:  Review of Systems   Constitutional: Negative.    Respiratory: Negative.     Cardiovascular: Negative.    Gastrointestinal: Negative.    Musculoskeletal:         Right breast pain   Skin: Negative.    Neurological: Negative.          Objective:   /76   Pulse 88   Ht 5' 1\" (1.549 m)   Wt 133 lb (60.3 kg)   SpO2 98%   BMI 25.13 kg/m²  Estimated body mass index is 25.13 kg/m² as calculated from the following:    Height as of this encounter: 5' 1\" (1.549 m).    Weight as of this encounter: 133 lb (60.3 kg).  Physical Exam  Vitals reviewed.   Constitutional:       General: She is not in acute distress.     Appearance: Normal appearance. She is well-developed.   Cardiovascular:      Rate and Rhythm: Normal rate and regular rhythm.      Heart sounds: Normal heart sounds.   Pulmonary:      Effort: Pulmonary effort is normal.      Breath sounds: Normal breath sounds.   Chest:   Breasts:     Right: Tenderness present. No mass, nipple discharge or skin change.      Left: No mass, nipple discharge or skin change.   Lymphadenopathy:      Upper Body:      Right upper body: No supraclavicular or axillary adenopathy.      Left upper body: No supraclavicular or axillary adenopathy.   Skin:     General: Skin is warm and dry.   Neurological:      Mental Status: She is alert and oriented to person, place, and time.       Assessment & Plan:   1. Pain of right breast (Primary)  -     [facility] (CPT=77066); Future; Expected date: 05/03/2024      CARRIE Doe, 5/3/2024, 2:46 PM

## 2024-05-07 ENCOUNTER — TELEPHONE (OUTPATIENT)
Dept: FAMILY MEDICINE | Age: 64
End: 2024-05-07

## 2024-05-09 ENCOUNTER — OFFICE VISIT (OUTPATIENT)
Dept: ENDOCRINOLOGY CLINIC | Facility: CLINIC | Age: 64
End: 2024-05-09
Payer: COMMERCIAL

## 2024-05-09 ENCOUNTER — PATIENT MESSAGE (OUTPATIENT)
Dept: ENDOCRINOLOGY CLINIC | Facility: CLINIC | Age: 64
End: 2024-05-09

## 2024-05-09 VITALS
BODY MASS INDEX: 25.61 KG/M2 | HEIGHT: 61 IN | WEIGHT: 135.63 LBS | DIASTOLIC BLOOD PRESSURE: 78 MMHG | HEART RATE: 85 BPM | SYSTOLIC BLOOD PRESSURE: 134 MMHG

## 2024-05-09 DIAGNOSIS — E78.00 PURE HYPERCHOLESTEROLEMIA: ICD-10-CM

## 2024-05-09 DIAGNOSIS — E11.65 UNCONTROLLED TYPE 2 DIABETES MELLITUS WITH HYPERGLYCEMIA (HCC): Primary | ICD-10-CM

## 2024-05-09 LAB
GLUCOSE BLOOD: 181
HEMOGLOBIN A1C: 5.4 % (ref 4.3–5.6)
TEST STRIP LOT #: NORMAL NUMERIC

## 2024-05-09 PROCEDURE — 3008F BODY MASS INDEX DOCD: CPT | Performed by: NURSE PRACTITIONER

## 2024-05-09 PROCEDURE — 3078F DIAST BP <80 MM HG: CPT | Performed by: NURSE PRACTITIONER

## 2024-05-09 PROCEDURE — 99214 OFFICE O/P EST MOD 30 MIN: CPT | Performed by: NURSE PRACTITIONER

## 2024-05-09 PROCEDURE — 83036 HEMOGLOBIN GLYCOSYLATED A1C: CPT | Performed by: NURSE PRACTITIONER

## 2024-05-09 PROCEDURE — 82947 ASSAY GLUCOSE BLOOD QUANT: CPT | Performed by: NURSE PRACTITIONER

## 2024-05-09 PROCEDURE — 3075F SYST BP GE 130 - 139MM HG: CPT | Performed by: NURSE PRACTITIONER

## 2024-05-09 PROCEDURE — 3044F HG A1C LEVEL LT 7.0%: CPT | Performed by: NURSE PRACTITIONER

## 2024-05-09 NOTE — PATIENT INSTRUCTIONS
A1C: 5.4% today --> previously was 5.4% on 12/6/2023  Blood glucose: 181 in clinic today    Medications:   - continue with Metformin ER 500mg with breakfast       1,000mg with lunch  - continue with Glimepiride 2mg daily if fasting readings >160     - let's work on eating 3 meals per day   - make sure that each meal has protein    Protein options: meat, fish, eggs, greek yogurt, cottage cheese, peanut butter, humus, peanuts/seeds     [place] or walnuts or pecans     Weight:  Wt Readings from Last 6 Encounters:   05/09/24 135 lb 9.6 oz (61.5 kg)   05/03/24 133 lb (60.3 kg)   04/05/24 131 lb (59.4 kg)   03/27/24 130 lb (59 kg)   12/06/23 120 lb (54.4 kg)   10/24/23 124 lb (56.2 kg)     A1C goal:  <7.5%    Blood sugar testing:  Test your blood sugar 1 time daily   Recommended times to test: before breakfast (fasting) or 2hrs after meals     Blood sugar targets:  Before breakfast:  (preferably < 110)  Before meals OR 2 hours after meals: <180 (preferably <150)     Call for persistent blood sugars < 75 or > 200

## 2024-05-09 NOTE — PROGRESS NOTES
Name: Laquita Norris  Date: 2024    CHIEF COMPLAINT   Chief Complaint   Patient presents with    Diabetes     HISTORY OF PRESENT ILLNESS   Laquita Norris is a 63 year old female who presents for follow up on diabetes management.   HbA1C: 5.4% at POC today. Previously this was 4.9% on 2023.   Blood glucose is: 181 in clinic today. Had lunch around 12pm ( around 2hrs prior to office visit).     FAMILY HISTORY OF DIABETES  -mother and father;   -maternal side - grandmother, uncle, aunt and cousin   DIABETES HISTORY  Diagnosed: 2023  Prior HbA, C or glycohemoglobin were 10.2% on 2023; 8.2% 2023; 4.9% 2023; 5.4% 2023; 5.4% at POC today;     Patient has not had hospitalizations for blood sugar issues.   Denies history of pancreatitis.     PREVIOUS MEDICATION FOR DM:  Ozempic - d/c'ed due to weight loss   Glimepiride -d/c'ed due to hypoglycemia   -Rybelsus -d/elaine due to high cost     CURRENT MEDICATIONS FOR DM:  - Metformin ER 500mg with breakfast and 1,000mg with lunch  - Glimepiride 2mg daily as needed if fasting BG reading >160     HOME GLUCOSE READINGS:   Testing BG x2 daily  Meter or log book today: yes   Fastin, 81, 111, 118, 93, 123, 92, 95  2hrs after breakfast: 123, 98, 83, 133, 131, 141, 93, 123, 151, 95  2hrs after lunch: 126, 172, 116, 155, 119, 152, 120  2hrs after dinner: 129, 111, 131, 146, 132, 155, 110, 107, 145, 110, 109, 103, 142  Hypoglycemia present: no - denies having felt symptoms of hypoglycemia   Hypoglycemia awareness: n/a has never had an episode     HISTORY OF DIABETES COMPLICATIONS:  History of Retinopathy: denies - last eye exam within the last 12 months: yes    History of Neuropathy: no   History of Nephropathy: no     ASSOCIATED COMPLICATIONS:   HTN: yes   Hyperlipidemia: yes   Cardiovascular Disease: no   Peripheral Vascular Disease: no     DIETARY COMPLIANCE:  Eating 3 meals per day   - continues to follow a low carb diet -  who is a  has  been helping with meal preparations.   - has noticed that she on occasion is not hungry for lunch meal, thus will skip    EXERCISE:   Yes- walking     Polyuria, polyphagia, polydipsia: no   Paresthesias: no   Blurred vision: no   Recent steroids, illness or infections: no     REVIEW OF SYSTEMS  Constitutional: Negative for: weight change, fever, fatigue, cold/heat intolerance  Eyes: Negative for:  Visual changes, proptosis, blurring  ENT: Negative for:  dysphagia, neck swelling, dysphonia  Respiratory: Negative for: hemoptysis, shortness of breath, cough, or dyspnea.  Cardiovascular: Negative for:  chest pain, chest discomfort, palpitations  GI: Negative for:  abdominal pain, nausea, vomiting, diarrhea, heartburn, constipation  Neurology: Negative for: headache, dizziness, syncope, numbness/tingling, or weakness.   Genito-Urinary: Negative for: dysuria, frequency or hematuria   Hematology/Lymphatics: Negative for: bruising, easy bleeding, lower extremity edema  Skin: Negative for: rash, blister, infection or ulcers.  Endocrine: Negative for: polyuria, polydipsia. No osteoporosis. No thyroid disease.     MEDICATIONS:     Current Outpatient Medications:     metFORMIN  MG Oral Tablet 24 Hr, Take 1 tablet (500 mg total) by mouth daily with breakfast AND 2 tablets (1,000 mg total) daily with dinner., Disp: , Rfl:     atorvastatin 40 MG Oral Tab, Take 1 tablet (40 mg total) by mouth nightly., Disp: , Rfl:     Lancets (ONETOUCH DELICA PLUS [id number]) Does not apply Misc, 1 Lancet by Finger stick route 3 (three) times daily., Disp: 300 each, Rfl: 0    Acetaminophen (TYLENOL OR), Take by mouth., Disp: , Rfl:     enalapril 20 MG Oral Tab, Take 1 tablet (20 mg total) by mouth daily., Disp: 100 tablet, Rfl: 3    Glucose Blood (ONETOUCH VERIO) In Vitro Strip, USE  STRIP TO CHECK GLUCOSE THREE TIMES DAILY, Disp: 300 strip, Rfl: 1    CALCIUM-VITAMINS C & D OR, Take by mouth., Disp: , Rfl:     celecoxib 200 MG Oral Cap, Take  1 capsule (200 mg total) by mouth daily., Disp: 90 capsule, Rfl: 0    Polyethylene Glycol 3350 17 GM/SCOOP Oral Powder, , Disp: , Rfl:     estradiol 0.1 MG/GM Vaginal Cream, APPLY 0.5 GRAM VAGINALLY 2 TIMES EVERY WEEK, Disp: 42.5 g, Rfl: 3    Saline (AYR NASAL MIST ALLERGY/SINUS NA), 1 spray by Nasal route daily as needed., Disp: , Rfl:     Polyvinyl Alcohol-Povidone (REFRESH OP), Apply 1 drop to eye daily as needed., Disp: , Rfl:     Inulin (FIBER CHOICE PREBIOTIC FIBER OR), Take 1 capsule by mouth daily., Disp: , Rfl:     Homeopathic Products (EARACHE DROPS OT), Place 1 drop in ear(s) daily as needed., Disp: , Rfl:     loratadine 10 MG Oral Tab, Take 1 tablet (10 mg total) by mouth daily as needed., Disp: , Rfl:     ALLERGIES:   Allergies   Allergen Reactions    Codeine RASH     T#3    Acetaminophen-Codeine RASH     Tolerates plain Tylenol  Tolerates plain Tylenol  Tolerates plain Tylenol       SOCIAL HISTORY:   Social History     Socioeconomic History    Marital status:    Tobacco Use    Smoking status: Never     Passive exposure: Never    Smokeless tobacco: Never   Vaping Use    Vaping status: Never Used   Substance and Sexual Activity    Alcohol use: No    Drug use: No    Sexual activity: Yes     Birth control/protection: Tubal Ligation   Other Topics Concern    Caffeine Concern No     Comment: 16oz soda chocolate daily    Exercise Yes     Comment: Physical therapy 3 times per week.  Walking daily    Pt has a pacemaker No    Pt has a defibrillator No    Reaction to local anesthetic No       PAST MEDICAL HISTORY:   Past Medical History:    Anxiety state    Bartholin cyst    per ng excision of bartholins cyst    Bunion    per ng bunionectomy, bilateral feet    Cyst of skin    per ng rt thigh cyst removal    Diverticulitis    hosp x 3 days    Diverticulosis    Fibroids    Hemorrhoids    per ng colonoscopy    High blood pressure    High cholesterol    History of blood transfusion    @[facility]    History of  bone density study    \"normal dexa '04, '07\"    Hx of motion sickness    Osteoarthritis    Perforated diverticulum of large intestine    Postmenopausal bleeding    per ng neg endometrial biopsy    Tonsillitis    tonsillectomy 1970    Uterine prolapse    uses pessary    Uterine prolapse    Donut pessary -- self cleaning     Visual impairment    contacts and glasses       PAST SURGICAL HISTORY:   Past Surgical History:   Procedure Laterality Date    Biopsy of uterus lining  10/2007    neg endometrial biopsy    Colonoscopy N/A 2018    Procedure: COLONOSCOPY;  Surgeon: Nini Garcia MD;  Location: [facility]    Foot surgery      bunionectomy    Hip replacement surgery      RT hip surgery 2020    Hysterectomy  2019    Leg/ankle surgery proc unlisted Left     to repair flat foot    Needle biopsy left  2013          x3 w/PPTL    Other surgical history Right 2013    thigh cyst removal    Pessary      Tonsillectomy  1970    Tubal ligation         PHYSICAL EXAM:   Vitals:    24 1430   BP: 134/78   BP Location: Left arm   Patient Position: Sitting   Cuff Size: adult   Pulse: 85   Weight: 135 lb 9.6 oz (61.5 kg)   Height: 5' 1\" (1.549 m)     BMI:   Body mass index is 25.62 kg/m².    General Appearance:  alert, well developed, in no acute distress  Nutritional:  no extreme weight gain or loss  Head: Atraumatic  Eyes:  normal conjunctivae, sclera., normal sclera and normal pupils  Throat/Neck: normal sound to voice. Normal hearing, normal speech  Back: no kyphosis  Respiratory:  Speaking in full sentences, non-labored. no increased work of breathing, no audible wheezing    Skin:  normal moisture and skin texture, no visible lesions  Hair and nails: normal scalp hair  Hematologic:  no excessive bruising  Neuro: motor grossly intact, moving all extremities without difficulty  Psychiatric:  oriented to time, self, and place  Extremities: no obvious extremity swelling, no  lesions    Diabetic Foot Exam:  Bilateral barefoot skin diabetic exam is normal, visualized feet and the appearance is normal.  Bilateral monofilament/sensation of both feet is normal.  Pulsation pedal pulse exam of both lower legs/feet is normal as well.    LABS: Pertinent labs reviewed    ASSESSMENT/PLAN:    -Reviewed with patient the pathogenesis of diabetes, clinical significance of A1c, and common complications such as: microvascular, macrovascular and diabetic ketoacidosis. Patient verbalizes understanding of the importance of glycemic control and the goals of therapy.   Per ADA 2024 guidelines, it is recommended that older adults (age 65 and above) who are healthy with few coexisting chronic illnesses, intact cognitive and functional status, should have A1C goal of <7.5%, fasting or preprandial glucose of  and bedtime glucose of .     1.Type 2 Diabetes Mellitus, controlled   -LAB DATA  HbA,C: 5.4% today   a) Medications  - continue with Metformin ER 500mg with breakfast and 1,000mg with lunch   - continue with Glimepiride 2mg daily if fasting readings >160     -discussed to continue with low carb diet    - discussed to avoid skipping lunch meal   - discussed to incorporate protein with each meal. Reviewed protein options in great detail today.   -reviewed target goal BG readings and A1C  -instructed to increase exercise to 4x weekly for at least 30mins per session  - reviewed when to call and notify me of abnormal BG readings.     b) No nephropathy: GFR: 100 on 2023 and urine MA: <0.3mg/dL on 2023 --> reminded to repeat labs  C) UTD with annual eye exam   D) foot exam: normal today 2024  e) cont. testing frequency to 2x daily - fasting and 2hrs after meals   f) Life style changes reviewed.     2. Hypertension    - normotensive today   - on enalapril 20mg once daily     3. Hyperlipidemia   - LDL: 136 and Tri on 2023  - reminded to repeat lipid panel       RTC in 3 months    Patient instructed to call sooner if they develop Blood glucose readings <75 and/or if they have readings persistently >200.     The risks and benefits of my recommendations were discussed with the patient today. questions were also answered to the best of my knowledge. Patient verbalizes understanding of these issues and agrees to the plan.    A total of 30 minutes was spent on obtaining history, reviewing pertinent labs, evaluating patient, providing multiple treatment options, reinforcing diet/exercise and compliance, and completing documentation.     5/9/2024  CARRIE Aguila

## 2024-05-10 NOTE — TELEPHONE ENCOUNTER
From: Laquita Norris  To: Beth Correa  Sent: 5/9/2024 8:35 PM CDT  Subject: Blood test    I wanted to know if I can drink water before my blood test to  tomorrow

## 2024-05-14 ENCOUNTER — TELEPHONE (OUTPATIENT)
Dept: ENDOCRINOLOGY CLINIC | Facility: CLINIC | Age: 64
End: 2024-05-14

## 2024-05-14 NOTE — TELEPHONE ENCOUNTER
Patient indicates she is out of her diabetes log paperwork, request that we either mail to home address or send through [employer], thanks.

## 2024-05-19 DIAGNOSIS — N95.2 POSTMENOPAUSAL ATROPHIC VAGINITIS: ICD-10-CM

## 2024-05-20 RX ORDER — ESTRADIOL 0.1 MG/G
CREAM VAGINAL
Qty: 42.5 G | Refills: 3 | Status: SHIPPED | OUTPATIENT
Start: 2024-05-20

## 2024-05-21 ENCOUNTER — HOSPITAL ENCOUNTER (OUTPATIENT)
Dept: MAMMOGRAPHY | Facility: HOSPITAL | Age: 64
Discharge: HOME | End: 2024-05-21

## 2024-05-21 ENCOUNTER — HOSPITAL ENCOUNTER (OUTPATIENT)
Dept: ULTRASOUND IMAGING | Facility: HOSPITAL | Age: 64
Discharge: HOME | End: 2024-05-21

## 2024-05-21 DIAGNOSIS — N64.4 PAIN OF RIGHT BREAST: ICD-10-CM

## 2024-05-21 DIAGNOSIS — R92.8 ABNORMAL MAMMOGRAM OF LEFT BREAST: Primary | ICD-10-CM

## 2024-05-21 PROCEDURE — 77061 BREAST TOMOSYNTHESIS UNI: CPT

## 2024-05-21 PROCEDURE — 77065 DX MAMMO INCL CAD UNI: CPT

## 2024-05-21 PROCEDURE — 76642 ULTRASOUND BREAST LIMITED: CPT

## 2024-05-23 ENCOUNTER — LAB ENCOUNTER (OUTPATIENT)
Dept: LAB | Age: 64
End: 2024-05-23
Attending: INTERNAL MEDICINE

## 2024-05-23 DIAGNOSIS — E78.5 HYPERLIPIDEMIA, UNSPECIFIED HYPERLIPIDEMIA TYPE: ICD-10-CM

## 2024-05-23 DIAGNOSIS — I10 ESSENTIAL HYPERTENSION: ICD-10-CM

## 2024-05-23 LAB
ALBUMIN SERPL-MCNC: 4.6 G/DL (ref 3.2–4.8)
ALBUMIN/GLOB SERPL: 1.3 {RATIO} (ref 1–2)
ALP LIVER SERPL-CCNC: 206 U/L
ALT SERPL-CCNC: 38 U/L
ANION GAP SERPL CALC-SCNC: 7 MMOL/L (ref 0–18)
AST SERPL-CCNC: 26 U/L (ref ?–34)
BASOPHILS # BLD AUTO: 0.06 X10(3) UL (ref 0–0.2)
BASOPHILS NFR BLD AUTO: 1.1 %
BILIRUB SERPL-MCNC: 1.5 MG/DL (ref 0.2–1.1)
BILIRUB UR QL: NEGATIVE
BUN BLD-MCNC: 14 MG/DL (ref 9–23)
BUN/CREAT SERPL: 21.5 (ref 10–20)
CALCIUM BLD-MCNC: 10 MG/DL (ref 8.7–10.4)
CHLORIDE SERPL-SCNC: 103 MMOL/L (ref 98–112)
CHOLEST SERPL-MCNC: 146 MG/DL (ref ?–200)
CLARITY UR: CLEAR
CO2 SERPL-SCNC: 27 MMOL/L (ref 21–32)
CREAT BLD-MCNC: 0.65 MG/DL
DEPRECATED RDW RBC AUTO: 40.7 FL (ref 35.1–46.3)
EGFRCR SERPLBLD CKD-EPI 2021: 98 ML/MIN/1.73M2 (ref 60–?)
EOSINOPHIL # BLD AUTO: 0.03 X10(3) UL (ref 0–0.7)
EOSINOPHIL NFR BLD AUTO: 0.5 %
ERYTHROCYTE [DISTWIDTH] IN BLOOD BY AUTOMATED COUNT: 12.2 % (ref 11–15)
FASTING PATIENT LIPID ANSWER: YES
FASTING STATUS PATIENT QL REPORTED: YES
GLOBULIN PLAS-MCNC: 3.5 G/DL (ref 2–3.5)
GLUCOSE BLD-MCNC: 108 MG/DL (ref 70–99)
GLUCOSE UR-MCNC: NORMAL MG/DL
HCT VFR BLD AUTO: 38.5 %
HDLC SERPL-MCNC: 64 MG/DL (ref 40–59)
HGB BLD-MCNC: 12.7 G/DL
HGB UR QL STRIP.AUTO: NEGATIVE
IMM GRANULOCYTES # BLD AUTO: 0.03 X10(3) UL (ref 0–1)
IMM GRANULOCYTES NFR BLD: 0.5 %
KETONES UR-MCNC: NEGATIVE MG/DL
LDLC SERPL CALC-MCNC: 69 MG/DL (ref ?–100)
LEUKOCYTE ESTERASE UR QL STRIP.AUTO: NEGATIVE
LYMPHOCYTES # BLD AUTO: 1.71 X10(3) UL (ref 1–4)
LYMPHOCYTES NFR BLD AUTO: 30.9 %
MCH RBC QN AUTO: 30.2 PG (ref 26–34)
MCHC RBC AUTO-ENTMCNC: 33 G/DL (ref 31–37)
MCV RBC AUTO: 91.4 FL
MONOCYTES # BLD AUTO: 0.32 X10(3) UL (ref 0.1–1)
MONOCYTES NFR BLD AUTO: 5.8 %
NEUTROPHILS # BLD AUTO: 3.39 X10 (3) UL (ref 1.5–7.7)
NEUTROPHILS # BLD AUTO: 3.39 X10(3) UL (ref 1.5–7.7)
NEUTROPHILS NFR BLD AUTO: 61.2 %
NONHDLC SERPL-MCNC: 82 MG/DL (ref ?–130)
OSMOLALITY SERPL CALC.SUM OF ELEC: 285 MOSM/KG (ref 275–295)
PH UR: 5.5 [PH] (ref 5–8)
PLATELET # BLD AUTO: 219 10(3)UL (ref 150–450)
POTASSIUM SERPL-SCNC: 4.1 MMOL/L (ref 3.5–5.1)
PROT SERPL-MCNC: 8.1 G/DL (ref 5.7–8.2)
PROT UR-MCNC: NEGATIVE MG/DL
RBC # BLD AUTO: 4.21 X10(6)UL
SODIUM SERPL-SCNC: 137 MMOL/L (ref 136–145)
SP GR UR STRIP: 1.01 (ref 1–1.03)
T4 FREE SERPL-MCNC: 1.1 NG/DL (ref 0.8–1.7)
TRIGL SERPL-MCNC: 67 MG/DL (ref 30–149)
TSI SER-ACNC: 1.29 MIU/ML (ref 0.55–4.78)
UROBILINOGEN UR STRIP-ACNC: NORMAL
VLDLC SERPL CALC-MCNC: 10 MG/DL (ref 0–30)
WBC # BLD AUTO: 5.5 X10(3) UL (ref 4–11)

## 2024-05-23 PROCEDURE — 84439 ASSAY OF FREE THYROXINE: CPT

## 2024-05-23 PROCEDURE — 80053 COMPREHEN METABOLIC PANEL: CPT

## 2024-05-23 PROCEDURE — 85025 COMPLETE CBC W/AUTO DIFF WBC: CPT

## 2024-05-23 PROCEDURE — 84443 ASSAY THYROID STIM HORMONE: CPT

## 2024-05-23 PROCEDURE — 81001 URINALYSIS AUTO W/SCOPE: CPT

## 2024-05-23 PROCEDURE — 36415 COLL VENOUS BLD VENIPUNCTURE: CPT

## 2024-05-23 PROCEDURE — 80061 LIPID PANEL: CPT

## 2024-05-28 ENCOUNTER — PATIENT MESSAGE (OUTPATIENT)
Dept: INTERNAL MEDICINE CLINIC | Facility: CLINIC | Age: 64
End: 2024-05-28

## 2024-05-29 ENCOUNTER — PATIENT MESSAGE (OUTPATIENT)
Dept: INTERNAL MEDICINE CLINIC | Facility: CLINIC | Age: 64
End: 2024-05-29

## 2024-05-29 NOTE — TELEPHONE ENCOUNTER
Patient read result notes  Written by Erickson Ruiz MD on 5/23/2024  8:56 PM CDT  Seen by patient Laquita Norris on 5/24/2024  5:44 AM

## 2024-05-29 NOTE — TELEPHONE ENCOUNTER
From: Laquita Norris  To: Erickson Ruiz  Sent: 5/29/2024 10:39 AM CDT  Subject: ultrasound on the abdomen    Good morning I wanted to know if it is urgent for me to have the ultrasound on my abdomen?    Thank you  Laquita

## 2024-06-02 NOTE — TELEPHONE ENCOUNTER
From  Lesley Kemp RN To  Laquita Norris Sent and Delivered  5/29/2024 11:11 AM   Last Read in MyChart  6/2/2024  9:32 AM by Laquita Norris

## 2024-06-04 RX ORDER — ATORVASTATIN CALCIUM 40 MG/1
40 TABLET, FILM COATED ORAL NIGHTLY
Qty: 90 TABLET | Refills: 1 | Status: SHIPPED | OUTPATIENT
Start: 2024-06-04

## 2024-06-04 RX ORDER — METFORMIN HYDROCHLORIDE 500 MG/1
TABLET, EXTENDED RELEASE ORAL
Qty: 270 TABLET | Refills: 1 | Status: SHIPPED | OUTPATIENT
Start: 2024-06-04

## 2024-06-04 NOTE — TELEPHONE ENCOUNTER
Patient calling regards refill request. States is running low. As well for Atorvastatin is running low. Please call. Pharmacy in file, [facility].   (Metformin)

## 2024-06-04 NOTE — TELEPHONE ENCOUNTER
Endocrine Refill protocol for oral and injectable diabetic medications    Protocol Criteria:    -Appointment with Endocrinology completed in the last 6 months or scheduled in the next 3 months    -A1c result <8.5% in the past 6 months      Verify the above has been completed or scheduled in the appropriate timeline. If so can send a 90 day supply with 1 refill.     Last completed office visit: 5/9/2024   Next scheduled Follow up:   Future Appointments   Date Time Provider Department Center   7/2/2024  9:45 AM ADO  [facility] US [facility] [place]   8/12/2024  2:15 PM Beth Correa APRN [facility]   3/28/2025  1:00 PM Alyssia Carroll DO [facility]      Last A1c result: Last A1c value was 5.4% done 5/9/2024.

## 2024-06-11 ENCOUNTER — TELEPHONE (OUTPATIENT)
Dept: ENDOCRINOLOGY CLINIC | Facility: CLINIC | Age: 64
End: 2024-06-11

## 2024-06-11 NOTE — TELEPHONE ENCOUNTER
Patient was asking if she can take dramamine along with her Metformin. States she get car sick and is leaving for a trip tomorrow. Please advise.

## 2024-06-11 NOTE — TELEPHONE ENCOUNTER
Left detailed voice message for pt that she can take metformin and dramamine, but to take metformin with food.

## 2024-06-13 NOTE — TELEPHONE ENCOUNTER
Called pt and was notified that she has already taken dramamine this morning and will take metformin at 9:30 am, her normal time. Advised pt that she can take metformin at her normal time with food. Pt verbalized understanding.

## 2024-06-24 RX ORDER — ATORVASTATIN CALCIUM 40 MG/1
40 TABLET, FILM COATED ORAL NIGHTLY
Qty: 90 TABLET | Refills: 1 | Status: SHIPPED | OUTPATIENT
Start: 2024-06-24

## 2024-06-24 NOTE — TELEPHONE ENCOUNTER
Endocrine Refill protocol for oral antihypertensive medications    Protocol Criteria:pass    -Appointment with Endocrinology completed in the last 6 months or scheduled in the next 3 months    -BMP or CMP has been completed in the last 12 months     -GFR is greater than or equal to 50    Verify the above has been completed or scheduled in the appropriate timeline. If so can send a 90 day supply with 1 refill.   Verify BMP or CMP has been completed in last year  Verify last GFR result     Last completed office visit:5/9/2024 Beth Correa APRN   Next scheduled Follow up: 08/12/24     Last BMP or CMP completion date:05/23/24

## 2024-07-01 ENCOUNTER — TELEPHONE (OUTPATIENT)
Dept: ENDOCRINOLOGY CLINIC | Facility: CLINIC | Age: 64
End: 2024-07-01

## 2024-07-01 NOTE — TELEPHONE ENCOUNTER
CARRIE Odell,  See message below   Per LOV dtd 5/9/24:  - continue with Metformin ER 500mg with breakfast and 1,000mg with dinner     Please advise -thanks

## 2024-07-01 NOTE — TELEPHONE ENCOUNTER
Patient calling states asking if can take Claritin due to itchy eyes due to taking Metformin. Please call and advise. If no answer states can leave detailed voicemail.

## 2024-07-02 ENCOUNTER — HOSPITAL ENCOUNTER (OUTPATIENT)
Dept: ULTRASOUND IMAGING | Age: 64
Discharge: HOME | End: 2024-07-02
Attending: INTERNAL MEDICINE
Payer: MEDICARE

## 2024-07-02 DIAGNOSIS — R74.8 ELEVATED ALKALINE PHOSPHATASE LEVEL: ICD-10-CM

## 2024-07-02 PROCEDURE — 76705 ECHO EXAM OF ABDOMEN: CPT | Performed by: INTERNAL MEDICINE

## 2024-07-02 NOTE — TELEPHONE ENCOUNTER
Called ans spoke to patient, she reports her only symptom is itchy eyes.     Patient saw her eye doctor last week and prescribed Refresh eye drops, which has helped relieve her symptoms of itchy eyes.    Informed patient, per Beth, recommends to stop the Metformin for 1-2 weeks and see if her symptoms of itchy eyes improves.    Patient declined to stop the Metformin. Patient feels the itchiness is due to allergies, not the Metformin and she has been on this medication for almost year.    Beth, please review. Patient has appointment with you 8/12/24. Thank you.

## 2024-07-02 NOTE — TELEPHONE ENCOUNTER
This is a new side effect for patient. Please review when the symptoms of itchy eyes started? Are there any other associated symptoms?    I would recommend that she stops taking MTF for 1-2 weeks and see if her symptoms of itchy eyes improve.       She should let me know if her glucose readings > 150 fasting or >200 after meal times.       Thank you!

## 2024-07-02 NOTE — TELEPHONE ENCOUNTER
Ok noted. If she feels comfortable with continuing MTF then that is ok with me.continue to monitor her itchy eyes symptoms and follow up with PCP as needed.      Ok to keep current f/u apt.     Thank you!

## 2024-07-02 NOTE — TELEPHONE ENCOUNTER
Attempted to call patient, message left with Beth's recommendations below. She may call office with any further questions or concerns.

## 2024-07-03 ENCOUNTER — PATIENT MESSAGE (OUTPATIENT)
Dept: INTERNAL MEDICINE CLINIC | Facility: CLINIC | Age: 64
End: 2024-07-03

## 2024-07-04 NOTE — TELEPHONE ENCOUNTER
From: Laquita Norris  To: Erickson Ruiz  Sent: 7/3/2024 7:44 PM CDT  Subject: the result of my ultra sound on July 2,    Hi Dr. Ruiz,    Can you please tell me my test result, I don't understand them.    Thank you,  enjoy the 4th. of July  Laquita

## 2024-07-05 NOTE — TELEPHONE ENCOUNTER
Fat  deposits in the liver  And the pancreatic duct appears slightly enlarged  That is what it means    None have something to do with leg strength    Treatment is low fat diet  for fatty liver  Pancreatic duct is what it is no cyst or tumor in panceas   it.s an incidental fidning    Follow up with gastroenteroly advised

## 2024-07-06 NOTE — PROGRESS NOTES
Problem: At Risk for Falls  Goal: Patient does not fall  Outcome: Monitoring/Evaluating progress  Goal: Patient takes action to control fall-related risks  Outcome: Monitoring/Evaluating progress     Problem: Impaired Physical Mobility  Goal: Functional status is maintained or returned to baseline during hospitalization  Outcome: Monitoring/Evaluating progress  Goal: Tolerates activity for discharge setting with no abnormal symptoms  Outcome: Monitoring/Evaluating progress     Problem: Fluid Volume Excess  Goal: Fluid Volume Excess Symptoms Resolved  Description: Treatment often consists of oxygen and respiratory support with diuretic therapy at doses that exceed usual dose (typically doubled).  Monitor patient response to treatment.    Acute dyspnea should resolve quickly if dose is adequate and kidney function is adequate. Dyspnea/SOB should only be observed with Activity after effective treatment. Patient should be able to lie down comfortably, without SOB.  Outcome: Monitoring/Evaluating progress  Goal: Oxygenation is maintained (SpO2 greater than or equal to 90% or as ordered)  Outcome: Monitoring/Evaluating progress  Goal: Verbalizes understanding of FVE management plan  Description: Document on Patient Education Activity  Outcome: Monitoring/Evaluating progress     Problem: Breathing Pattern Ineffective  Goal: Air exchange is effective, demonstrated by Sp02 sat of greater then or = 92% (or as ordered)  Outcome: Monitoring/Evaluating progress  Goal: Respiratory pattern is quiet and regular without report of SOB  Outcome: Monitoring/Evaluating progress  Goal: Breathing pattern demonstrates minimal apnea during sleep with appropriate use of airway pressure support devices  Outcome: Monitoring/Evaluating progress  Goal: Verbalizes/demonstrates effective breathing management strategies  Description: Document education using the patient education activity.   Outcome: Monitoring/Evaluating progress     Problem:  She is s/p Post-Op Summary  Procedure Date: 07/22/19  Procedure Name: Vaginal Hysterectomy;Bilateral Salpingectomy;Uterosacral Ligament Suspension; Anterior and Posterior Repair;Cystoscopy;Prolene Mesh Mid Urethral Sling  Post-Op Symptoms: Patient denies pa Pain  Goal: Acceptable pain level achieved/maintained at rest using appropriate pain scale for the patient  Outcome: Monitoring/Evaluating progress  Goal: Acceptable pain level achieved/maintained with activity using appropriate pain scale for the patient  Outcome: Monitoring/Evaluating progress  Goal: Acceptable pain level achieved/maintained without oversedation  Outcome: Monitoring/Evaluating progress

## 2024-07-08 ENCOUNTER — TELEPHONE (OUTPATIENT)
Dept: ENDOCRINOLOGY CLINIC | Facility: CLINIC | Age: 64
End: 2024-07-08

## 2024-07-08 NOTE — TELEPHONE ENCOUNTER
Patient states that she was recently diagnosed with a fatty liver.  She is wanting to know if she should still continue taking Atorvastatin.  PCP informed her to continue.medication.  Patient will be scheduling an appointment with GI.  Please call

## 2024-07-09 NOTE — TELEPHONE ENCOUNTER
Spoke to pt. Provided medication instruction as written below by Beth BUTLER. Pt verbalized understanding. No other questions or concerns.

## 2024-07-21 DIAGNOSIS — E11.65 UNCONTROLLED TYPE 2 DIABETES MELLITUS WITH HYPERGLYCEMIA (HCC): ICD-10-CM

## 2024-07-21 DIAGNOSIS — E11.9 TYPE 2 DIABETES MELLITUS WITHOUT COMPLICATION, WITHOUT LONG-TERM CURRENT USE OF INSULIN (HCC): ICD-10-CM

## 2024-07-22 RX ORDER — BLOOD SUGAR DIAGNOSTIC
STRIP MISCELLANEOUS
Qty: 400 STRIP | Refills: 1 | Status: SHIPPED | OUTPATIENT
Start: 2024-07-22

## 2024-07-22 RX ORDER — LANCETS 33 GAUGE
EACH MISCELLANEOUS
Qty: 300 EACH | Refills: 1 | Status: SHIPPED | OUTPATIENT
Start: 2024-07-22

## 2024-07-22 NOTE — TELEPHONE ENCOUNTER
Endocrine Refill protocol for Glucose testing supplies       Protocol Criteria: PASSED    Appointment with Endocrinology completed in the last 12 months or scheduled in the next 6 months     Verify appointment has been completed or scheduled in the appropriate timeline. If so can send a 90 day supply with 1 refill.        Last completed office visit: 5/9/2024 Beth Correa APRN   Next scheduled Follow up:   Future Appointments   Date Time Provider Department Center   8/12/2024  2:15 PM Beth Correa APRN [facility]   8/29/2024 11:00 AM [facility]   3/28/2025  1:00 PM Alyssia Carroll DO [facility]

## 2024-07-26 ENCOUNTER — NURSE TRIAGE (OUTPATIENT)
Dept: INTERNAL MEDICINE CLINIC | Facility: CLINIC | Age: 64
End: 2024-07-26

## 2024-07-26 NOTE — TELEPHONE ENCOUNTER
Please reply to pool: EM RN TRIAGE  Action Requested: Summary for Provider     []  Critical Lab, Recommendations Needed  [] Need Additional Advice  [x]   FYI    []   Need Orders  [] Need Medications Sent to Pharmacy  []  Other     SUMMARY: Patient contacts clinic reporting mid and upper back pain that radiates out to the side on the right and left.  Reports a fall earlier in the year, this pain began this week.  Denies chest pain, shortness of breath, dizziness or lightheadedness.  Denies numbness or tingling of extremities.  Inquires if it is ok to take tylenol, see previous [employer] message.  Patient notified it is ok to take tylenol per package instructions.  Call back if pain persists or progresses.  She verbalized understanding and compliance.     Reason for call: Back Pain  Onset: Data Unavailable                       Reason for Disposition   Back pain    Protocols used: Back Pain-A-[place]

## 2024-08-12 ENCOUNTER — OFFICE VISIT (OUTPATIENT)
Dept: ENDOCRINOLOGY CLINIC | Facility: CLINIC | Age: 64
End: 2024-08-12
Payer: COMMERCIAL

## 2024-08-12 ENCOUNTER — PATIENT MESSAGE (OUTPATIENT)
Dept: ENDOCRINOLOGY CLINIC | Facility: CLINIC | Age: 64
End: 2024-08-12

## 2024-08-12 VITALS
DIASTOLIC BLOOD PRESSURE: 80 MMHG | HEART RATE: 77 BPM | HEIGHT: 61 IN | WEIGHT: 127.31 LBS | SYSTOLIC BLOOD PRESSURE: 130 MMHG | BODY MASS INDEX: 24.04 KG/M2

## 2024-08-12 DIAGNOSIS — E11.9 TYPE 2 DIABETES MELLITUS WITHOUT COMPLICATION, WITHOUT LONG-TERM CURRENT USE OF INSULIN (HCC): Primary | ICD-10-CM

## 2024-08-12 LAB
GLUCOSE BLOOD: 148
HEMOGLOBIN A1C: 5.4 % (ref 4.3–5.6)
TEST STRIP LOT #: NORMAL NUMERIC

## 2024-08-12 PROCEDURE — 82947 ASSAY GLUCOSE BLOOD QUANT: CPT | Performed by: NURSE PRACTITIONER

## 2024-08-12 PROCEDURE — 99213 OFFICE O/P EST LOW 20 MIN: CPT | Performed by: NURSE PRACTITIONER

## 2024-08-12 PROCEDURE — 1160F RVW MEDS BY RX/DR IN RCRD: CPT | Performed by: NURSE PRACTITIONER

## 2024-08-12 PROCEDURE — 83036 HEMOGLOBIN GLYCOSYLATED A1C: CPT | Performed by: NURSE PRACTITIONER

## 2024-08-12 PROCEDURE — 3079F DIAST BP 80-89 MM HG: CPT | Performed by: NURSE PRACTITIONER

## 2024-08-12 PROCEDURE — 3075F SYST BP GE 130 - 139MM HG: CPT | Performed by: NURSE PRACTITIONER

## 2024-08-12 PROCEDURE — 1159F MED LIST DOCD IN RCRD: CPT | Performed by: NURSE PRACTITIONER

## 2024-08-12 PROCEDURE — 3044F HG A1C LEVEL LT 7.0%: CPT | Performed by: NURSE PRACTITIONER

## 2024-08-12 PROCEDURE — 3008F BODY MASS INDEX DOCD: CPT | Performed by: NURSE PRACTITIONER

## 2024-08-12 NOTE — PATIENT INSTRUCTIONS
A1C: 5.4% today --> previously was 5.4% on 5/9/2024  Blood glucose: 148 in clinic today    Medications:   - continue with Metformin ER 500mg with breakfast            1,000mg with lunch   - continue with Glimepiride 2mg daily if fasting readings >160       Weight:  Wt Readings from Last 6 Encounters:   08/12/24 127 lb 4.8 oz (57.7 kg)   05/09/24 135 lb 9.6 oz (61.5 kg)   05/03/24 133 lb (60.3 kg)   04/05/24 131 lb (59.4 kg)   03/27/24 130 lb (59 kg)   12/06/23 120 lb (54.4 kg)     A1C goal:  <7.5%    Blood sugar testing:  Test your blood sugar 1-2 times daily   Recommended times to test: Before breakfast (fasting) or 2hrs after meals     Blood sugar targets:  Before breakfast:  (preferably < 110)  2 hours after meals: <180 (preferably <150)     Call for persistent blood sugars < 75 or > 200   To Dr Buenrostro  Pt states a couple days she started to have the burning sensation. Urine is orangy in color  She has frequency urgency    She would like order to test urine when she goes to the lab at [facility] Thursday - pended order - please sign if ok    She is also asking to see an allergist - prefer [facility]  She has sores on legs that are not healing properly - scabs and then pop open/off  She does have frail skin  She is wondering if food or medication could be causing this as they itch like crazy?   They also hurt    Being that she has so many allergies she would like to rule this out     Her leg are also welling again she is taking the Lasix 20 mg - her son noted her ankles were swollen again.

## 2024-08-12 NOTE — PROGRESS NOTES
Name: Laquita Norris  Date: 2024    CHIEF COMPLAINT   Chief Complaint   Patient presents with    Diabetes     Pt is here for diabetes and A1c check      HISTORY OF PRESENT ILLNESS   Laquita Norris is a 64 year old female who presents for follow up on diabetes management.   HbA1C: 5.4% at POC today. Previously was 5.4% on 2024.   Blood glucose is: 148 in clinic today.    FAMILY HISTORY OF DIABETES  -mother and father;   -maternal side - grandmother, uncle, aunt and cousin   DIABETES HISTORY  Diagnosed: 2023  Prior HbA, C or glycohemoglobin were 10.2% on 2023; 8.2% 2023; 4.9% 2023; 5.4% 2023; 5.4% 2024; 5.4% at POC today;     Patient has not had hospitalizations for blood sugar issues.   Denies history of pancreatitis.     PREVIOUS MEDICATION FOR DM:  Ozempic - d/c'ed due to weight loss   Glimepiride -d/c'ed due to hypoglycemia   -Rybelsus -d/elaine due to high cost     CURRENT MEDICATIONS FOR DM:  - Metformin ER 500mg with breakfast and 1,000mg with lunch  - Glimepiride 2mg daily as needed if fasting BG reading >160     HOME GLUCOSE READINGS:   Testing BG x2 daily  Meter or log book today: yes   Fastin, 106, 138, 82, 98, 86, 142, 121, 145, 128, 105, 134, 147,   2hrs after breakfast: 132, 114, 142, 101, 117, 122, 137, 126,   2hrs after lunch: 110, 122, 138, 138, 95,   2hrs after dinner: 132, 155, 125, 100, 129, 152, 106, 132, 124, 134, 119, 129, 140, 178, 135, 104, 105, 119    Hypoglycemia present: no - denies having felt symptoms of hypoglycemia   Hypoglycemia awareness: n/a has never had an episode     HISTORY OF DIABETES COMPLICATIONS:  History of Retinopathy: denies - last eye exam within the last 12 months: yes    History of Neuropathy: no   History of Nephropathy: no     ASSOCIATED COMPLICATIONS:   HTN: yes   Hyperlipidemia: yes   Cardiovascular Disease: no   Peripheral Vascular Disease: no     DIETARY COMPLIANCE:  Eating 3 meals per day   - good; following a low carb  diet   - low carb ice cream   - salads; soups     EXERCISE:   Yes- walking around 2hrs daily     Polyuria, polyphagia, polydipsia: no   Paresthesias: no   Blurred vision: no   Recent steroids, illness or infections: no     REVIEW OF SYSTEMS  Constitutional: Negative for: weight change, fever, fatigue, cold/heat intolerance  Eyes: Negative for:  Visual changes, proptosis, blurring  ENT: Negative for:  dysphagia, neck swelling, dysphonia  Respiratory: Negative for: hemoptysis, shortness of breath, cough, or dyspnea.  Cardiovascular: Negative for:  chest pain, chest discomfort, palpitations  GI: Negative for:  abdominal pain, nausea, vomiting, diarrhea, heartburn, constipation  Neurology: Negative for: headache, dizziness, syncope, numbness/tingling, or weakness.   Genito-Urinary: Negative for: dysuria, frequency or hematuria   Hematology/Lymphatics: Negative for: bruising, easy bleeding, lower extremity edema  Skin: Negative for: rash, blister, infection or ulcers.  Endocrine: Negative for: polyuria, polydipsia. No osteoporosis. No thyroid disease.     MEDICATIONS:     Current Outpatient Medications:     Glucose Blood (ONETOUCH VERIO) In Vitro Strip, USE 1 STRIP TO CHECK GLUCOSE THREE TIMES DAILY, Disp: 400 strip, Rfl: 1    ONETOUCH DELICA PLUS [id number] Does not apply Misc, 1 LANCET BY FINGER STICK ROUTE THREE TIMES DAILY, Disp: 300 each, Rfl: 1    atorvastatin 40 MG Oral Tab, Take 1 tablet (40 mg total) by mouth nightly., Disp: 90 tablet, Rfl: 1    metFORMIN  MG Oral Tablet 24 Hr, Take 1 tablet (500 mg total) by mouth daily with breakfast AND 2 tablets (1,000 mg total) daily with dinner., Disp: 270 tablet, Rfl: 1    estradiol 0.1 MG/GM Vaginal Cream, APPLY 0.5 GRAM VAGINALLY 2 TIMES EVERY WEEK, Disp: 42.5 g, Rfl: 3    Acetaminophen (TYLENOL OR), Take by mouth., Disp: , Rfl:     enalapril 20 MG Oral Tab, Take 1 tablet (20 mg total) by mouth daily., Disp: 100 tablet, Rfl: 3    CALCIUM-VITAMINS C & D OR, Take by  mouth., Disp: , Rfl:     celecoxib 200 MG Oral Cap, Take 1 capsule (200 mg total) by mouth daily., Disp: 90 capsule, Rfl: 0    Polyethylene Glycol 3350 17 GM/SCOOP Oral Powder, , Disp: , Rfl:     Saline (AYR NASAL MIST ALLERGY/SINUS NA), 1 spray by Nasal route daily as needed., Disp: , Rfl:     Polyvinyl Alcohol-Povidone (REFRESH OP), Apply 1 drop to eye daily as needed., Disp: , Rfl:     Inulin (FIBER CHOICE PREBIOTIC FIBER OR), Take 1 capsule by mouth daily., Disp: , Rfl:     Homeopathic Products (EARACHE DROPS OT), Place 1 drop in ear(s) daily as needed., Disp: , Rfl:     loratadine 10 MG Oral Tab, Take 1 tablet (10 mg total) by mouth daily as needed., Disp: , Rfl:     ALLERGIES:   Allergies   Allergen Reactions    Codeine RASH     T#3    Acetaminophen-Codeine RASH     Tolerates plain Tylenol  Tolerates plain Tylenol  Tolerates plain Tylenol       SOCIAL HISTORY:   Social History     Socioeconomic History    Marital status:    Tobacco Use    Smoking status: Never     Passive exposure: Never    Smokeless tobacco: Never   Vaping Use    Vaping status: Never Used   Substance and Sexual Activity    Alcohol use: No    Drug use: No    Sexual activity: Yes     Birth control/protection: Tubal Ligation   Other Topics Concern    Caffeine Concern No     Comment: 16oz soda chocolate daily    Exercise Yes     Comment: Physical therapy 3 times per week.  Walking daily    Pt has a pacemaker No    Pt has a defibrillator No    Reaction to local anesthetic No       PAST MEDICAL HISTORY:   Past Medical History:    Anxiety state    Bartholin cyst    per ng excision of bartholins cyst    Bunion    per ng bunionectomy, bilateral feet    Cyst of skin    per ng rt thigh cyst removal    Diverticulitis    hosp x 3 days    Diverticulosis    Fibroids    Hemorrhoids    per ng colonoscopy    High blood pressure    High cholesterol    History of blood transfusion    @[facility]    History of bone density study    \"normal dexa '04, '07\"     Hx of motion sickness    Osteoarthritis    Perforated diverticulum of large intestine    Postmenopausal bleeding    per ng neg endometrial biopsy    Tonsillitis    tonsillectomy 1970    Uterine prolapse    uses pessary    Uterine prolapse    Donut pessary -- self cleaning     Visual impairment    contacts and glasses       PAST SURGICAL HISTORY:   Past Surgical History:   Procedure Laterality Date    Biopsy of uterus lining  10/2007    neg endometrial biopsy    Colonoscopy N/A 2018    Procedure: COLONOSCOPY;  Surgeon: Nini Garcia MD;  [facility]    Foot surgery      bunionectomy    Hip replacement surgery      RT hip surgery 2020    Hysterectomy  2019    Leg/ankle surgery proc unlisted Left     to repair flat foot    Needle biopsy left  2013          x3 w/PPTL    Other surgical history Right 2013    thigh cyst removal    Pessary      Tonsillectomy      Tubal ligation         PHYSICAL EXAM:   Vitals:    24 1410   BP: 130/80   Pulse: 77   Weight: 127 lb 4.8 oz (57.7 kg)   Height: 5' 1\" (1.549 m)     BMI:   Body mass index is 24.05 kg/m².    General Appearance:  alert, well developed, in no acute distress  Nutritional:  no extreme weight gain or loss  Head: Atraumatic  Eyes:  normal conjunctivae, sclera., normal sclera and normal pupils  Throat/Neck: normal sound to voice. Normal hearing, normal speech  Back: no kyphosis  Respiratory:  Speaking in full sentences, non-labored. no increased work of breathing, no audible wheezing    Skin:  normal moisture and skin texture, no visible lesions  Hair and nails: normal scalp hair  Hematologic:  no excessive bruising  Neuro: motor grossly intact, moving all extremities without difficulty  Psychiatric:  oriented to time, self, and place  Extremities: no obvious extremity swelling, no lesions    LABS: Pertinent labs reviewed    ASSESSMENT/PLAN:    -Reviewed with patient the pathogenesis of diabetes, clinical  significance of A1c, and common complications such as: microvascular, macrovascular and diabetic ketoacidosis. Patient verbalizes understanding of the importance of glycemic control and the goals of therapy.   Per ADA 4 guidelines, it is recommended that older adults (age 65 and above) who are healthy with few coexisting chronic illnesses, intact cognitive and functional status, should have A1C goal of <7.5%, fasting or preprandial glucose of  and bedtime glucose of .     1.Type 2 Diabetes Mellitus, controlled   -LAB DATA  HbA,C: 5.4% today   a) Medications  - continue with Metformin ER 500mg with breakfast and 1,000mg with lunch   - continue with Glimepiride 2mg daily if fasting readings >160     -discussed to continue with low carb diet    - discussed to space out walking intervals to 30mins 3x daily after meals, instead of 2hr walks at one time. Longer walks have been causing lower back pain.   -reviewed target goal BG readings and A1C  - reviewed when to call and notify me of abnormal BG readings.     b) No nephropathy: GFR: 98 on 2024 and urine MA: <0.3mg/dL on 2023  C) UTD with annual eye exam   D) foot exam: normal on 2024  e) cont. testing frequency to 2x daily - fasting and 2hrs after meals   f) Life style changes reviewed.     2. Hypertension    - normotensive today   - on enalapril 20mg once daily     3. Hyperlipidemia   - LDL: 69 and Tri on 2024  - on atorvastatin 40mg nightly       RTC in 3 months   Patient instructed to call sooner if they develop Blood glucose readings <75 and/or if they have readings persistently >200.     The risks and benefits of my recommendations were discussed with the patient today. questions were also answered to the best of my knowledge. Patient verbalizes understanding of these issues and agrees to the plan.    2024  CARRIE Aguila

## 2024-08-13 NOTE — TELEPHONE ENCOUNTER
From: Laquita Norris  To: Beth Correa  Sent: 8/12/2024 6:32 PM CDT  Subject: Metformin    Hi Dr. Campos, I didn't notice that you change the time I should take metformin I have been taken it with dinner and the paper you gave me said to take it at lunch. Can I still take it at dinner time?     Thank you for your time,  Laquita

## 2024-08-13 NOTE — TELEPHONE ENCOUNTER
Beth, Please see patient message below, can she continue to take it at breakfast and dinner. Thank you.

## 2024-08-14 NOTE — TELEPHONE ENCOUNTER
Patient called the office. Provided her with Beth's instructions as per [employer] message sent to the patient on 8/13/24. Patient may continue to take metformin with dinner. Patient verbalized her understanding. Updated med list.

## 2024-08-20 ENCOUNTER — HOSPITAL ENCOUNTER (OUTPATIENT)
Dept: MAMMOGRAPHY | Facility: HOSPITAL | Age: 64
Discharge: HOME | End: 2024-08-20
Payer: MEDICARE

## 2024-08-20 DIAGNOSIS — R92.8 ABNORMAL MAMMOGRAM OF LEFT BREAST: ICD-10-CM

## 2024-08-20 PROCEDURE — 77065 DX MAMMO INCL CAD UNI: CPT

## 2024-08-20 PROCEDURE — 77061 BREAST TOMOSYNTHESIS UNI: CPT

## 2024-08-20 NOTE — IMAGING NOTE
Discussed recommended breast biopsy with patient.  Patient is being recommended for stereotactic biopsy of the left Breast   calcsyair Pham .  Pt is  Ros[place] has 3 sons. Super orders placed was provided Friday checking in at 915 am [facility].  Pt history discussed as below:  Pt history of biopsy:  Yes yrs ago       Family history of cancer: yes  3 mat aunts dx 45 72 70's  Pt history of breast cancer: no  Hx BCP use:   no                 HRT use:    no ivf no    Recommedations :                      see [facility] for dictated radiology report   Reviewed pertinent patient history, family history of cancer, and patient medications  Discussed with patient Radiology’s protocol regarding medications, as well as over-the-counter vitamin or herbal supplements, as follows:  -All herbal supplements, Vitamin E, Fish Oil    -All NSAIDs (Ibuprofen, Motrin, Advil, Aleve, or other antiinflammatory medication)  and Aspirin  81mg currently being taken    not  recommended or prescribed by  your physician  should be held for 5  days prior to biopsy.  Denies usage  -Aspirin 81 mg being taken related to a cardiac condition  or prescribed by your  physician should be held at the  direction of your physician.  Informed patient to call ordering physician for guidelines  denies usage   -Blood thinners/antiplatelet medications (Coumadin, Plavix  ect) should be held at the  direction of your physician.  Informed patient to call ordering physician for guidelines denies usage   Reviewed Stereotactic biopsy procedure, as below.  For this procedure,   stereotactic biopsy is being performed with tomosynthesis , you will sitting upright  with your breast in compression.  Mammography imaging will be used to locate the area in question that was seen on your previous breast imaging.  The Radiologist will then inject a local numbing medication into the area and then use a needle to collect cells from the site.  A marker, or clip, will then  be placed in the biopsied area.  This marker is placed so this biopsy site is able to be accurately located upon future breast imaging.  After the clip is placed,  a dressing will be placed on the biopsy site.  Additional mammography films will then be taken to assure correct placement of the marker.    Educated the patient they will be awake during this procedure and are able to drive themselves home if they wish.    Educated patient that some soreness may occur after biopsy.  Discussed use of a supportive bra and ice packs after procedure, to decrease soreness.    Discussed with patient no swimming, bathing,  hot tubs , or submerging underwater for 5 days and   until the incision is closed and healed.  Educated patient on lifting restrictions - nothing heavier than a gallon of milk for  48 hours after the procedure.      Discussed with patient that some soreness and bruising is normal after biopsy but that prolonged or increased pain and bruising should be reported to the ordering physician.  An ace wrap will be applied over bra for added support and should be left on for 24 hours post procedure.  Reviewed results process with patient and shared that pathology results will be available within 2-3 business days of their biopsy.  Discussed results will be communicated by their ordering physician unless otherwise indicated.  Educated patient that once we receive an order from their physician our radiology secretaries will be calling them to schedule their biopsy.

## 2024-08-21 NOTE — DISCHARGE INSTRUCTIONS
The Doctor (Radiologist) who performed your procedure was: DR GARCIA    Place an ice pack over the biopsy site on top of your bra or on top of the ACE wrap (never apply ice directly over skin) for 10-15 minutes of every hour until bedtime for your comfort and to decrease bleeding.  Keep your sports bra or the ACE wrap (stereotactic and MRI biopsy) in place for 24 hours after your biopsy. This compression decreases bleeding and breast movement for your comfort. Wear a supportive bra for the next couple of days for comfort (sports bra for sleep).   Continue to wear, preferably, a sports bra or good supportive bra for 1 week and take off only to shower.  No baths or showers during the first 24 hours after biopsy. After this time you may take a shower. It's okay if the strips get wet but do not soak them. NO saunas, hot tubs or swimming until steri-strips fall off (approx. 5 days). This prevents infection and allows time for them to completely close and heal.  DO NOT remove the steri-strips. They will fall off in 5 days. If any type of irritation (redness, itching or blisters) develops in the area around the steri-strips, remove them gently. If the steri-strips do not fall off after 5 days, gently remove them. Keep the area clean and dry.  It is normal to have mild discomfort and bruising at the biopsy site.  You may take Tylenol as needed for discomfort, as long as you have no allergies to Tylenol. Do not take aspirin, motrin, ibuprofen or any medication containing NSAID (non-steroidal anti-inflammatory drug) product for 48 hours.  DO NOT participate in strenuous activity (aerobics, heavy lifting, housework, gardening, etc.) 48 hours after your biopsy to prevent bleeding.  You will receive results in 2-3 business days.  If you are having an MRI breast biopsy or an Ultrasound guided breast biopsy, you will be billed for the biopsy and unilateral mammogram separately.  If you have any questions about the procedure or  your results, please contact the Breast Care Coordinator Nurse at [phone].  Notify your ordering physician or primary physician for increased bleeding, pain or fever over 100. Or contact a Radiology Nurse at [phone] between 8am-4pm (after 4pm, your call will be directed to the [facility]).

## 2024-08-22 ENCOUNTER — TELEPHONE (OUTPATIENT)
Dept: MAMMOGRAPHY | Facility: HOSPITAL | Age: 64
End: 2024-08-22

## 2024-08-22 NOTE — TELEPHONE ENCOUNTER
Returning call pt had  left me a message has questions regarding Bx I left a detail message to return call to

## 2024-08-23 ENCOUNTER — HOSPITAL ENCOUNTER (OUTPATIENT)
Dept: MAMMOGRAPHY | Facility: HOSPITAL | Age: 64
Discharge: HOME | End: 2024-08-23
Payer: MEDICARE

## 2024-08-23 DIAGNOSIS — R92.8 ABNORMAL MAMMOGRAM: ICD-10-CM

## 2024-08-23 PROCEDURE — 88305 TISSUE EXAM BY PATHOLOGIST: CPT

## 2024-08-23 PROCEDURE — 19081 BX BREAST 1ST LESION STRTCTC: CPT

## 2024-08-23 PROCEDURE — 88360 TUMOR IMMUNOHISTOCHEM/MANUAL: CPT

## 2024-08-23 NOTE — PROCEDURES
[facility]  part of [facility]  Procedure Note    Laquita Norris Patient Status:  Outpatient    7/10/1960 MRN [id number]   Location [facility] MAMMOGRAPHY [facility] [place] [facility] Attending Isela Hansen APRN   Hosp Day # 0 PCP Erickson Ruiz MD     Procedure: Left breast stereotactic/thi guided biopsy    Pre-Procedure Diagnosis:  Left breast calcifications    Post-Procedure Diagnosis: Left breast calcifications    Anesthesia:  Local    Findings:  Left breast calcifications    Specimens: multiple cores    Blood Loss:  minimal    Tourniquet Time: none  Complications:  None  Drains:  none      Clarissa Nagel MD  2024

## 2024-08-26 ENCOUNTER — TELEPHONE (OUTPATIENT)
Dept: HEMATOLOGY/ONCOLOGY | Facility: HOSPITAL | Age: 64
End: 2024-08-26

## 2024-08-26 ENCOUNTER — TELEPHONE (OUTPATIENT)
Dept: MAMMOGRAPHY | Facility: HOSPITAL | Age: 64
End: 2024-08-26

## 2024-08-26 ENCOUNTER — TELEPHONE (OUTPATIENT)
Dept: INTERNAL MEDICINE CLINIC | Facility: CLINIC | Age: 64
End: 2024-08-26

## 2024-08-26 NOTE — TELEPHONE ENCOUNTER
Calista De La Vega for . I received a call from Pathology from [facility]   the pathologist has result for this patient. He will like to speak to you regarding this patient. He can be reached at [phone]

## 2024-08-26 NOTE — TELEPHONE ENCOUNTER
Patient is s/p left breast stereotactic biopsy, performed 8/23/24.  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  The maximum length of tumor area in the tissue submitted is 4 mm.   No definitive tumor invasion is seen in the tissue submitted.     Comment:  For  purposes, this case was reviewed by a second pathologist who concurred with the diagnosis.     Tumor Markers by Immunostaining (Polymer based detection method) using the ASCO/CAP scoring criteria, performed at [facility] on formalin fixed paraffin embedded tissue:   Estrogen receptor (Leica, monoclonal, clone 6 [id number]) status: 73% (Positive, strong intensity)    Phoned patient and introduced myself as Breast Nurse Navigator.  Shared my role to provide support and education, assist with care coordination, as well as connect her to supportive resources as she may need them.  Pathology results reviewed with patient and questions answered to the best of my ability.  Patient has been referred by Dr. Ruiz to Dr. De Jesus for surgical management.  Offered patient appointment with Dr. De Jesus for Wednesday 8/28/24 at 3:30. Patient agreeable.  Shared with patient what to expect during consultation.  Patient aware she can have support person attend with her.  Patient to complete yrn history forms and bring to her appointment.    Emotional support provided to patient.  Encouraged patient call of message with questions concerns or needs.

## 2024-08-26 NOTE — TELEPHONE ENCOUNTER
Spoke with Alyssia He via secure chat.  She will be reaching out to the patient to discuss results.

## 2024-08-26 NOTE — TELEPHONE ENCOUNTER
Laquita Norris is s/p biopsy .  Phoned and introduced myself as breast coordinator .  Pt had questions regarding icing  informed she could ice if she is sore but usually icing  for 24 hours and can exercise  No c/o post Bx results are still pending

## 2024-08-28 ENCOUNTER — OFFICE VISIT (OUTPATIENT)
Dept: SURGERY | Facility: CLINIC | Age: 64
End: 2024-08-28
Payer: COMMERCIAL

## 2024-08-28 VITALS — HEIGHT: 61 IN | WEIGHT: 127 LBS | BODY MASS INDEX: 23.98 KG/M2

## 2024-08-28 DIAGNOSIS — D05.12 DUCTAL CARCINOMA IN SITU (DCIS) OF LEFT BREAST: Primary | ICD-10-CM

## 2024-08-28 PROCEDURE — 99205 OFFICE O/P NEW HI 60 MIN: CPT | Performed by: SURGERY

## 2024-08-28 PROCEDURE — 3008F BODY MASS INDEX DOCD: CPT | Performed by: SURGERY

## 2024-08-28 PROCEDURE — 1159F MED LIST DOCD IN RCRD: CPT | Performed by: SURGERY

## 2024-08-28 PROCEDURE — 1160F RVW MEDS BY RX/DR IN RCRD: CPT | Performed by: SURGERY

## 2024-08-28 NOTE — H&P
[facility] - Gastroenterology                                                                                                  Clinic History and Physical       Referring provider: Sara    Chief Complaint   Patient presents with    Consult     Fatty liver,last colon 2/2018 with , h/o Diverticulitis     HPI:   Laquita Norris is a 64 year old woman with history of BMI 24, diabetes, hypertension, tubal ligation, tonsillectomy, hip surgery, hysterectomy, perforated diverticulitis, anxiety, high cholesterol here her liver    She says she is here to discuss her abdominal ultrasound which was ordered by her primary care physician.  Says she was not told about any of the results.  Denies any gastrointestinal symptoms.  Says she was just diagnosed with early stage breast cancer and planning for surgery for this soon.  Denies any use of alcohol or Chinese herbal supplements.  Says her weight has gone up recently.  Notes and onto a breast cancer and a nonrelated uncle who had colon cancer.  Otherwise no family history of gastrointestinal cancer.    History, Medications, Allergies, ROS:      Past Medical History:    Anxiety state    Bartholin cyst    per ng excision of bartholins cyst    Bunion    per ng bunionectomy, bilateral feet    Cyst of skin    per ng rt thigh cyst removal    Diverticulitis    hosp x 3 days    Diverticulosis    Fibroids    Hemorrhoids    per ng colonoscopy    High blood pressure    High cholesterol    History of blood transfusion    @[place]    History of bone density study    \"normal dexa '04, '07\"    Hx of motion sickness    Osteoarthritis    Perforated diverticulum of large intestine    Postmenopausal bleeding    per ng neg endometrial biopsy    Tonsillitis    tonsillectomy 1970    Uterine prolapse    uses pessary    Uterine prolapse    Donut pessary -- self cleaning     Visual impairment     contacts and glasses      Past Surgical History:   Procedure Laterality Date    Biopsy of uterus lining  10/2007    neg endometrial biopsy    Colonoscopy N/A 2018    Procedure: COLONOSCOPY;  Surgeon: Nini Garcia MD;  Location: [facility] ENDO    Foot surgery      bunionectomy    Hip replacement surgery      RT hip surgery 2020    Hysterectomy  2019    Leg/ankle surgery proc unlisted Left 2018    to repair flat foot    Joshua biopsy stereo nodule 1 site left (cpt=19081) Left 2024    TOPHAT CLIP    Needle biopsy left  2013          x3 w/PPTL    Other surgical history Right 2013    thigh cyst removal    Pessary  2006    Tonsillectomy  1970    Tubal ligation        Family Hx:   Family History   Problem Relation Age of Onset    Alcohol and Other Disorders Associated Father         alcoholism    Hypertension Father     Diabetes Mother         type 2    Lipids Mother         hyperlipidemia    Eye Problems Mother         eye issues    Heart Disorder Mother         per ng CABG    Hypertension Mother     Musculo-skelatal Disorder Mother         per ng osteoporosis    Allergies Mother         medication    Glaucoma Maternal Grandmother     Diabetes Maternal Grandmother     Hypertension Maternal Grandmother     Musculo-skelatal Disorder Maternal Grandmother         per ng osteoporosis    Allergies Sister     Diabetes Cousin     Breast Cancer Maternal Aunt 46    Breast Cancer Maternal Aunt 65    Cancer Paternal Uncle         per ng stomach    Diabetes Other         mGA      Social History:   Social History     Socioeconomic History    Marital status:    Tobacco Use    Smoking status: Never     Passive exposure: Never    Smokeless tobacco: Never   Vaping Use    Vaping status: Never Used   Substance and Sexual Activity    Alcohol use: No    Drug use: No    Sexual activity: Yes     Birth control/protection: Tubal Ligation   Other Topics Concern    Caffeine Concern No     Comment:  16oz soda chocolate daily    Exercise Yes     Comment: Physical therapy 3 times per week.  Walking daily    Pt has a pacemaker No    Pt has a defibrillator No    Reaction to local anesthetic No        Medications (Active prior to today's visit):  Current Outpatient Medications   Medication Sig Dispense Refill    Glucose Blood (ONETOUCH VERIO) In Vitro Strip USE 1 STRIP TO CHECK GLUCOSE THREE TIMES DAILY 400 strip 1    ONETOUCH DELICA PLUS [id number] Does not apply Misc 1 LANCET BY FINGER STICK ROUTE THREE TIMES DAILY 300 each 1    atorvastatin 40 MG Oral Tab Take 1 tablet (40 mg total) by mouth nightly. 90 tablet 1    metFORMIN  MG Oral Tablet 24 Hr Take 1 tablet (500 mg total) by mouth daily with breakfast AND 2 tablets (1,000 mg total) daily with dinner. 270 tablet 1    estradiol 0.1 MG/GM Vaginal Cream APPLY 0.5 GRAM VAGINALLY 2 TIMES EVERY WEEK 42.5 g 3    Acetaminophen (TYLENOL OR) Take by mouth.      enalapril 20 MG Oral Tab Take 1 tablet (20 mg total) by mouth daily. 100 tablet 3    CALCIUM-VITAMINS C & D OR Take by mouth.      celecoxib 200 MG Oral Cap Take 1 capsule (200 mg total) by mouth daily. 90 capsule 0    Polyethylene Glycol 3350 17 GM/SCOOP Oral Powder       Saline (AYR NASAL MIST ALLERGY/SINUS NA) 1 spray by Nasal route daily as needed.      Polyvinyl Alcohol-Povidone (REFRESH OP) Apply 1 drop to eye daily as needed.      Inulin (FIBER CHOICE PREBIOTIC FIBER OR) Take 1 capsule by mouth daily.      Homeopathic Products (EARACHE DROPS OT) Place 1 drop in ear(s) daily as needed.      loratadine 10 MG Oral Tab Take 1 tablet (10 mg total) by mouth daily as needed.         Allergies:  Allergies   Allergen Reactions    Codeine RASH     T#3    Acetaminophen-Codeine RASH     Tolerates plain Tylenol  Tolerates plain Tylenol  Tolerates plain Tylenol       ROS:   Systems were reviewed and were negative except as noted in the HPI    PHYSICAL EXAM:   Height 5' 1\" (1.549 m), weight 138 lb 12.8 oz (63 kg),  not currently breastfeeding.    General:awake, cooperative, no acute distress  HEENT: EOMI, no scleral icterus, MMM; oral pharnyx is without exudates or lesions  Neck: no lymphadenopathy; thyroid is not enlarged and without nodules  CV: RRR  Resp: non-labored breathing  Abd: soft, non-tender, non-distended  Ext: no lower extremity swelling  Neuro: Alert, Oriented X 3  Skin: no rashes, bruises  Psych: normal affect    Labs/Imaging:     Reviewed as noted in the HPI and A/P    ASSESSMENT/PLAN:   Laquita Norris is a 64 year old woman with history of BMI 24, diabetes, hypertension, tubal ligation, tonsillectomy, hip surgery, hysterectomy, perforated diverticulitis, anxiety, high cholesterol here her liver    Review of multiple sets of liver enzymes since 2012 shows mildly intermittently elevated alk phos. The bilirubin is also elevated intermittently in mild fashion though predominantly indirect. The most recent alk phos is from May 2024 and has risen to 206. Other liver enzymes are essentially unremarkable aside from bilirubin of 1.5. An ultrasound of the abdomen from July 2024 shows hepatic steatosis, cholelithiasis, bile duct of 5.6 mm and mildly prominent pancreatic duct (described though 3 mm which is normal).     We discussed her alkaline phosphatase which has been elevated for over 10 years.  Mildly to noted to have increased recently in May.  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  An abdominal ultrasound was ordered showing the findings noted above. Possibility of MASLD. Given the findings we will order serologies for other chronic liver related problems.  Discussed consideration of MRI/MRCP pending the above evaluation and clinical course.    We discussed the gallstones seen on the ultrasound which are asymptomatic.  We discussed  signs and symptoms that should prompt consideration of surgery evaluation.    Recommend:  -serologies (fasting)- hepatic function panel, direct/indirect bilirubin, anti-smooth muscle ab, anti mitochondrial ab, viral hepatitis serologies, GGT  -consider MRI/MRCP    Orders This Visit:  No orders of the defined types were placed in this encounter.    Meds This Visit:  Requested Prescriptions      No prescriptions requested or ordered in this encounter     Imaging & Referrals:  None     Heath Rosa MD  [facility] - Gastroenterology  8/29/2024

## 2024-08-28 NOTE — PROGRESS NOTES
Breast Surgery New Patient Consultation    This is the first visit for this 64 year old woman, referred by Dr. Ruiz, who presents for evaluation of L DCIS.    History of Present Illness:   Ms. Laquita Norris is a 64 year old woman who presents with imaging detected DCIS. SHe denies any palpable masses, nipple discharge or axillary symptoms. She has a personal h/o a remote benign Left breast biopsy. She does have a family history of breast cancer. SHe had a prior right dx workup recently for pain that was unremarkable. She had a surveillance for L calcs in Feb 2024 deemed to be stable. SHe had a L surveillance dx imaging on AUg 20, 2024 that showed increased calcifications in the medial left breast for which biopsy was recommended.THis took place on Aug 23, 2024 and showed DCIS that was ER 73%.  She is here today for evaluation and recommendations for further therapy.        Past Medical History:    Anxiety state    Bartholin cyst    per ng excision of bartholins cyst    Bunion    per ng bunionectomy, bilateral feet    Cyst of skin    per ng rt thigh cyst removal    Diverticulitis    hosp x 3 days    Diverticulosis    Fibroids    Hemorrhoids    per  colonoscopy    High blood pressure    High cholesterol    History of blood transfusion    @[place]    History of bone density study    \"normal dexa '04, '07\"    Hx of motion sickness    Osteoarthritis    Perforated diverticulum of large intestine    Postmenopausal bleeding    per ng neg endometrial biopsy    Tonsillitis    tonsillectomy 1970    Uterine prolapse    uses pessary    Uterine prolapse    Donut pessary -- self cleaning     Visual impairment    contacts and glasses       Past Surgical History:   Procedure Laterality Date    Biopsy of uterus lining  10/2007    neg endometrial biopsy    Colonoscopy N/A 02/20/2018    Procedure: COLONOSCOPY;  Surgeon: Nini Garcia MD;  [facility]    Foot surgery  2010    bunionectomy    Hip replacement  surgery      RT hip surgery 2020    Hysterectomy  2019    Leg/ankle surgery proc unlisted Left 2018    to repair flat foot    Joshua biopsy stereo nodule 1 site left (cpt=19081) Left 2024    TOPHAT CLIP    Needle biopsy left  2013          x3 w/PPTL    Other surgical history Right 2013    thigh cyst removal    Pessary  2006    Tonsillectomy  1970    Tubal ligation         Gynecological History:  Pt is a   Pt was 26 years old at time of first pregnancy.    She has cumulative breastfeeding history of 2 months.  She achieved menarche at age 12 and LMP age 46  Age of Menopause: 46  Type: natural menopause  She denies any history of hormone replacement therapy   She denies any history of oral contraceptive use   She denies infertility treatment to achieve pregnancy.    Medications:    No outpatient medications have been marked as taking for the 24 encounter (Appointment) with Angelica De Jesus MD.       Allergies:    Allergies   Allergen Reactions    Codeine RASH     T#3    Acetaminophen-Codeine RASH     Tolerates plain Tylenol  Tolerates plain Tylenol  Tolerates plain Tylenol       Family History:   Family History   Problem Relation Age of Onset    Alcohol and Other Disorders Associated Father         alcoholism    Hypertension Father     Diabetes Mother         type 2    Lipids Mother         hyperlipidemia    Eye Problems Mother         eye issues    Heart Disorder Mother         per ng CABG    Hypertension Mother     Musculo-skelatal Disorder Mother         per ng osteoporosis    Allergies Mother         medication    Glaucoma Maternal Grandmother     Diabetes Maternal Grandmother     Hypertension Maternal Grandmother     Musculo-skelatal Disorder Maternal Grandmother         per ng osteoporosis    Allergies Sister     Diabetes Cousin     Breast Cancer Maternal Aunt 46    Breast Cancer Maternal Aunt 65    Cancer Paternal Uncle         per ng stomach    Diabetes Other         mGA        She is not of Ashkenazi Yarsanism ancestry.    Social History:  History   Alcohol Use No       History   Smoking Status    Never   Smokeless Tobacco    Never     Ms. Laquita Norris is  with 3 children. She has 4 siblings. She is currently Retired    Review of Systems:  General:   The patient denies, fever, chills, night sweats, fatigue, generalized weakness, +change in appetite or +weight loss.    HEENT:     The patient denies eye irritation, cataracts, redness, glaucoma, yellowing of the eyes, change in vision, color blindness, or wearing contacts/glasses. The patient denies hearing loss, ringing in the ears, ear drainage, +earaches, nasal congestion, nose bleeds, +snoring, pain in mouth/throat, hoarseness, change in voice, facial trauma.    Respiratory:  The patient denies chronic cough, phlegm, hemoptysis, pleurisy/chest pain, pneumonia, asthma, wheezing, difficulty in breathing with exertion, emphysema, chronic bronchitis, shortness of breath or abnormal sound when breathing.     Cardiovascular:  There is no history of chest pain, chest pressure/discomfort, palpitations, irregular heartbeat, fainting or near-fainting, difficulty breathing when lying flat, SOB/Coughing at night, swelling of the legs or chest pain while walking.    Breasts:  See history of present illness    Gastrointestinal:     There is no history of difficulty or pain with swallowing, reflux symptoms, vomiting, dark or bloody stools, constipation, yellowing of the skin, indigestion, nausea, change in bowel habits, diarrhea, abdominal pain or vomiting blood.     Genitourinary:  The patient denies frequent urination, needing to get up at night to urinate, urinary hesitancy or retaining urine, painful urination, urinary incontinence, decreased urine stream, blood in the urine or vaginal/penile discharge.    Skin:    The patient denies rash, +itching, skin lesions, dry skin, change in skin color or change in moles.      Hematologic/Lymphatic:  The patient denies +easily bruising or bleeding or persistent swollen glands or lymph nodes.     Musculoskeletal:  The patient denies muscle aches/pain, joint pain, stiff joints, neck pain, +back pain or bone pain.    Neuropsychiatric:  There is no history of migraines or severe headaches, seizure/epilepsy, speech problems, coordination problems, trembling/tremors, fainting/black outs, dizziness, memory problems, loss of sensation/numbness, +problems walking, weakness, tingling or burning in hands/feet. There is no history of abusive relationship, bipolar disorder, sleep disturbance, anxiety, depression or feeling of despair.    Endocrine:    There is no history of poor/slow wound healing, weight loss/gain, fertility or hormone problems, cold intolerance, thyroid disease.     Allergic/Immunologic:  There is no history of hives, hay fever, angioedema or anaphylaxis.    Ht 1.549 m (5' 1\")   Wt 57.6 kg (127 lb)   BMI 24.00 kg/m²     Physical Exam:  The patient is an alert, oriented, well-nourished and  well-developed woman who appears her stated age. Her speech patterns and movements are normal. Her affect is appropriate.    HEENT: The head is normocephalic. The neck is supple. The thyroid is not enlarged and is without palpable masses/nodules. There are no palpable masses. The trachea is in the midline. Conjunctiva are clear, non-icteric.    Chest: The chest expands symmetrically. The lungs are clear to auscultation.    Heart: The rhythm is regular.  There are no murmurs, rubs, gallops or thrills.    Breasts:  Her breasts are symmetrical with a cup size 38B.  Right breast: The skin, nipple ,and areola appear normal. There is no skin dimpling with movement of the pectoralis. There is no nipple retraction. No nipple discharge can be elicited. The parenchyma is mildly nodular. There are no dominant masses in the breast. The axillary tail is normal.  Left breast:   The skin, nipple, and  areola appear normal. There is no skin dimpling with movement of the pectoralis. There is no nipple retraction. No nipple discharge can be elicited. The parenchyma is mildly nodular. There are no dominant masses in the breast. The axillary tail is normal.    Abdomen:  The abdomen is soft, flat and non tender. The liver is not enlarged. There are no palpable masses.    Lymph Nodes:  The supraclavicular, axillary and cervical regions are free of significant lymphadenopathy.    Back: There is no vertebral column tenderness.    Skin: The skin appears normal. There are no suspicious appearing rashes or lesions.    Extremities: The extremities are without deformity, cyanosis or edema.    Impression:   Ms. Laquita Norris is a 64 year old woman presents with family history of breast cancer and L breast DCIS, clinical stage [id number].    Discussion and Plan:  I had a discussion with the Patient regarding her breast exam. On exam today I found her to be healing well since the biopsy with no signs of infection. I personally reviewed the recent imaging and pathology and we discussed this at length.     The natural history and evolution of DCIS was discussed with Ms. Laquita Norris and her family. This  included the difference between in-situ and invasive carcinoma, and the distinction between  local and systemic disease and local and systemic therapy. For local treatment options, I  explained the risks and benefits of breast conservation and mastectomy (with or without  reconstruction), including the fact that survival rates are essentially equal with these two  approaches. If breast conservation is elected, I explained the need for free margins, the  possibility of re-excision to achieve free margins, and the need for post-operative radiotherapy.  The patient was told that adela staging is not usually required for DCIS, but is sometimes  recommended depending on the size and grade of the lesion, and if mastectomy is planned  for  treatment. The reasons for this were explained. I explained that for pure DCIS, systemic  therapy is not required, although endocrine agents such as tamoxifen can be used in women  with hormone sensitive disease in order to reduce the risk of local recurrence and future new  Primaries.   Following this discussion, where all of the patient's questions were answered, we agreed to  proceed with bracketed wire localized lumpectomy. The span of calcs is 4.4 x 3.1 x 2.8 cm and her clip is displaced 1.9cm. We discussed possibility of re-excision if margins are positive. We discussed the implications of a genetic mutation and she agreed to proceed with testing.   The risks and possible complications of the procedure were explained to the patient and her family and she understood and agreed to the proposed plan. She was given ample opportunity for questions and those questions were answered to her satisfaction. She has been  encouraged to contact the office with any questions or concerns prior to her next appointment.     This note was created by [employer] voice recognition. Errors in content may be related to improper recognition by the system; efforts to review and correct have been done but errors may still exist. Please be advised the primary purpose of this note is for me to communicate medical care. Standard sentence structure is not always used. Medical terminology and medical abbreviations may be used. There may be grammatical, typographical, and automated fill ins that may have errors missed in proofreading.

## 2024-08-29 ENCOUNTER — TELEPHONE (OUTPATIENT)
Dept: HEMATOLOGY/ONCOLOGY | Facility: HOSPITAL | Age: 64
End: 2024-08-29

## 2024-08-29 ENCOUNTER — OFFICE VISIT (OUTPATIENT)
Dept: GASTROENTEROLOGY | Facility: CLINIC | Age: 64
End: 2024-08-29

## 2024-08-29 ENCOUNTER — LAB ENCOUNTER (OUTPATIENT)
Dept: LAB | Age: 64
End: 2024-08-29
Attending: INTERNAL MEDICINE
Payer: MEDICARE

## 2024-08-29 ENCOUNTER — TELEPHONE (OUTPATIENT)
Dept: SURGERY | Facility: CLINIC | Age: 64
End: 2024-08-29

## 2024-08-29 VITALS
DIASTOLIC BLOOD PRESSURE: 74 MMHG | HEART RATE: 80 BPM | BODY MASS INDEX: 26.21 KG/M2 | WEIGHT: 138.81 LBS | SYSTOLIC BLOOD PRESSURE: 129 MMHG | HEIGHT: 61 IN

## 2024-08-29 DIAGNOSIS — K76.0 HEPATIC STEATOSIS: ICD-10-CM

## 2024-08-29 DIAGNOSIS — R74.8 ELEVATED ALKALINE PHOSPHATASE LEVEL: Primary | ICD-10-CM

## 2024-08-29 DIAGNOSIS — K80.20 CHOLELITHIASIS WITHOUT CHOLECYSTITIS: ICD-10-CM

## 2024-08-29 DIAGNOSIS — R93.2 ABNORMAL ULTRASOUND OF LIVER: ICD-10-CM

## 2024-08-29 DIAGNOSIS — R74.8 ELEVATED LIVER ENZYMES: ICD-10-CM

## 2024-08-29 DIAGNOSIS — R74.8 ELEVATED ALKALINE PHOSPHATASE LEVEL: ICD-10-CM

## 2024-08-29 DIAGNOSIS — D05.12 DUCTAL CARCINOMA IN SITU (DCIS) OF LEFT BREAST: Primary | ICD-10-CM

## 2024-08-29 LAB
ALBUMIN SERPL-MCNC: 4.4 G/DL (ref 3.2–4.8)
ALP LIVER SERPL-CCNC: 267 U/L
ALT SERPL-CCNC: 261 U/L
AST SERPL-CCNC: 195 U/L (ref ?–34)
BILIRUB DIRECT SERPL-MCNC: 0.6 MG/DL (ref ?–0.3)
BILIRUB SERPL-MCNC: 1.7 MG/DL (ref 0.2–1.1)
GGT SERPL-CCNC: 376 U/L
HAV AB SER QL IA: REACTIVE
HAV IGM SER QL: NONREACTIVE
HBV CORE AB SERPL QL IA: NONREACTIVE
HBV SURFACE AB SER QL: NONREACTIVE
HBV SURFACE AB SERPL IA-ACNC: 6.34 MIU/ML
HBV SURFACE AG SERPL QL IA: NONREACTIVE
HCV AB SERPL QL IA: NONREACTIVE
PROT SERPL-MCNC: 8.2 G/DL (ref 5.7–8.2)

## 2024-08-29 PROCEDURE — 36415 COLL VENOUS BLD VENIPUNCTURE: CPT

## 2024-08-29 PROCEDURE — 86706 HEP B SURFACE ANTIBODY: CPT

## 2024-08-29 PROCEDURE — 86803 HEPATITIS C AB TEST: CPT

## 2024-08-29 PROCEDURE — 80503 PATH CLIN CONSLTJ SF 5-20: CPT

## 2024-08-29 PROCEDURE — 86709 HEPATITIS A IGM ANTIBODY: CPT

## 2024-08-29 PROCEDURE — 87340 HEPATITIS B SURFACE AG IA: CPT

## 2024-08-29 PROCEDURE — 1126F AMNT PAIN NOTED NONE PRSNT: CPT | Performed by: INTERNAL MEDICINE

## 2024-08-29 PROCEDURE — 80076 HEPATIC FUNCTION PANEL: CPT

## 2024-08-29 PROCEDURE — 83516 IMMUNOASSAY NONANTIBODY: CPT

## 2024-08-29 PROCEDURE — 3074F SYST BP LT 130 MM HG: CPT | Performed by: INTERNAL MEDICINE

## 2024-08-29 PROCEDURE — 99204 OFFICE O/P NEW MOD 45 MIN: CPT | Performed by: INTERNAL MEDICINE

## 2024-08-29 PROCEDURE — 3078F DIAST BP <80 MM HG: CPT | Performed by: INTERNAL MEDICINE

## 2024-08-29 PROCEDURE — 82977 ASSAY OF GGT: CPT

## 2024-08-29 PROCEDURE — 3008F BODY MASS INDEX DOCD: CPT | Performed by: INTERNAL MEDICINE

## 2024-08-29 PROCEDURE — 1159F MED LIST DOCD IN RCRD: CPT | Performed by: INTERNAL MEDICINE

## 2024-08-29 PROCEDURE — 86708 HEPATITIS A ANTIBODY: CPT

## 2024-08-29 PROCEDURE — 86704 HEP B CORE ANTIBODY TOTAL: CPT

## 2024-08-29 NOTE — PATIENT INSTRUCTIONS
As we discussed I have ordered some blood test to further evaluate your liver.  Please complete these at the lab at your convenience after fasting 8 to 10 hours.

## 2024-08-29 NOTE — TELEPHONE ENCOUNTER
Calling pt in regards to scheduling surgery.  Informed pt that I have 09/23/2024 available at [facility] with Dr. De Jesus.  Pt verbalized understanding and in agreement with date and location.  All questions answered.   Encouraged pt to call or [employer] message office with any other questions or concerns.

## 2024-08-29 NOTE — TELEPHONE ENCOUNTER
Phoned patient for care coordination r/t genetic counseling consultation. LVM requesting patient return call to [facility] at her earliest convenience.

## 2024-08-30 LAB
ACTIN SMOOTH MUSCLE AB: 78 UNITS
M2 MITOCHONDRIAL AB: 29.4 UNITS

## 2024-08-31 ENCOUNTER — PATIENT MESSAGE (OUTPATIENT)
Dept: INTERNAL MEDICINE CLINIC | Facility: CLINIC | Age: 64
End: 2024-08-31

## 2024-08-31 DIAGNOSIS — R74.8 ELEVATED LIVER ENZYMES: Primary | ICD-10-CM

## 2024-09-02 NOTE — TELEPHONE ENCOUNTER
Dr Ruiz=see message, pended referral ..     From: Laquita Norris  To: Erickson Ruiz  Sent: 8/31/2024  8:54 PM CDT  Subject: breast surgery    Dr. Ruiz you were probably told that my biopsy was adnormal I have calcification grade 3, I need surgery on my left breast , I am having it done Sept. 23.   I also got my results for my liver. it is very elevated, so Dr. Heath Rosa recommend me to make appointment with a hepatologist he said there is one in [facility]. Can you please recommend me one in [place].  Thank you for your time  Laquita Norris

## 2024-09-04 ENCOUNTER — APPOINTMENT (OUTPATIENT)
Dept: HEMATOLOGY/ONCOLOGY | Facility: HOSPITAL | Age: 64
End: 2024-09-04
Payer: MEDICARE

## 2024-09-04 ENCOUNTER — APPOINTMENT (OUTPATIENT)
Dept: GENETICS | Facility: HOSPITAL | Age: 64
End: 2024-09-04
Attending: INTERNAL MEDICINE
Payer: MEDICARE

## 2024-09-04 ENCOUNTER — NURSE ONLY (OUTPATIENT)
Dept: HEMATOLOGY/ONCOLOGY | Facility: HOSPITAL | Age: 64
End: 2024-09-04
Attending: INTERNAL MEDICINE
Payer: MEDICARE

## 2024-09-04 DIAGNOSIS — Z80.0 FAMILY HISTORY OF MALIGNANT NEOPLASM OF GASTROINTESTINAL TRACT: ICD-10-CM

## 2024-09-04 DIAGNOSIS — D05.12 DUCTAL CARCINOMA IN SITU (DCIS) OF LEFT BREAST: Primary | ICD-10-CM

## 2024-09-04 DIAGNOSIS — Z80.3 FAMILY HISTORY OF MALIGNANT NEOPLASM OF BREAST: ICD-10-CM

## 2024-09-04 PROCEDURE — 36415 COLL VENOUS BLD VENIPUNCTURE: CPT

## 2024-09-04 PROCEDURE — 96040 HC GENETIC COUNSELING EA 30 MIN: CPT | Performed by: GENETIC COUNSELOR, MS

## 2024-09-04 NOTE — PROGRESS NOTES
meliton for visit: Mrs. Norris is a 64-year-old woman referred for genetic counseling due to her recent diagnosis of a left-sided breast cancer.  She was seen today to discuss the option of genetic testing and how this may help with her management and surgical options.  She is currently pending treatment. She is  and is retired.  She and her  have three adult sons and three young grandchildren together.  Referring MD: Liza    Medical History: Mrs. Norris is a 64-year-old woman who recently was diagnosed with left-sided breast cancer at age 64. She is pending treatment.   The tumor marker of her breast cancer (DCIS) is   ER+. She understands that genetic testing may change her medical management.      Other medical history of note: Mrs. Norris has had a colonoscopy within the past several years with negative results.  She denies any current dermatological concerns, history of uterine fibroids or thyroid issues.    She was 12 at menarche and was 26 at the birth of her eldest son.  She is post-menopausal and admits to a hysterectomy with her ovaries removed at age 46.  She denies any use of hormone replacement therapy or oral contraceptive use. She denies any tobacco use and denies regular consumption of alcoholic beverages.      Relevant family history: Mrs. Norris shares that three of her maternal aunts have been diagnosed with breast cancer and one of these women was diagnosed at age 45.  The other two were diagnosed post-menopausally.  On her paternal side, a paternal uncle passed away from gastric cancer at age 58 and a paternal aunt and paternal cousin were both diagnosed with breast cancer post-menopausally.  She does have limited information for a number of these relatives as they reside in [place].  She is not aware of any family members who have pursued genetic testing to date.  She is of [place] heritage on both sides of her family and denies any direct consanguinity or knowledge of  Ashkenazi Buddhism heritage.  Her sons and her grandchildren (two granddaughters and a grandson) are healthy and fine by her report.      Summary:  Most breast cancer is not hereditary; however, hereditary cancer is suspected under certain conditions, such as when breast cancer occurs prior to the age of 50 (or premenopausal), when there are multiple affected family members, when breast cancer occurs in combination with other cancers, especially ovarian cancer, and when cancer appears to be passing from generation to generation.   We discussed dominant inheritance, the pattern in which most hereditary cancer conditions are inherited.  If an individual has such a cancer predisposing gene mutation, there is a 50% chance that any offspring will not inherit the mutation and a 50% chance that he or she will inherit it.  Inheriting the mutation does not automatically mean that one will develop cancer, rather that there is an increased chance for developing certain types of cancer.  Cancer predisposing gene mutations can exist in males and females and can be passed on to both male and female offspring.    The pros and cons of cancer predisposing gene mutation testing were reviewed with Mrs. Norris.  Genetic test results can have significant impact on medical management, planning, screening, surgical decision-making, cancer risk assessment for the patient and relatives, and psychological/emotional well-being.  Mutations in the genes BRCA1 and BRCA2 account for most cases (but not all) of hereditary breast cancer.  Mutations in other genes have also been associated with an increased risk for breast cancer - but mutations in these other genes are statistically less often seen in hereditary breast cancer.    We discussed the benefits and limitations of BRCA1/2 testing versus multi-gene panels that include BRCA1/2 as well as other genes associated with different cancers, such as colon cancer.  Panels are an appropriate option  for individuals whose history is suggestive of more than one syndrome, and they improve detection rate for identifying the underlying cause of a hereditary cancer.  Limitations of panels include an unknown percentage of variants of unknown significance, as well as an uncertainty of level of risk associated with certain unknown-penetrance genes, and therefore lack of clear guidelines for risk management of carriers of some of these mutations.  There are panels that assess for mutations in only “high risk” and clinically actionable breast cancer genes as well as larger panels that assess for mutations in high, moderate and unknown-penetrance breast cancer genes.  Genetic testing could yield one of three results: no mutation detected, deleterious mutation detected, or variant of uncertain significance.  The implications of these potential results were reviewed with Mrs. Norris.  Given her diagnosis of breast cancer in conjunction with multiple family members with breast cancer, one of which was early-onset, and given that these results may change her medical management, she meets BOTH Medicare and NCCN criteria for HBOC syndrome testing.    Based on the above history and our discussion of genetic testing options, Mrs. Norris decided to proceed with a hereditary cancer panel through [facility], which encompasses 9 genes associated with high risk of breast cancers which is available on a STAT basis ([facility] STAT panel with KARIS and CHEK2 added on).  Blood was drawn at her visit today and sent to [facility].  Turn-around-time is typically 7-14 days from receipt of the sample.  My office will call Mrs. Norris as soon as results are received; post-test counseling can be scheduled at that time.        Thank you for referring Mrs. Norris to Genetics; please do not hesitate to contact my office if you have any questions or concerns, [phone].  Plan:  Blood was drawn and sent out for [facility]  Breast Cancer STAT Panel plus KARIS and CHEK2 testing through [facility].   The Genetics office will call Mrs. Norris when her results are available.  Recommendations for Mrs. Norris and family members will depend on above test results.    Send to: Dr. De Jesus  Time spent with patient: 40 minutes

## 2024-09-05 ENCOUNTER — TELEPHONE (OUTPATIENT)
Dept: INTERNAL MEDICINE CLINIC | Facility: CLINIC | Age: 64
End: 2024-09-05

## 2024-09-05 NOTE — TELEPHONE ENCOUNTER
This RN spoke with Sonia/Dr De Jesus's office.  Our GI referred  her to Hepatology, Dr Hernandez, but she can't see him until October and surgery is scheduled 9/23/2024, therefore, can you see her for clearance, please advise?

## 2024-09-05 NOTE — TELEPHONE ENCOUNTER
Patient has read [employer] messages.     From  Erickson Ruiz MD To  Laquita Norris Sent and Delivered  9/2/2024 10:53 PM   Last Read in [employer]  9/3/2024  8:43 AM by Laquita Norris       breast surgery  Message [phone]  From  Jennifer Bellamy RN To  Laquita Norris Sent and Delivered  9/2/2024  9:00 AM   Last Read in [employer]  9/3/2024  8:43 AM by Laquita Norris       Future Appointments   Date Time Provider Department Center   9/11/2024  1:00 PM Erickson Ruiz MD [facility]   9/23/2024  1:00 PM [address]   9/30/2024 10:30 AM Shefali Mc APRN [facility]   9/30/2024 11:40 AM Bindu Roth MD [facility] ONC [facility]   10/9/2024 10:45 AM Angelica De Jesus MD [facility] S[facility]   11/13/2024  1:45 PM Beth Correa APRN [facility]   3/28/2025  1:00 PM Alyssia Carroll DO [facility]

## 2024-09-05 NOTE — TELEPHONE ENCOUNTER
Dr. Ruiz I received a call from Sonia MORALES from Dr. De Jesus office. Patient will have surgery  on 9/23/24. Patient does need pre op clearance from you but Sonia MORALES noted patient has elevated liver enzymes and was inform to follow up with the liver specialist (hepatologist). Do you want patient to see the liver specialist or  will you be ok to clear patient once she sees you.  Please advise

## 2024-09-05 NOTE — TELEPHONE ENCOUNTER
Patient called requesting pre op appointment, having surgery on 9/23 breast surgery at [facility] next available is in October please advise.

## 2024-09-05 NOTE — TELEPHONE ENCOUNTER
We can get surgical clearance form Dr Rosa  GI   Component      Latest Ref Rng 8/29/2024   AST (SGOT)      <34 U/L 195 (H)    ALT (SGPT)      10 - 49 U/L 261 (H)    ALKALINE PHOSPHATASE      55 - 142 U/L 267 (H)    Total Bilirubin      0.2 - 1.1 mg/dL 1.7 (H)    Bilirubin, Direct      <=0.3 mg/dL 0.6 (H)    PROTEIN, TOTAL      5.7 - 8.2 g/dL 8.2    Albumin      3.2 - 4.8 g/dL 4.4    Hepatitis A Virus Ab, Total      Nonreactive   Reactive !    HBSAg Screen      Nonreactive   Nonreactive    HEPATITIS B CORE AB, TOTAL      Nonreactive   Nonreactive    HEPATITIS B SURFACE AB QUAL      Reactive   Nonreactive !    HEPATITIS B SURFACE AB QUANT      mIU/mL [id number]    HCV AB      Nonreactive   Nonreactive    Actin Smooth Muscle Ab      0 - 19 Units 78 (H)    M2 Mitochondrial Ab      0.0 - 20.0 Units 29.4 (H)    GAMMA GLUTAMYL TRANSFERASE      <38 U/L U/L 376 (H)    HEPATITIS A AB, IGM      Nonreactive   Nonreactive       Legend:  (H) High  ! Abnormal

## 2024-09-06 ENCOUNTER — TELEPHONE (OUTPATIENT)
Dept: ENDOCRINOLOGY CLINIC | Facility: CLINIC | Age: 64
End: 2024-09-06

## 2024-09-06 NOTE — TELEPHONE ENCOUNTER
Patient called, verified Name and . She is following up regarding below. Relayed Dr. Ruiz's message. Patient verbalized understanding. She will keep the appointment with primary care provider and go from there. No further questions at this time.    Future Appointments   Date Time Provider Department Center   2024  1:00 PM Erickson Ruiz MD [facility]

## 2024-09-06 NOTE — TELEPHONE ENCOUNTER
Patient is requesting to get at least 10 sheets with log in information to log in her glucose readings. Please mail out to the patients home.

## 2024-09-10 ENCOUNTER — APPOINTMENT (OUTPATIENT)
Dept: GENETICS | Facility: HOSPITAL | Age: 64
End: 2024-09-10
Attending: INTERNAL MEDICINE
Payer: MEDICARE

## 2024-09-11 ENCOUNTER — OFFICE VISIT (OUTPATIENT)
Dept: INTERNAL MEDICINE CLINIC | Facility: CLINIC | Age: 64
End: 2024-09-11
Payer: COMMERCIAL

## 2024-09-11 ENCOUNTER — HOSPITAL ENCOUNTER (OUTPATIENT)
Dept: GENERAL RADIOLOGY | Age: 64
Discharge: HOME | End: 2024-09-11
Attending: INTERNAL MEDICINE
Payer: MEDICARE

## 2024-09-11 ENCOUNTER — LAB ENCOUNTER (OUTPATIENT)
Dept: LAB | Age: 64
End: 2024-09-11
Attending: INTERNAL MEDICINE
Payer: MEDICARE

## 2024-09-11 VITALS
HEIGHT: 61 IN | BODY MASS INDEX: 26.06 KG/M2 | WEIGHT: 138 LBS | SYSTOLIC BLOOD PRESSURE: 142 MMHG | HEART RATE: 80 BPM | DIASTOLIC BLOOD PRESSURE: 82 MMHG

## 2024-09-11 DIAGNOSIS — R79.89 ELEVATED LFTS: ICD-10-CM

## 2024-09-11 DIAGNOSIS — Z01.818 PREOP EXAM FOR INTERNAL MEDICINE: Primary | ICD-10-CM

## 2024-09-11 DIAGNOSIS — Z01.818 PREOPERATIVE CLEARANCE: ICD-10-CM

## 2024-09-11 DIAGNOSIS — D05.12 DUCTAL CARCINOMA IN SITU (DCIS) OF LEFT BREAST: ICD-10-CM

## 2024-09-11 DIAGNOSIS — E78.5 HYPERLIPIDEMIA, UNSPECIFIED HYPERLIPIDEMIA TYPE: ICD-10-CM

## 2024-09-11 DIAGNOSIS — I10 ESSENTIAL HYPERTENSION: ICD-10-CM

## 2024-09-11 DIAGNOSIS — E11.9 TYPE 2 DIABETES MELLITUS WITHOUT COMPLICATION, WITHOUT LONG-TERM CURRENT USE OF INSULIN (HCC): ICD-10-CM

## 2024-09-11 LAB
ALBUMIN SERPL-MCNC: 4.4 G/DL (ref 3.2–4.8)
ALBUMIN/GLOB SERPL: 1.1 {RATIO} (ref 1–2)
ALP LIVER SERPL-CCNC: 249 U/L
ALT SERPL-CCNC: 353 U/L
ANION GAP SERPL CALC-SCNC: 7 MMOL/L (ref 0–18)
APTT PPP: 27.2 SECONDS (ref 23–36)
AST SERPL-CCNC: 251 U/L (ref ?–34)
BASOPHILS # BLD AUTO: 0.05 X10(3) UL (ref 0–0.2)
BASOPHILS NFR BLD AUTO: 0.9 %
BILIRUB SERPL-MCNC: 1.5 MG/DL (ref 0.2–1.1)
BUN BLD-MCNC: 15 MG/DL (ref 9–23)
BUN/CREAT SERPL: 23.8 (ref 10–20)
CALCIUM BLD-MCNC: 9.8 MG/DL (ref 8.7–10.4)
CHLORIDE SERPL-SCNC: 107 MMOL/L (ref 98–112)
CO2 SERPL-SCNC: 25 MMOL/L (ref 21–32)
CREAT BLD-MCNC: 0.63 MG/DL
DEPRECATED RDW RBC AUTO: 41.9 FL (ref 35.1–46.3)
EGFRCR SERPLBLD CKD-EPI 2021: 98 ML/MIN/1.73M2 (ref 60–?)
EOSINOPHIL # BLD AUTO: 0.06 X10(3) UL (ref 0–0.7)
EOSINOPHIL NFR BLD AUTO: 1 %
ERYTHROCYTE [DISTWIDTH] IN BLOOD BY AUTOMATED COUNT: 12.5 % (ref 11–15)
EST. AVERAGE GLUCOSE BLD GHB EST-MCNC: 114 MG/DL (ref 68–126)
FASTING STATUS PATIENT QL REPORTED: NO
GLOBULIN PLAS-MCNC: 4.1 G/DL (ref 2–3.5)
GLUCOSE BLD-MCNC: 95 MG/DL (ref 70–99)
HBA1C MFR BLD: 5.6 % (ref ?–5.7)
HCT VFR BLD AUTO: 37.9 %
HGB BLD-MCNC: 12.8 G/DL
IMM GRANULOCYTES # BLD AUTO: 0.01 X10(3) UL (ref 0–1)
IMM GRANULOCYTES NFR BLD: 0.2 %
INR BLD: 0.96 (ref 0.8–1.2)
LYMPHOCYTES # BLD AUTO: 1.77 X10(3) UL (ref 1–4)
LYMPHOCYTES NFR BLD AUTO: 30.1 %
MCH RBC QN AUTO: 31.1 PG (ref 26–34)
MCHC RBC AUTO-ENTMCNC: 33.8 G/DL (ref 31–37)
MCV RBC AUTO: 92 FL
MONOCYTES # BLD AUTO: 0.51 X10(3) UL (ref 0.1–1)
MONOCYTES NFR BLD AUTO: 8.7 %
NEUTROPHILS # BLD AUTO: 3.48 X10 (3) UL (ref 1.5–7.7)
NEUTROPHILS # BLD AUTO: 3.48 X10(3) UL (ref 1.5–7.7)
NEUTROPHILS NFR BLD AUTO: 59.1 %
OSMOLALITY SERPL CALC.SUM OF ELEC: 289 MOSM/KG (ref 275–295)
PLATELET # BLD AUTO: 205 10(3)UL (ref 150–450)
POTASSIUM SERPL-SCNC: 4.4 MMOL/L (ref 3.5–5.1)
PROT SERPL-MCNC: 8.5 G/DL (ref 5.7–8.2)
PROTHROMBIN TIME: 13.3 SECONDS (ref 11.6–14.8)
RBC # BLD AUTO: 4.12 X10(6)UL
SODIUM SERPL-SCNC: 139 MMOL/L (ref 136–145)
WBC # BLD AUTO: 5.9 X10(3) UL (ref 4–11)

## 2024-09-11 PROCEDURE — 3008F BODY MASS INDEX DOCD: CPT | Performed by: INTERNAL MEDICINE

## 2024-09-11 PROCEDURE — 1159F MED LIST DOCD IN RCRD: CPT | Performed by: INTERNAL MEDICINE

## 2024-09-11 PROCEDURE — 85025 COMPLETE CBC W/AUTO DIFF WBC: CPT

## 2024-09-11 PROCEDURE — 3077F SYST BP >= 140 MM HG: CPT | Performed by: INTERNAL MEDICINE

## 2024-09-11 PROCEDURE — 83036 HEMOGLOBIN GLYCOSYLATED A1C: CPT

## 2024-09-11 PROCEDURE — 36415 COLL VENOUS BLD VENIPUNCTURE: CPT

## 2024-09-11 PROCEDURE — 71046 X-RAY EXAM CHEST 2 VIEWS: CPT | Performed by: INTERNAL MEDICINE

## 2024-09-11 PROCEDURE — 3079F DIAST BP 80-89 MM HG: CPT | Performed by: INTERNAL MEDICINE

## 2024-09-11 PROCEDURE — 85730 THROMBOPLASTIN TIME PARTIAL: CPT

## 2024-09-11 PROCEDURE — 3044F HG A1C LEVEL LT 7.0%: CPT | Performed by: INTERNAL MEDICINE

## 2024-09-11 PROCEDURE — 85610 PROTHROMBIN TIME: CPT

## 2024-09-11 PROCEDURE — 1126F AMNT PAIN NOTED NONE PRSNT: CPT | Performed by: INTERNAL MEDICINE

## 2024-09-11 PROCEDURE — 99215 OFFICE O/P EST HI 40 MIN: CPT | Performed by: INTERNAL MEDICINE

## 2024-09-11 PROCEDURE — 80053 COMPREHEN METABOLIC PANEL: CPT

## 2024-09-11 RX ORDER — GLIMEPIRIDE 2 MG/1
2 TABLET ORAL
Qty: 90 TABLET | Refills: 0 | COMMUNITY
Start: 2024-09-11

## 2024-09-11 NOTE — PROGRESS NOTES
HPI:    Patient ID: Laquita Norris is a 64 year old female.    HPI    Laquita Norris presents for preoperative clearance for: Left breast wire bracketed lumpectomy General     Performing Physician:Angelica De Jesus MD   Date of surgery: 9/23/2024   Elective or emergency: elective           Cervical/neck:   - Arthritis: no  - Neck pain: no  - Difficulty with ROM: no  - Prior neck injury/surgery: no    Cardiac:  - History of ischemic coronary disease: no  - History of heart failure: no  - Heart attack in past 90 days:   - Chest pain in last 90 days:   - History of Arrhythmia:    - History of stroke or TIA:  - Diabetes/A1C: Last A1c value was 5.4% done 8/12/2024.      - Most recent echo/Stress test: none      Pulmonary:   - Smoker: never  - Apnea/CPAP:no  - Asthma/COPD:no  - Difficulty laying flat for extended period of time: no  - Dyspnea/exertional: no      Functional Capacity:   Perform ADLs- eating, dress, toilet (1 METs)?yes  Walk up flight of steps, hill, walk ground level 3-4mph (4 METs)? No limited by foot pain  Perform heavy housework- scrubbing floors, move heavy furniture, climb 2 flights of stairs (4-10 METs)?no  Participate in strenuous sports- swimming, singles tennis, football, basketball, ski (+10 METs)?no    Risk Assessment Tools:  body mass index is 26.07 kg/m².     The patient's ASCVD 10 year risk score is 12.8.    The 10-year ASCVD risk score (Amboy DK, et al., 2019) is: 12.8%    Values used to calculate the score:      Age: 64 years      Sex: Female      Is Non- : No      Diabetic: Yes      Tobacco smoker: No      Systolic Blood Pressure: 129 mmHg      Is BP treated: Yes      HDL Cholesterol: 64 mg/dL      Total Cholesterol: 146 mg/dL         Ht 5' 1\" (1.549 m)   Wt 138 lb (62.6 kg)   BMI 26.07 kg/m²   Wt Readings from Last 6 Encounters:   09/11/24 138 lb (62.6 kg)   08/29/24 138 lb 12.8 oz (63 kg)   08/28/24 127 lb (57.6 kg)   08/12/24 127 lb 4.8 oz (57.7 kg)   05/09/24  135 lb 9.6 oz (61.5 kg)   05/03/24 133 lb (60.3 kg)     Body mass index is 26.07 kg/m².  HGBA1C:    Lab Results   Component Value Date    A1C 5.4 08/12/2024    A1C 5.4 05/09/2024    A1C 5.4 12/06/2023     (H) 04/21/2023         Review of Systems   Constitutional:  Negative for activity change, chills, fatigue and fever.   HENT:  Negative for ear discharge, nosebleeds, postnasal drip, rhinorrhea, sinus pressure and sore throat.    Eyes:  Negative for pain, discharge and redness.   Respiratory:  Negative for cough, chest tightness, shortness of breath and wheezing.    Cardiovascular:  Negative for chest pain, palpitations and leg swelling.   Gastrointestinal:  Negative for abdominal pain, blood in stool, constipation, diarrhea, nausea and vomiting.   Genitourinary:  Negative for difficulty urinating, dysuria, frequency, hematuria and urgency.   Musculoskeletal:  Positive for arthralgias, gait problem and joint swelling (left 5th toe). Negative for back pain.   Skin:  Negative for rash.   Neurological:  Negative for syncope, weakness, light-headedness and headaches.   Psychiatric/Behavioral:  Negative for dysphoric mood. The patient is not nervous/anxious.          Current Outpatient Medications   Medication Sig Dispense Refill    Glucose Blood (ONETOUCH VERIO) In Vitro Strip USE 1 STRIP TO CHECK GLUCOSE THREE TIMES DAILY 400 strip 1    ONETOUCH DELICA PLUS [id number] Does not apply Misc 1 LANCET BY FINGER STICK ROUTE THREE TIMES DAILY 300 each 1    atorvastatin 40 MG Oral Tab Take 1 tablet (40 mg total) by mouth nightly. 90 tablet 1    metFORMIN  MG Oral Tablet 24 Hr Take 1 tablet (500 mg total) by mouth daily with breakfast AND 2 tablets (1,000 mg total) daily with dinner. 270 tablet 1    estradiol 0.1 MG/GM Vaginal Cream APPLY 0.5 GRAM VAGINALLY 2 TIMES EVERY WEEK 42.5 g 3    Acetaminophen (TYLENOL OR) Take by mouth.      enalapril 20 MG Oral Tab Take 1 tablet (20 mg total) by mouth daily. 100 tablet 3     CALCIUM-VITAMINS C & D OR Take by mouth.      celecoxib 200 MG Oral Cap Take 1 capsule (200 mg total) by mouth daily. 90 capsule 0    Polyethylene Glycol 3350 17 GM/SCOOP Oral Powder       Saline (AYR NASAL MIST ALLERGY/SINUS NA) 1 spray by Nasal route daily as needed.      Polyvinyl Alcohol-Povidone (REFRESH OP) Apply 1 drop to eye daily as needed.      Inulin (FIBER CHOICE PREBIOTIC FIBER OR) Take 1 capsule by mouth daily.      Homeopathic Products (EARACHE DROPS OT) Place 1 drop in ear(s) daily as needed.      loratadine 10 MG Oral Tab Take 1 tablet (10 mg total) by mouth daily as needed.       Allergies:  Allergies   Allergen Reactions    Codeine RASH     T#3    Acetaminophen-Codeine RASH     Tolerates plain Tylenol  Tolerates plain Tylenol  Tolerates plain Tylenol       HISTORY:  Past Medical History:    Anxiety state    Bartholin cyst    per ng excision of bartholins cyst    Bunion    per ng bunionectomy, bilateral feet    Cyst of skin    per ng rt thigh cyst removal    Diverticulitis    hosp x 3 days    Diverticulosis    Fibroids    Hemorrhoids    per  colonoscopy    High blood pressure    High cholesterol    History of blood transfusion    @[place]    History of bone density study    \"normal dexa '04, '07\"    Hx of motion sickness    Osteoarthritis    Perforated diverticulum of large intestine    Postmenopausal bleeding    per ng neg endometrial biopsy    Tonsillitis    tonsillectomy 1970    Uterine prolapse    uses pessary    Uterine prolapse    Donut pessary -- self cleaning     Visual impairment    contacts and glasses      Past Surgical History:   Procedure Laterality Date    Biopsy of uterus lining  10/2007    neg endometrial biopsy    Colonoscopy N/A 02/20/2018    Procedure: COLONOSCOPY;  Surgeon: Nini Garcia MD;  Location: [facility]    Foot surgery  2010    bunionectomy    Hip replacement surgery      RT hip surgery 8/17/2020    Hysterectomy  2019    Leg/ankle surgery proc  unlisted Left     to repair flat foot    Joshua biopsy stereo nodule 1 site left (cpt=19081) Left 2024    TOPHAT CLIP    Needle biopsy left  2013          x3 w/PPTL    Other surgical history Right 2013    thigh cyst removal    Pessary  2006    Tonsillectomy  1970    Tubal ligation  1992      Family History   Problem Relation Age of Onset    Alcohol and Other Disorders Associated Father         alcoholism    Hypertension Father     Diabetes Mother         type 2    Lipids Mother         hyperlipidemia    Eye Problems Mother         eye issues    Heart Disorder Mother         per ng CABG    Hypertension Mother     Musculo-skelatal Disorder Mother         per ng osteoporosis    Allergies Mother         medication    Glaucoma Maternal Grandmother     Diabetes Maternal Grandmother     Hypertension Maternal Grandmother     Musculo-skelatal Disorder Maternal Grandmother         per ng osteoporosis    Allergies Sister     Cancer Maternal Aunt 45        breast ca; nun    Breast Cancer Maternal Aunt 45    Cancer Maternal Aunt 70        breast    Breast Cancer Maternal Aunt 70    Breast Cancer Maternal Aunt 80    Cancer Maternal Aunt 80        breast    Cancer Paternal Uncle         gastric    Diabetes Other         mGA    Breast Cancer Paternal Cousin Female 62    Cancer Paternal Cousin Female 62        breast    Breast Cancer Paternal Aunt 75    Cancer Paternal Aunt 75        breast      Social History:   Social History     Socioeconomic History    Marital status:    Tobacco Use    Smoking status: Never     Passive exposure: Never    Smokeless tobacco: Never   Vaping Use    Vaping status: Never Used   Substance and Sexual Activity    Alcohol use: No    Drug use: No    Sexual activity: Yes     Birth control/protection: Tubal Ligation   Other Topics Concern    Caffeine Concern No     Comment: 16oz soda chocolate daily    Exercise Yes     Comment: Physical therapy 3 times per week.  Walking daily    Pt  has a pacemaker No    Pt has a defibrillator No    Reaction to local anesthetic No        PHYSICAL EXAM:    Physical Exam  Constitutional:       General: She is not in acute distress.     Appearance: She is well-developed. She is not diaphoretic.   HENT:      Head: Normocephalic and atraumatic.      Right Ear: Ear canal normal.      Left Ear: Ear canal normal.      Nose: Nose normal.      Mouth/Throat:      Pharynx: No oropharyngeal exudate or posterior oropharyngeal erythema.   Eyes:      General: No scleral icterus.        Right eye: No discharge.         Left eye: No discharge.      Conjunctiva/sclera: Conjunctivae normal.      Pupils: Pupils are equal, round, and reactive to light.   Cardiovascular:      Rate and Rhythm: Normal rate and regular rhythm.      Heart sounds: Normal heart sounds. No murmur heard.  Pulmonary:      Effort: Pulmonary effort is normal. No respiratory distress.      Breath sounds: Normal breath sounds. No wheezing.   Abdominal:      General: Bowel sounds are normal.      Palpations: Abdomen is soft. There is no mass.      Tenderness: There is no abdominal tenderness. There is no guarding or rebound.      Hernia: No hernia is present.   Musculoskeletal:         General: No tenderness.   Skin:     General: Skin is warm and dry.      Findings: No lesion or rash.   Neurological:      Mental Status: She is alert.      Gait: Gait normal.   Psychiatric:         Behavior: Behavior normal.         Thought Content: Thought content normal.       Component      Latest Ref [facility] 9/11/2024   WBC      4.0 - 11.0 x10(3) uL 5.9    RBC      3.80 - 5.30 x10(6)uL 4.12    Hemoglobin      12.0 - 16.0 g/dL 12.8    Hematocrit      35.0 - 48.0 % 37.9    MCV      80.0 - 100.0 fL 92.0    MCH      26.0 - 34.0 pg 31.1    MCHC      31.0 - 37.0 g/dL 33.8    RDW-SD      35.1 - 46.3 fL 41.9    RDW      11.0 - 15.0 % 12.5    Platelet Count      150.0 - 450.0 10(3)uL 205.0    Prelim Neutrophil Abs      1.50 - 7.70 x10 (3) uL  3.48    Neutrophils Absolute      1.50 - 7.70 x10(3) uL 3.48    Lymphocytes Absolute      1.00 - 4.00 x10(3) uL 1.77    Monocytes Absolute      0.10 - 1.00 x10(3) uL 0.51    Eosinophils Absolute      0.00 - 0.70 x10(3) uL 0.06    Basophils Absolute      0.00 - 0.20 x10(3) uL 0.05    Immature Granulocyte Absolute      0.00 - 1.00 x10(3) uL 0.01    Neutrophils %      % 59.1    Lymphocytes %      % 30.1    Monocytes %      % 8.7    Eosinophils %      % 1.0    Basophils %      % 0.9    Immature Granulocyte %      % 0.2    Glucose      70 - 99 mg/dL 95    Sodium      136 - 145 mmol/L 139    Potassium      3.5 - 5.1 mmol/L 4.4    Chloride      98 - 112 mmol/L 107    Carbon Dioxide, Total      21.0 - 32.0 mmol/L 25.0    ANION GAP      0 - 18 mmol/L 7    BUN      9 - 23 mg/dL 15    CREATININE      0.55 - 1.02 mg/dL 0.63    BUN/CREATININE RATIO      10.0 - 20.0  23.8 (H)    CALCIUM      8.7 - 10.4 mg/dL 9.8    CALCULATED OSMOLALITY      275 - 295 mOsm/kg 289    EGFR      >=60 mL/min/1.73m2 98    ALT (SGPT)      10 - 49 U/L 353 (H)    AST (SGOT)      <34 U/L 251 (H)    ALKALINE PHOSPHATASE      55 - 142 U/L 249 (H)    Total Bilirubin      0.2 - 1.1 mg/dL 1.5 (H)    PROTEIN, TOTAL      5.7 - 8.2 g/dL 8.5 (H)    Albumin      3.2 - 4.8 g/dL 4.4    Globulin      2.0 - 3.5 g/dL 4.1 (H)    A/G Ratio      1.0 - 2.0  1.1    Patient Fasting for CMP? No    PT      11.6 - 14.8 seconds 13.3    INR      0.80 - 1.20  0.96    HEMOGLOBIN A1c      <5.7 % 5.6    ESTIMATED AVERAGE GLUCOSE      68 - 126 mg/dL 114    APTT      23.0 - 36.0 seconds 27.2       Legend:  (H) High  Exam Date: 07/02/2024  Procedure: US ABDOMEN LIMITED (CPT=76705)  CONCLUSION:   1. Hepatic steatosis.  No focal hepatic lesions.  2. Cholelithiasis without gallbladder wall thickening.  Negative sonographic Mills sign.  3. Normal caliber common duct measures 5.6 mm average.  4. No free fluid hepatorenal fossa. No hydronephrosis right kidney.  5. Mildly prominent pancreatic  duct of uncertain clinical significance measures 3 mm.  No intrinsic pancreatic lesion visualized.  Distal tail obscured by bowel            DICTATED BY (CST): JONATHAN BEY MD ON 7/03/2024 AT 12:48 PM        CXR  Impression   CONCLUSION:  1. No acute cardiopulmonary disease.           Dictated by (CST): Jonathan Bey MD on 9/11/2024 at 5:44 PM        EKG pending  ASSESSMENT/PLAN:     (Z01.818) Preop exam for internal medicine  (primary encounter diagnosis)  Plan: She has  1 MET  of activity.    She  has no known cardiovascular or pulmonary disease   Given the information avalable int he above notes patient my proceed with surgical intervention as long as the benefit outweighs the risks at the time of the procedure.   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  Plan: PTT, Activated       Planned surgery    (E11.9) Type 2 diabetes mellitus without complication, without long-term current use of insulin (HCC)  Plan: Hemoglobin A1C [E]        HGBA1C:    Lab Results   Component Value Date    A1C 5.6 09/11/2024    A1C 5.4 08/12/2024    A1C 5.4 05/09/2024     09/11/2024     Controlled monitor    (I10) Essential hypertension  Plan: EKG 12 Lead, XR CHEST PA + LAT CHEST         (CPT=71046)        /82 (BP Location: Right arm, Patient Position: Sitting, Cuff Size: adult)   Pulse 80   Ht 5' 1\" (1.549 m)   Wt 138 lb (62.6 kg)   BMI 26.07 kg/m²   Low salt diet monitor    (E78.5) Hyperlipidemia, unspecified hyperlipidemia type  Plan: CBC With Differential With Platelet, Comp         Metabolic Panel (14), Prothrombin Time (PT)        Low cholesterol diet advised  Avoid saturated and trans fats         ELEVATED LIVER ENZYMES  seen by DR ROSA  liver testing suggest there could be an  autoimmune condition   Referrred to eferred to Provider Information:  Provider Address Phone   Fam Hernandez MD [address] [address] [phone]     Additonal labs  Component      Latest Ref Rng 8/29/2024   AST (SGOT)      <34 U/L 195 (H)    ALT (SGPT)      10 - 49 U/L 261 (H)    ALKALINE PHOSPHATASE      55 - 142 U/L 267 (H)    Total Bilirubin      0.2 - 1.1 mg/dL 1.7 (H)    Bilirubin, Direct      <=0.3 mg/dL 0.6 (H)    PROTEIN, TOTAL      5.7 - 8.2 g/dL 8.2    Albumin      3.2 - 4.8 g/dL 4.4    Hepatitis A Virus Ab, Total      Nonreactive   Reactive !    HBSAg Screen      Nonreactive   Nonreactive    HEPATITIS B CORE AB, TOTAL      Nonreactive   Nonreactive    HEPATITIS B SURFACE AB QUAL      Reactive   Nonreactive !    HEPATITIS B SURFACE AB QUANT      mIU/mL 6.295413    HCV AB      Nonreactive   Nonreactive    Actin Smooth Muscle Ab      0 - 19 Units 78 (H)    M2 Mitochondrial Ab      0.0 - 20.0 Units 29.4 (H)    GAMMA GLUTAMYL TRANSFERASE      <38 U/L U/L 376 (H)    HEPATITIS A AB, IGM      Nonreactive   Nonreactive       Legend:  (H) High  ! Abnormal  Meds This Visit:  Requested Prescriptions      No prescriptions requested or ordered in this encounter       Imaging & Referrals:  None        ID#[id number]

## 2024-09-12 ENCOUNTER — TELEPHONE (OUTPATIENT)
Dept: GENETICS | Facility: HOSPITAL | Age: 64
End: 2024-09-12

## 2024-09-12 NOTE — TELEPHONE ENCOUNTER
Shared NEGATIVE genetic testing results for 9 gene breast-focused STAT panel through [facility] with Laquita ([facility] Hereditary Breast Cancer STAT Panel with KARIS and CHEK2). She was pleased to hear this. I will contact her when reflex results are available as well. All her questions were answered to the best of my ability and she was appreciative of the call.

## 2024-09-13 ENCOUNTER — TELEPHONE (OUTPATIENT)
Dept: INTERNAL MEDICINE CLINIC | Facility: CLINIC | Age: 64
End: 2024-09-13

## 2024-09-13 ENCOUNTER — TELEPHONE (OUTPATIENT)
Facility: CLINIC | Age: 64
End: 2024-09-13

## 2024-09-13 DIAGNOSIS — R93.2 ABNORMAL ULTRASOUND OF LIVER: ICD-10-CM

## 2024-09-13 DIAGNOSIS — K83.9 BILE DUCT ABNORMALITY: ICD-10-CM

## 2024-09-13 DIAGNOSIS — R74.8 ELEVATED LIVER ENZYMES: Primary | ICD-10-CM

## 2024-09-13 DIAGNOSIS — K80.20 CHOLELITHIASIS WITHOUT CHOLECYSTITIS: ICD-10-CM

## 2024-09-13 DIAGNOSIS — D05.12 DUCTAL CARCINOMA IN SITU (DCIS) OF LEFT BREAST: ICD-10-CM

## 2024-09-13 NOTE — TELEPHONE ENCOUNTER
Spoke with patient, Date of Birth verified  She stated she will be having MRI (MRCP) of the abdomen, she is claustrophobic but she will do her test with out taking any anxiety meds.    FYI      Future Appointments   Date Time Provider Department Center   9/14/2024  9:45 AM [facility] MRI RM1 (1.5T WIDE) [facility] MRI  [place]   9/18/2024  9:00 AM [facility] US RM5 [facility] US EM [facility]   9/18/2024 10:00 AM [facility] SCHEDULED RESOURCE [facility] LAB [facility]   9/23/2024  1:00 PM [facility] Emergency RM1 [facility] [place]  [place]   9/30/2024 10:30 AM Shefali Mc APRN EMGSURONCELM EMG Surg EL   9/30/2024 11:40 AM Bindu Roth MD [facility] HEM ONC EMO   10/9/2024 10:45 AM Angelica De Jesus MD EMGSURGONCEL EMG Surg ELM   11/13/2024  1:45 PM Beth Correa APRN [facility]O EC [facility]   3/28/2025  1:00 PM Alyssia Carroll DO UROG [facility]

## 2024-09-13 NOTE — TELEPHONE ENCOUNTER
Patient has breast surgery scheduled 9/23 and requires appointment for clearance. Please call at [phone],thanks.

## 2024-09-13 NOTE — TELEPHONE ENCOUNTER
I would like her to complete MRI/MRCP    Thanks    MD Tucker [facility] - Gastroenterology  9/13/2024  2:39 PM

## 2024-09-13 NOTE — TELEPHONE ENCOUNTER
Dr. Rosa,    I spoke to patient, states she was told by Dr. Ruiz to reach out to you to get pre op clearance for her upcoming breast surgery on 9/23/24.    You saw patient on 8/29/24.     Please advise if anything further is required or if patient is ok to proceed from GI standpoint, thank you.

## 2024-09-13 NOTE — TELEPHONE ENCOUNTER
Patient called Dr Ruiz's office to find out what she needs to do to get GI clearance for upcoming surgery.  Dr Ruiz's pre op note states she will clear patient if GI also clears her for surgery (has elevated liver enzymes).  Dr Rosa, please advise?

## 2024-09-13 NOTE — TELEPHONE ENCOUNTER
Spoke to patient and provided information to schedule MRI/MRCP    Will follow up once results are reviewed.

## 2024-09-14 ENCOUNTER — HOSPITAL ENCOUNTER (OUTPATIENT)
Dept: MRI IMAGING | Facility: HOSPITAL | Age: 64
Discharge: HOME | End: 2024-09-14
Attending: INTERNAL MEDICINE
Payer: MEDICARE

## 2024-09-14 DIAGNOSIS — R74.8 ELEVATED LIVER ENZYMES: ICD-10-CM

## 2024-09-14 DIAGNOSIS — D05.12 DUCTAL CARCINOMA IN SITU (DCIS) OF LEFT BREAST: ICD-10-CM

## 2024-09-14 DIAGNOSIS — R93.2 ABNORMAL ULTRASOUND OF LIVER: ICD-10-CM

## 2024-09-14 DIAGNOSIS — K80.20 CHOLELITHIASIS WITHOUT CHOLECYSTITIS: ICD-10-CM

## 2024-09-14 DIAGNOSIS — K83.9 BILE DUCT ABNORMALITY: ICD-10-CM

## 2024-09-14 PROCEDURE — A9575 INJ GADOTERATE MEGLUMI 0.1ML: HCPCS | Performed by: INTERNAL MEDICINE

## 2024-09-14 PROCEDURE — 74183 MRI ABD W/O CNTR FLWD CNTR: CPT | Performed by: INTERNAL MEDICINE

## 2024-09-14 RX ORDER — GADOTERATE MEGLUMINE 376.9 MG/ML
15 INJECTION INTRAVENOUS
Status: COMPLETED | OUTPATIENT
Start: 2024-09-14 | End: 2024-09-14

## 2024-09-14 RX ADMIN — GADOTERATE MEGLUMINE 12 ML: 376.9 INJECTION INTRAVENOUS at 10:30:00

## 2024-09-16 RX ORDER — ACETAMINOPHEN 500 MG
500 TABLET ORAL EVERY 6 HOURS PRN
COMMUNITY

## 2024-09-16 NOTE — PROGRESS NOTES
GI staff:    Please call the patient. Let her know her MRI overall is unremarkable. I have no specific other recommendations for evaluation/management prior to her planned surgery for breast cancer and can proceed as scheduled. Please let her know she should continue with her consultation with Dr. Hernandez.     Thanks    [facility]  9/16/2024  2:46 PM

## 2024-09-17 ENCOUNTER — PATIENT MESSAGE (OUTPATIENT)
Dept: ENDOCRINOLOGY CLINIC | Facility: CLINIC | Age: 64
End: 2024-09-17

## 2024-09-17 ENCOUNTER — TELEPHONE (OUTPATIENT)
Dept: ENDOCRINOLOGY CLINIC | Facility: CLINIC | Age: 64
End: 2024-09-17

## 2024-09-17 NOTE — TELEPHONE ENCOUNTER
Update noted.     Day before procedure:  - take Metformin ER 1,000mg with breakfast   -  hold Metformin with dinner   - hold glimepiride       Day of procedure:   Hold all medications until able to resume normal eating/drinking        Thank you!

## 2024-09-17 NOTE — TELEPHONE ENCOUNTER
Cass -- spoke to pt. Pt has left breast wire bracketed lumpectomy on 9/23. Pt was instructed to contact our office for pre surgery instructions on diabetes medications. Pt will be NPO at midnight but was not given exact time of surgery.     Pt confirmed she takes metformin  mg with BF and 1000 mg with dinner. Glimepiride 2 mg if fasting BG>160    Pt reports the following sugars:    Fasting 2 hours after dinner   9/17 139    9/16 97 108   9/15 2 hrs after BF - 105 121   9/14 121 152   9/13 121 102

## 2024-09-17 NOTE — TELEPHONE ENCOUNTER
Patient states she has a upcoming surgery for left breast surgery and wondering if she needs to hold any medication has surgery on September 23rd please call

## 2024-09-17 NOTE — TELEPHONE ENCOUNTER
Spoke to pt. Provided medication instructions as written below by Beth BUTLER. Pt verbalized understanding. Denies further questions or concerns.

## 2024-09-18 ENCOUNTER — EKG ENCOUNTER (OUTPATIENT)
Dept: LAB | Facility: HOSPITAL | Age: 64
End: 2024-09-18
Attending: INTERNAL MEDICINE
Payer: MEDICARE

## 2024-09-18 ENCOUNTER — TELEPHONE (OUTPATIENT)
Dept: GENETICS | Facility: HOSPITAL | Age: 64
End: 2024-09-18

## 2024-09-18 ENCOUNTER — TELEPHONE (OUTPATIENT)
Facility: CLINIC | Age: 64
End: 2024-09-18

## 2024-09-18 ENCOUNTER — HOSPITAL ENCOUNTER (OUTPATIENT)
Dept: ULTRASOUND IMAGING | Facility: HOSPITAL | Age: 64
Discharge: HOME | End: 2024-09-18
Attending: INTERNAL MEDICINE
Payer: MEDICARE

## 2024-09-18 DIAGNOSIS — I10 ESSENTIAL HYPERTENSION: Primary | ICD-10-CM

## 2024-09-18 DIAGNOSIS — R74.8 ELEVATED LIVER ENZYMES: ICD-10-CM

## 2024-09-18 LAB
ATRIAL RATE: 73 BPM
P AXIS: 56 DEGREES
P-R INTERVAL: 156 MS
Q-T INTERVAL: 382 MS
QRS DURATION: 82 MS
QTC CALCULATION (BEZET): 420 MS
R AXIS: 38 DEGREES
T AXIS: 67 DEGREES
VENTRICULAR RATE: 73 BPM

## 2024-09-18 PROCEDURE — 93005 ELECTROCARDIOGRAM TRACING: CPT

## 2024-09-18 PROCEDURE — 76705 ECHO EXAM OF ABDOMEN: CPT | Performed by: INTERNAL MEDICINE

## 2024-09-18 PROCEDURE — 93010 ELECTROCARDIOGRAM REPORT: CPT | Performed by: INTERNAL MEDICINE

## 2024-09-18 NOTE — TELEPHONE ENCOUNTER
Please see Dr. Rosa's note below. Patient ok to proceed with planned breast surgery.      Please call the patient. Let her know her MRI overall is unremarkable. I have no specific other recommendations for evaluation/management prior to her planned surgery for breast cancer and can proceed as scheduled. Please let her know she should continue with her consultation with Dr. Hernandez.      Thanks     [facility]  9/16/2024  2:46 PM

## 2024-09-18 NOTE — TELEPHONE ENCOUNTER
Spoke to Beth BUTLER verbally at [facility].     Pt to resume normal medication regimen as long as pt is eating and drinking normally again after surgery. Otherwise, continue to hold. Okay for patient to take atorvastatin at 9pm on Sunday.

## 2024-09-18 NOTE — TELEPHONE ENCOUNTER
LM for Laquita with NEGATIVE genetic testing results for 70 gene panel+RNA through [facility] ([facility] Multi-Cancer Panel+RNA). Will mail her a copy of these results for her records. Encouraged her to reach out to me with any questions.

## 2024-09-18 NOTE — TELEPHONE ENCOUNTER
From: Laquita Norris  To: Beth Correa  Sent: 9/17/2024 1:04 PM CDT  Subject: Surgery 9/23/24    Hi Dr. Campos,    The nurse called me told me to take 2 Metformin in the morning on Sunday, none on Monday my surgery day 9/23/24. how much metformin should I take after my surgery?  I will take Atorvastatin on Sunday night at 9:00 pm is that a good time?    Thank you for your time,  Laquita

## 2024-09-18 NOTE — TELEPHONE ENCOUNTER
----- Message from Heath Rosa sent at 9/16/2024  2:46 PM CDT -----  GI staff:    Please call the patient. Let her know her MRI overall is unremarkable. I have no specific other recommendations for evaluation/management prior to her planned surgery for breast cancer and can proceed as scheduled. Please let her know she should continue with her consultation with Dr. Hernandez.     Thanks    [facility]  9/16/2024  2:46 PM

## 2024-09-19 ENCOUNTER — TELEPHONE (OUTPATIENT)
Dept: INTERNAL MEDICINE CLINIC | Facility: CLINIC | Age: 64
End: 2024-09-19

## 2024-09-19 ENCOUNTER — TELEPHONE (OUTPATIENT)
Facility: CLINIC | Age: 64
End: 2024-09-19

## 2024-09-19 NOTE — TELEPHONE ENCOUNTER
Sonia, states that the patient is scheduled for surgery on 9-23-24 and they would like the pre op clearance that states that the patient is cleared for surgery. Please, fax to [phone]. Sonia, would like to know if this can be done asap.

## 2024-09-19 NOTE — TELEPHONE ENCOUNTER
Nurse Sonia is requesting to get surgery clearance for this patient. Fax # [phone]. Patient is scheduled for surgery for Monday 9/23/24.

## 2024-09-20 ENCOUNTER — TELEPHONE (OUTPATIENT)
Dept: ENDOCRINOLOGY CLINIC | Facility: CLINIC | Age: 64
End: 2024-09-20

## 2024-09-20 NOTE — DISCHARGE INSTRUCTIONS
Breast Surgery  Post-operative Instructions  Excisional Biopsy, Lumpectomy, Mastectomy, [place] Node Biopsy, or Axillary  Lymph Node Dissection  Angelica De Jesus MD  General Instructions  The following instructions will provide helpful information that will assist your recovery. These are designed to be general guidelines. Please remember that everyone heals and recovers differently. Listen to your body and rest when you are tired. If you have any questions or concerns, please do not hesitate to contact my office. I would like to see you in the office about one week after surgery, please schedule and appointment through my office to make a post-operative appointment if you do not already have one.     Restrictions  There are no lifting weight restrictions for the arm on the surgical side. You may gradually increase the amount of weight based on your comfort level. You should not drive a car until you believe you can react to an emergency situation and you’re no longer taking narcotic pain medications.   You may shower the day after surgery. You should not bathe or swim (i.e. submerge wound) until the wound is well healed (about two weeks).  There are no dietary restrictions.    Exercise  You may begin arm exercises within a couple days. Do these 2 or 3 times per day, beginning with light exercise and gradually increase your range of motion and repetitions. This will help your arm regain full mobility. We will address your activity level again at your post-operative visit.   You will have pain medication prescribed before discharge. Take this as directed to relieve pain. It is important that you be comfortable so that you may continue your stretching exercises.   If you find the medication prescribed is too strong, try Tylenol (Acetaminophen) or Ibuprofen.    Wound Care  You may remove the gauze dressing on the first or second postoperative day and then shower. You should leave the steri-strips in place; they  will start to peel off about 10 days after your surgery. The stitches are all underneath the skin and will dissolve on their own.  I encourage you to shower once the outer bandage is removed, you may use soap and water directly over the steri-strips and pat dry following.  You should keep gauze dressing on the wound until the wound is completely dry and without drainage-usually 1-3 days.   If a surgical bra was placed after the surgery, I encourage you to wear it as much as possible during the week following the procedure (including during sleep). Alternatively, you may choose to wear your own bra provided it is comfortable, provides support and does not have an underwire. If the breast doesn’t move it is less painful.  It is normal to feel a lump in the area of the incisions for up to 6 months. This is part of the healing process. Eventually the breast will return to its normal condition.     Pain Medication  You will be given a prescription for a narcotic pain medication (usually Norco) upon discharge. Many patients have very little pain and don’t want to use the narcotic. Don’t be afraid to use it if you’re uncomfortable. If you’d prefer you may substitute Tylenol or Ibuprofen (Motrin, Advil). Using an ice pack for a few minutes over the incision can also alleviate pain. If you do use the narcotic medication, use an over the counter stool softener or gentle laxative and stay well-hydrated as constipation is not uncommon with narcotics.    Pathology Report  The Pathology report is usually available 4-5 business days following the surgery. I will call you  with the results once the report is available.    Notify my office if:   Your temperature is over 101.5 F   You notice increasing swelling, redness, warmth or drainage from around the incision or drain site.    If you experience any problems please call my office and either my nurse or myself will respond. After hours, you will be forwarded to my answering  service which will help you get in touch with myself or the physician covering for me.\         HOME INSTRUCTIONS  [facility] HOME CARE INSTRUCTIONS: POST-OP ANESTHESIA  The medication that you received for sedation or general anesthesia can last up to 24 hours. Your judgment and reflexes may be altered, even if you feel like your normal self.      We Recommend:   Do not drive any motor vehicle or bicycle   Avoid mowing the lawn, playing sports, or working with power tools/applicances (power saws, electric knives or mixers)   That you have someone stay with you on your first night home   Do not drink alcohol or take sleeping pills or tranquilizers   Do not sign legal documents within 24 hours of your procedure   If you had a nerve block for your surgery, take extra care not to put any pressure on your arm or hand for 24 hours    It is normal:  For you to have a sore throat if you had a breathing tube during surgery (while you were asleep!). The sore throat should get better within 48 hours. You can gargle with warm salt water (1/2 tsp in 4 oz warm water) or use a throat lozenge for comfort  To feel muscle aches or soreness especially in the abdomen, chest or neck. The achy feeling should go away in the next 24 hours  To feel weak, sleepy or \"wiped out\". Your should start feeling better in the next 24 hours.   To experience mild discomforts such as sore lip or tongue, headache, cramps, gas pains or a bloated feeling in your abdomen.   To experience mild back pain or soreness for a day or two if you had spinal or epidural anesthesia.   If you had laparoscopic surgery, to feel shoulder pain or discomfort on the day of surgery.   For some patients to have nausea after surgery/anesthesia    If you feel nausea or experience vomiting:   Try to move around less.   Eat less than usual or drink only liquids until the next morning   Nausea should resolve in about 24 hours    If you have a problem when you are at home:    Call  your surgeons office   Discharge Instructions: After Your Surgery  You’ve just had surgery. During surgery, you were given medicine called anesthesia to keep you relaxed and free of pain. After surgery, you may have some pain or nausea. This is common. Here are some tips for feeling better and getting well after surgery.   Going home  Your healthcare provider will show you how to take care of yourself when you go home. They'll also answer your questions. Have an adult family member or friend drive you home. For the first 24 hours after your surgery:   Don't drive or use heavy equipment.  Don't make important decisions or sign legal papers.  Take medicines as directed.  Don't drink alcohol.  Have someone stay with you, if needed. They can watch for problems and help keep you safe.  Be sure to go to all follow-up visits with your healthcare provider. And rest after your surgery for as long as your provider tells you to.   Coping with pain  If you have pain after surgery, pain medicine will help you feel better. Take it as directed, before pain becomes severe. Also, ask your healthcare provider or pharmacist about other ways to control pain. This might be with heat, ice, or relaxation. And follow any other instructions your surgeon or nurse gives you.      Stay on schedule with your medicine.     Tips for taking pain medicine  To get the best relief possible, remember these points:   Pain medicines can upset your stomach. Taking them with a little food may help.  Most pain relievers taken by mouth need at least 20 to 30 minutes to start to work.  Don't wait till your pain becomes severe before you take your medicine. Try to time your medicine so that you can take it before starting an activity. This might be before you get dressed, go for a walk, or sit down for dinner.  Constipation is a common side effect of some pain medicines. Call your healthcare provider before taking any medicines such as laxatives or stool  softeners to help ease constipation. Also ask if you should skip any foods. Drinking lots of fluids and eating foods such as fruits and vegetables that are high in fiber can also help. Remember, don't take laxatives unless your surgeon has prescribed them.  Drinking alcohol and taking pain medicine can cause dizziness and slow your breathing. It can even be deadly. Don't drink alcohol while taking pain medicine.  Pain medicine can make you react more slowly to things. Don't drive or run machinery while taking pain medicine.  Your healthcare provider may tell you to take acetaminophen to help ease your pain. Ask them how much you're supposed to take each day. Acetaminophen or other pain relievers may interact with your prescription medicines or other over-the-counter (OTC) medicines. Some prescription medicines have acetaminophen and other ingredients in them. Using both prescription and OTC acetaminophen for pain can cause you to accidentally overdose. Read the labels on your OTC medicines with care. This will help you to clearly know the list of ingredients, how much to take, and any warnings. It may also help you not take too much acetaminophen. If you have questions or don't understand the information, ask your pharmacist or healthcare provider to explain it to you before you take the OTC medicine.   Managing nausea  Some people have an upset stomach (nausea) after surgery. This is often because of anesthesia, pain, or pain medicine, less movement of food in the stomach, or the stress of surgery. These tips will help you handle nausea and eat healthy foods as you get better. If you were on a special food plan before surgery, ask your healthcare provider if you should follow it while you get better. Check with your provider on how your eating should progress. It may depend on the surgery you had. These general tips may help:   Don't push yourself to eat. Your body will tell you when to eat and how much.  Start off  with clear liquids and soup. They're easier to digest.  Next try semi-solid foods as you feel ready. These include mashed potatoes, applesauce, and gelatin.  Slowly move to solid foods. Don’t eat fatty, rich, or spicy foods at first.  Don't force yourself to have 3 large meals a day. Instead eat smaller amounts more often.  Take pain medicines with a small amount of solid food, such as crackers or toast. This helps prevent nausea.  When to call your healthcare provider  Call your healthcare provider right away if you have any of these:   You still have too much pain, or the pain gets worse, after taking the medicine. The medicine may not be strong enough. Or there may be a complication from the surgery.  You feel too sleepy, dizzy, or groggy. The medicine may be too strong.  Side effects such as nausea or vomiting. Your healthcare provider may advise taking other medicines to .  Skin changes such as rash, itching, or hives. This may mean you have an allergic reaction. Your provider may advise taking other medicines.  The incision looks different (for instance, part of it opens up).  Bleeding or fluid leaking from the incision site, and weren't told to expect that.  Fever of 100.4°F (38°C) or higher, or as directed by your provider.  Call 911  Call 911 right away if you have:   Trouble breathing  Facial swelling    If you have obstructive sleep apnea   You were given anesthesia medicine during surgery to keep you comfortable and free of pain. After surgery, you may have more apnea spells because of this medicine and other medicines you were given. The spells may last longer than normal.    At home:  Keep using the continuous positive airway pressure (CPAP) device when you sleep. Unless your healthcare provider tells you not to, use it when you sleep, day or night. CPAP is a common device used to treat obstructive sleep apnea.  Talk with your provider before taking any pain medicine, muscle relaxants, or sedatives.  Your provider will tell you about the possible dangers of taking these medicines.  Contact your provider if your sleeping changes a lot even when taking medicines as directed.  Laci last reviewed this educational content on 10/1/2021  © 0179-6178 [employer]. All rights reserved. This information is not intended as a substitute for professional medical care. Always follow your healthcare professional's instructions.

## 2024-09-20 NOTE — TELEPHONE ENCOUNTER
Patient afraid she may be nauseous after surgery. RN advised against regular ginger ale and crackers but ok in minimal amounts and to request nausea medication for severe symptoms    RN explained importance of keeping a blood sugar in range to promote wound healing and to increase amount of protein in diet as well.

## 2024-09-20 NOTE — TELEPHONE ENCOUNTER
Patient calling states if okay to have ginger ale and crackers due to procedure coming up due to being diabetic or what she can eat after. Please call. Able to leave voice mail if no response, states surgery is on 9/23/24.

## 2024-09-23 ENCOUNTER — HOSPITAL ENCOUNTER (OUTPATIENT)
Facility: HOSPITAL | Age: 64
Setting detail: HOSPITAL OUTPATIENT SURGERY
Discharge: HOME | End: 2024-09-23
Attending: SURGERY | Admitting: SURGERY
Payer: MEDICARE

## 2024-09-23 ENCOUNTER — APPOINTMENT (OUTPATIENT)
Dept: MAMMOGRAPHY | Facility: HOSPITAL | Age: 64
End: 2024-09-23
Attending: SURGERY
Payer: MEDICARE

## 2024-09-23 ENCOUNTER — ANESTHESIA (OUTPATIENT)
Dept: SURGERY | Facility: HOSPITAL | Age: 64
End: 2024-09-23
Payer: MEDICARE

## 2024-09-23 ENCOUNTER — ANESTHESIA EVENT (OUTPATIENT)
Dept: SURGERY | Facility: HOSPITAL | Age: 64
End: 2024-09-23
Payer: MEDICARE

## 2024-09-23 ENCOUNTER — HOSPITAL ENCOUNTER (OUTPATIENT)
Dept: MAMMOGRAPHY | Facility: HOSPITAL | Age: 64
Discharge: HOME | End: 2024-09-23
Attending: SURGERY
Payer: MEDICARE

## 2024-09-23 VITALS
RESPIRATION RATE: 14 BRPM | DIASTOLIC BLOOD PRESSURE: 73 MMHG | SYSTOLIC BLOOD PRESSURE: 151 MMHG | BODY MASS INDEX: 26.43 KG/M2 | HEART RATE: 77 BPM | TEMPERATURE: 97 F | OXYGEN SATURATION: 99 % | WEIGHT: 140 LBS | HEIGHT: 61 IN

## 2024-09-23 DIAGNOSIS — D05.12 DUCTAL CARCINOMA IN SITU (DCIS) OF LEFT BREAST: ICD-10-CM

## 2024-09-23 DIAGNOSIS — D05.12 DUCTAL CARCINOMA IN SITU (DCIS) OF LEFT BREAST: Primary | ICD-10-CM

## 2024-09-23 LAB
GLUCOSE BLDC GLUCOMTR-MCNC: 116 MG/DL (ref 70–99)
GLUCOSE BLDC GLUCOMTR-MCNC: 118 MG/DL (ref 70–99)

## 2024-09-23 PROCEDURE — 0HBU0ZX EXCISION OF LEFT BREAST, OPEN APPROACH, DIAGNOSTIC: ICD-10-PCS | Performed by: SURGERY

## 2024-09-23 PROCEDURE — 82962 GLUCOSE BLOOD TEST: CPT

## 2024-09-23 PROCEDURE — 19281 PERQ DEVICE BREAST 1ST IMAG: CPT | Performed by: SURGERY

## 2024-09-23 PROCEDURE — 19282 PERQ DEVICE BREAST EA IMAG: CPT | Performed by: SURGERY

## 2024-09-23 PROCEDURE — 88341 IMHCHEM/IMCYTCHM EA ADD ANTB: CPT | Performed by: SURGERY

## 2024-09-23 PROCEDURE — 88307 TISSUE EXAM BY PATHOLOGIST: CPT | Performed by: SURGERY

## 2024-09-23 PROCEDURE — 76098 X-RAY EXAM SURGICAL SPECIMEN: CPT | Performed by: SURGERY

## 2024-09-23 PROCEDURE — 88342 IMHCHEM/IMCYTCHM 1ST ANTB: CPT | Performed by: SURGERY

## 2024-09-23 RX ORDER — MIDAZOLAM HYDROCHLORIDE 1 MG/ML
INJECTION INTRAMUSCULAR; INTRAVENOUS AS NEEDED
Status: DISCONTINUED | OUTPATIENT
Start: 2024-09-23 | End: 2024-09-23 | Stop reason: SURG

## 2024-09-23 RX ORDER — SODIUM CHLORIDE, SODIUM LACTATE, POTASSIUM CHLORIDE, CALCIUM CHLORIDE 600; 310; 30; 20 MG/100ML; MG/100ML; MG/100ML; MG/100ML
INJECTION, SOLUTION INTRAVENOUS CONTINUOUS
Status: DISCONTINUED | OUTPATIENT
Start: 2024-09-23 | End: 2024-09-23

## 2024-09-23 RX ORDER — ACETAMINOPHEN 500 MG
1000 TABLET ORAL ONCE
Status: COMPLETED | OUTPATIENT
Start: 2024-09-23 | End: 2024-09-23

## 2024-09-23 RX ORDER — LIDOCAINE HYDROCHLORIDE AND EPINEPHRINE 10; 10 MG/ML; UG/ML
INJECTION, SOLUTION INFILTRATION; PERINEURAL AS NEEDED
Status: DISCONTINUED | OUTPATIENT
Start: 2024-09-23 | End: 2024-09-23 | Stop reason: HOSPADM

## 2024-09-23 RX ORDER — TRAMADOL HYDROCHLORIDE 50 MG/1
TABLET ORAL EVERY 6 HOURS PRN
Qty: 20 TABLET | Refills: 0 | Status: SHIPPED | OUTPATIENT
Start: 2024-09-23 | End: 2024-09-30 | Stop reason: ALTCHOICE

## 2024-09-23 RX ORDER — ONDANSETRON 2 MG/ML
INJECTION INTRAMUSCULAR; INTRAVENOUS AS NEEDED
Status: DISCONTINUED | OUTPATIENT
Start: 2024-09-23 | End: 2024-09-23 | Stop reason: SURG

## 2024-09-23 RX ORDER — BUPIVACAINE HYDROCHLORIDE 5 MG/ML
INJECTION, SOLUTION EPIDURAL; INTRACAUDAL AS NEEDED
Status: DISCONTINUED | OUTPATIENT
Start: 2024-09-23 | End: 2024-09-23 | Stop reason: HOSPADM

## 2024-09-23 RX ADMIN — SODIUM CHLORIDE, SODIUM LACTATE, POTASSIUM CHLORIDE, CALCIUM CHLORIDE: 600; 310; 30; 20 INJECTION, SOLUTION INTRAVENOUS at 15:22:00

## 2024-09-23 RX ADMIN — MIDAZOLAM HYDROCHLORIDE 2 MG: 1 INJECTION INTRAMUSCULAR; INTRAVENOUS at 15:22:00

## 2024-09-23 RX ADMIN — SODIUM CHLORIDE, SODIUM LACTATE, POTASSIUM CHLORIDE, CALCIUM CHLORIDE: 600; 310; 30; 20 INJECTION, SOLUTION INTRAVENOUS at 16:05:00

## 2024-09-23 RX ADMIN — ONDANSETRON 4 MG: 2 INJECTION INTRAMUSCULAR; INTRAVENOUS at 15:32:00

## 2024-09-23 NOTE — H&P
History of Present Illness:   Ms. Laquita Norris is a 64 year old woman who presents with imaging detected DCIS. SHe denies any palpable masses, nipple discharge or axillary symptoms. She has a personal h/o a remote benign Left breast biopsy. She does have a family history of breast cancer. SHe had a prior right dx workup recently for pain that was unremarkable. She had a surveillance for L calcs in Feb 2024 deemed to be stable. SHe had a L surveillance dx imaging on AUg 20, 2024 that showed increased calcifications in the medial left breast for which biopsy was recommended.THis took place on Aug 23, 2024 and showed DCIS that was ER 73%.  She is here today for evaluation and recommendations for further therapy.        Past Medical History       Past Medical History:    Anxiety state    Bartholin cyst     per ng excision of bartholins cyst    Bunion     per ng bunionectomy, bilateral feet    Cyst of skin     per ng rt thigh cyst removal    Diverticulitis     hosp x 3 days    Diverticulosis    Fibroids    Hemorrhoids     per ng colonoscopy    High blood pressure    High cholesterol    History of blood transfusion     @[facility]    History of bone density study     \"normal dexa '04, '07\"    Hx of motion sickness    Osteoarthritis    Perforated diverticulum of large intestine    Postmenopausal bleeding     per ng neg endometrial biopsy    Tonsillitis     tonsillectomy 1970    Uterine prolapse     uses pessary    Uterine prolapse     Donut pessary -- self cleaning     Visual impairment     contacts and glasses            Past Surgical History         Past Surgical History:   Procedure Laterality Date    Biopsy of uterus lining   10/2007     neg endometrial biopsy    Colonoscopy N/A 02/20/2018     Procedure: COLONOSCOPY;  Surgeon: Nini Garcia MD;  Location: [facility]    Foot surgery   2010     bunionectomy    Hip replacement surgery         RT hip surgery 8/17/2020    Hysterectomy   2019    Leg/ankle  surgery proc unlisted Left 2018     to repair flat foot    Joshua biopsy stereo nodule 1 site left (cpt=19081) Left 2024     TOPHAT CLIP    Needle biopsy left   2013             x3 w/PPTL    Other surgical history Right 2013     thigh cyst removal    Pessary   2006    Tonsillectomy   1970    Tubal ligation               Gynecological History:  Pt is a   Pt was 26 years old at time of first pregnancy.    She has cumulative breastfeeding history of 2 months.  She achieved menarche at age 12 and LMP age 46  Age of Menopause: 46  Type: natural menopause  She denies any history of hormone replacement therapy   She denies any history of oral contraceptive use   She denies infertility treatment to achieve pregnancy.     Medications:    Medications Ordered Today   No outpatient medications have been marked as taking for the 24 encounter (Appointment) with Angelica De Jesus MD.            Allergies:    Allergies         Allergies   Allergen Reactions    Codeine RASH       T#3    Acetaminophen-Codeine RASH       Tolerates plain Tylenol  Tolerates plain Tylenol  Tolerates plain Tylenol            Family History:   Family History         Family History   Problem Relation Age of Onset    Alcohol and Other Disorders Associated Father           alcoholism    Hypertension Father      Diabetes Mother           type 2    Lipids Mother           hyperlipidemia    Eye Problems Mother           eye issues    Heart Disorder Mother           per ng CABG    Hypertension Mother      Musculo-skelatal Disorder Mother           per ng osteoporosis    Allergies Mother           medication    Glaucoma Maternal Grandmother      Diabetes Maternal Grandmother      Hypertension Maternal Grandmother      Musculo-skelatal Disorder Maternal Grandmother           per ng osteoporosis    Allergies Sister      Diabetes Cousin      Breast Cancer Maternal Aunt 46    Breast Cancer Maternal Aunt 65    Cancer Paternal Uncle            per ng stomach    Diabetes Other           mGA            She is not of Ashkenazi Protestant ancestry.     Social History:      History   Alcohol Use No             History   Smoking Status    Never   Smokeless Tobacco    Never      Ms. Laquita Norris is  with 3 children. She has 4 siblings. She is currently Retired     Review of Systems:  General:   The patient denies, fever, chills, night sweats, fatigue, generalized weakness, +change in appetite or +weight loss.     HEENT:     The patient denies eye irritation, cataracts, redness, glaucoma, yellowing of the eyes, change in vision, color blindness, or wearing contacts/glasses. The patient denies hearing loss, ringing in the ears, ear drainage, +earaches, nasal congestion, nose bleeds, +snoring, pain in mouth/throat, hoarseness, change in voice, facial trauma.     Respiratory:  The patient denies chronic cough, phlegm, hemoptysis, pleurisy/chest pain, pneumonia, asthma, wheezing, difficulty in breathing with exertion, emphysema, chronic bronchitis, shortness of breath or abnormal sound when breathing.      Cardiovascular:  There is no history of chest pain, chest pressure/discomfort, palpitations, irregular heartbeat, fainting or near-fainting, difficulty breathing when lying flat, SOB/Coughing at night, swelling of the legs or chest pain while walking.     Breasts:  See history of present illness     Gastrointestinal:     There is no history of difficulty or pain with swallowing, reflux symptoms, vomiting, dark or bloody stools, constipation, yellowing of the skin, indigestion, nausea, change in bowel habits, diarrhea, abdominal pain or vomiting blood.      Genitourinary:  The patient denies frequent urination, needing to get up at night to urinate, urinary hesitancy or retaining urine, painful urination, urinary incontinence, decreased urine stream, blood in the urine or vaginal/penile discharge.     Skin:    The patient denies rash, +itching, skin lesions,  dry skin, change in skin color or change in moles.      Hematologic/Lymphatic:  The patient denies +easily bruising or bleeding or persistent swollen glands or lymph nodes.      Musculoskeletal:  The patient denies muscle aches/pain, joint pain, stiff joints, neck pain, +back pain or bone pain.     Neuropsychiatric:  There is no history of migraines or severe headaches, seizure/epilepsy, speech problems, coordination problems, trembling/tremors, fainting/black outs, dizziness, memory problems, loss of sensation/numbness, +problems walking, weakness, tingling or burning in hands/feet. There is no history of abusive relationship, bipolar disorder, sleep disturbance, anxiety, depression or feeling of despair.     Endocrine:    There is no history of poor/slow wound healing, weight loss/gain, fertility or hormone problems, cold intolerance, thyroid disease.      Allergic/Immunologic:  There is no history of hives, hay fever, angioedema or anaphylaxis.     Ht 1.549 m (5' 1\")   Wt 57.6 kg (127 lb)   BMI 24.00 kg/m²      Physical Exam:  The patient is an alert, oriented, well-nourished and  well-developed woman who appears her stated age. Her speech patterns and movements are normal. Her affect is appropriate.     HEENT: The head is normocephalic. The neck is supple. The thyroid is not enlarged and is without palpable masses/nodules. There are no palpable masses. The trachea is in the midline. Conjunctiva are clear, non-icteric.     Chest: The chest expands symmetrically. The lungs are clear to auscultation.     Heart: The rhythm is regular.  There are no murmurs, rubs, gallops or thrills.     Breasts:  Her breasts are symmetrical with a cup size 38B.  Right breast: The skin, nipple ,and areola appear normal. There is no skin dimpling with movement of the pectoralis. There is no nipple retraction. No nipple discharge can be elicited. The parenchyma is mildly nodular. There are no dominant masses in the breast. The  axillary tail is normal.  Left breast:   The skin, nipple, and areola appear normal. There is no skin dimpling with movement of the pectoralis. There is no nipple retraction. No nipple discharge can be elicited. The parenchyma is mildly nodular. There are no dominant masses in the breast. The axillary tail is normal.     Abdomen:  The abdomen is soft, flat and non tender. The liver is not enlarged. There are no palpable masses.     Lymph Nodes:  The supraclavicular, axillary and cervical regions are free of significant lymphadenopathy.     Back: There is no vertebral column tenderness.     Skin: The skin appears normal. There are no suspicious appearing rashes or lesions.     Extremities: The extremities are without deformity, cyanosis or edema.     Impression:   Ms. Laquita Norris is a 64 year old woman presents with family history of breast cancer and L breast DCIS, clinical stage [id number].     Discussion and Plan:  I had a discussion with the Patient regarding her breast exam. On exam today I found her to be healing well since the biopsy with no signs of infection. I personally reviewed the recent imaging and pathology and we discussed this at length.      The natural history and evolution of DCIS was discussed with Ms. Laquita Norris and her family. This  included the difference between in-situ and invasive carcinoma, and the distinction between  local and systemic disease and local and systemic therapy. For local treatment options, I  explained the risks and benefits of breast conservation and mastectomy (with or without  reconstruction), including the fact that survival rates are essentially equal with these two  approaches. If breast conservation is elected, I explained the need for free margins, the  possibility of re-excision to achieve free margins, and the need for post-operative radiotherapy.  The patient was told that adela staging is not usually required for DCIS, but is sometimes  recommended depending  on the size and grade of the lesion, and if mastectomy is planned  for treatment. The reasons for this were explained. I explained that for pure DCIS, systemic  therapy is not required, although endocrine agents such as tamoxifen can be used in women  with hormone sensitive disease in order to reduce the risk of local recurrence and future new  Primaries.   Following this discussion, where all of the patient's questions were answered, we agreed to  proceed with bracketed wire localized lumpectomy. The span of calcs is 4.4 x 3.1 x 2.8 cm and her clip is displaced 1.9cm. We discussed possibility of re-excision if margins are positive. We discussed the implications of a genetic mutation and she agreed to proceed with testing.   The risks and possible complications of the procedure were explained to the patient and her family and she understood and agreed to the proposed plan. She was given ample opportunity for questions and those questions were answered to her satisfaction. She has been  encouraged to contact the office with any questions or concerns prior to her next appointment.      This note was created by [employer] voice recognition. Errors in content may be related to improper recognition by the system; efforts to review and correct have been done but errors may still exist. Please be advised the primary purpose of this note is for me to communicate medical care. Standard sentence structure is not always used. Medical terminology and medical abbreviations may be used. There may be grammatical, typographical, and automated fill ins that may have errors missed in proofreading.    Pre-op Diagnosis: Ductal carcinoma in situ (DCIS) of left breast [D05.12]    The above referenced H&P was reviewed by Angelica De Jesus MD on 9/23/2024, the patient was examined and no significant changes have occurred in the patient's condition since the H&P was performed.  I discussed with the patient and/or legal representative the  potential benefits, risks and side effects of this procedure; the likelihood of the patient achieving goals; and potential problems that might occur during recuperation.  I discussed reasonable alternatives to the procedure, including risks, benefits and side effects related to the alternatives and risks related to not receiving this procedure.  We will proceed with procedure as planned.

## 2024-09-23 NOTE — ANESTHESIA POSTPROCEDURE EVALUATION
Patient: Laquita Norris    Procedure Summary       Date: 09/23/24 Room / Location: [facility] MAIN OR 08 / [facility] MAIN OR    Anesthesia Start: 1522 Anesthesia Stop: 1614    Procedures:       Left breast wire bracketed lumpectomy (Left: Breast)      BREAST BIOPSY NEEDLE LOCALIZATION (Left: Breast) Diagnosis:       Ductal carcinoma in situ (DCIS) of left breast      (Ductal carcinoma in situ (DCIS) of left breast [D05.12])    Surgeons: Angelica De Jesus MD Anesthesiologist: Rubens Philip MD    Anesthesia Type: general ASA Status: 2            Anesthesia Type: general    Vitals Value Taken Time   /70 09/23/24 1613   Temp 97.6 °F (36.4 °C) 09/23/24 1613   Pulse 92 09/23/24 1614   Resp 18 09/23/24 1614   SpO2 99 % 09/23/24 1614   Vitals shown include unfiled device data.    EM AN Post Evaluation:   Patient Evaluated in PACU  Patient Participation: complete - patient participated  Level of Consciousness: awake and alert  Pain Score: 0  Pain Management: adequate  Airway Patency:patent  Yes    Nausea/Vomiting: none  Cardiovascular Status: acceptable  Respiratory Status: acceptable  Postoperative Hydration acceptable      RUBENS PHILIP MD  9/23/2024 4:14 PM

## 2024-09-23 NOTE — BRIEF OP NOTE
Pre-Operative Diagnosis: Ductal carcinoma in situ (DCIS) of left breast [D05.12]     Post-Operative Diagnosis: Ductal carcinoma in situ (DCIS) of left breast [D05.12]      Procedure Performed:   Left breast wire bracketed lumpectomy    Surgeons and Role:     * Angelica De Jesus MD - Primary    Assistant(s):   Magui Bermudez     Surgical Findings: Clip and calcs seen on xray     Specimen: L bracketed lumpectomy, L margins x6     Estimated Blood Loss: 5cc    Angelica De Jesus MD  9/23/2024  3:56 PM

## 2024-09-23 NOTE — ANESTHESIA PROCEDURE NOTES
Airway  Date/Time: 9/23/2024 3:27 PM  Urgency: Elective      General Information and Staff    Patient location during procedure: OR  Anesthesiologist: Mallory Butler MD  Performed: anesthesiologist   Performed by: Mallory Butler MD  Authorized by: Mallory Butler MD      Indications and Patient Condition  Indications for airway management: anesthesia  Sedation level: deep  Preoxygenated: yes  Patient position: sniffing  Mask difficulty assessment: 1 - vent by mask    Final Airway Details  Final airway type: supraglottic airway      Successful airway: classic  Size 4       Number of attempts at approach: 1

## 2024-09-23 NOTE — IMAGING NOTE
Pt  to mammography department scouts completed by MAXIME  mammography technologist     1250 Hx taken procedure explained questions answered.  Iv patent no redness or swelling noted at site     1300  Consent signed and verified      1309 Dr COFFEY here scanning completed Order verified and signed by all  procedural staff members    1310 Time out taken       site marked LEFT BREAST  Breast    Site #1    1311  Chloro prep as skin prep to site.  Lidocaine   1% 10 milligrams per ml given as anesthetic affect from kit  3 ml total given.    1312 Ghiatas needle  20 gauge  X 5 cm   placed .  Pt re  images procedure complete.    Site #2     1311  Chloro prep as skin prep to site.  Lidocaine   1% 10 milligrams per ml given as anesthetic affect from kit  3 ml total given.    1314 Ghiatas needle  20 gauge  X 5 cm   placed .  Pt re  images procedure complete.    1325 BB marker to site wire secured to breast  strips.  A 4x4 dsg secured  over wire with tape by nurse  LORIE LEI  after images completed .    1344 To [facility] SURGERY department .

## 2024-09-23 NOTE — ANESTHESIA PREPROCEDURE EVALUATION
Anesthesia PreOp Note    HPI:     Laquita Norris is a 64 year old female who presents for preoperative consultation requested by: Angelica De Jesus MD    Date of Surgery: 9/23/2024    Procedure(s):  Left breast wire bracketed lumpectomy  BREAST BIOPSY NEEDLE LOCALIZATION  Indication: Ductal carcinoma in situ (DCIS) of left breast [D05.12]    Relevant Problems   No relevant active problems       NPO:  Last Liquid Consumption Date: 09/23/24  Last Liquid Consumption Time: 0730 (CUP OF WATER)  Last Solid Consumption Date: 09/22/24  Last Solid Consumption Time: 1700  Last Liquid Consumption Date: 09/23/24          History Review:  Patient Active Problem List    Diagnosis Date Noted    Family history of malignant neoplasm of breast 09/04/2024    Family history of malignant neoplasm of gastrointestinal tract 09/04/2024    Ductal carcinoma in situ (DCIS) of left breast 08/28/2024    Pes planus of left foot 11/30/2022    Vaginal vault prolapse 01/28/2022    Primary osteoarthritis of right hip 08/17/2020    Essential hypertension 10/18/2017    Cystocele, midline 11/08/2012    Uterine prolapse 04/03/2006       Past Medical History:    Anxiety state    Bartholin cyst    per ng excision of bartholins cyst    BRCA gene mutation negative in female    negative for 70 gene panel with RNA. Results in media tab    Bunion    per ng bunionectomy, bilateral feet    Cancer (HCC)    L Br Ca    Cyst of skin    per ng rt thigh cyst removal    Diabetes (HCC)    Diverticulitis    hosp x 3 days    Diverticulosis    Fibroids    Hemorrhoids    per ng colonoscopy    High blood pressure    High cholesterol    History of blood transfusion    @[place]    History of bone density study    \"normal dexa '04, '07\"    Hx of motion sickness    Osteoarthritis    Perforated diverticulum of large intestine    Postmenopausal bleeding    per ng neg endometrial biopsy    Tonsillitis    tonsillectomy 1970    Uterine prolapse    uses pessary    Uterine prolapse     Donut pessary -- self cleaning     Visual impairment    contacts and glasses       Past Surgical History:   Procedure Laterality Date    Biopsy of uterus lining  10/2007    neg endometrial biopsy    Colonoscopy N/A 2018    Procedure: COLONOSCOPY;  Surgeon: Nini Garcia MD;  Location: [facility] ENDO    Foot surgery      bunionectomy    Hip replacement surgery      RT hip surgery 2020    Hysterectomy  2019    Leg/ankle surgery proc unlisted Left 2018    to repair flat foot    Joshua biopsy stereo nodule 1 site left (cpt=19081) Left 2024    TOPHAT CLIP    Needle biopsy left  2013          x3 w/PPTL    Other surgical history Right     thigh cyst removal    Pessary      Tonsillectomy  1970    Tubal ligation  1992       Medications Prior to Admission   Medication Sig Dispense Refill Last Dose    acetaminophen 500 MG Oral Tab Take 1 tablet (500 mg total) by mouth every 6 (six) hours as needed for Pain.   Past Week    glimepiride 2 MG Oral Tab Take 1 tablet (2 mg total) by mouth every morning before breakfast. A needed for sugar above 170 before breakfast 90 tablet 0 2024    atorvastatin 40 MG Oral Tab Take 1 tablet (40 mg total) by mouth nightly. 90 tablet 1 2024 at 2115    metFORMIN  MG Oral Tablet 24 Hr Take 1 tablet (500 mg total) by mouth daily with breakfast AND 2 tablets (1,000 mg total) daily with dinner. 270 tablet 1 2024 at 0930    estradiol 0.1 MG/GM Vaginal Cream APPLY 0.5 GRAM VAGINALLY 2 TIMES EVERY WEEK (Patient taking differently: 1 g twice a week. APPLY 0.5 GRAM VAGINALLY 2 TIMES EVERY WEEK) 42.5 g 3 Past Week    enalapril 20 MG Oral Tab Take 1 tablet (20 mg total) by mouth daily. (Patient taking differently: Take 1 tablet (20 mg total) by mouth every morning.) 100 tablet 3 2024 at 1100    Homeopathic Products (EARACHE DROPS OT) Place 2 drops in ear(s) daily as needed.   2024    Glucose Blood (ONETOUCH VERIO) In Vitro Strip USE  1 STRIP TO CHECK GLUCOSE THREE TIMES DAILY 400 strip 1     ONETOUCH DELICA PLUS [id number] Does not apply Misc 1 LANCET BY FINGER STICK ROUTE THREE TIMES DAILY 300 each 1     CALCIUM-VITAMINS C & D OR Take 1 tablet by mouth every morning.   More than a month    Polyvinyl Alcohol-Povidone (REFRESH OP) Apply 1 drop to eye daily as needed.       Inulin (FIBER CHOICE PREBIOTIC FIBER OR) Take 1 capsule by mouth as needed.   More than a month     Current Facility-Administered Medications Ordered in Epic   Medication Dose Route Frequency Provider Last Rate Last Admin    lactated ringers infusion   Intravenous Continuous Angelica De Jesus MD 20 mL/hr at 09/23/24 1159 New Bag at 09/23/24 1159    ceFAZolin (Ancef) 2g in 10mL IV syringe premix  2 g Intravenous Once Angelica De Jesus MD         No current [facility]-ordered outpatient medications on file.       Allergies   Allergen Reactions    Codeine RASH     T#3    Acetaminophen-Codeine RASH     Tolerates plain Tylenol       Family History   Problem Relation Age of Onset    Alcohol and Other Disorders Associated Father         alcoholism    Hypertension Father     Diabetes Mother         type 2    Lipids Mother         hyperlipidemia    Eye Problems Mother         eye issues    Heart Disorder Mother         per ng CABG    Hypertension Mother     Musculo-skelatal Disorder Mother         per ng osteoporosis    Allergies Mother         medication    Glaucoma Maternal Grandmother     Diabetes Maternal Grandmother     Hypertension Maternal Grandmother     Musculo-skelatal Disorder Maternal Grandmother         per ng osteoporosis    Allergies Sister     Cancer Maternal Aunt 45        breast ca; nun    Breast Cancer Maternal Aunt 45    Cancer Maternal Aunt 70        breast    Breast Cancer Maternal Aunt 70    Breast Cancer Maternal Aunt 80    Cancer Maternal Aunt 80        breast    Cancer Paternal Uncle         gastric    Diabetes Other         mGA    Breast Cancer Paternal Cousin  Female 62    Cancer Paternal Cousin Female 62        breast    Breast Cancer Paternal Aunt 75    Cancer Paternal Aunt 75        breast     Social History     Socioeconomic History    Marital status:    Tobacco Use    Smoking status: Never     Passive exposure: Never    Smokeless tobacco: Never   Vaping Use    Vaping status: Never Used   Substance and Sexual Activity    Alcohol use: No    Drug use: No    Sexual activity: Yes     Birth control/protection: Tubal Ligation   Other Topics Concern    Caffeine Concern No     Comment: 16oz soda chocolate daily    Exercise Yes     Comment: Physical therapy 3 times per week.  Walking daily    Pt has a pacemaker No    Pt has a defibrillator No    Reaction to local anesthetic No       Available pre-op labs reviewed.  Lab Results   Component Value Date    WBC 5.9 09/11/2024    RBC 4.12 09/11/2024    HGB 12.8 09/11/2024    HCT 37.9 09/11/2024    MCV 92.0 09/11/2024    MCH 31.1 09/11/2024    MCHC 33.8 09/11/2024    RDW 12.5 09/11/2024    .0 09/11/2024     Lab Results   Component Value Date     09/11/2024    K 4.4 09/11/2024     09/11/2024    CO2 25.0 09/11/2024    BUN 15 09/11/2024    CREATSERUM 0.63 09/11/2024    GLU 95 09/11/2024    PGLU 116 (H) 09/23/2024    CA 9.8 09/11/2024     Lab Results   Component Value Date    INR 0.96 09/11/2024       Vital Signs:  Body mass index is 26.45 kg/m².   height is 1.549 m (5' 1\") and weight is 63.5 kg (140 lb). Her blood pressure is 138/81 and her pulse is 78. Her oxygen saturation is 98%.   Vitals:    09/16/24 1658 09/23/24 1126 09/23/24 1205   BP:  (!) 171/89 138/81   Pulse:  78    SpO2:  98%    Weight: 62.6 kg (138 lb) 63.5 kg (140 lb)    Height: 1.549 m (5' 1\") 1.549 m (5' 1\")         Anesthesia Evaluation     Patient summary reviewed and Nursing notes reviewed    No history of anesthetic complications   Airway   Mallampati: II  TM distance: >3 FB  Neck ROM: full  Dental    (+) partials    Pulmonary - normal exam    Cardiovascular - normal exam  Exercise tolerance: good  (+) hypertension    Neuro/Psych    (+)  anxiety/panic attacks,        GI/Hepatic/Renal      Endo/Other    (+) diabetes mellitus  Abdominal                  Anesthesia Plan:   ASA:  2  Plan:   General  Airway:  LMA  Post-op Pain Management: IV analgesics  Informed Consent Plan and Risks Discussed With:  Patient  Discussed plan with:  Surgeon      I have informed Laquita Norris and/or legal guardian or family member of the nature of the anesthetic plan, benefits, risks including possible dental damage if relevant, major complications, and any alternative forms of anesthetic management.   All of the patient's questions were answered to the best of my ability. The patient desires the anesthetic management as planned.  Mallory Butler MD  9/23/2024 2:39 PM  Present on Admission:  **None**

## 2024-09-24 ENCOUNTER — TELEPHONE (OUTPATIENT)
Dept: SURGERY | Facility: CLINIC | Age: 64
End: 2024-09-24

## 2024-09-24 NOTE — TELEPHONE ENCOUNTER
Patient called to ask about a medication being sent to her pharmacy. She can be reached at [phone]. Call transferred to RN line.

## 2024-09-24 NOTE — TELEPHONE ENCOUNTER
Called patient back in regards to her questions regarding her pain medication. Patient wanted to know if she needs to  the pain medication prescription from the pharmacy. Patient stated that her pain is well under control with extra strength tylenol. She stated she does not want to take any extra medication if she does not have too. Explained to the patient that if she does not want to take the medication she can choose not too.She can take extra strength tylenol if that is helping her with the pain. Patient complained of soreness at the surgical area. Suggested icing the area every few hours for not more then 10 min. All questions were answered to the best of my ability. Patient verbalized understanding. Will contact our office if have any further questions.

## 2024-09-24 NOTE — OPERATIVE REPORT
[facility]    PATIENT'S NAME: MAXIME PAUL   ATTENDING PHYSICIAN: Angelica De Jesus MD   OPERATING PHYSICIAN: Angelica De Jesus MD   PATIENT ACCOUNT#:   [id number]    LOCATION:  [place] [address]  MEDICAL RECORD #:   [id number]       YOB: 1960  ADMISSION DATE:       09/23/2024      OPERATION DATE:  09/23/2024    OPERATIVE REPORT      PREOPERATIVE DIAGNOSIS:  Ductal carcinoma in situ of the left breast.  POSTOPERATIVE DIAGNOSIS:  Ductal carcinoma in situ of the left breast.  PROCEDURE:  Left breast wire-bracketed lumpectomy, left breast specimen radiography, and left breast advancement flap mastopexy for defect measuring 16 sq cm.    ASSISTANT:  Magui Bermudez CSA.    ANESTHESIA:  General anesthesia and local.    ESTIMATED BLOOD LOSS:  5 mL.    DRAINS:  None.    COMPLICATIONS:  None.    DISPOSITION:  Stable on transfer to recovery room.    INDICATIONS:  The patient is a 64-year-old female who had an expansive calcifications seen on mammogram and underwent biopsy confirming a diagnosis of DCIS.  We discussed local treatment options.  She was motivated for a breast-conserving approach.  We discussed the need to achieve free margins, the possibility of re-excision to achieve free margins, as well as possible need for postoperative further treatment locally or regionally.  Risks and possible complications were discussed with the patient including, but not limited to, infection, bleeding, injury to surrounding structures, possible need for reoperation, and she agreed to the proposed surgery.    OPERATIVE TECHNIQUE:  Patient was brought to the imaging suite.  She underwent a wire bracketing of the area of expansive calcifications in the left breast.  She was then brought to the OR, placed in supine position, properly padded and secured, given a dose of IV antibiotics, and sequential compression devices were applied to legs for DVT prophylaxis.  General anesthesia induced.  The left  breast was prepped and draped in usual sterile fashion.  Then, 1% lidocaine with epinephrine was used to infiltrate the skin and subcutaneous tissues at the targeted incision site.  A curvilinear incision was made along the lateral areolar border with a 15 blade knife in the skin.  Both wires were identified, brought in the field, and a segment of breast tissue surrounding the tip of both wires was excised and oriented and brought as a single specimen, oriented as a short stitch single clip superiorly and a long stitch double clip laterally in order to allow for appropriate pathological margin assessment and review.  This was then placed in the imaging device where specimen x-ray confirmed the presence of the targeted biopsy clip calcifications with adequate margins as deemed by myself.  Using sharp dissection and electrocautery, 6 margins were then taken from the interior of the lumpectomy cavity, each marked with a clip at true margin, individually labeled, and sent for permanent pathologic evaluation.  Wound was irrigated with warm sterile saline.  Hemostasis was assured with electrocautery.  Hemoclips placed around the periphery of the cavity to assist with subsequent surveillance.  She was found to have a large defect in the breast, thought to impair both optimal wound healing and cosmesis, for which we proceeded with an advancement flap mastopexy.  This required that I place a counterincision in immediate adjacent superior lateral breast parenchyma down to the level of the pectoralis fascia.  I then used a superior vascular pedicle, mobilized this into the aforementioned defect, and secured this at the level of the pectoralis fascia with a running 3-0 PDS suture.  The wound was then closed with an interrupted 3-0 Vicryl for deep layer and a running 4-0 subcuticular Monocryl for skin.  Mastisol and Steri-Strips were applied.  Then, 0.5% Marcaine was instilled in the cavity to assist with postoperative  analgesia.  A sterile dressing and compression bra were placed.  Blood loss was minimal.  All counts correct at the conclusion of the procedure.  She tolerated the procedure well.  She was transferred to recovery area in stable condition.    Dictated By Angelica De Jesus MD  d: 09/23/2024 16:20:00  t: 09/23/2024 18:43:11  Job [id number]  CMG/    cc: Erickosn Ruiz MD

## 2024-09-24 NOTE — TELEPHONE ENCOUNTER
Returned patient call regarding her medication question. Left voice massage with a call back number to call our office.

## 2024-09-26 ENCOUNTER — TELEPHONE (OUTPATIENT)
Dept: ENDOCRINOLOGY CLINIC | Facility: CLINIC | Age: 64
End: 2024-09-26

## 2024-09-26 ENCOUNTER — TELEPHONE (OUTPATIENT)
Dept: SURGERY | Facility: CLINIC | Age: 64
End: 2024-09-26

## 2024-09-26 NOTE — TELEPHONE ENCOUNTER
Pt called to confirm when she should be checking BG.    Per LOV: cont. testing frequency to 2x daily - fasting and 2hrs after meals     Advised pt to check fasting BG and may alternate checking 2 hours after lunch and 2 hours after dinner for a total of twice per day. Advised pt to call office if BG is <80 or persistently >250. Pt verbalized understanding.

## 2024-09-30 ENCOUNTER — OFFICE VISIT (OUTPATIENT)
Dept: HEMATOLOGY/ONCOLOGY | Facility: HOSPITAL | Age: 64
End: 2024-09-30
Attending: INTERNAL MEDICINE
Payer: MEDICARE

## 2024-09-30 ENCOUNTER — OFFICE VISIT (OUTPATIENT)
Dept: SURGERY | Facility: CLINIC | Age: 64
End: 2024-09-30
Payer: COMMERCIAL

## 2024-09-30 VITALS
HEIGHT: 61 IN | OXYGEN SATURATION: 98 % | TEMPERATURE: 98 F | WEIGHT: 141 LBS | BODY MASS INDEX: 26.62 KG/M2 | DIASTOLIC BLOOD PRESSURE: 68 MMHG | SYSTOLIC BLOOD PRESSURE: 140 MMHG | RESPIRATION RATE: 16 BRPM | HEART RATE: 87 BPM

## 2024-09-30 VITALS
BODY MASS INDEX: 27 KG/M2 | TEMPERATURE: 98 F | DIASTOLIC BLOOD PRESSURE: 79 MMHG | WEIGHT: 141 LBS | SYSTOLIC BLOOD PRESSURE: 150 MMHG | OXYGEN SATURATION: 98 % | HEART RATE: 84 BPM | RESPIRATION RATE: 16 BRPM

## 2024-09-30 DIAGNOSIS — Z78.0 OSTEOPENIA AFTER MENOPAUSE: ICD-10-CM

## 2024-09-30 DIAGNOSIS — M85.80 OSTEOPENIA AFTER MENOPAUSE: ICD-10-CM

## 2024-09-30 DIAGNOSIS — D05.12 DUCTAL CARCINOMA IN SITU (DCIS) OF LEFT BREAST: Primary | ICD-10-CM

## 2024-09-30 PROCEDURE — 3078F DIAST BP <80 MM HG: CPT

## 2024-09-30 PROCEDURE — 99205 OFFICE O/P NEW HI 60 MIN: CPT | Performed by: INTERNAL MEDICINE

## 2024-09-30 PROCEDURE — 1159F MED LIST DOCD IN RCRD: CPT

## 2024-09-30 PROCEDURE — 3077F SYST BP >= 140 MM HG: CPT

## 2024-09-30 PROCEDURE — 1160F RVW MEDS BY RX/DR IN RCRD: CPT

## 2024-09-30 PROCEDURE — 99024 POSTOP FOLLOW-UP VISIT: CPT

## 2024-09-30 RX ORDER — ANASTROZOLE 1 MG/1
1 TABLET ORAL DAILY
Qty: 30 TABLET | Refills: 3 | Status: SHIPPED | OUTPATIENT
Start: 2024-09-30

## 2024-09-30 NOTE — PROGRESS NOTES
Breast Surgery Post-Operative Visit    Diagnosis: DCIS, left breast, status post left breast wire bracketed lumpectomy on September 23, 2024.     Stage: Cancer Staging   No matching staging information was found for the patient.      Disease Status:  Surgical treatment complete, medical and radiation oncology recommendations pending.      History of Present Illness:   Ms. Laquita Norris is a 64 year old woman who presents with imaging detected DCIS. SHe denies any palpable masses, nipple discharge or axillary symptoms. She has a personal h/o a remote benign Left breast biopsy. She does have a family history of breast cancer. SHe had a prior right dx workup recently for pain that was unremarkable. She had a surveillance for L calcs in Feb 2024 deemed to be stable. SHe had a L surveillance dx imaging on AUg 20, 2024 that showed increased calcifications in the medial left breast for which biopsy was recommended.THis took place on Aug 23, 2024 and showed DCIS that was ER 73%. She underwent left breast lumpectomy, which occurred without complication. She is here for postoperative visit. She reports her pain is under control. She denies erythema, warmth, drainage, or fevers.  She has extensive bruising to her left breast as well as her chest wall.  She has developed a rash from the chlorhexidine.  She has developed blisters to her left breast near her Steri-Strips.   She is here today for evaluation and recommendations for further therapy.        Past Medical History:    Anxiety state    Bartholin cyst    per ng excision of bartholins cyst    BRCA gene mutation negative in female    negative for 70 gene panel with RNA. Results in media tab    Bunion    per ng bunionectomy, bilateral feet    Cancer (HCC)    L Br Ca    Cyst of skin    per ng rt thigh cyst removal    Diabetes (HCC)    Diverticulitis    hosp x 3 days    Diverticulosis    Fibroids    Hemorrhoids    per ng colonoscopy    High blood pressure    High cholesterol     History of blood transfusion    @[place]    History of bone density study    \"normal dexa '04, '07\"    Hx of motion sickness    Osteoarthritis    Perforated diverticulum of large intestine    Postmenopausal bleeding    per ng neg endometrial biopsy    Tonsillitis    tonsillectomy 1970    Uterine prolapse    uses pessary    Uterine prolapse    Donut pessary -- self cleaning     Visual impairment    contacts and glasses       Past Surgical History:   Procedure Laterality Date    Biopsy of uterus lining  10/2007    neg endometrial biopsy    Colonoscopy N/A 2018    Procedure: COLONOSCOPY;  Surgeon: Nini Garcia MD;  [facility]    Foot surgery      bunionectomy    Hip replacement surgery      RT hip surgery 2020    Hysterectomy  2019    Leg/ankle surgery proc unlisted Left     to repair flat foot    Joshua biopsy stereo nodule 1 site left (cpt=19081) Left 2024    TOPHAT CLIP    Needle biopsy left  2013          x3 w/PPTL    Other surgical history Right     thigh cyst removal    Pessary      Tonsillectomy      Tubal ligation         Gynecological History:  Pt is a   Pt was 26 years old at time of first pregnancy.    She has cumulative breastfeeding history of 2 months.  She achieved menarche at age 12 and LMP age 46  Age of Menopause: 46  Type: natural menopause  She denies any history of hormone replacement therapy   She denies any history of oral contraceptive use   She denies infertility treatment to achieve pregnancy.    Medications:     acetaminophen 500 MG Oral Tab Take 1 tablet (500 mg total) by mouth every 6 (six) hours as needed for Pain.      glimepiride 2 MG Oral Tab Take 1 tablet (2 mg total) by mouth every morning before breakfast. A needed for sugar above 170 before breakfast 90 tablet 0    Glucose Blood (ONETOUCH VERIO) In Vitro Strip USE 1 STRIP TO CHECK GLUCOSE THREE TIMES DAILY 400 strip 1    ONETOUCH DELICA PLUS [id number]  Does not apply Misc 1 LANCET BY FINGER STICK ROUTE THREE TIMES DAILY 300 each 1    atorvastatin 40 MG Oral Tab Take 1 tablet (40 mg total) by mouth nightly. 90 tablet 1    metFORMIN  MG Oral Tablet 24 Hr Take 1 tablet (500 mg total) by mouth daily with breakfast AND 2 tablets (1,000 mg total) daily with dinner. 270 tablet 1    estradiol 0.1 MG/GM Vaginal Cream APPLY 0.5 GRAM VAGINALLY 2 TIMES EVERY WEEK (Patient taking differently: 1 g twice a week. APPLY 0.5 GRAM VAGINALLY 2 TIMES EVERY WEEK) 42.5 g 3    enalapril 20 MG Oral Tab Take 1 tablet (20 mg total) by mouth daily. (Patient taking differently: Take 1 tablet (20 mg total) by mouth every morning.) 100 tablet 3    CALCIUM-VITAMINS C & D OR Take 1 tablet by mouth every morning.      Polyvinyl Alcohol-Povidone (REFRESH OP) Apply 1 drop to eye daily as needed.      Inulin (FIBER CHOICE PREBIOTIC FIBER OR) Take 1 capsule by mouth as needed.      Homeopathic Products (EARACHE DROPS OT) Place 2 drops in ear(s) daily as needed.         Allergies:    Allergies   Allergen Reactions    Codeine RASH     T#3    Acetaminophen-Codeine RASH     Tolerates plain Tylenol       Family History:   Family History   Problem Relation Age of Onset    Alcohol and Other Disorders Associated Father         alcoholism    Hypertension Father     Diabetes Mother         type 2    Lipids Mother         hyperlipidemia    Eye Problems Mother         eye issues    Heart Disorder Mother         per ng CABG    Hypertension Mother     Musculo-skelatal Disorder Mother         per ng osteoporosis    Allergies Mother         medication    Glaucoma Maternal Grandmother     Diabetes Maternal Grandmother     Hypertension Maternal Grandmother     Musculo-skelatal Disorder Maternal Grandmother         per ng osteoporosis    Allergies Sister     Cancer Maternal Aunt 45        breast ca; nun    Breast Cancer Maternal Aunt 45    Cancer Maternal Aunt 70        breast    Breast Cancer Maternal Aunt 70     Breast Cancer Maternal Aunt 80    Cancer Maternal Aunt 80        breast    Cancer Paternal Uncle         gastric    Diabetes Other         mGA    Breast Cancer Paternal Cousin Female 62    Cancer Paternal Cousin Female 62        breast    Breast Cancer Paternal Aunt 75    Cancer Paternal Aunt 75        breast       She is not of Ashkenazi Christian ancestry.    Social History:  History   Alcohol Use No       History   Smoking Status    Never   Smokeless Tobacco    Never     Ms. Laquita Norris is  with 3 children. She has 4 siblings. She is currently Retired    Review of Systems:  General:   The patient denies, fever, chills, night sweats, fatigue, generalized weakness, +change in appetite or +weight loss.    HEENT:     The patient denies eye irritation, cataracts, redness, glaucoma, yellowing of the eyes, change in vision, color blindness, or wearing contacts/glasses. The patient denies hearing loss, ringing in the ears, ear drainage, +earaches, nasal congestion, nose bleeds, +snoring, pain in mouth/throat, hoarseness, change in voice, facial trauma.    Respiratory:  The patient denies chronic cough, phlegm, hemoptysis, pleurisy/chest pain, pneumonia, asthma, wheezing, difficulty in breathing with exertion, emphysema, chronic bronchitis, shortness of breath or abnormal sound when breathing.     Cardiovascular:  There is no history of chest pain, chest pressure/discomfort, palpitations, irregular heartbeat, fainting or near-fainting, difficulty breathing when lying flat, SOB/Coughing at night, swelling of the legs or chest pain while walking.    Breasts:  See history of present illness    Gastrointestinal:     There is no history of difficulty or pain with swallowing, reflux symptoms, vomiting, dark or bloody stools, constipation, yellowing of the skin, indigestion, nausea, change in bowel habits, diarrhea, abdominal pain or vomiting blood.     Genitourinary:  The patient denies frequent urination, needing to get  up at night to urinate, urinary hesitancy or retaining urine, painful urination, urinary incontinence, decreased urine stream, blood in the urine or vaginal/penile discharge.    Skin:    The patient denies rash, +itching, skin lesions, dry skin, change in skin color or change in moles.     Hematologic/Lymphatic:  The patient denies +easily bruising or bleeding or persistent swollen glands or lymph nodes.     Musculoskeletal:  The patient denies muscle aches/pain, joint pain, stiff joints, neck pain, +back pain or bone pain.    Neuropsychiatric:  There is no history of migraines or severe headaches, seizure/epilepsy, speech problems, coordination problems, trembling/tremors, fainting/black outs, dizziness, memory problems, loss of sensation/numbness, +problems walking, weakness, tingling or burning in hands/feet. There is no history of abusive relationship, bipolar disorder, sleep disturbance, anxiety, depression or feeling of despair.    Endocrine:    There is no history of poor/slow wound healing, weight loss/gain, fertility or hormone problems, cold intolerance, thyroid disease.     Allergic/Immunologic:  There is no history of hives, hay fever, angioedema or anaphylaxis.    /79 (BP Location: Right arm, Patient Position: Sitting, Cuff Size: adult)   Pulse 84   Temp 98.1 °F (36.7 °C)   Resp 16   Wt 64 kg (141 lb)   SpO2 98%   BMI 26.64 kg/m²     Physical Exam:  The patient is an alert, oriented, well-nourished and  well-developed woman who appears her stated age. Her speech patterns and movements are normal. Her affect is appropriate.    HEENT: The head is normocephalic. The neck is supple. The thyroid is not enlarged and is without palpable masses/nodules. There are no palpable masses. The trachea is in the midline. Conjunctiva are clear, non-icteric.    Chest: The chest expands symmetrically. The lungs are clear to auscultation.    Heart: The rhythm is regular.  There are no murmurs, rubs, gallops or  thrills.    Breasts:  Her breasts are symmetrical with a cup size 38B.  Right breast: The skin, nipple ,and areola appear normal. There is no skin dimpling with movement of the pectoralis. There is no nipple retraction. No nipple discharge can be elicited. The parenchyma is mildly nodular. There are no dominant masses in the breast. The axillary tail is normal.  Left breast:   The skin, nipple, and areola appear normal. There is no skin dimpling with movement of the pectoralis. There is no nipple retraction. No nipple discharge can be elicited. The parenchyma is mildly nodular. There are no dominant masses in the breast. The axillary tail is normal.    Abdomen:  The abdomen is soft, flat and non tender. The liver is not enlarged. There are no palpable masses.    Lymph Nodes:  The supraclavicular, axillary and cervical regions are free of significant lymphadenopathy.    Back: There is no vertebral column tenderness.    Skin: The skin appears normal. There are no suspicious appearing rashes or lesions.    Extremities: The extremities are without deformity, cyanosis or edema.    Impression:   Ms. Laquita Norris is a 64 year old woman presents with family history of breast cancer and L breast DCIS, clinical stage [id number], s/p lumpectomy    Recommendations:   I had a discussion with the Patient regarding her breast exam.  She is healing well since surgery with no signs of infection. I reviewed her pathology.  She will follow-up with Dr. De Jesus for a second postoperative visit.  I encouraged her to take Benadryl for her rash related to the chlorhexidine.  I removed her Steri-Strips today and instructed the patient to apply Neosporin daily to her incision and areas of blistering.  I instructed the patient to continue to wear a compression bra due to her extensive ecchymosis.  I encouraged her to continue monitoring her ROM and strength and explained that a referral to physical therapy may be warranted in the future if she  identifies any limitations or restrictions. She was given ample opportunity for questions and those questions were answered to her satisfaction. She was encouraged to contact the office with any questions or concerns prior to her next scheduled appointment.       This note was created by Dragon voice recognition. Errors in content may be related to improper recognition by the system; efforts to review and correct have been done but errors may still exist. Please be advised the primary purpose of this note is for me to communicate medical care. Standard sentence structure is not always used. Medical terminology and medical abbreviations may be used. There may be grammatical, typographical, and automated fill ins that may have errors missed in proofreading.

## 2024-09-30 NOTE — PATIENT INSTRUCTIONS
Take benadryl for rash  Tylenol for pain  Wear compression bra for hematoma/bruising   Neosporin over incision daily and over blister

## 2024-09-30 NOTE — CONSULTS
Hematology Oncology Consultation Note    Patient Name: Laquita Norris   YOB: 1960   Medical Record Number: [id number]   CSN: [id number]   Consulting Physician: Bindu Roth MD  Referring Physician(s): Angelica De Jesus MD  Date of Consultation: 9/30/2024     Reason for Consultation:    Encounter Diagnoses   Name Primary?    Ductal carcinoma in situ (DCIS) of left breast Yes    Osteopenia after menopause        History of Present Illness:   Laquita Diamond a 64 year old White female with history of HTN, HLD, DM II, osteoarthritis, osteopenia referred for evaluation of newly diagnosed breast cancer. Her oncologic history is as follows:    8/14/23: screening mammogram revealed left breast focal symmetry with associated calcifications. Additional imaging was recommended.   8/23/23: left diagnotic mammogram and ultrasound showed probably benign focal asymmetry in the left inner lower quadrant with grouped calcifications. Follow up in 6 months was recommended.   2/28/24: left diagnostic mammogram showed stable probably benign left breast mass with associated calcifications and probably a mass at 9:00, 5 cfn stable since 8/2023. Repeat dx mamm in 6 months was recommended.   8/20/24: left diagnostic mammogram showed increased calcifications in the medial left breast. Biopsy was recommended.   8/23/24: left breast biopsy positive for DCIS, ER 73%.   9/23/24: left breast bracketed lumpectomy with left margins x5 ([facility]); path showed high grade multifocal DCIS, largest 2,0 cm, all margins negative with nearest margin 5 mm from DCIS. pTisNx.       Performance Status: ECOG 0      Past Medical History:  Past Medical History:    Anxiety state    Bartholin cyst    per ng excision of bartholins cyst    BRCA gene mutation negative in female    negative for 70 gene panel with RNA. Results in media tab    Bunion    per ng bunionectomy, bilateral feet    Cancer (HCC)    L Br Ca    Cyst of skin    per ng rt thigh cyst  removal    Diabetes (HCC)    Diverticulitis    hosp x 3 days    Diverticulosis    Fibroids    Hemorrhoids    per ng colonoscopy    High blood pressure    High cholesterol    History of blood transfusion    @[facility]    History of bone density study    \"normal dexa '04, '07\"    Hx of motion sickness    Osteoarthritis    Perforated diverticulum of large intestine    Postmenopausal bleeding    per ng neg endometrial biopsy    Tonsillitis    tonsillectomy 1970    Uterine prolapse    uses pessary    Uterine prolapse    Donut pessary -- self cleaning     Visual impairment    contacts and glasses       Past Surgical History:  Past Surgical History:   Procedure Laterality Date    Biopsy of uterus lining  10/2007    neg endometrial biopsy    Colonoscopy N/A 2018    Procedure: COLONOSCOPY;  Surgeon: Nini Garcia MD;  Location: [facility]    Foot surgery      bunionectomy    Hip replacement surgery      RT hip surgery 2020    Hysterectomy  2019    Leg/ankle surgery proc unlisted Left     to repair flat foot    Joshua biopsy stereo nodule 1 site left (cpt=19081) Left 2024    TOPHAT CLIP    Needle biopsy left  2013          x3 w/PPTL    Other surgical history Right 2013    thigh cyst removal    Pessary  2006    Tonsillectomy  1970    Tubal ligation         Family Medical History:  Family History   Problem Relation Age of Onset    Alcohol and Other Disorders Associated Father         alcoholism    Hypertension Father     Diabetes Mother         type 2    Lipids Mother         hyperlipidemia    Eye Problems Mother         eye issues    Heart Disorder Mother         per ng CABG    Hypertension Mother     Musculo-skelatal Disorder Mother         per ng osteoporosis    Allergies Mother         medication    Glaucoma Maternal Grandmother     Diabetes Maternal Grandmother     Hypertension Maternal Grandmother     Musculo-skelatal Disorder Maternal Grandmother         per ng osteoporosis     Allergies Sister     Cancer Maternal Aunt 45        breast ca; nun    Breast Cancer Maternal Aunt 45    Cancer Maternal Aunt 70        breast    Breast Cancer Maternal Aunt 70    Breast Cancer Maternal Aunt 80    Cancer Maternal Aunt 80        breast    Cancer Paternal Uncle         gastric    Diabetes Other         mGA    Breast Cancer Paternal Cousin Female 62    Cancer Paternal Cousin Female 62        breast    Breast Cancer Paternal Aunt 75    Cancer Paternal Aunt 75        breast       Gyne History:  OB History    Para Term  AB Living   3 3 3 0 0 3   SAB IAB Ectopic Multiple Live Births   0 0 0 0 3       Psychosocial History:  Social History     Socioeconomic History    Marital status:      Spouse name: Not on file    Number of children: Not on file    Years of education: Not on file    Highest education level: Not on file   Occupational History    Not on file   Tobacco Use    Smoking status: Never     Passive exposure: Never    Smokeless tobacco: Never   Vaping Use    Vaping status: Never Used   Substance and Sexual Activity    Alcohol use: No    Drug use: No    Sexual activity: Yes     Birth control/protection: Tubal Ligation   Other Topics Concern     Service Not Asked    Blood Transfusions Not Asked    Caffeine Concern No     Comment: 16oz soda chocolate daily    Occupational Exposure Not Asked    Hobby Hazards Not Asked    Sleep Concern Not Asked    Stress Concern Not Asked    Weight Concern Not Asked    Special Diet Not Asked    Back Care Not Asked    Exercise Yes     Comment: Physical therapy 3 times per week.  Walking daily    Bike Helmet Not Asked    Seat Belt Not Asked    Self-Exams Not Asked    Grew up on a farm Not Asked    History of tanning Not Asked    Outdoor occupation Not Asked    Pt has a pacemaker No    Pt has a defibrillator No    Breast feeding Not Asked    Reaction to local anesthetic No   Social History Narrative    Not on file     Social Determinants of  Health     Financial Resource Strain: Not on file   Food Insecurity: Not on file   Transportation Needs: Not on file   Physical Activity: Not on file   Stress: Not on file   Social Connections: Not on file   Housing Stability: Not on file       Allergies:   Allergies   Allergen Reactions    Codeine RASH     T#3    Acetaminophen-Codeine RASH     Tolerates plain Tylenol       Current Medications:    Current Outpatient Medications:     acetaminophen 500 MG Oral Tab, Take 1 tablet (500 mg total) by mouth every 6 (six) hours as needed for Pain., Disp: , Rfl:     glimepiride 2 MG Oral Tab, Take 1 tablet (2 mg total) by mouth every morning before breakfast. A needed for sugar above 170 before breakfast, Disp: 90 tablet, Rfl: 0    Glucose Blood (ONETOUCH VERIO) In Vitro Strip, USE 1 STRIP TO CHECK GLUCOSE THREE TIMES DAILY, Disp: 400 strip, Rfl: 1    ONETOUCH DELICA PLUS [id number] Does not apply Misc, 1 LANCET BY FINGER STICK ROUTE THREE TIMES DAILY, Disp: 300 each, Rfl: 1    atorvastatin 40 MG Oral Tab, Take 1 tablet (40 mg total) by mouth nightly., Disp: 90 tablet, Rfl: 1    metFORMIN  MG Oral Tablet 24 Hr, Take 1 tablet (500 mg total) by mouth daily with breakfast AND 2 tablets (1,000 mg total) daily with dinner., Disp: 270 tablet, Rfl: 1    estradiol 0.1 MG/GM Vaginal Cream, APPLY 0.5 GRAM VAGINALLY 2 TIMES EVERY WEEK (Patient taking differently: 1 g twice a week. APPLY 0.5 GRAM VAGINALLY 2 TIMES EVERY WEEK), Disp: 42.5 g, Rfl: 3    enalapril 20 MG Oral Tab, Take 1 tablet (20 mg total) by mouth daily. (Patient taking differently: Take 1 tablet (20 mg total) by mouth every morning.), Disp: 100 tablet, Rfl: 3    CALCIUM-VITAMINS C & D OR, Take 1 tablet by mouth every morning., Disp: , Rfl:     Polyvinyl Alcohol-Povidone (REFRESH OP), Apply 1 drop to eye daily as needed., Disp: , Rfl:     Inulin (FIBER CHOICE PREBIOTIC FIBER OR), Take 1 capsule by mouth as needed., Disp: , Rfl:     Homeopathic Products (EARACHE DROPS  OT), Place 2 drops in ear(s) daily as needed., Disp: , Rfl:     Review of Systems:  A comprehensive 10-point ROS completed all negative unless otherwise documented in HPI.     Vital Signs:  /68 (BP Location: Left arm, Patient Position: Sitting, Cuff Size: adult)   Pulse 87   Temp 98.1 °F (36.7 °C) (Oral)   Resp 16   Ht 1.549 m (5' 1\")   Wt 64 kg (141 lb)   SpO2 98%   BMI 26.64 kg/m²     Physical Examination:  General: Alert and oriented x 3, not in acute distress.  HEENT: Non-icteric sclera. Oropharynx is clear.   Neck: No palpable lymphadenopathy. Neck is supple.  Breast: Left lumpectomy incision with extensive bruising and skin rash. No palpable masses, hematoma/seroma. Right breast normal. No axillary adenopathy bilaterally.   Chest: Clear to auscultation.  Heart: Regular rate and rhythm. S1 S2 normal.   Abdomen: Soft, non tender, non distended with good bowel sounds.  Extremities: Pedal pulses are present. No edema.  Neurological: Grossly intact.   Lymphatics: No palpable lymphadenopathy in the cervical, supraclavicular, axillary, or inguinal regions.  Psych/Depression: Appropriate mood and affect.     Laboratory:    Lab Results   Component Value Date    WBC 5.9 09/11/2024    RBC 4.12 09/11/2024    HGB 12.8 09/11/2024    HCT 37.9 09/11/2024    MCV 92.0 09/11/2024    MCH 31.1 09/11/2024    MCHC 33.8 09/11/2024    RDW 12.5 09/11/2024    .0 09/11/2024    MPV 7.3 (L) 12/06/2018     Lab Results   Component Value Date     09/11/2024    K 4.4 09/11/2024     09/11/2024    CO2 25.0 09/11/2024    BUN 15 09/11/2024    CREATSERUM 0.63 09/11/2024    GLU 95 09/11/2024    CA 9.8 09/11/2024    ALKPHO 249 (H) 09/11/2024     (H) 09/11/2024     (H) 09/11/2024    BILT 1.5 (H) 09/11/2024    ALB 4.4 09/11/2024    TP 8.5 (H) 09/11/2024       Radiology:  [facility] SURGICAL SPECIMEN LEFT (CPT=76098)    Result Date: 9/24/2024  CONCLUSION: The specimen radiograph shows the top-hat shaped clip to be  within the specimen, with the localization wires in place.   Dictated by (CST): Freddy Pham DO on 9/24/2024 at 8:16 AM     Finalized by (CST): Freddy Pham DO on 9/24/2024 at 8:17 AM          [facility] LOCALIZATION WIRE 2 SITE LEFT (CPT=19281/35797)    Result Date: 9/23/2024  CONCLUSION: Successful hookwire localization.  Postprocedure mammogram demonstrates the wires in the appropriate positions.     Dictated by (CST): Freddy Pham DO on 9/23/2024 at 1:46 PM     Finalized by (CST): Freddy Pham DO on 9/23/2024 at 1:49 PM          MRI MRCP (W+WO) (CPT=74183)    Result Date: 9/14/2024  CONCLUSION:   Pancreatic divisum. Normal pancreas with normal duct measuring up to 3-4 millimeter in the pancreatic head.  There are no concerning findings present   Dictated by (CST): Ernesto Barrett MD on 9/14/2024 at 11:19 AM     Finalized by (CST): Ernesto Barrett MD on 9/14/2024 at 11:27 AM          XR CHEST PA + LAT CHEST (CPT=71046)    Result Date: 9/11/2024  CONCLUSION:  1. No acute cardiopulmonary disease.    Dictated by (CST): Jonathan Evans MD on 9/11/2024 at 5:44 PM     Finalized by (CST): Jonathan Evans MD on 9/11/2024 at 5:47 PM            Pathology:    A. Right breast bracketed lumpectomy:    Ductal carcinoma in situ, high-grade, solid and cribriform types, with associated comedo necrosis and calcifications, multifocal, 2.0 cm in greatest linear span.  Reactive changes present at site of previous biopsy.  Fibrocystic changes present, including usual ductal hyperplasia, apocrine metaplasia, ductal adenosis, papillomatosis, microcyst formation, columnar cell change, and fibroadenomatosis, with associated microcalcifications.  All final inked surgical margins are negative for carcinoma; nearest final margin is deep margin, 5 mm from DCIS.  pTis, NX.  See comment.     B. Right breast, anterior margin; excision:   Portion of breast tissue, including the inked true specimen margin, showing no  evidence of malignancy.   Fibrocystic changes present, including usual ductal hyperplasia, with associated microcalcifications.      C. Right breast, deep margin; excision:   Portion of breast tissue, including the inked true specimen margin, showing no evidence of malignancy.       D. Right breast, medial margin; excision:   Portion of breast tissue, including the inked true specimen margin, showing no evidence of malignancy.       E. Right breast, lateral margin; excision:   Portion of breast tissue, including the inked true specimen margin, showing no evidence of malignancy.   Fibrocystic changes present, with associated microcalcifications.       F. Right breast, superior margin; excision:   Portion of breast tissue, including the inked true specimen margin, showing no evidence of malignancy.       G. Right breast, inferior margin; excision:   Portion of breast tissue, including the inked true specimen margin, showing no evidence of malignancy.       Comment:  Immunohistochemical stains p63 and myosin performed on block A5 demonstrate preservation of the myoepithelial layer throughout, supporting the diagnosis of DCIS in a background of extensive fibrocystic changes.     Immunohistochemical stains p63 and myosin performed on block A17 also demonstrate preservation of the myoepithelial layer throughout, supporting the diagnosis of DCIS in a background of extensive fibrocystic changes.     Tumor Markers by Immunostaining (Polymer based detection method) using the ASCO/CAP scoring criteria, performed at [facility] on formalin fixed paraffin embedded tissue performed on the patient's previous biopsy ([id number], collected 8/23/2024):   Estrogen receptor (Leica, monoclonal, clone 6 [id number]) status: 73% (Positive, strong intensity)      Impression:     Left breast DCIS, ER positive  I reviewed her imaging and pathology in details and discussed at length the diagnosis and excellent prognosis of DCIS. The  role of chemoprevention with AI x 5 yrs discussed with relative risk reduction of invasive and non invasive cancer by 30-40% without a change in survival.  Side effects including fatigue, hot flashes, lipid abnormalities, arthralgias, myalgias, and bone density loss reviewed. Patient is agreeable to start anastrozole 1 mg daily.      Role of adjuvant radiation discussed in reducing risk of local recurrence (invasive and pre-invasive) without change in mortality by 50% . She will be referred to radiation oncology for further evaluation.     Bone health:  DEXA scan showed mild osteopenia at the left femoral neck in 2021. Will repeat DEXA scan to reevaluate. She will continue vitamin D and Ca supplements and exercise regularly as tolerated.       Return in about 1 month (around 10/30/2024).         Thank you for the consultation request and the opportunity to participate in the care of Laquita Norris. We will continue to follow and provide further recommendations. Please contact me for any questions or concerns.         Bindu Roth MD   Hematology Oncology

## 2024-10-01 ENCOUNTER — TELEPHONE (OUTPATIENT)
Dept: SURGERY | Facility: CLINIC | Age: 64
End: 2024-10-01

## 2024-10-01 ENCOUNTER — TELEPHONE (OUTPATIENT)
Dept: RADIATION ONCOLOGY | Facility: HOSPITAL | Age: 64
End: 2024-10-01

## 2024-10-01 NOTE — TELEPHONE ENCOUNTER
Called [facility] for the addendum to the pathology report regarding the correct site. Talk to meredith from the pathology department and she said that they will do the addendum.

## 2024-10-01 NOTE — TELEPHONE ENCOUNTER
Pt called and asked to speak to Caroline, pt advised she was returning her call    I attempted to reach Caroline    Please give pt a call back

## 2024-10-09 ENCOUNTER — OFFICE VISIT (OUTPATIENT)
Dept: SURGERY | Facility: CLINIC | Age: 64
End: 2024-10-09
Payer: COMMERCIAL

## 2024-10-09 VITALS
TEMPERATURE: 98 F | BODY MASS INDEX: 26 KG/M2 | DIASTOLIC BLOOD PRESSURE: 82 MMHG | HEART RATE: 83 BPM | OXYGEN SATURATION: 98 % | RESPIRATION RATE: 18 BRPM | SYSTOLIC BLOOD PRESSURE: 161 MMHG | WEIGHT: 138 LBS

## 2024-10-09 DIAGNOSIS — D05.12 DUCTAL CARCINOMA IN SITU (DCIS) OF LEFT BREAST: Primary | ICD-10-CM

## 2024-10-09 DIAGNOSIS — N64.89 HEMATOMA OF BREAST: ICD-10-CM

## 2024-10-10 ENCOUNTER — TELEPHONE (OUTPATIENT)
Dept: SURGERY | Facility: CLINIC | Age: 64
End: 2024-10-10

## 2024-10-10 NOTE — TELEPHONE ENCOUNTER
Patient called with the same questions she had sent us in the in basket today. Informed patient that she can take shower and keep the surgical area clean and Dry. She can apply gauze and ABD pad to cover it. She can take tylenol for pain ,but there is no need to ice the area.Patient verbalized that she had slight bleeding at the area where Dr. De Jesus had drained her yesterday. But at present there is no bleeding its just few blood spots on the gauze.  All questions patient had were answered to the best of my ability. Patient verbalized understanding. Will contact our office if have any further questions.

## 2024-10-11 ENCOUNTER — OFFICE VISIT (OUTPATIENT)
Dept: RADIATION ONCOLOGY | Facility: HOSPITAL | Age: 64
End: 2024-10-11
Attending: RADIOLOGY
Payer: MEDICARE

## 2024-10-11 ENCOUNTER — TELEPHONE (OUTPATIENT)
Dept: SURGERY | Facility: CLINIC | Age: 64
End: 2024-10-11

## 2024-10-11 VITALS
RESPIRATION RATE: 16 BRPM | DIASTOLIC BLOOD PRESSURE: 74 MMHG | SYSTOLIC BLOOD PRESSURE: 124 MMHG | HEART RATE: 84 BPM | OXYGEN SATURATION: 100 % | TEMPERATURE: 98 F

## 2024-10-11 PROCEDURE — 99212 OFFICE O/P EST SF 10 MIN: CPT

## 2024-10-11 NOTE — CONSULTS
RADIATION ONCOLOGY NOTE    DATE OF VISIT: 10/11/2024    DIAGNOSIS :  Stage 0  Tis N0 M0 , DCIS of L breast , high grade, s/p lumpectomy, for adjuvant XRT     Dear [facility]iza Pickard Pantano and colleagues,    As you recall, Laquita Norris is a pleasant 64 year old female who presented with abn screening mammo, with  abn 8/20/24 left diagnostic mammogram showed increased calcifications in the medial left breast. Biopsy +DCIS, ER 73%.  Pt underwent lumpectomy with left margins x5 with pathhigh grade multifocal DCIS, largest 2,0 cm, all margins negative with nearest margin 5 mm from DCIS.         [facility] SURGICAL SPECIMEN LEFT (CPT=76098)    Result Date: 9/24/2024  CONCLUSION: The specimen radiograph shows the top-hat shaped clip to be within the specimen, with the localization wires in place.   Dictated by (CST): Freddy Pham DO on 9/24/2024 at 8:16 AM     Finalized by (CST): Freddy Pham DO on 9/24/2024 at 8:17 AM          [facility] LOCALIZATION WIRE 2 SITE LEFT (CPT=19281/96828)    Result Date: 9/23/2024  CONCLUSION: Successful hookwire localization.  Postprocedure mammogram demonstrates the wires in the appropriate positions.     Dictated by (CST): Freddy Pham DO on 9/23/2024 at 1:46 PM     Finalized by (CST): Freddy Pham DO on 9/23/2024 at 1:49 PM             ROS    A 12 Point review of system was obtained and is as above and per HPI and nursing note.    Review of Systems   Constitutional:  Positive for appetite change and fatigue.        Pt reports she isn't as hungry   HENT: Negative.     Eyes: Negative.    Respiratory: Negative.     Cardiovascular: Negative.    Gastrointestinal: Negative.    Endocrine: Negative.    Genitourinary: Negative.    Musculoskeletal:  Positive for back pain.        Tylenol PRN   Skin: Negative.    Allergic/Immunologic: Negative.    Neurological: Negative.    Hematological: Negative.    Psychiatric/Behavioral: Negative.           Current Outpatient Medications    Medication Sig Dispense Refill    anastrozole 1 MG Oral Tab tab Take 1 tablet (1 mg total) by mouth daily. (Patient not taking: Reported on 10/11/2024) 30 tablet 3    acetaminophen 500 MG Oral Tab Take 1 tablet (500 mg total) by mouth every 6 (six) hours as needed for Pain.      glimepiride 2 MG Oral Tab Take 1 tablet (2 mg total) by mouth every morning before breakfast. A needed for sugar above 170 before breakfast 90 tablet 0    Glucose Blood (ONETOUCH VERIO) In Vitro Strip USE 1 STRIP TO CHECK GLUCOSE THREE TIMES DAILY 400 strip 1    ONETOUCH DELICA PLUS [id number] Does not apply Misc 1 LANCET BY FINGER STICK ROUTE THREE TIMES DAILY 300 each 1    atorvastatin 40 MG Oral Tab Take 1 tablet (40 mg total) by mouth nightly. 90 tablet 1    metFORMIN  MG Oral Tablet 24 Hr Take 1 tablet (500 mg total) by mouth daily with breakfast AND 2 tablets (1,000 mg total) daily with dinner. 270 tablet 1    estradiol 0.1 MG/GM Vaginal Cream APPLY 0.5 GRAM VAGINALLY 2 TIMES EVERY WEEK (Patient taking differently: 1 g twice a week. APPLY 0.5 GRAM VAGINALLY 2 TIMES EVERY WEEK) 42.5 g 3    enalapril 20 MG Oral Tab Take 1 tablet (20 mg total) by mouth daily. (Patient taking differently: Take 1 tablet (20 mg total) by mouth every morning.) 100 tablet 3    Polyvinyl Alcohol-Povidone (REFRESH OP) Apply 1 drop to eye daily as needed.      Homeopathic Products (EARACHE DROPS OT) Place 2 drops in ear(s) daily as needed.         PAIN:   , Pain Score: 0,     ,  ,     ,  ,      ALLERGIES :   Allergies[1]    PAST MEDICAL HISTORY:   has a past medical history of Anxiety state, Bartholin cyst (1994), BRCA gene mutation negative in female (09/12/2024), Bunion (2010), Cancer (HCC), Cyst of skin (2013), Diabetes (HCC), Diverticulitis (2017), Diverticulosis, Fibroids (2003), Hemorrhoids (2012), High blood pressure, High cholesterol, History of blood transfusion (1986), History of bone density study (12/2004), motion sickness, Osteoarthritis,  Perforated diverticulum of large intestine (10/20/2017), Postmenopausal bleeding (), Tonsillitis, Uterine prolapse (), Uterine prolapse (2015), and Visual impairment.    She has no past medical history of Anesthesia complication, Deep vein thrombosis (HCC), Difficult intubation, Family history of malignant hyperthermia, Family history of pseudocholinesterase deficiency, Malignant hyperthermia, PONV (postoperative nausea and vomiting), Pseudocholinesterase deficiency, Pulmonary embolism (HCC), or Sleep apnea.    PAST SURGICAL HISTORY:   has a past surgical history that includes foot surgery () (bunionectomy); tonsillectomy (1970);  (x3 w/PPTL); pessary (); biopsy of uterus lining (10/2007) (neg endometrial biopsy); other surgical history (Right, ) (thigh cyst removal); tubal ligation (); needle biopsy left (2013); colonoscopy (N/A, 2018) (Procedure: COLONOSCOPY;  Surgeon: Nini Garcia MD;  Location: [facility]); leg/ankle surgery proc unlisted (Left, ) (to repair flat foot); hysterectomy (); hip replacement surgery (RT hip surgery 2020); and joseph biopsy stereo nodule 1 site left (cpt=19081) (Left, 2024) (TOPHAT CLIP).    PAST SOCIAL HISTORY   reports that she has never smoked. She has never been exposed to tobacco smoke. She has never used smokeless tobacco. She reports that she does not drink alcohol and does not use drugs.    PAST FAMILY HISTORY   family history includes Alcohol and Other Disorders Associated in her father; Allergies in her mother and sister; Breast Cancer (age of onset: 45) in her maternal aunt; Breast Cancer (age of onset: 62) in her paternal cousin female; Breast Cancer (age of onset: 70) in her maternal aunt; Breast Cancer (age of onset: 75) in her paternal aunt; Breast Cancer (age of onset: 80) in her maternal aunt; Cancer in her paternal uncle; Cancer (age of onset: 45) in her maternal aunt; Cancer (age of onset:  62) in her paternal cousin female; Cancer (age of onset: 70) in her maternal aunt; Cancer (age of onset: 75) in her paternal aunt; Cancer (age of onset: 80) in her maternal aunt; Diabetes in her maternal grandmother, mother, and another family member; Eye Problems in her mother; Glaucoma in her maternal grandmother; Heart Disorder in her mother; Hypertension in her father, maternal grandmother, and mother; Lipids in her mother; Musculo-skelatal Disorder in her maternal grandmother and mother.    Wt Readings from Last 6 Encounters:   10/09/24 62.6 kg (138 lb)   09/30/24 64 kg (141 lb)   09/30/24 64 kg (141 lb)   09/23/24 63.5 kg (140 lb)   09/11/24 62.6 kg (138 lb)   08/29/24 63 kg (138 lb 12.8 oz)        PHYSICAL EXAM  Blood pressure 124/74, pulse 84, temperature 98.4 °F (36.9 °C), temperature source Temporal, resp. rate 16, SpO2 100%, not currently breastfeeding.  PERFORMANCE STATUS 0 , denies PAIN  GENERAL:  Pt is alert and oriented times three in no acute distress.    HEENT:  PERRLA, EOMI,   NECK:  Supple with no  lymphadenopathy.   CHEST:  Clear s/p L breast lumpectomy  ABDOMEN:  Soft with no masses.   EXTREMITIES:  There is no upper or lower extremity edema.    NEUROLOGIC:  Cranial nerves II-XII are intact.  Neuro exam is nonfocal.    COMPLETED TESTS:  I have reviewed the patient's clinical, radiographic, pathologic and laboratory studies.    ASSESSMENT/PLAN    63 yo with Stage 0  Tis N0 M0 , DCIS, high grade, s/p lumpectomy, for adjuvant XRT     Pt is recovering well.  I have explained the diagnosis, stage, natural history of the disease and the goals of treatment.   The rational, alternatives and all risk were discussed and all questions were answered.    At this time the patient would like to proceed with a course of treatment.  Therefore, I will plan a XRT mapping session shortly and will keep you updated.      Thank you for allowing me to take care of your patient.  Please do not hesitate to contact me  directly.    Jean Paul Dominguez MD, FACRO  Radiation Oncology  [email or web address]  [phone]                    [1]   Allergies  Allergen Reactions    Codeine RASH     T#3    Adhesive Tape OTHER (SEE COMMENTS)     Blisters from steri strips    Acetaminophen-Codeine RASH     Tolerates plain Tylenol    Chlorhexidine RASH

## 2024-10-11 NOTE — TELEPHONE ENCOUNTER
Patient called to let us know that the area where Dr. De Jesus did her I&D is healing. There is no more bleeding. Patient wanted to know if she can use a smaller pad in the area. Informed patient that it is ok to use a smaller gauze or pad to cover that area. All questions were answered to the best of my ability. Patient verbalized understanding.Will contact our office if have any further questions.

## 2024-10-11 NOTE — PATIENT INSTRUCTIONS
Call [phone] to reach a radiation nurse with Dr. Dominguez's office.    Radiation closer to home, Dr. Dominguez printed out radiation oncologist names for you.

## 2024-10-11 NOTE — PROGRESS NOTES
Nursing Consultation Note  Patient: Laquita Norris  YOB: 1960  Age: 64 year old  Radiation Oncologist: Dr. Jean Paul Dominguez  Referring Physician: Bindu Roth  Diagnosis:[unfilled]  Consult Date: 10/11/2024      Chemotherapy: No  Labs: Reviewed  Imaging: Reviewed  Is the patient of child-bearing age?         No  Has the patient received radiation therapy in the past? no  Does the patient have an implantable device?No   Patient has/has had:     1. Assistive Devices: N/A    2. Flu Vaccination: no-referral to ask PCP    3. Pneumonia Vaccination:  no--referral to ask PCP    Vital Signs:   Vitals:    10/11/24 1334   BP: 124/74   Pulse: 84   Resp: 16   Temp: 98.4 °F (36.9 °C)   ,   Wt Readings from Last 6 Encounters:   10/09/24 62.6 kg (138 lb)   09/30/24 64 kg (141 lb)   09/30/24 64 kg (141 lb)   09/23/24 63.5 kg (140 lb)   09/11/24 62.6 kg (138 lb)   08/29/24 63 kg (138 lb 12.8 oz)       Nursing Note:      Review of Systems   Constitutional:  Positive for appetite change and fatigue.        Pt reports she isn't as hungry   HENT: Negative.     Eyes: Negative.    Respiratory: Negative.     Cardiovascular: Negative.    Gastrointestinal: Negative.    Endocrine: Negative.    Genitourinary: Negative.    Musculoskeletal:  Positive for back pain.        Tylenol PRN   Skin: Negative.    Allergic/Immunologic: Negative.    Neurological: Negative.    Hematological: Negative.    Psychiatric/Behavioral: Negative.            Allergies:  Allergies[1]    Current Outpatient Medications   Medication Sig Dispense Refill    anastrozole 1 MG Oral Tab tab Take 1 tablet (1 mg total) by mouth daily. (Patient not taking: Reported on 10/11/2024) 30 tablet 3    acetaminophen 500 MG Oral Tab Take 1 tablet (500 mg total) by mouth every 6 (six) hours as needed for Pain.      glimepiride 2 MG Oral Tab Take 1 tablet (2 mg total) by mouth every morning before breakfast. A needed for sugar above 170 before breakfast 90 tablet 0    Glucose Blood  (ONETOUCH VERIO) In Vitro Strip USE 1 STRIP TO CHECK GLUCOSE THREE TIMES DAILY 400 strip 1    ONETOUCH DELICA PLUS NUITMQ23O Does not apply Misc 1 LANCET BY FINGER STICK ROUTE THREE TIMES DAILY 300 each 1    atorvastatin 40 MG Oral Tab Take 1 tablet (40 mg total) by mouth nightly. 90 tablet 1    metFORMIN  MG Oral Tablet 24 Hr Take 1 tablet (500 mg total) by mouth daily with breakfast AND 2 tablets (1,000 mg total) daily with dinner. 270 tablet 1    estradiol 0.1 MG/GM Vaginal Cream APPLY 0.5 GRAM VAGINALLY 2 TIMES EVERY WEEK (Patient taking differently: 1 g twice a week. APPLY 0.5 GRAM VAGINALLY 2 TIMES EVERY WEEK) 42.5 g 3    enalapril 20 MG Oral Tab Take 1 tablet (20 mg total) by mouth daily. (Patient taking differently: Take 1 tablet (20 mg total) by mouth every morning.) 100 tablet 3    Polyvinyl Alcohol-Povidone (REFRESH OP) Apply 1 drop to eye daily as needed.      Homeopathic Products (EARACHE DROPS OT) Place 2 drops in ear(s) daily as needed.         Preferred Pharmacy:    [facility] [address] [place] [address] [phone], [phone]  [address] [place] [id number]  Phone: [phone] Fax: [phone]      Past Medical History:    Anxiety state    Bartholin cyst    per  excision of bartholins cyst    BRCA gene mutation negative in female    negative for 70 gene panel with RNA. Results in media tab    Bunion    per  bunionectomy, bilateral feet    Cancer (HCC)    L Br Ca    Cyst of skin    per  rt thigh cyst removal    Diabetes (HCC)    Diverticulitis    hosp x 3 days    Diverticulosis    Fibroids    Hemorrhoids    per  colonoscopy    High blood pressure    High cholesterol    History of blood transfusion    @[place]    History of bone density study    \"normal dexa '04, '07\"    Hx of motion sickness    Osteoarthritis    Perforated diverticulum of large intestine    Postmenopausal bleeding    per  neg endometrial biopsy    Tonsillitis    tonsillectomy 1970     Uterine prolapse    uses pessary    Uterine prolapse    Donut pessary -- self cleaning     Visual impairment    contacts and glasses       Past Surgical History:   Procedure Laterality Date    Biopsy of uterus lining  10/2007    neg endometrial biopsy    Colonoscopy N/A 2018    Procedure: COLONOSCOPY;  Surgeon: Nini Garcia MD;  Location: [facility]    Foot surgery      bunionectomy    Hip replacement surgery      RT hip surgery 2020    Hysterectomy  2019    Leg/ankle surgery proc unlisted Left     to repair flat foot    Joshua biopsy stereo nodule 1 site left (cpt=19081) Left 2024    TOPHAT CLIP    Needle biopsy left  2013          x3 w/PPTL    Other surgical history Right 2013    thigh cyst removal    Pessary  2006    Tonsillectomy  1970    Tubal ligation         Social History     Socioeconomic History    Marital status:      Spouse name: Not on file    Number of children: Not on file    Years of education: Not on file    Highest education level: Not on file   Occupational History    Not on file   Tobacco Use    Smoking status: Never     Passive exposure: Never    Smokeless tobacco: Never   Vaping Use    Vaping status: Never Used   Substance and Sexual Activity    Alcohol use: No    Drug use: No    Sexual activity: Yes     Birth control/protection: Tubal Ligation   Other Topics Concern     Service Not Asked    Blood Transfusions Not Asked    Caffeine Concern No     Comment: 16oz soda chocolate daily    Occupational Exposure Not Asked    Hobby Hazards Not Asked    Sleep Concern Not Asked    Stress Concern Not Asked    Weight Concern Not Asked    Special Diet Not Asked    Back Care Not Asked    Exercise Yes     Comment: Physical therapy 3 times per week.  Walking daily    Bike Helmet Not Asked    Seat Belt Not Asked    Self-Exams Not Asked    Grew up on a farm Not Asked    History of tanning Not Asked    Outdoor occupation Not Asked    Pt has a  pacemaker No    Pt has a defibrillator No    Breast feeding Not Asked    Reaction to local anesthetic No   Social History Narrative    Not on file     Social Drivers of Health     Financial Resource Strain: Not on file   Food Insecurity: Not on file   Transportation Needs: Not on file   Physical Activity: Not on file   Stress: Not on file   Social Connections: Not on file   Housing Stability: Not on file       ECOG:  Grade 1 - No physically strenuous activity, but ambulatory and able to carry out light and sedentary work (e.g. office work, light house work).    Education:  Yes    Are ADL's met?  Yes  Does patient feel safe in their environment?  Yes  Care decisions:  Patient and/or surrogate IS involved in care decisions.  Advanced directives:  Patient DOES NOT have advanced directives.  Transportation:  Adequate transportation available for expected visits    Pain:   ;Pain Score: 0   ;    ;     Primary language:  English  Language line required?  no  Comprehension Ability:  good  Able to read?  yes  Able to write?  yes  Communication tools:   none  Patient's ability to learn:  good  Readiness to learn:  Motivated  Learning preferences:  Discussion and Handout  Barriers to learning:  None  Interventions to reduce barriers:  Consult, Face patient when speaking, Provide support/encouragement, Provide printed materials, and Patient to express feelings  Visual aids:  yes  Hearing disability:  no  Dentures:  yes  partial plate upper and lower    Knowledge Deficit Plan Of Care:    Problem:  Knowledge Deficit    Problems related to:    Radiation therapy    Interventions:  Assess patient knowledge level  Assess knowledge needs  Instruct on the disease process  Instruct on treatment planning  Instruct on radiation therapy appointment scheduling  Instruct on basic skin care  Instruct on purpose of radiation therapy  Instruct on side effects of radiation therapy    Expected Outcomes:  Knowledge of radiation therapy  Knowledge  of side effects of radiation therapy and management  Comfortable with knowledge level    Progress Toward Outcome:  Making progress    Pamphlets/Handouts Given to Patient:  Radiation Process Overview       Skin Plan Of Care:    Skin Problem:  Potential for impaired skin integrity    Problems related to:    Side effects of radiation therapy    Interventions:  Assess skin Monitor signs/symptoms of healing Instruct patient on skin care Instruct patient on basix skin care measures during radiation therapy Instruct patient on additional skin care measures during radiation therapy Keep area clean and dry Encourage fluids/diet    Expected Outcomes:  Intact skin Skin without trauma or irritation Free from infection Wound evidence of healing Knowledge of disease process knowledge of care plan    Progress Toward Outcome:  Making progress    Pamphlets/Handouts Given to Patient:  Radiation therapy symptom management for breast  Site specific side effects breast          Pt seen for consultation with Dr. Dominguez. I explained to the patient that today she would meet Dr. Dominguez but while being treated there was a possibility that she might also encounter the physicians who cover for Dr. Dominguez which are Dr. Conroy, Dr. Stewart, and Dr. Wili Zuñiga. VSS, pt denies pain currently. Pt educated on radiation process, as well as possible side effects. Pt lives about 1 hour away from [place]. Plans for radiation closer to home. Dr. Dominguez provided patient with radiation oncologist names. Pt given RN number in case of radiation questions/concerns, however will call for tx closer to home.             [1]   Allergies  Allergen Reactions    Codeine RASH     T#3    Adhesive Tape OTHER (SEE COMMENTS)     Blisters from steri strips    Acetaminophen-Codeine RASH     Tolerates plain Tylenol    Chlorhexidine RASH

## 2024-10-16 ENCOUNTER — OFFICE VISIT (OUTPATIENT)
Age: 64
End: 2024-10-16
Payer: COMMERCIAL

## 2024-10-16 VITALS
SYSTOLIC BLOOD PRESSURE: 133 MMHG | RESPIRATION RATE: 18 BRPM | DIASTOLIC BLOOD PRESSURE: 88 MMHG | OXYGEN SATURATION: 98 % | BODY MASS INDEX: 27 KG/M2 | TEMPERATURE: 98 F | HEART RATE: 86 BPM | WEIGHT: 142.19 LBS

## 2024-10-16 DIAGNOSIS — N64.89 HEMATOMA OF BREAST: ICD-10-CM

## 2024-10-16 DIAGNOSIS — D05.12 DUCTAL CARCINOMA IN SITU (DCIS) OF LEFT BREAST: Primary | ICD-10-CM

## 2024-10-16 PROCEDURE — 3075F SYST BP GE 130 - 139MM HG: CPT | Performed by: SURGERY

## 2024-10-16 PROCEDURE — 99024 POSTOP FOLLOW-UP VISIT: CPT | Performed by: SURGERY

## 2024-10-16 PROCEDURE — 3079F DIAST BP 80-89 MM HG: CPT | Performed by: SURGERY

## 2024-10-16 PROCEDURE — 1126F AMNT PAIN NOTED NONE PRSNT: CPT | Performed by: SURGERY

## 2024-10-22 ENCOUNTER — TELEPHONE (OUTPATIENT)
Dept: UROLOGY | Facility: HOSPITAL | Age: 64
End: 2024-10-22

## 2024-10-22 ENCOUNTER — LAB ENCOUNTER (OUTPATIENT)
Dept: LAB | Facility: HOSPITAL | Age: 64
End: 2024-10-22
Attending: INTERNAL MEDICINE
Payer: MEDICARE

## 2024-10-22 DIAGNOSIS — K73.9 CHRONIC HEPATITIS (HCC): ICD-10-CM

## 2024-10-22 DIAGNOSIS — R79.89 ELEVATED LIVER FUNCTION TESTS: Primary | ICD-10-CM

## 2024-10-22 LAB
ALBUMIN SERPL-MCNC: 4.3 G/DL (ref 3.2–4.8)
ALP LIVER SERPL-CCNC: 201 U/L
ALT SERPL-CCNC: 110 U/L
AST SERPL-CCNC: 85 U/L (ref ?–34)
BASOPHILS # BLD AUTO: 0.05 X10(3) UL (ref 0–0.2)
BASOPHILS NFR BLD AUTO: 0.8 %
BILIRUB DIRECT SERPL-MCNC: 0.6 MG/DL (ref ?–0.3)
BILIRUB SERPL-MCNC: 2 MG/DL (ref 0.2–1.1)
DEPRECATED RDW RBC AUTO: 43.9 FL (ref 35.1–46.3)
EOSINOPHIL # BLD AUTO: 0.04 X10(3) UL (ref 0–0.7)
EOSINOPHIL NFR BLD AUTO: 0.7 %
ERYTHROCYTE [DISTWIDTH] IN BLOOD BY AUTOMATED COUNT: 12.8 % (ref 11–15)
HCT VFR BLD AUTO: 34 %
HGB BLD-MCNC: 11.9 G/DL
IMM GRANULOCYTES # BLD AUTO: 0.03 X10(3) UL (ref 0–1)
IMM GRANULOCYTES NFR BLD: 0.5 %
IMMUNOGLOBULIN PNL SER-MCNC: 1902 MG/DL (ref 650–1600)
INR BLD: 1.04 (ref 0.8–1.2)
LYMPHOCYTES # BLD AUTO: 1.67 X10(3) UL (ref 1–4)
LYMPHOCYTES NFR BLD AUTO: 27.2 %
MCH RBC QN AUTO: 32.7 PG (ref 26–34)
MCHC RBC AUTO-ENTMCNC: 35 G/DL (ref 31–37)
MCV RBC AUTO: 93.4 FL
MONOCYTES # BLD AUTO: 0.49 X10(3) UL (ref 0.1–1)
MONOCYTES NFR BLD AUTO: 8 %
NEUTROPHILS # BLD AUTO: 3.87 X10 (3) UL (ref 1.5–7.7)
NEUTROPHILS # BLD AUTO: 3.87 X10(3) UL (ref 1.5–7.7)
NEUTROPHILS NFR BLD AUTO: 62.8 %
PLATELET # BLD AUTO: 176 10(3)UL (ref 150–450)
PROT SERPL-MCNC: 8 G/DL (ref 5.7–8.2)
PROTHROMBIN TIME: 14.2 SECONDS (ref 11.6–14.8)
RBC # BLD AUTO: 3.64 X10(6)UL
WBC # BLD AUTO: 6.2 X10(3) UL (ref 4–11)

## 2024-10-22 PROCEDURE — 86038 ANTINUCLEAR ANTIBODIES: CPT

## 2024-10-22 PROCEDURE — 85610 PROTHROMBIN TIME: CPT

## 2024-10-22 PROCEDURE — 86225 DNA ANTIBODY NATIVE: CPT

## 2024-10-22 PROCEDURE — 36415 COLL VENOUS BLD VENIPUNCTURE: CPT

## 2024-10-22 PROCEDURE — 82784 ASSAY IGA/IGD/IGG/IGM EACH: CPT

## 2024-10-22 PROCEDURE — 84075 ASSAY ALKALINE PHOSPHATASE: CPT

## 2024-10-22 PROCEDURE — 84080 ASSAY ALKALINE PHOSPHATASES: CPT

## 2024-10-22 PROCEDURE — 82977 ASSAY OF GGT: CPT

## 2024-10-22 PROCEDURE — 80076 HEPATIC FUNCTION PANEL: CPT

## 2024-10-22 PROCEDURE — 85025 COMPLETE CBC W/AUTO DIFF WBC: CPT

## 2024-10-22 NOTE — TELEPHONE ENCOUNTER
Incoming telephone call from patient.   Message left on nursing line requesting return call from nursing staff with condition update.   Message left by patient states the following:  I will not be using Estrogen cream as directed due to receiving a breast cancer diagnosis in August.     Addendum:  Outgoing telephone call to patient. No answer.   Left detailed message for patient.   Message left for patient states our office is returning telephone call as requested.   Office number provided.     Addendum:  Patient returning call with condition update.  Patient reports Dr. De Jesus (Oncologist) recommended to discontinue using Estrace cream as ordered.   Patient reports 3 radiation treatments indicated, and will be undergoing treatments in the near future.  Verbalized understanding.   Encouraged to call with questions or concerns.

## 2024-10-23 ENCOUNTER — TELEPHONE (OUTPATIENT)
Dept: SURGERY | Facility: CLINIC | Age: 64
End: 2024-10-23

## 2024-10-23 ENCOUNTER — TELEPHONE (OUTPATIENT)
Dept: ENDOCRINOLOGY CLINIC | Facility: CLINIC | Age: 64
End: 2024-10-23

## 2024-10-23 LAB — GGT SERPL-CCNC: 215 U/L

## 2024-10-23 NOTE — TELEPHONE ENCOUNTER
Patient calling to inform at she was seen by Dr. Hernandez and was informed to stop taking prescription Atorvastatin 40 MG. Patients liver enzymes is elevated.  Please call at [phone],thanks.

## 2024-10-23 NOTE — TELEPHONE ENCOUNTER
Cass BUTLER,    Patient asked to stop Atorvastatin 40 mg daily by Dr Hernandez    Component      Latest Ref Rng 10/22/2024   ALT (SGPT)      10 - 49 U/L 110 (H)    AST (SGOT)      <34 U/L 85 (H)    ALKALINE PHOSPHATASE      55 - 142 U/L 201 (H)    Total Bilirubin      0.2 - 1.1 mg/dL 2.0 (H)    PROTEIN, TOTAL      5.7 - 8.2 g/dL 8.0    Albumin      3.2 - 4.8 g/dL 4.3    Bilirubin, Direct      <=0.3 mg/dL 0.6 (H)

## 2024-10-23 NOTE — TELEPHONE ENCOUNTER
Message routed to [facility]. I spoke to patient, states she wants to speak to Beth BUTLER not Dr. Rosa regarding message below.    Please follow up, thank you.

## 2024-10-23 NOTE — TELEPHONE ENCOUNTER
Update noted and Dr. Hernandez's note reviewed.     \"- Get labs today  - Hold atorvastatin for now  - Will let you know about next steps after seeing labs from today  - Follow up 3 months () [Jean Claude or Alex]\"      Agree with holding atorvastatin for now. She should  re-evaluate this medication again at next f/u visit with him in 3 months.     No further action other than hold atorvastatin at this time.       Thank you!

## 2024-10-23 NOTE — TELEPHONE ENCOUNTER
Patient called the nurses line to let Dr. De Jesus know that she has a spot of blood on the gauze. She it just calling to let us know that it is not bleeding just a spot of blood. She has a follow up appointment on 10/30 with Dr. De Jesus

## 2024-10-24 LAB
ALK PHOSPHATASE: 200 IU/L
BONE FRAC: 17 %
DSDNA IGG SERPL IA-ACNC: 1.1 IU/ML
ENA AB SER QL IA: 0.1 UG/L
ENA AB SER QL IA: NEGATIVE
INTESTINAL FRAC: 23 %
LIVER FRAC: 60 %

## 2024-10-24 NOTE — TELEPHONE ENCOUNTER
Left voice message for pt to call back     Sent [facility] with instructions written below by Beth BUTLER.

## 2024-10-28 ENCOUNTER — TELEPHONE (OUTPATIENT)
Dept: SURGERY | Facility: CLINIC | Age: 64
End: 2024-10-28

## 2024-10-28 NOTE — TELEPHONE ENCOUNTER
Patient called the nurse line stating that she had slight bleeding yesterday. But today she only have a slight spotting to no bleeding at all. Patient stated that yesterday after she she put Aquaphor cream the bleeding diminished and Stopped. Denies any swelling ,pain or redness to the area. Asked patient if she could send us a picture of the area that she is explaining. Patient stated she will try but it is not bad or anything alarming. Patient has an appointment to see Dr. De Jesus on Wednesday 10/30 at [facility]. Encouraged patient to send us a picture of the area so that we can evaluate. Also informed patient that if she sees any redness, swelling, pain, or if the area becomes warm to touch, to call our office. Patient verbalized understanding and stated that she will try to send us a picture. All questions patient had were answered to the best of my ability.

## 2024-10-30 ENCOUNTER — TELEPHONE (OUTPATIENT)
Dept: INTERNAL MEDICINE CLINIC | Facility: CLINIC | Age: 64
End: 2024-10-30

## 2024-10-30 ENCOUNTER — APPOINTMENT (OUTPATIENT)
Dept: HEMATOLOGY/ONCOLOGY | Facility: HOSPITAL | Age: 64
End: 2024-10-30
Attending: INTERNAL MEDICINE
Payer: MEDICARE

## 2024-10-30 ENCOUNTER — OFFICE VISIT (OUTPATIENT)
Age: 64
End: 2024-10-30
Payer: COMMERCIAL

## 2024-10-30 VITALS
DIASTOLIC BLOOD PRESSURE: 85 MMHG | WEIGHT: 142 LBS | OXYGEN SATURATION: 97 % | TEMPERATURE: 98 F | SYSTOLIC BLOOD PRESSURE: 137 MMHG | HEART RATE: 90 BPM | RESPIRATION RATE: 18 BRPM | BODY MASS INDEX: 27 KG/M2

## 2024-10-30 DIAGNOSIS — N64.89 HEMATOMA OF BREAST: ICD-10-CM

## 2024-10-30 DIAGNOSIS — D05.12 DUCTAL CARCINOMA IN SITU (DCIS) OF LEFT BREAST: Primary | ICD-10-CM

## 2024-10-30 PROCEDURE — 1159F MED LIST DOCD IN RCRD: CPT | Performed by: SURGERY

## 2024-10-30 PROCEDURE — 3075F SYST BP GE 130 - 139MM HG: CPT | Performed by: SURGERY

## 2024-10-30 PROCEDURE — 99024 POSTOP FOLLOW-UP VISIT: CPT | Performed by: SURGERY

## 2024-10-30 PROCEDURE — 1160F RVW MEDS BY RX/DR IN RCRD: CPT | Performed by: SURGERY

## 2024-10-30 PROCEDURE — 3079F DIAST BP 80-89 MM HG: CPT | Performed by: SURGERY

## 2024-10-30 PROCEDURE — 1125F AMNT PAIN NOTED PAIN PRSNT: CPT | Performed by: SURGERY

## 2024-10-31 NOTE — PROGRESS NOTES
Breast Surgery Post-Operative Visit    Diagnosis: DCIS, left breast, status post left breast wire bracketed lumpectomy on September 23, 2024.     Stage:  Cancer Staging   No matching staging information was found for the patient.      Disease Status:  Surgical treatment complete, medical and radiation oncology recommendations pending.      History of Present Illness:   Ms. Laquita Norris is a 64 year old woman who presents with imaging detected DCIS. SHe denies any palpable masses, nipple discharge or axillary symptoms. She has a personal h/o a remote benign Left breast biopsy. She does have a family history of breast cancer. SHe had a prior right dx workup recently for pain that was unremarkable. She had a surveillance for L calcs in Feb 2024 deemed to be stable. SHe had a L surveillance dx imaging on AUg 20, 2024 that showed increased calcifications in the medial left breast for which biopsy was recommended.THis took place on Aug 23, 2024 and showed DCIS that was ER 73%. She underwent left breast lumpectomy, which occurred without complication. She is here for postoperative visit. She reports her pain is under control. She denies erythema, warmth, drainage, or fevers.  She has extensive bruising to her left breast as well as her chest wall.  She has developed a rash from the chlorhexidine.  She has undergone aspiration of a symptomatic hematoma and believes a hematoma recurred.   She is here today for evaluation and recommendations for further therapy.        Past Medical History:    Anxiety state    Bartholin cyst    per ng excision of bartholins cyst    BRCA gene mutation negative in female    negative for 70 gene panel with RNA. Results in media tab    Bunion    per ng bunionectomy, bilateral feet    Cancer (HCC)    L Br Ca    Cyst of skin    per ng rt thigh cyst removal    Diabetes (HCC)    Diverticulitis    hosp x 3 days    Diverticulosis    Fibroids    Hemorrhoids    per ng colonoscopy    High blood pressure     High cholesterol    History of blood transfusion    @[place]    History of bone density study    \"normal dexa '04, '07\"    Hx of motion sickness    Osteoarthritis    Perforated diverticulum of large intestine    Postmenopausal bleeding    per ng neg endometrial biopsy    Tonsillitis    tonsillectomy 1970    Uterine prolapse    uses pessary    Uterine prolapse    Donut pessary -- self cleaning     Visual impairment    contacts and glasses       Past Surgical History:   Procedure Laterality Date    Biopsy of uterus lining  10/2007    neg endometrial biopsy    Colonoscopy N/A 2018    Procedure: COLONOSCOPY;  Surgeon: Nini Garcia MD;  Location: [facility] END[facility]    Foot surgery      bunionectomy    Hip replacement surgery      RT hip surgery 2020    Hysterectomy  2019    Leg/ankle surgery proc unlisted Left     to repair flat foot    Joshua biopsy stereo nodule 1 site left (cpt=19081) Left 2024    TOPHAT CLIP    Needle biopsy left  2013          x3 w/PPTL    Other surgical history Right     thigh cyst removal    Pessary      Tonsillectomy  1970    Tubal ligation         Gynecological History:  Pt is a   Pt was 26 years old at time of first pregnancy.    She has cumulative breastfeeding history of 2 months.  She achieved menarche at age 12 and LMP age 46  Age of Menopause: 46  Type: natural menopause  She denies any history of hormone replacement therapy   She denies any history of oral contraceptive use   She denies infertility treatment to achieve pregnancy.    Medications:     acetaminophen 500 MG Oral Tab Take 1 tablet (500 mg total) by mouth every 6 (six) hours as needed for Pain.      glimepiride 2 MG Oral Tab Take 1 tablet (2 mg total) by mouth every morning before breakfast. A needed for sugar above 170 before breakfast 90 tablet 0    Glucose Blood (ONETOUCH VERIO) In Vitro Strip USE 1 STRIP TO CHECK GLUCOSE THREE TIMES DAILY 400 strip 1    ONETOUCH  DELICA PLUS [id number] Does not apply Misc 1 LANCET BY FINGER STICK ROUTE THREE TIMES DAILY 300 each 1    atorvastatin 40 MG Oral Tab Take 1 tablet (40 mg total) by mouth nightly. 90 tablet 1    metFORMIN  MG Oral Tablet 24 Hr Take 1 tablet (500 mg total) by mouth daily with breakfast AND 2 tablets (1,000 mg total) daily with dinner. 270 tablet 1    [DISCONTINUED] estradiol 0.1 MG/GM Vaginal Cream APPLY 0.5 GRAM VAGINALLY 2 TIMES EVERY WEEK (Patient taking differently: 1 g twice a week. APPLY 0.5 GRAM VAGINALLY 2 TIMES EVERY WEEK) 42.5 g 3    [DISCONTINUED] enalapril 20 MG Oral Tab Take 1 tablet (20 mg total) by mouth daily. (Patient taking differently: Take 1 tablet (20 mg total) by mouth every morning.) 100 tablet 3    Polyvinyl Alcohol-Povidone (REFRESH OP) Apply 1 drop to eye daily as needed.      Homeopathic Products (EARACHE DROPS OT) Place 2 drops in ear(s) daily as needed.         Allergies:    Allergies   Allergen Reactions    Codeine RASH     T#3    Adhesive Tape OTHER (SEE COMMENTS)     Blisters from steri strips    Acetaminophen-Codeine RASH     Tolerates plain Tylenol    Chlorhexidine RASH       Family History:   Family History   Problem Relation Age of Onset    Alcohol and Other Disorders Associated Father         alcoholism    Hypertension Father     Diabetes Mother         type 2    Lipids Mother         hyperlipidemia    Eye Problems Mother         eye issues    Heart Disorder Mother         per ng CABG    Hypertension Mother     Musculo-skelatal Disorder Mother         per ng osteoporosis    Allergies Mother         medication    Glaucoma Maternal Grandmother     Diabetes Maternal Grandmother     Hypertension Maternal Grandmother     Musculo-skelatal Disorder Maternal Grandmother         per ng osteoporosis    Allergies Sister     Cancer Maternal Aunt 45        breast ca; nun    Breast Cancer Maternal Aunt 45    Cancer Maternal Aunt 70        breast    Breast Cancer Maternal Aunt 70    Breast  Cancer Maternal Aunt 80    Cancer Maternal Aunt 80        breast    Cancer Paternal Uncle         gastric    Diabetes Other         mGA    Breast Cancer Paternal Cousin Female 62    Cancer Paternal Cousin Female 62        breast    Breast Cancer Paternal Aunt 75    Cancer Paternal Aunt 75        breast       She is not of Ashkenazi Protestant ancestry.    Social History:  History   Alcohol Use No       History   Smoking Status    Never   Smokeless Tobacco    Never     Ms. Laquita Norris is  with 3 children. She has 4 siblings. She is currently Retired    Review of Systems:  General:   The patient denies, fever, chills, night sweats, fatigue, generalized weakness, +change in appetite or +weight loss.    HEENT:     The patient denies eye irritation, cataracts, redness, glaucoma, yellowing of the eyes, change in vision, color blindness, or wearing contacts/glasses. The patient denies hearing loss, ringing in the ears, ear drainage, +earaches, nasal congestion, nose bleeds, +snoring, pain in mouth/throat, hoarseness, change in voice, facial trauma.    Respiratory:  The patient denies chronic cough, phlegm, hemoptysis, pleurisy/chest pain, pneumonia, asthma, wheezing, difficulty in breathing with exertion, emphysema, chronic bronchitis, shortness of breath or abnormal sound when breathing.     Cardiovascular:  There is no history of chest pain, chest pressure/discomfort, palpitations, irregular heartbeat, fainting or near-fainting, difficulty breathing when lying flat, SOB/Coughing at night, swelling of the legs or chest pain while walking.    Breasts:  See history of present illness    Gastrointestinal:     There is no history of difficulty or pain with swallowing, reflux symptoms, vomiting, dark or bloody stools, constipation, yellowing of the skin, indigestion, nausea, change in bowel habits, diarrhea, abdominal pain or vomiting blood.     Genitourinary:  The patient denies frequent urination, needing to get up at  night to urinate, urinary hesitancy or retaining urine, painful urination, urinary incontinence, decreased urine stream, blood in the urine or vaginal/penile discharge.    Skin:    The patient denies rash, +itching, skin lesions, dry skin, change in skin color or change in moles.     Hematologic/Lymphatic:  The patient denies +easily bruising or bleeding or persistent swollen glands or lymph nodes.     Musculoskeletal:  The patient denies muscle aches/pain, joint pain, stiff joints, neck pain, +back pain or bone pain.    Neuropsychiatric:  There is no history of migraines or severe headaches, seizure/epilepsy, speech problems, coordination problems, trembling/tremors, fainting/black outs, dizziness, memory problems, loss of sensation/numbness, +problems walking, weakness, tingling or burning in hands/feet. There is no history of abusive relationship, bipolar disorder, sleep disturbance, anxiety, depression or feeling of despair.    Endocrine:    There is no history of poor/slow wound healing, weight loss/gain, fertility or hormone problems, cold intolerance, thyroid disease.     Allergic/Immunologic:  There is no history of hives, hay fever, angioedema or anaphylaxis.    /88 (BP Location: Left arm, Patient Position: Sitting, Cuff Size: adult)   Pulse 86   Temp 98.4 °F (36.9 °C) (Temporal)   Resp 18   Wt 64.5 kg (142 lb 3.2 oz)   SpO2 98%   BMI 26.87 kg/m²     Physical Exam:  The patient is an alert, oriented, well-nourished and  well-developed woman who appears her stated age. Her speech patterns and movements are normal. Her affect is appropriate.    HEENT: The head is normocephalic. The neck is supple. The thyroid is not enlarged and is without palpable masses/nodules. There are no palpable masses. The trachea is in the midline. Conjunctiva are clear, non-icteric.    Chest: The chest expands symmetrically. The lungs are clear to auscultation.    Heart: The rhythm is regular.  There are no murmurs,  rubs, gallops or thrills.    Breasts:  Her breasts are symmetrical with a cup size 38B.  Right breast: The skin, nipple ,and areola appear normal. There is no skin dimpling with movement of the pectoralis. There is no nipple retraction. No nipple discharge can be elicited. The parenchyma is mildly nodular. There are no dominant masses in the breast. The axillary tail is normal.  Left breast:   The skin, nipple, and areola appear normal. There is no skin dimpling with movement of the pectoralis. There is no nipple retraction. No nipple discharge can be elicited. The parenchyma is mildly nodular. There are no dominant masses in the breast. The axillary tail is normal.  Well-healed incision with a moderate-sized hematoma involving the incision.    Abdomen:  The abdomen is soft, flat and non tender. The liver is not enlarged. There are no palpable masses.    Lymph Nodes:  The supraclavicular, axillary and cervical regions are free of significant lymphadenopathy.    Back: There is no vertebral column tenderness.    Skin: The skin appears normal. There are no suspicious appearing rashes or lesions.    Extremities: The extremities are without deformity, cyanosis or edema.    Impression:   Ms. Laquita Norris is a 64 year old woman presents with family history of breast cancer and L breast DCIS, s/p lumpectomy.    Recommendations:   I had a discussion with the Patient regarding her breast exam.  She is healing well since surgery with no signs of infection.  She did develop a large hematoma.  This is causing symptoms I therefore recommend an attempted aspiration.  The risks and possible complications were discussed with the patient and she agreed to proceed.    Aspiration of left breast hematoma  The left breast was prepped and draped in usual sterile fashion.  1% lidocaine with epinephrine was used to overtreat the targeted fluctuant spot.  An 18-gauge needle was inserted through the skin with successful aspiration of  approximate 80 cc of gelatinous blood clot.  Incomplete collapse of the hematoma was noted.  A sterile dressing was applied.  She tolerated this with no immediate complications.    She will return in 2 weeks for her next clinical evaluation to ensure adequate healing prior to any adjuvant therapy.  She will be referred to both medical and radiation oncology for further recommendations regarding local regional control.  I anticipate her first set of imaging will take place approximately 6 months following completion of her radiation.  She was given ample opportunity for questions and those questions were answered to her satisfaction. She was encouraged to contact the office with any questions or concerns prior to her next scheduled appointment.       This note was created by Dragon voice recognition. Errors in content may be related to improper recognition by the system; efforts to review and correct have been done but errors may still exist. Please be advised the primary purpose of this note is for me to communicate medical care. Standard sentence structure is not always used. Medical terminology and medical abbreviations may be used. There may be grammatical, typographical, and automated fill ins that may have errors missed in proofreading.

## 2024-10-31 NOTE — PROGRESS NOTES
Breast Surgery Post-Operative Visit    Diagnosis: DCIS, left breast, status post left breast wire bracketed lumpectomy on September 23, 2024.     Stage:  Cancer Staging   No matching staging information was found for the patient.      Disease Status:  Surgical treatment complete, medical and radiation oncology recommendations pending.    History of Present Illness:   Ms. Laquita Norris is a 64 year old woman who presents with imaging detected DCIS. SHe denies any palpable masses, nipple discharge or axillary symptoms. She has a personal h/o a remote benign Left breast biopsy. She does have a family history of breast cancer. SHe had a prior right dx workup recently for pain that was unremarkable. She had a surveillance for L calcs in Feb 2024 deemed to be stable. SHe had a L surveillance dx imaging on AUg 20, 2024 that showed increased calcifications in the medial left breast for which biopsy was recommended.THis took place on Aug 23, 2024 and showed DCIS that was ER 73%. She underwent left breast lumpectomy, which occurred without complication. She is here for postoperative visit. She reports her pain is under control. She denies erythema, warmth, drainage, or fevers.  She has extensive bruising to her left breast as well as her chest wall.  She has developed a rash from the chlorhexidine.  She has undergone aspiration of a symptomatic hematoma and believes a hematoma recurred.  She reports occasional leakage from the area.  She is here today for evaluation and recommendations for further therapy.        Past Medical History:    Anxiety state    Bartholin cyst    per ng excision of bartholins cyst    BRCA gene mutation negative in female    negative for 70 gene panel with RNA. Results in media tab    Bunion    per ng bunionectomy, bilateral feet    Cancer (HCC)    L Br Ca    Cyst of skin    per ng rt thigh cyst removal    Diabetes (HCC)    Diverticulitis    hosp x 3 days    Diverticulosis    Fibroids    Hemorrhoids     per ng colonoscopy    High blood pressure    High cholesterol    History of blood transfusion    @[place]    History of bone density study    \"normal dexa '04, '07\"    Hx of motion sickness    Osteoarthritis    Perforated diverticulum of large intestine    Postmenopausal bleeding    per ng neg endometrial biopsy    Tonsillitis    tonsillectomy 1970    Uterine prolapse    uses pessary    Uterine prolapse    Donut pessary -- self cleaning     Visual impairment    contacts and glasses       Past Surgical History:   Procedure Laterality Date    Biopsy of uterus lining  10/2007    neg endometrial biopsy    Colonoscopy N/A 2018    Procedure: COLONOSCOPY;  Surgeon: Nnii Garcia MD;  Location: [facility]    Foot surgery      bunionectomy    Hip replacement surgery      RT hip surgery 2020    Hysterectomy  2019    Leg/ankle surgery proc unlisted Left     to repair flat foot    Joshua biopsy stereo nodule 1 site left (cpt=19081) Left 2024    TOPHAT CLIP    Needle biopsy left  2013          x3 w/PPTL    Other surgical history Right     thigh cyst removal    Pessary      Tonsillectomy      Tubal ligation         Gynecological History:  Pt is a   Pt was 26 years old at time of first pregnancy.    She has cumulative breastfeeding history of 2 months.  She achieved menarche at age 12 and LMP age 46  Age of Menopause: 46  Type: natural menopause  She denies any history of hormone replacement therapy   She denies any history of oral contraceptive use   She denies infertility treatment to achieve pregnancy.    Medications:     acetaminophen 500 MG Oral Tab Take 1 tablet (500 mg total) by mouth every 6 (six) hours as needed for Pain.      glimepiride 2 MG Oral Tab Take 1 tablet (2 mg total) by mouth every morning before breakfast. A needed for sugar above 170 before breakfast 90 tablet 0    Glucose Blood (ONETOUCH VERIO) In Vitro Strip USE 1 STRIP TO CHECK GLUCOSE  THREE TIMES DAILY 400 strip 1    ONETOUCH DELICA PLUS [id number] Does not apply Misc 1 LANCET BY FINGER STICK ROUTE THREE TIMES DAILY 300 each 1    atorvastatin 40 MG Oral Tab Take 1 tablet (40 mg total) by mouth nightly. 90 tablet 1    metFORMIN  MG Oral Tablet 24 Hr Take 1 tablet (500 mg total) by mouth daily with breakfast AND 2 tablets (1,000 mg total) daily with dinner. 270 tablet 1    Polyvinyl Alcohol-Povidone (REFRESH OP) Apply 1 drop to eye daily as needed.      Homeopathic Products (EARACHE DROPS OT) Place 2 drops in ear(s) daily as needed.         Allergies:    Allergies   Allergen Reactions    Codeine RASH     T#3    Adhesive Tape OTHER (SEE COMMENTS)     Blisters from steri strips    Acetaminophen-Codeine RASH     Tolerates plain Tylenol    Chlorhexidine RASH       Family History:   Family History   Problem Relation Age of Onset    Alcohol and Other Disorders Associated Father         alcoholism    Hypertension Father     Diabetes Mother         type 2    Lipids Mother         hyperlipidemia    Eye Problems Mother         eye issues    Heart Disorder Mother         per ng CABG    Hypertension Mother     Musculo-skelatal Disorder Mother         per ng osteoporosis    Allergies Mother         medication    Glaucoma Maternal Grandmother     Diabetes Maternal Grandmother     Hypertension Maternal Grandmother     Musculo-skelatal Disorder Maternal Grandmother         per ng osteoporosis    Allergies Sister     Cancer Maternal Aunt 45        breast ca; nun    Breast Cancer Maternal Aunt 45    Cancer Maternal Aunt 70        breast    Breast Cancer Maternal Aunt 70    Breast Cancer Maternal Aunt 80    Cancer Maternal Aunt 80        breast    Cancer Paternal Uncle         gastric    Diabetes Other         mGA    Breast Cancer Paternal Cousin Female 62    Cancer Paternal Cousin Female 62        breast    Breast Cancer Paternal Aunt 75    Cancer Paternal Aunt 75        breast       She is not of Ashkenazi  Yazidism ancestry.    Social History:  History   Alcohol Use No       History   Smoking Status    Never   Smokeless Tobacco    Never     Ms. Laquita Norris is  with 3 children. She has 4 siblings. She is currently Retired    Review of Systems:  General:   The patient denies, fever, chills, night sweats, fatigue, generalized weakness, +change in appetite or +weight loss.    HEENT:     The patient denies eye irritation, cataracts, redness, glaucoma, yellowing of the eyes, change in vision, color blindness, or wearing contacts/glasses. The patient denies hearing loss, ringing in the ears, ear drainage, +earaches, nasal congestion, nose bleeds, +snoring, pain in mouth/throat, hoarseness, change in voice, facial trauma.    Respiratory:  The patient denies chronic cough, phlegm, hemoptysis, pleurisy/chest pain, pneumonia, asthma, wheezing, difficulty in breathing with exertion, emphysema, chronic bronchitis, shortness of breath or abnormal sound when breathing.     Cardiovascular:  There is no history of chest pain, chest pressure/discomfort, palpitations, irregular heartbeat, fainting or near-fainting, difficulty breathing when lying flat, SOB/Coughing at night, swelling of the legs or chest pain while walking.    Breasts:  See history of present illness    Gastrointestinal:     There is no history of difficulty or pain with swallowing, reflux symptoms, vomiting, dark or bloody stools, constipation, yellowing of the skin, indigestion, nausea, change in bowel habits, diarrhea, abdominal pain or vomiting blood.     Genitourinary:  The patient denies frequent urination, needing to get up at night to urinate, urinary hesitancy or retaining urine, painful urination, urinary incontinence, decreased urine stream, blood in the urine or vaginal/penile discharge.    Skin:    The patient denies rash, +itching, skin lesions, dry skin, change in skin color or change in moles.     Hematologic/Lymphatic:  The patient denies  +easily bruising or bleeding or persistent swollen glands or lymph nodes.     Musculoskeletal:  The patient denies muscle aches/pain, joint pain, stiff joints, neck pain, +back pain or bone pain.    Neuropsychiatric:  There is no history of migraines or severe headaches, seizure/epilepsy, speech problems, coordination problems, trembling/tremors, fainting/black outs, dizziness, memory problems, loss of sensation/numbness, +problems walking, weakness, tingling or burning in hands/feet. There is no history of abusive relationship, bipolar disorder, sleep disturbance, anxiety, depression or feeling of despair.    Endocrine:    There is no history of poor/slow wound healing, weight loss/gain, fertility or hormone problems, cold intolerance, thyroid disease.     Allergic/Immunologic:  There is no history of hives, hay fever, angioedema or anaphylaxis.    /85 (BP Location: Right arm, Patient Position: Sitting, Cuff Size: adult)   Pulse 90   Temp 98.4 °F (36.9 °C) (Temporal)   Resp 18   Wt 64.4 kg (142 lb)   SpO2 97%   BMI 26.83 kg/m²     Physical Exam:  The patient is an alert, oriented, well-nourished and  well-developed woman who appears her stated age. Her speech patterns and movements are normal. Her affect is appropriate.    HEENT: The head is normocephalic. The neck is supple. The thyroid is not enlarged and is without palpable masses/nodules. There are no palpable masses. The trachea is in the midline. Conjunctiva are clear, non-icteric.    Chest: The chest expands symmetrically. The lungs are clear to auscultation.    Heart: The rhythm is regular.  There are no murmurs, rubs, gallops or thrills.    Breasts:  Her breasts are symmetrical with a cup size 38B.  Right breast: The skin, nipple ,and areola appear normal. There is no skin dimpling with movement of the pectoralis. There is no nipple retraction. No nipple discharge can be elicited. The parenchyma is mildly nodular. There are no dominant masses  in the breast. The axillary tail is normal.  Left breast:   The skin, nipple, and areola appear normal. There is no skin dimpling with movement of the pectoralis. There is no nipple retraction. No nipple discharge can be elicited. The parenchyma is mildly nodular. There are no dominant masses in the breast. The axillary tail is normal.  Well-healed incision with a small-sized hematoma involving the incision.    Abdomen:  The abdomen is soft, flat and non tender. The liver is not enlarged. There are no palpable masses.    Lymph Nodes:  The supraclavicular, axillary and cervical regions are free of significant lymphadenopathy.    Back: There is no vertebral column tenderness.    Skin: The skin appears normal. There are no suspicious appearing rashes or lesions.    Extremities: The extremities are without deformity, cyanosis or edema.    Impression:   Ms. Laquita Norris is a 64 year old woman presents with family history of breast cancer and L breast DCIS, s/p lumpectomy.    Recommendations:   I had a discussion with the Patient regarding her breast exam.  She is healing well since surgery with no signs of infection.  She did develop a large hematoma.  This is improved following aspiration and is mild on her exam today.  Will plan to do a Neosporin dressing to prevent secondary infection and we will contact her next week to see if she is cleared to proceed with her radiation therapy.   She will be referred to both medical and radiation oncology for further recommendations regarding local regional control.  I anticipate her first set of imaging will take place approximately 6 months following completion of her radiation.  She was given ample opportunity for questions and those questions were answered to her satisfaction. She was encouraged to contact the office with any questions or concerns prior to her next scheduled appointment.       This note was created by Dragon voice recognition. Errors in content may be related  to improper recognition by the system; efforts to review and correct have been done but errors may still exist. Please be advised the primary purpose of this note is for me to communicate medical care. Standard sentence structure is not always used. Medical terminology and medical abbreviations may be used. There may be grammatical, typographical, and automated fill ins that may have errors missed in proofreading.

## 2024-11-04 ENCOUNTER — TELEPHONE (OUTPATIENT)
Dept: SURGERY | Facility: CLINIC | Age: 64
End: 2024-11-04

## 2024-11-04 ENCOUNTER — APPOINTMENT (OUTPATIENT)
Dept: HEMATOLOGY/ONCOLOGY | Facility: HOSPITAL | Age: 64
End: 2024-11-04
Attending: INTERNAL MEDICINE
Payer: MEDICARE

## 2024-11-04 NOTE — TELEPHONE ENCOUNTER
Patient called to ask if she should keep applying neosporin or she can stop. Patient stated that the wound is healing well and she is feeling much better. Informed patient that if she think that the wound is healing she can stop applying neosporin ointment. Patient verbalized understanding. Will call us if have any further questions.

## 2024-11-05 ENCOUNTER — APPOINTMENT (OUTPATIENT)
Dept: HEMATOLOGY/ONCOLOGY | Facility: HOSPITAL | Age: 64
End: 2024-11-05
Attending: INTERNAL MEDICINE
Payer: MEDICARE

## 2024-11-13 ENCOUNTER — OFFICE VISIT (OUTPATIENT)
Dept: ENDOCRINOLOGY CLINIC | Facility: CLINIC | Age: 64
End: 2024-11-13
Payer: COMMERCIAL

## 2024-11-13 VITALS
BODY MASS INDEX: 26.81 KG/M2 | HEART RATE: 85 BPM | WEIGHT: 142 LBS | SYSTOLIC BLOOD PRESSURE: 149 MMHG | HEIGHT: 61 IN | DIASTOLIC BLOOD PRESSURE: 87 MMHG

## 2024-11-13 DIAGNOSIS — E11.9 TYPE 2 DIABETES MELLITUS WITHOUT COMPLICATION, WITHOUT LONG-TERM CURRENT USE OF INSULIN (HCC): Primary | ICD-10-CM

## 2024-11-13 LAB
GLUCOSE BLOOD: 141
HEMOGLOBIN A1C: 5.3 % (ref 4.3–5.6)
TEST STRIP LOT #: NORMAL NUMERIC

## 2024-11-13 PROCEDURE — 3077F SYST BP >= 140 MM HG: CPT | Performed by: NURSE PRACTITIONER

## 2024-11-13 PROCEDURE — 3079F DIAST BP 80-89 MM HG: CPT | Performed by: NURSE PRACTITIONER

## 2024-11-13 PROCEDURE — 83036 HEMOGLOBIN GLYCOSYLATED A1C: CPT | Performed by: NURSE PRACTITIONER

## 2024-11-13 PROCEDURE — 82947 ASSAY GLUCOSE BLOOD QUANT: CPT | Performed by: NURSE PRACTITIONER

## 2024-11-13 PROCEDURE — 99213 OFFICE O/P EST LOW 20 MIN: CPT | Performed by: NURSE PRACTITIONER

## 2024-11-13 PROCEDURE — 1160F RVW MEDS BY RX/DR IN RCRD: CPT | Performed by: NURSE PRACTITIONER

## 2024-11-13 PROCEDURE — 1159F MED LIST DOCD IN RCRD: CPT | Performed by: NURSE PRACTITIONER

## 2024-11-13 PROCEDURE — 3044F HG A1C LEVEL LT 7.0%: CPT | Performed by: NURSE PRACTITIONER

## 2024-11-13 PROCEDURE — 3008F BODY MASS INDEX DOCD: CPT | Performed by: NURSE PRACTITIONER

## 2024-11-13 NOTE — PATIENT INSTRUCTIONS
A1C: 5.3% today --> previously was 5.4% on 8/12/2024  Blood glucose: 141 in clinic today    Medications:   - continue with Metformin ER   500 mg with breakfast   1,000 mg with dinner     - stop glimepiride       Weight:  Wt Readings from Last 6 Encounters:   11/13/24 142 lb (64.4 kg)   10/30/24 142 lb (64.4 kg)   10/16/24 142 lb 3.2 oz (64.5 kg)   10/09/24 138 lb (62.6 kg)   09/30/24 141 lb (64 kg)   09/30/24 141 lb (64 kg)     A1C goal:  <7.0%    Blood sugar testing:  Test your blood sugar 1 time daily   Recommended times to test: alternate with before breakfast (fasting) or before lunch or before dinner     Blood sugar targets:  Before breakfast:  (preferably < 110)  Before meals: <150     Call for persistent blood sugars < 75 or > 200  
no

## 2024-11-13 NOTE — PROGRESS NOTES
Name: Laquita Norris  Date: 2024    CHIEF COMPLAINT   Chief Complaint   Patient presents with    Diabetes     Diabetic follow up     HISTORY OF PRESENT ILLNESS   Laquita Norris is a 64 year old female who presents for follow up on diabetes management.   HbA1C: 5.3% at POC today. Previously was 5.4% on 2024.   Blood glucose is: 141 in clinic today.  Patient notes that she has been feeling fair. She was diagnosed with left breast DCIS in 2024 and had left breast wire bracketed lumpectomy on 2024 by Dr. Angelica De Jesus.   She continues to follow a low carb diet and staying active per usual. She continues to be compliant with all diabetes medications.     FAMILY HISTORY OF DIABETES  -mother and father;   -maternal side - grandmother, uncle, aunt and cousin   DIABETES HISTORY  Diagnosed: 2023  Prior HbA, C or glycohemoglobin were 10.2% on 2023; 8.2% 2023; 4.9% 2023; 5.4% 2023; 5.4% 2024; 5.4% 2024; 5.3% at POC today;     Patient has not had hospitalizations for blood sugar issues.   Denies history of pancreatitis.     PREVIOUS MEDICATION FOR DM:  Ozempic - d/c'ed due to weight loss   Glimepiride -d/c'ed due to hypoglycemia   -Rybelsus -d/elaine due to high cost     CURRENT MEDICATIONS FOR DM:  - Metformin ER 500mg with breakfast and 1,000mg with lunch  - Glimepiride 2mg daily as needed if fasting BG reading >160     HOME GLUCOSE READINGS:   Testing BG x2 daily  Meter or log book today: yes   Fastin, 105, 108, 92, 180, 135, 87, 130, 129, 174, 112, 91, 132, 125, 132,   Before lunch: 104, 102, 98, 81, 117, 93, 128, 99, 125, 126, 99, 122  Before dinner: 128, 118, 101, 99, 128, 116  Bedtime: 134, 108, 97, 128, 107, 136, 134, 104, 114, 118, 159, 128, 80, 140, 81, 165, 127, 95, 108, 86  Hypoglycemia present: no - denies having felt symptoms of hypoglycemia   Hypoglycemia awareness: n/a has never had an episode     HISTORY OF DIABETES COMPLICATIONS:  History of Retinopathy:  denies - last eye exam within the last 12 months: yes    History of Neuropathy: no   History of Nephropathy: no     ASSOCIATED COMPLICATIONS:   HTN: yes   Hyperlipidemia: yes   Cardiovascular Disease: no   Peripheral Vascular Disease: no     DIETARY COMPLIANCE:  Good; eating a low carb diet     EXERCISE:   no- however staying active with walking    Polyuria, polyphagia, polydipsia: no   Paresthesias: no   Blurred vision: no   Recent steroids, illness or infections: no     REVIEW OF SYSTEMS  Constitutional: Negative for: weight change, fever, fatigue, cold/heat intolerance  Eyes: Negative for:  Visual changes, proptosis, blurring  ENT: Negative for:  dysphagia, neck swelling, dysphonia  Respiratory: Negative for: hemoptysis, shortness of breath, cough, or dyspnea.  Cardiovascular: Negative for:  chest pain, chest discomfort, palpitations  GI: Negative for:  abdominal pain, nausea, vomiting, diarrhea, heartburn, constipation  Neurology: Negative for: headache, dizziness, syncope, numbness/tingling, or weakness.   Genito-Urinary: Negative for: dysuria, frequency or hematuria   Hematology/Lymphatics: Negative for: bruising, easy bleeding, lower extremity edema  Skin: Negative for: rash, blister, infection or ulcers.  Endocrine: Negative for: polyuria, polydipsia. No osteoporosis. No thyroid disease.     MEDICATIONS:     Current Outpatient Medications:     acetaminophen 500 MG Oral Tab, Take 1 tablet (500 mg total) by mouth every 6 (six) hours as needed for Pain., Disp: , Rfl:     glimepiride 2 MG Oral Tab, Take 1 tablet (2 mg total) by mouth every morning before breakfast. A needed for sugar above 170 before breakfast, Disp: 90 tablet, Rfl: 0    Glucose Blood (ONETOUCH VERIO) In Vitro Strip, USE 1 STRIP TO CHECK GLUCOSE THREE TIMES DAILY, Disp: 400 strip, Rfl: 1    ONETOUCH DELICA PLUS [id number] Does not apply Misc, 1 LANCET BY FINGER STICK ROUTE THREE TIMES DAILY, Disp: 300 each, Rfl: 1    atorvastatin 40 MG Oral Tab,  Take 1 tablet (40 mg total) by mouth nightly., Disp: 90 tablet, Rfl: 1    metFORMIN  MG Oral Tablet 24 Hr, Take 1 tablet (500 mg total) by mouth daily with breakfast AND 2 tablets (1,000 mg total) daily with dinner., Disp: 270 tablet, Rfl: 1    Polyvinyl Alcohol-Povidone (REFRESH OP), Apply 1 drop to eye daily as needed., Disp: , Rfl:     Homeopathic Products (EARACHE DROPS OT), Place 2 drops in ear(s) daily as needed., Disp: , Rfl:     ALLERGIES:   Allergies   Allergen Reactions    Codeine RASH     T#3    Adhesive Tape OTHER (SEE COMMENTS)     Blisters from steri strips    Acetaminophen-Codeine RASH     Tolerates plain Tylenol    Chlorhexidine RASH       SOCIAL HISTORY:   Social History     Socioeconomic History    Marital status:    Tobacco Use    Smoking status: Never     Passive exposure: Never    Smokeless tobacco: Never   Vaping Use    Vaping status: Never Used   Substance and Sexual Activity    Alcohol use: No    Drug use: No    Sexual activity: Yes     Birth control/protection: Tubal Ligation   Other Topics Concern    Caffeine Concern No     Comment: 16oz soda chocolate daily    Exercise Yes     Comment: Physical therapy 3 times per week.  Walking daily    Pt has a pacemaker No    Pt has a defibrillator No    Reaction to local anesthetic No       PAST MEDICAL HISTORY:   Past Medical History:    Anxiety state    Bartholin cyst    per ng excision of bartholins cyst    BRCA gene mutation negative in female    negative for 70 gene panel with RNA. Results in media tab    Bunion    per ng bunionectomy, bilateral feet    Cancer (HCC)    L Br Ca    Cyst of skin    per ng rt thigh cyst removal    Diabetes (HCC)    Diverticulitis    hosp x 3 days    Diverticulosis    Fibroids    Hemorrhoids    per  colonoscopy    High blood pressure    High cholesterol    History of blood transfusion    @[facility]    History of bone density study    \"normal dexa '04, '07\"    Hx of motion sickness    Osteoarthritis     Perforated diverticulum of large intestine    Postmenopausal bleeding    per ng neg endometrial biopsy    Tonsillitis    tonsillectomy 1970    Uterine prolapse    uses pessary    Uterine prolapse    Donut pessary -- self cleaning     Visual impairment    contacts and glasses       PAST SURGICAL HISTORY:   Past Surgical History:   Procedure Laterality Date    Biopsy of uterus lining  10/2007    neg endometrial biopsy    Colonoscopy N/A 2018    Procedure: COLONOSCOPY;  Surgeon: Nini Garcia MD;  Location: [facility]    Foot surgery      bunionectomy    Hip replacement surgery      RT hip surgery 2020    Hysterectomy  2019    Leg/ankle surgery proc unlisted Left     to repair flat foot    Joshua biopsy stereo nodule 1 site left (cpt=19081) Left 2024    TOPHAT CLIP    Needle biopsy left  2013          x3 w/PPTL    Other surgical history Right 2013    thigh cyst removal    Pessary      Tonsillectomy  1970    Tubal ligation         PHYSICAL EXAM:   Vitals:    24 1339   BP: 149/87   BP Location: Right arm   Patient Position: Sitting   Cuff Size: adult   Pulse: 85   Weight: 142 lb (64.4 kg)   Height: 5' 1\" (1.549 m)     BMI:   Body mass index is 26.83 kg/m².    General Appearance:  alert, well developed, in no acute distress  Nutritional:  no extreme weight gain or loss  Head: Atraumatic  Eyes:  normal conjunctivae, sclera., normal sclera and normal pupils  Throat/Neck: normal sound to voice. Normal hearing, normal speech  Back: no kyphosis  Respiratory:  Speaking in full sentences, non-labored. no increased work of breathing, no audible wheezing    Skin:  normal moisture and skin texture, no visible lesions  Hair and nails: normal scalp hair  Hematologic:  no excessive bruising  Neuro: motor grossly intact, moving all extremities without difficulty  Psychiatric:  oriented to time, self, and place  Extremities: no obvious extremity swelling, no lesions    Diabetes  Foot Exam:  Bilateral barefoot skin diabetic exam is normal, visualized feet and the appearance is normal.  Bilateral monofilament/sensation of both feet is normal.  Pulsation pedal pulse exam of both lower legs/feet is normal as well.    LABS: Pertinent labs reviewed    ASSESSMENT/PLAN:    -Reviewed with patient the pathogenesis of diabetes, clinical significance of A1c, and common complications such as: microvascular, macrovascular and diabetic ketoacidosis. Patient verbalizes understanding of the importance of glycemic control and the goals of therapy.   Per ADA 2024 guidelines, it is recommended that older adults (age 65 and above) who are healthy with few coexisting chronic illnesses, intact cognitive and functional status, should have A1C goal of <7.5%, fasting or preprandial glucose of  and bedtime glucose of .     1.Type 2 Diabetes Mellitus, controlled   -LAB DATA  HbA,C: 5.3% today   a) Medications  - continue with Metformin ER 500mg with breakfast and 1,000mg with dinner   - stop Glimepiride     -discussed to continue with low carb diet    -discussed to continue to stay active as much as safely able   -reviewed target goal BG readings and A1C  - reviewed when to call and notify me of abnormal BG readings.     b) No nephropathy: GFR: 98 on 2024 and urine MA: <0.3mg/dL on 2023 --> repeat urine MA  C) UTD with annual eye exam   D) foot exam: normal today 2024  e) cont. testing frequency to 2x daily - fasting and 2hrs after meals   f) Life style changes reviewed.     2. Hypertension    - BP above goal today- patient notes that she forgot to take her BP medication today.   - discussed to resume taking as soon as she returns home   - on enalapril 20mg once daily     3. Hyperlipidemia   - LDL: 69 and Tri on 2024  - atorvastatin on hold per Dr. Hernandez related to elevated liver enzymes       RTC in 4 months   Patient instructed to call sooner if they develop Blood glucose  readings <75 and/or if they have readings persistently >200.     The risks and benefits of my recommendations were discussed with the patient today. questions were also answered to the best of my knowledge. Patient verbalizes understanding of these issues and agrees to the plan.    11/13/2024  CARRIE Aguila

## 2024-11-22 RX ORDER — METFORMIN HYDROCHLORIDE 500 MG/1
TABLET, EXTENDED RELEASE ORAL
Qty: 270 TABLET | Refills: 0 | Status: SHIPPED | OUTPATIENT
Start: 2024-11-22

## 2024-11-22 NOTE — TELEPHONE ENCOUNTER
Endocrine Refill protocol for metformin    Protocol Criteria:  PASSED  Reason: N/A    If all below requirements are met, send a 90-day supply with 1 refill per provider protocol.     Verify appointment with Endocrinology completed in the last 6 months or scheduled in the next 3 months.  Verify A1C has been completed within the last 6 months and is below 8.5%  Verify last GFR is greater than or equal to 40 in the past 12 months    Last completed office visit:11/13/2024 Beth Correa APRN   Next scheduled Follow up:   Future Appointments   Date Time Provider Department Center                                   Last GFR result:    Lab Results   Component Value Date    EGFRCR 98 09/11/2024     Last A1c result: Last A1C result: 5.3% done 11/13/2024.

## 2024-11-29 ENCOUNTER — PATIENT MESSAGE (OUTPATIENT)
Dept: INTERNAL MEDICINE CLINIC | Facility: CLINIC | Age: 64
End: 2024-11-29

## 2024-11-29 ENCOUNTER — TELEPHONE (OUTPATIENT)
Dept: SURGERY | Facility: CLINIC | Age: 64
End: 2024-11-29

## 2024-11-29 NOTE — TELEPHONE ENCOUNTER
Patient called to ask if it is ok to use a heating pad on her calf as she is having some pain. Informed patient that it should be ok to use a heating pad on her calf for pain. Also informed patient to use it for not more than 10-15 min at a time. Patient verbalized understanding. Will contact our office if have any further questions.

## 2024-11-30 NOTE — TELEPHONE ENCOUNTER
[email or web address] message sent to patient.          Future Appointments   Date Time Provider Department Center   12/3/2024  3:00 PM [facility] DEXA 1 [facility] DEXA [facility]   12/5/2024 10:40 AM Erickson Ruiz MD [facility]   12/16/2024  1:00 PM Bindu Roth MD [facility] HEM ONC [facility]   3/28/2025  1:00 PM Alyssia Carroll DO UROG [facility]

## 2024-12-03 ENCOUNTER — LAB ENCOUNTER (OUTPATIENT)
Dept: LAB | Facility: HOSPITAL | Age: 64
End: 2024-12-03
Attending: INTERNAL MEDICINE
Payer: MEDICARE

## 2024-12-03 DIAGNOSIS — E55.9 VITAMIN D DEFICIENCY: ICD-10-CM

## 2024-12-03 DIAGNOSIS — E11.9 TYPE 2 DIABETES MELLITUS WITHOUT COMPLICATION, WITHOUT LONG-TERM CURRENT USE OF INSULIN (HCC): ICD-10-CM

## 2024-12-03 LAB
CREAT UR-SCNC: 80.2 MG/DL
MICROALBUMIN UR-MCNC: <0.3 MG/DL
VIT D+METAB SERPL-MCNC: 22.7 NG/ML (ref 30–100)

## 2024-12-03 PROCEDURE — 82043 UR ALBUMIN QUANTITATIVE: CPT

## 2024-12-03 PROCEDURE — 36415 COLL VENOUS BLD VENIPUNCTURE: CPT

## 2024-12-03 PROCEDURE — 82306 VITAMIN D 25 HYDROXY: CPT

## 2024-12-03 PROCEDURE — 82570 ASSAY OF URINE CREATININE: CPT

## 2024-12-05 ENCOUNTER — OFFICE VISIT (OUTPATIENT)
Dept: INTERNAL MEDICINE CLINIC | Facility: CLINIC | Age: 64
End: 2024-12-05
Payer: COMMERCIAL

## 2024-12-05 ENCOUNTER — TELEPHONE (OUTPATIENT)
Dept: ENDOCRINOLOGY CLINIC | Facility: CLINIC | Age: 64
End: 2024-12-05

## 2024-12-05 VITALS
DIASTOLIC BLOOD PRESSURE: 94 MMHG | BODY MASS INDEX: 27 KG/M2 | HEIGHT: 61 IN | SYSTOLIC BLOOD PRESSURE: 162 MMHG | HEART RATE: 89 BPM | WEIGHT: 143 LBS

## 2024-12-05 DIAGNOSIS — R74.8 ELEVATED LIVER ENZYMES: ICD-10-CM

## 2024-12-05 DIAGNOSIS — I10 ESSENTIAL HYPERTENSION: ICD-10-CM

## 2024-12-05 DIAGNOSIS — Z00.00 MEDICARE ANNUAL WELLNESS VISIT, SUBSEQUENT: Primary | ICD-10-CM

## 2024-12-05 DIAGNOSIS — M16.10 PRIMARY OSTEOARTHRITIS OF HIP, UNSPECIFIED LATERALITY: ICD-10-CM

## 2024-12-05 DIAGNOSIS — E78.5 HYPERLIPIDEMIA, UNSPECIFIED HYPERLIPIDEMIA TYPE: ICD-10-CM

## 2024-12-05 DIAGNOSIS — E11.9 TYPE 2 DIABETES MELLITUS WITHOUT COMPLICATION, WITHOUT LONG-TERM CURRENT USE OF INSULIN (HCC): ICD-10-CM

## 2024-12-05 DIAGNOSIS — D05.12 DUCTAL CARCINOMA IN SITU (DCIS) OF LEFT BREAST: ICD-10-CM

## 2024-12-05 DIAGNOSIS — R26.9 GAIT ABNORMALITY: ICD-10-CM

## 2024-12-05 PROBLEM — Z80.0 FAMILY HISTORY OF MALIGNANT NEOPLASM OF GASTROINTESTINAL TRACT: Status: RESOLVED | Noted: 2024-09-04 | Resolved: 2024-12-05

## 2024-12-05 PROBLEM — M21.42 PES PLANUS OF LEFT FOOT: Status: RESOLVED | Noted: 2022-11-30 | Resolved: 2024-12-05

## 2024-12-05 PROBLEM — N81.9 VAGINAL VAULT PROLAPSE: Status: RESOLVED | Noted: 2022-01-28 | Resolved: 2024-12-05

## 2024-12-05 PROBLEM — Z80.3 FAMILY HISTORY OF MALIGNANT NEOPLASM OF BREAST: Status: RESOLVED | Noted: 2024-09-04 | Resolved: 2024-12-05

## 2024-12-05 RX ORDER — ERGOCALCIFEROL 1.25 MG/1
50000 CAPSULE ORAL WEEKLY
Qty: 12 CAPSULE | Refills: 0 | Status: SHIPPED | OUTPATIENT
Start: 2024-12-05 | End: 2025-03-05

## 2024-12-05 RX ORDER — ENALAPRIL MALEATE 20 MG/1
20 TABLET ORAL DAILY
Qty: 100 TABLET | Refills: 3 | COMMUNITY
Start: 2024-12-05

## 2024-12-06 ENCOUNTER — TELEPHONE (OUTPATIENT)
Dept: ENDOCRINOLOGY CLINIC | Facility: CLINIC | Age: 64
End: 2024-12-06

## 2024-12-06 NOTE — TELEPHONE ENCOUNTER
Noted.     She should continue taking Vitamin D3 at 2,000 units daily until her next follow up visit.     I see that she does not have a f/u apt scheduled yet, she was advised to f/u in 4 months from 11/13/24. Please help her schedule apt.     Thank you!

## 2024-12-06 NOTE — TELEPHONE ENCOUNTER
Called pt and notified that since she will be fasting she can wait to take MTF until after labs when she can eat to avoid hypoglycemia or stomach SE. Patient verbalized understanding.   Patient also states that Dr. Ruiz started pt on ergocalciferol once weekly x12.    Cass,     After course, should pt repeat vitamin D level or just continue with vitamin D 500 units or 2000 units?

## 2024-12-06 NOTE — TELEPHONE ENCOUNTER
Patient calling asking if ok to take metformin prior to labs or should wait after completing tomorrow. Please call states able to leave detailed message.

## 2024-12-07 ENCOUNTER — LAB ENCOUNTER (OUTPATIENT)
Dept: LAB | Age: 64
End: 2024-12-07
Attending: INTERNAL MEDICINE
Payer: MEDICARE

## 2024-12-07 DIAGNOSIS — R79.89 ELEVATED LIVER FUNCTION TESTS: Primary | ICD-10-CM

## 2024-12-07 LAB
ALBUMIN SERPL-MCNC: 4.3 G/DL (ref 3.2–4.8)
ALBUMIN/GLOB SERPL: 1.2 {RATIO} (ref 1–2)
ALP LIVER SERPL-CCNC: 123 U/L
ALT SERPL-CCNC: 22 U/L
ANION GAP SERPL CALC-SCNC: 8 MMOL/L (ref 0–18)
AST SERPL-CCNC: 19 U/L (ref ?–34)
BASOPHILS # BLD AUTO: 0.04 X10(3) UL (ref 0–0.2)
BASOPHILS NFR BLD AUTO: 0.7 %
BILIRUB SERPL-MCNC: 1.2 MG/DL (ref 0.2–1.1)
BUN BLD-MCNC: 14 MG/DL (ref 9–23)
BUN/CREAT SERPL: 22.2 (ref 10–20)
CALCIUM BLD-MCNC: 9.6 MG/DL (ref 8.7–10.4)
CHLORIDE SERPL-SCNC: 103 MMOL/L (ref 98–112)
CO2 SERPL-SCNC: 26 MMOL/L (ref 21–32)
CREAT BLD-MCNC: 0.63 MG/DL
DEPRECATED RDW RBC AUTO: 39.6 FL (ref 35.1–46.3)
EGFRCR SERPLBLD CKD-EPI 2021: 98 ML/MIN/1.73M2 (ref 60–?)
EOSINOPHIL # BLD AUTO: 0.03 X10(3) UL (ref 0–0.7)
EOSINOPHIL NFR BLD AUTO: 0.5 %
ERYTHROCYTE [DISTWIDTH] IN BLOOD BY AUTOMATED COUNT: 11.7 % (ref 11–15)
FASTING STATUS PATIENT QL REPORTED: YES
GLOBULIN PLAS-MCNC: 3.6 G/DL (ref 2–3.5)
GLUCOSE BLD-MCNC: 106 MG/DL (ref 70–99)
HCT VFR BLD AUTO: 37.1 %
HGB BLD-MCNC: 13 G/DL
IGA SERPL-MCNC: 333.4 MG/DL (ref 40–350)
IGM SERPL-MCNC: 82.4 MG/DL (ref 50–300)
IMM GRANULOCYTES # BLD AUTO: 0.04 X10(3) UL (ref 0–1)
IMM GRANULOCYTES NFR BLD: 0.7 %
IMMUNOGLOBULIN PNL SER-MCNC: 1526 MG/DL (ref 650–1600)
INR BLD: 0.97 (ref 0.8–1.2)
LYMPHOCYTES # BLD AUTO: 1.36 X10(3) UL (ref 1–4)
LYMPHOCYTES NFR BLD AUTO: 24.3 %
MCH RBC QN AUTO: 32.2 PG (ref 26–34)
MCHC RBC AUTO-ENTMCNC: 35 G/DL (ref 31–37)
MCV RBC AUTO: 91.8 FL
MONOCYTES # BLD AUTO: 0.37 X10(3) UL (ref 0.1–1)
MONOCYTES NFR BLD AUTO: 6.6 %
NEUTROPHILS # BLD AUTO: 3.75 X10 (3) UL (ref 1.5–7.7)
NEUTROPHILS # BLD AUTO: 3.75 X10(3) UL (ref 1.5–7.7)
NEUTROPHILS NFR BLD AUTO: 67.2 %
OSMOLALITY SERPL CALC.SUM OF ELEC: 285 MOSM/KG (ref 275–295)
PLATELET # BLD AUTO: 217 10(3)UL (ref 150–450)
POTASSIUM SERPL-SCNC: 3.9 MMOL/L (ref 3.5–5.1)
PROT SERPL-MCNC: 7.9 G/DL (ref 5.7–8.2)
PROTHROMBIN TIME: 13.5 SECONDS (ref 11.6–14.8)
RBC # BLD AUTO: 4.04 X10(6)UL
SODIUM SERPL-SCNC: 137 MMOL/L (ref 136–145)
WBC # BLD AUTO: 5.6 X10(3) UL (ref 4–11)

## 2024-12-07 PROCEDURE — 83521 IG LIGHT CHAINS FREE EACH: CPT

## 2024-12-07 PROCEDURE — 86334 IMMUNOFIX E-PHORESIS SERUM: CPT

## 2024-12-07 PROCEDURE — 80053 COMPREHEN METABOLIC PANEL: CPT

## 2024-12-07 PROCEDURE — 84165 PROTEIN E-PHORESIS SERUM: CPT

## 2024-12-07 PROCEDURE — 36415 COLL VENOUS BLD VENIPUNCTURE: CPT

## 2024-12-07 PROCEDURE — 85025 COMPLETE CBC W/AUTO DIFF WBC: CPT

## 2024-12-07 PROCEDURE — 82784 ASSAY IGA/IGD/IGG/IGM EACH: CPT

## 2024-12-07 PROCEDURE — 85610 PROTHROMBIN TIME: CPT

## 2024-12-09 ENCOUNTER — TELEPHONE (OUTPATIENT)
Dept: INTERNAL MEDICINE CLINIC | Facility: CLINIC | Age: 64
End: 2024-12-09

## 2024-12-09 PROBLEM — M16.11 PRIMARY OSTEOARTHRITIS OF RIGHT HIP: Status: RESOLVED | Noted: 2020-08-17 | Resolved: 2024-12-09

## 2024-12-09 LAB
ALBUMIN SERPL ELPH-MCNC: 3.98 G/DL (ref 3.75–5.21)
ALBUMIN/GLOB SERPL: 1.13 {RATIO} (ref 1–2)
ALPHA1 GLOB SERPL ELPH-MCNC: 0.29 G/DL (ref 0.19–0.46)
ALPHA2 GLOB SERPL ELPH-MCNC: 0.65 G/DL (ref 0.48–1.05)
B-GLOBULIN SERPL ELPH-MCNC: 1.07 G/DL (ref 0.68–1.23)
GAMMA GLOB SERPL ELPH-MCNC: 1.52 G/DL (ref 0.62–1.7)
KAPPA LC FREE SER-MCNC: 2.77 MG/DL (ref 0.33–1.94)
KAPPA LC FREE/LAMBDA FREE SER NEPH: 1.53 {RATIO} (ref 0.26–1.65)
LAMBDA LC FREE SERPL-MCNC: 1.81 MG/DL (ref 0.57–2.63)
PROT SERPL-MCNC: 7.5 G/DL (ref 5.7–8.2)

## 2024-12-09 NOTE — PROGRESS NOTES
Subjective:   Laquita Norris is a 64 year old female who presents for a MA AHA (Medicare Advantage Annual Health Assessment) and IPPE (Initial Preventative Physical Exam) (Welcome to Medicare- < 12 months on Medicare) and scheduled follow up of multiple significant but stable problems.   HTN  Long standing history of hypertension     sympotms  :        Headache no  dizziness        no                             Blurred vision no  palpitaionsSyncope no  Chest pain  no  PND  Orthopnea no  Weakness  No  Low salt diet    yes    Compliance with exercise stays active  Compliance with medication yes                                              History/Other:   Fall Risk Assessment:   She has been screened for Falls and is High Risk. Fall Prevention information provided to patient in After Visit Summary.    Do you feel unsteady when standing or walking?: (Patient-Rptd) Yes  Do you worry about falling?: (Patient-Rptd) Yes  Have you fallen in the past year?: (Patient-Rptd) Yes  How many times have you fallen?: (Patient-Rptd) (P) 2  Were you injured?: (Patient-Rptd) (P) No     Cognitive Assessment:   She had a completely normal cognitive assessment - see flowsheet entries     Functional Ability/Status:   Laquita Norris has some abnormal functions as listed below:  She has Walking problems based on screening of functional status. She has problems with Daily Activities based on screening of functional status.       Depression Screening (PHQ):  PHQ-2 SCORE: 2  , done 11/29/2024   Little interest or pleasure in doing things: 1    Feeling down, depressed, or hopeless: 1             Advanced Directives:   She does NOT have a Living Will. [Do you have a living will?: No]  She does NOT have a Power of  for Health Care. [Do you have a healthcare power of ?: No]  Discussed Advance Care Planning with patient (and family/surrogate if present). Standard forms made available to patient in After Visit Summary.      Patient  Active Problem List   Diagnosis    Essential hypertension    Ductal carcinoma in situ (DCIS) of left breast     Allergies:  She is allergic to codeine, adhesive tape, acetaminophen-codeine, and chlorhexidine.    Current Medications:  Outpatient Medications Marked as Taking for the 12/5/24 encounter (Office Visit) with Erickson Ruiz MD   Medication Sig    enalapril 20 MG Oral Tab Take 1 tablet (20 mg total) by mouth daily.    ergocalciferol 1.25 MG (23112 UT) Oral Cap Take 1 capsule (50,000 Units total) by mouth once a week.    METFORMIN  MG Oral Tablet 24 Hr TAKE 1 TABLET BY MOUTH DAILY WITH BREAKFAST AND 2 TABLET BY MOUTH DAILY WITH DINNER.    acetaminophen 500 MG Oral Tab Take 1 tablet (500 mg total) by mouth every 6 (six) hours as needed for Pain.    Glucose Blood (ONETOUCH VERIO) In Vitro Strip USE 1 STRIP TO CHECK GLUCOSE THREE TIMES DAILY    ONETOUCH DELICA PLUS [id number] Does not apply Misc 1 LANCET BY FINGER STICK ROUTE THREE TIMES DAILY    [DISCONTINUED] enalapril 20 MG Oral Tab Take 1 tablet (20 mg total) by mouth daily.    Polyvinyl Alcohol-Povidone (REFRESH OP) Apply 1 drop to eye daily as needed.    Homeopathic Products (EARACHE DROPS OT) Place 2 drops in ear(s) daily as needed.       Medical History:  She  has a past medical history of Anxiety state, Bartholin cyst (1994), BRCA gene mutation negative in female (09/12/2024), Bunion (2010), Cancer (HCC), Cyst of skin (2013), Diabetes (HCC), Diverticulitis (2017), Diverticulosis, Family history of malignant neoplasm of breast (09/04/2024), Family history of malignant neoplasm of gastrointestinal tract (09/04/2024), Fibroids (2003), Hemorrhoids (2012), High blood pressure, High cholesterol, History of blood transfusion (1986), History of bone density study (12/2004), motion sickness, Osteoarthritis, Perforated diverticulum of large intestine (10/20/2017), Pes planus of left foot (11/30/2022), Postmenopausal bleeding (2007), Tonsillitis, Uterine  prolapse (), Uterine prolapse (2015), and Visual impairment.  Surgical History:  She  has a past surgical history that includes foot surgery (); tonsillectomy (); ; pessary (); biopsy of uterus lining (10/2007); other surgical history (Right, ); tubal ligation (); needle biopsy left (2013); colonoscopy (N/A, 2018); leg/ankle surgery proc unlisted (Left, ); hysterectomy (); hip replacement surgery; and joseph biopsy stereo nodule 1 site left (cpt=19081) (Left, 2024).   Family History:  Her family history includes Alcohol and Other Disorders Associated in her father; Allergies in her mother and sister; Breast Cancer (age of onset: 45) in her maternal aunt; Breast Cancer (age of onset: 62) in her paternal cousin female; Breast Cancer (age of onset: 70) in her maternal aunt; Breast Cancer (age of onset: 75) in her paternal aunt; Breast Cancer (age of onset: 80) in her maternal aunt; Cancer in her paternal uncle; Cancer (age of onset: 45) in her maternal aunt; Cancer (age of onset: 62) in her paternal cousin female; Cancer (age of onset: 70) in her maternal aunt; Cancer (age of onset: 75) in her paternal aunt; Cancer (age of onset: 80) in her maternal aunt; Diabetes in her maternal grandmother, mother, and another family member; Eye Problems in her mother; Glaucoma in her maternal grandmother; Heart Disorder in her mother; Hypertension in her father, maternal grandmother, and mother; Lipids in her mother; Musculo-skelatal Disorder in her maternal grandmother and mother.  Social History:  She  reports that she has never smoked. She has never been exposed to tobacco smoke. She has never used smokeless tobacco. She reports that she does not drink alcohol and does not use drugs.    Tobacco:  She has never smoked tobacco.    CAGE Alcohol Screen:   CAGE screening score of 0 on 2024, showing low risk of alcohol abuse.      Patient Care Team:  Erickson Ruiz MD as  PCP - General (Internal Medicine)  Soy Brasher MD (NEUROLOGY)  Alyssia He, RN as Breast Navigator (ONCOLOGY)  Lori Valencia, RN as Registered Nurse  Jean Paul Dominguez MD (Radiation Oncology)    Review of Systems   Constitutional:  Negative for activity change, chills, fatigue and fever.   HENT:  Negative for ear discharge, nosebleeds, postnasal drip, rhinorrhea, sinus pressure and sore throat.    Eyes:  Negative for pain, discharge and redness.   Respiratory:  Negative for cough, chest tightness, shortness of breath and wheezing.    Cardiovascular:  Negative for chest pain, palpitations and leg swelling.   Gastrointestinal:  Negative for abdominal pain, blood in stool, constipation, diarrhea, nausea and vomiting.   Genitourinary:  Negative for difficulty urinating, dysuria, frequency, hematuria and urgency.   Musculoskeletal:  Positive for arthralgias and gait problem. Negative for back pain and joint swelling.   Skin:  Negative for rash.   Neurological:  Negative for syncope, weakness, light-headedness and headaches.   Psychiatric/Behavioral:  Negative for dysphoric mood. The patient is not nervous/anxious.          Objective:   Physical Exam  Constitutional:       Appearance: She is well-developed. She is not ill-appearing.   HENT:      Right Ear: Ear canal normal.      Left Ear: Ear canal normal.      Mouth/Throat:      Pharynx: Oropharynx is clear.   Eyes:      Extraocular Movements: Extraocular movements intact.      Conjunctiva/sclera: Conjunctivae normal.      Pupils: Pupils are equal, round, and reactive to light.   Cardiovascular:      Rate and Rhythm: Normal rate and regular rhythm.      Heart sounds: Normal heart sounds.   Pulmonary:      Effort: Pulmonary effort is normal.      Breath sounds: Normal breath sounds.   Abdominal:      General: Bowel sounds are normal.      Palpations: Abdomen is soft.   Skin:     General: Skin is warm and dry.   Neurological:      Mental Status: She is alert.       Gait: Gait abnormal.   Psychiatric:         Mood and Affect: Mood normal.           BP (!) 162/94 (BP Location: Right arm, Patient Position: Sitting, Cuff Size: adult)   Pulse 89   Ht 5' 1\" (1.549 m)   Wt 143 lb (64.9 kg)   BMI 27.02 kg/m²  Estimated body mass index is 27.02 kg/m² as calculated from the following:    Height as of this encounter: 5' 1\" (1.549 m).    Weight as of this encounter: 143 lb (64.9 kg).    Medicare Hearing Assessment:   Hearing Screening    Entry User: Diana Sanches CMA  Screening Method: Questionnaire  I have a problem hearing over the telephone: No I have trouble following the conversations when two or more people are talking at the same time: No   I have trouble understanding things on the TV: No I have to strain to understand conversations: No   I have to worry about missing the telephone ring or doorbell: No I have trouble hearing conversations in a noisy background such as a crowded room or restaurant: No   I get confused about where sounds come from: No I misunderstand some words in a sentence and need to ask people to repeat themselves: No   I especially have trouble understanding the speech of women and children: No I have trouble understanding the speaker in a large room such as at a meeting or place of [employer]: Sometimes   Many people I talk to seem to mumble (or don't speak clearly): Sometimes People get annoyed because I misunderstand what they say: Sometimes   I misunderstand what others are saying and make inappropriate responses: Sometimes I avoid social activities because I cannot hear well and fear I will reply improperly: Sometimes   Family members and friends have told me they think I may have hearing loss: No             Visual Acuity:   Right Eye Visual Acuity: Corrected Right Eye Chart Acuity: 20/50   Left Eye Visual Acuity: Corrected Left Eye Chart Acuity: 20/30   Both Eyes Visual Acuity: Corrected Both Eyes Chart Acuity: 20/30   Able To Tolerate Visual  Acuity: Yes        Assessment & Plan:   Laquita Norris is a 64 year old female who presents for a Medicare Assessment.     (Z00.00) Medicare annual wellness visit, subsequent  (primary encounter diagnosis)  Plan: Patient is independent with ADL.s    Health screening   Colonoscopy, mammography, dexa scan  And routine eye exam advised.      (R26.9) Gait abnormality  Plan: DME - External        Fall precaution    (E78.5) Hyperlipidemia, unspecified hyperlipidemia type  Plan: Low cholesterol diet advised  Avoid saturated and trans fats       (E11.9) Type 2 diabetes mellitus without complication, without long-term current use of insulin (HCC)  Plan: HGBA1C:    Lab Results   Component Value Date    A1C 5.3 11/13/2024    A1C 5.6 09/11/2024    A1C 5.4 08/12/2024     09/11/2024     Controlled monitor    (D05.12) Ductal carcinoma in situ (DCIS) of left breast  Plan: ongoing treatment   monitor    (R74.8) Elevated liver enzymes  Plan: ongoing treatment    by GI  DR Hernandez  monitor    (I10) Essential hypertension  Plan: BP (!) 162/94 (BP Location: Right arm, Patient Position: Sitting, Cuff Size: adult)   Pulse 89   Ht 5' 1\" (1.549 m)   Wt 143 lb (64.9 kg)   BMI 27.02 kg/m²   Low salt diet advised  Monitor blood pressure daily and record  Follow up in a month  Aggravated by anxiety monitor    (M16.10) Primary osteoarthritis of hip, unspecified laterality  Plan: chronic   fall precuation aidvsed    Medications and most recent test results reviewed  Refill medicaitons  as needed  Potential side effect discussed  Modification of risk for CAD advised    Dietary an lifestyle change  Pt voiced understanding and agrees with plan  Pt given time to ask questions  After Visit Summary handout    Discussed  And given to patient.        The patient indicates understanding of these issues and agrees to the plan.  Reinforced healthy diet, lifestyle, and exercise.      No follow-ups on file.     Erickson Ruiz MD, 12/9/2024      Supplementary Documentation:   General Health:  In the past six months, have you lost more than 10 pounds without trying?: (Patient-Rptd) 2 - No  Has your appetite been poor?: (Patient-Rptd) No  Type of Diet: (Patient-Rptd) Balanced;Vegetarian;Diabetic;Low Salt;Low Carb  How does the patient maintain a good energy level?: (Patient-Rptd) Daily Walks;Stretching  How would you describe your daily physical activity?: (Patient-Rptd) Light  How would you describe your current health state?: (Patient-Rptd) Fair  How do you maintain positive mental well-being?: (Patient-Rptd) Social Interaction;Visiting Family  On a scale of 0 to 10, with 0 being no pain and 10 being severe pain, what is your pain level?: (Patient-Rptd) 3 - (Mild)  In the past six months, have you experienced urine leakage?: (Patient-Rptd) 0-No  At any time do you feel concerned for the safety/well-being of yourself and/or your children, in your home or elsewhere?: No  Have you had any immunizations at another office such as Influenza, Hepatitis B, Tetanus, or Pneumococcal?: (Patient-Rptd) No    Health Maintenance   Topic Date Due    Pneumococcal Vaccine: 65+ Years (1 of 2 - PCV) Never done    Zoster Vaccines (2 of 2) 08/20/2022    DEXA Scan  07/10/2023    MA Annual Health Assessment  Never done    Diabetes Care Dilated Eye Exam  06/22/2024    COVID-19 Vaccine (3 - 2024-25 season) 09/01/2024    Influenza Vaccine (1) Never done    HTN: BP Follow-Up  01/05/2025    Diabetes Care A1C  05/13/2025    Mammogram  08/20/2025    Diabetes Care Foot Exam  11/13/2025    Diabetes Care: Microalb/Creat Ratio  12/03/2025    Diabetes Care: GFR  12/07/2025    Colorectal Cancer Screening  02/20/2028    Annual Depression Screening  Completed    Fall Risk Screening (Annual)  Completed

## 2024-12-09 NOTE — TELEPHONE ENCOUNTER
Rollator walker order submitted to [facility] via parachute:    Rollator  Rollator Not Specific Color -   Seat Attachment -   Quantity  1  Supplier  [facility]

## 2024-12-12 ENCOUNTER — TELEPHONE (OUTPATIENT)
Dept: INTERNAL MEDICINE CLINIC | Facility: CLINIC | Age: 64
End: 2024-12-12

## 2024-12-12 NOTE — TELEPHONE ENCOUNTER
Patient calling ( name and date of birth of patient verified ) asking about her Vitamin D     If she can take it later on Thursday as she usually takes it on Thursdays    Assured patient she can take the vitamin D anytime on Thursday     Patient verbalizes understanding and agrees with plan.

## 2024-12-17 ENCOUNTER — OFFICE VISIT (OUTPATIENT)
Age: 64
End: 2024-12-17
Attending: INTERNAL MEDICINE
Payer: MEDICARE

## 2024-12-17 VITALS
HEART RATE: 88 BPM | BODY MASS INDEX: 27.34 KG/M2 | OXYGEN SATURATION: 98 % | SYSTOLIC BLOOD PRESSURE: 152 MMHG | WEIGHT: 144.81 LBS | HEIGHT: 61 IN | RESPIRATION RATE: 16 BRPM | TEMPERATURE: 98 F | DIASTOLIC BLOOD PRESSURE: 85 MMHG

## 2024-12-17 DIAGNOSIS — Z78.0 OSTEOPENIA AFTER MENOPAUSE: ICD-10-CM

## 2024-12-17 DIAGNOSIS — D05.12 DUCTAL CARCINOMA IN SITU (DCIS) OF LEFT BREAST: Primary | ICD-10-CM

## 2024-12-17 DIAGNOSIS — M85.80 OSTEOPENIA AFTER MENOPAUSE: ICD-10-CM

## 2024-12-17 PROBLEM — Z51.81 ENCOUNTER FOR MONITORING AROMATASE INHIBITOR THERAPY: Status: ACTIVE | Noted: 2024-12-17

## 2024-12-17 PROBLEM — Z79.811 ENCOUNTER FOR MONITORING AROMATASE INHIBITOR THERAPY: Status: ACTIVE | Noted: 2024-12-17

## 2024-12-17 RX ORDER — ROSUVASTATIN CALCIUM 5 MG/1
5 TABLET, COATED ORAL NIGHTLY
COMMUNITY
Start: 2024-12-09

## 2024-12-17 RX ORDER — ANASTROZOLE 1 MG/1
1 TABLET ORAL DAILY
Qty: 30 TABLET | Refills: 2 | Status: SHIPPED | OUTPATIENT
Start: 2025-01-14

## 2024-12-17 NOTE — PROGRESS NOTES
[facility] Hematology Oncology Progress Note    Patient Name: Laquita Norris   YOB: 1960   Medical Record Number: [id number]   CSN: [id number]   Attending Physician: Bnidu Roth MD     Date of Visit: 12/17/2024      Chief Complaint:  Breast cancer     Oncologic History:  8/14/23: screening mammogram revealed left breast focal symmetry with associated calcifications. Additional imaging was recommended.   8/23/23: left diagnotic mammogram and ultrasound showed probably benign focal asymmetry in the left inner lower quadrant with grouped calcifications. Follow up in 6 months was recommended.   2/28/24: left diagnostic mammogram showed stable probably benign left breast mass with associated calcifications and probably a mass at 9:00, 5 cfn stable since 8/2023. Repeat dx mamm in 6 months was recommended.   8/20/24: left diagnostic mammogram showed increased calcifications in the medial left breast. Biopsy was recommended.   8/23/24: left breast biopsy positive for DCIS, ER 73%.   9/23/24: left breast bracketed lumpectomy with left margins x5 (Liza); path showed high grade multifocal DCIS, largest 2,0 cm, all margins negative with nearest margin 5 mm from DCIS. pTisNx.   12/2/24: started radiation at [facility]       Interval History:  Here for routine follow up. She is undergoing radiation at [facility]. She has not started anastrozole yet; she thought the rad/onc asked her to hold it until completion of RT, but not sure. Overall she is feeling well and denies any complaints.     Performance Status: ECOG 0    PMH: HTN, HLD, DM II, osteoarthritis, osteopenia     Current Medications:    Current Outpatient Medications:     rosuvastatin 5 MG Oral Tab, Take 1 tablet (5 mg total) by mouth nightly., Disp: , Rfl:     enalapril 20 MG Oral Tab, Take 1 tablet (20 mg total) by mouth daily., Disp: 100 tablet, Rfl: 3    ergocalciferol 1.25 MG (05650 UT) Oral Cap, Take 1 capsule (50,000 Units total) by mouth  once a week., Disp: 12 capsule, Rfl: 0    METFORMIN  MG Oral Tablet 24 Hr, TAKE 1 TABLET BY MOUTH DAILY WITH BREAKFAST AND 2 TABLET BY MOUTH DAILY WITH DINNER., Disp: 270 tablet, Rfl: 0    acetaminophen 500 MG Oral Tab, Take 1 tablet (500 mg total) by mouth every 6 (six) hours as needed for Pain., Disp: , Rfl:     Glucose Blood (ONETOUCH VERIO) In Vitro Strip, USE 1 STRIP TO CHECK GLUCOSE THREE TIMES DAILY, Disp: 400 strip, Rfl: 1    ONETOUCH DELICA PLUS [id number] Does not apply Misc, 1 LANCET BY FINGER STICK ROUTE THREE TIMES DAILY, Disp: 300 each, Rfl: 1    Polyvinyl Alcohol-Povidone (REFRESH OP), Apply 1 drop to eye daily as needed., Disp: , Rfl:     Homeopathic Products (EARACHE DROPS OT), Place 2 drops in ear(s) daily as needed., Disp: , Rfl:       Review of Systems:  10 -point systems reviewed, all negative except as mentioned in the HPI/interval history.     Vital Signs:  /85 (BP Location: Left arm, Patient Position: Sitting, Cuff Size: adult)   Pulse 88   Temp 97.9 °F (36.6 °C) (Oral)   Resp 16   Ht 1.549 m (5' 1\")   Wt 65.7 kg (144 lb 12.8 oz)   SpO2 98%   BMI 27.36 kg/m²     Physical Examination:  General: Aert and oriented x 3, not in acute distress.  Neck: No masses.   Breast: Left breast lumpectomy with radiation changes. No palpable masses in either breast.   Chest: Clear to auscultation.  Heart: Regular pulse.   Abdomen: Soft, non tender.   Extremities: No peripheral edema.   Neurological: Grossly intact.     Laboratory:  Lab Results   Component Value Date    WBC 5.6 12/07/2024    RBC 4.04 12/07/2024    HGB 13.0 12/07/2024    HCT 37.1 12/07/2024    MCV 91.8 12/07/2024    MCH 32.2 12/07/2024    MCHC 35.0 12/07/2024    RDW 11.7 12/07/2024    .0 12/07/2024    MPV 7.3 (L) 12/06/2018     Lab Results   Component Value Date     12/07/2024    K 3.9 12/07/2024     12/07/2024    CO2 26.0 12/07/2024    BUN 14 12/07/2024    CREATSERUM 0.63 12/07/2024     (H) 12/07/2024     CA 9.6 12/07/2024    ALKPHO 123 12/07/2024    ALT 22 12/07/2024    AST 19 12/07/2024    BILT 1.2 (H) 12/07/2024    ALB 4.3 12/07/2024    ALB 3.98 12/07/2024    TP 7.9 12/07/2024    TP 7.5 12/07/2024       Radiology:  [facility] SURGICAL SPECIMEN LEFT (CPT=76098)    Result Date: 9/24/2024  CONCLUSION: The specimen radiograph shows the top-hat shaped clip to be within the specimen, with the localization wires in place.   Dictated by (CST): Freddy Pham DO on 9/24/2024 at 8:16 AM     Finalized by (CST): Freddy Pham DO on 9/24/2024 at 8:17 AM          [facility] LOCALIZATION WIRE 2 SITE LEFT (CPT=19281/02398)    Result Date: 9/23/2024  CONCLUSION: Successful hookwire localization.  Postprocedure mammogram demonstrates the wires in the appropriate positions.     Dictated by (CST): Freddy Pham DO on 9/23/2024 at 1:46 PM     Finalized by (CST): Freddy Pham DO on 9/23/2024 at 1:49 PM            Impression and Plan:    Left breast DCIS, ER positive  - s/p lumpectomy on 9/23/24; 2.0 cm high grade DCIS, no invasive disease, all margins negative  - undergoing radiation at [facility]  - we discussed the role of chemoprevention with AI x 5 yrs carrying relative risk reduction of invasive and non invasive cancer by 30-40% without a change in survival.  Side effects including fatigue, hot flashes, lipid abnormalities, arthralgias, myalgias, and bone density loss reviewed.  - patient to start anastrozole 1 mg daily after completion of RT.  - bilateral diagnostic mammogram due in 4/2025    Bone health:  - mild osteopenia at the left femoral neck based on DEXA scan 2021.  - repeat DEXA scan scheduled 1/24/24  - continue vitamin D and Ca supplements    Return in about 8 weeks (around 2/11/2025).       Bindu Roth MD   [facility] Hematology Oncology [place]

## 2024-12-30 DIAGNOSIS — D05.12 DUCTAL CARCINOMA IN SITU (DCIS) OF LEFT BREAST: Primary | ICD-10-CM

## 2025-01-13 ENCOUNTER — TELEPHONE (OUTPATIENT)
Age: 65
End: 2025-01-13

## 2025-01-13 NOTE — TELEPHONE ENCOUNTER
Returned call- patient not available - LM on VM to call back - number for call back provided   82.8

## 2025-01-13 NOTE — TELEPHONE ENCOUNTER
Patient returned call - spoke with pt finished RT two weeks ago and wants to know if she should start her pill.  Discussed with patient that she should start the Anastrozole - take one time daily at the same time each day if she misses a dose do not take two just wait and take one the following day. Some side effects discussed- hot flashes muscle and bone pain- can take tylenol if she is having pain. Discussed that she is scheduled for a Dexa scan on Friday and to please keep this appointment as this medicine can effect her bone density and we would like to have a baseline for comparison.  She stated understanding and will start the medication.

## 2025-01-21 ENCOUNTER — PATIENT MESSAGE (OUTPATIENT)
Dept: ENDOCRINOLOGY CLINIC | Facility: CLINIC | Age: 65
End: 2025-01-21

## 2025-01-21 ENCOUNTER — OFFICE VISIT (OUTPATIENT)
Age: 65
End: 2025-01-21
Attending: INTERNAL MEDICINE
Payer: MEDICARE

## 2025-01-21 DIAGNOSIS — Z85.3 PERSONAL HISTORY OF BREAST CANCER: Primary | ICD-10-CM

## 2025-01-21 DIAGNOSIS — Z71.9 COUNSELING, UNSPECIFIED: ICD-10-CM

## 2025-01-21 DIAGNOSIS — R26.9 GAIT DIFFICULTY: ICD-10-CM

## 2025-01-21 DIAGNOSIS — Z08 ENCOUNTER FOR FOLLOW-UP EXAMINATION AFTER COMPLETED TREATMENT FOR MALIGNANT NEOPLASM: ICD-10-CM

## 2025-01-21 NOTE — PROGRESS NOTES
I met with Laquita for a Survivorship Clinic visit to provide a survivorship care plan (SCP) and information related to post-treatment care.She is a pleasant 64 year old who in 8/14/23 had a screening mammogram that revealed left breast focal symmetry with associated calcifications. Additional imaging was recommended. On 8/23/23 a left diagnotic mammogram and ultrasound showed probably benign focal asymmetry in the left inner lower quadrant with grouped calcifications. Follow up in 6 months was recommended. On 2/28/24 a left diagnostic mammogram showed stable probably benign left breast mass with associated calcifications and probably a mass at 9:00, 5 cm from nipple stable since 8/2023 with recommendation for repeat diagnostic mammogram in 6 months. On 8/20/24 she had left diagnostic mammogram that showed increased calcifications in the medial left breast with biopsy recommended. On 8/23/24 a left breast biopsy was done and was positive for DCIS, ER 73%. She has a family history of breast cancer and genetic testing done here was negative for pathogenic variants.    On 9/23/24 Dr. Angelica De Jesus performed a left breast bracketed lumpectomy with left margins x 5  and pathology showed high grade multifocal DCIS, largest 2.0 cm, all margins negative with nearest margin 5 mm from DCIS, staging, pTis, cN0, ER+, Gr 3. She saw Dr. Bindu Roth and adjuvant radiation therapy was recommended. This was given by Dr. Blayne Hobbs at [facility] from 12/2/24-12/30/24. Dr. Roth recommended endocrine therapy with Anastrozole. (See Oncology Treatment Summary SCP for details).      Current condition/issues: Laquita appears to be doing well post-treatment.  She completed RT at [facility] on 12/30/24 and is doing well. She reports intermittent left breast \"throbbing\". She reports the skin condition is good and she moisturizes daily. She started Anastrozole on 1/15/25 and has been taking it at 3PM each day. She has  no noted side effects as of yet. She was encouraged to consider taking it with one of her AM or PM meds to ensure she does not forget taking or setting a phone alarm if plans to keep taking it at 3PM. She had baseline bone density in 2021 with osteopenia. She is scheduled later this week for bone density. She takes weekly Vitamin D3 71164Q/week. She has good ROM in both arms and no other issues to report. She is in good spirits. She denies anxiety or depression. She has noted some decline in memory but she and her  attribute to age and she is not concerned as she remembers important items.     She reports eating healthy at times but could use some improvements. She has type 2 DM and is monitored by her PCP, Dr. Erickson Ruiz. She reports recent weight gain with last BMI of 27. She uses a stationery bike for 30\" twice/week and does some stretching. She walked in today with a noted unsteady gait, reports that she left her cane in the car.  She was using a cane and most recently got a rolling walker from Dr. Ruiz. In good weather she walks in the park with her 's support, in the winter she only walks around her home. She reports that her legs are weak since her hip surgery in 2020. She would consider PT for leg strengthening and gait issues.     She denies anxiety or depression and is in good spirits with good support from her  and children who live near her.     Survivorship Care Plan and letter: Laquita was provided a hard copy of the SCP and a letter that outlined the purpose of the visit and plan.  We reviewed all elements of both documents. She is aware that a letter and SCP will be sent to her PCP, Dr. Ruiz.     Reviewed Cancer Surveillance and that Dr. Roth will oversee this care.  She will see her next on 2/11. She is due for bilateral mammogram in 4/25 and she should schedule appointment with Dr. De Jesus to follow. She will follow-up with Dr. Hobbs from radiation therapy on 4/8.      Reviewed Schedule of other clinic visits with Primary Care and specialists: Dr. Ruiz will continue to manage all general health care recommended for age and gender including cancer screening tests.  She is up-to-date with screening and will check with Dr. Ruiz on recommended vaccines. She was encouraged to continue to see specialists at usual intervals.     Reviewed concerning symptoms that she should report to any Provider.      Reviewed possible late and long-term effects related to the treatment that was received.  We reviewed care of the surgical arm and management of hormone related side effects including vaginal dryness if this were to occur.       Reviewed common cancer survivor issues and resources available.  Reviewed resources to consider on nutrition, exercise, psychosocial support, stress management.     Reviewed Lifestyle/behaviors that can affect ongoing health, including the risk for the cancer coming back or developing another cancer.  We reviewed importance of physical activity including aerobic, strength training and weight bearing exercise.  We reviewed a plant based diet.  We reviewed skin care and sun safety.     The patient received a take-home folder that included the following survivorship resources:   -SCP and patient letter  -Breast Survivorship Guide   -[facility]-education, support groups, special programs, nutrition and exercise classes, movement classes, mind/body programs, volunteer opportunities, survivorship programs, individual counseling  -ChooseMyPlate.gov handout-nutrition, physical activity  -ACS Cancer Screening Guidelines  -Websites: American Cancer Society, Living Beyond Breast Cancer, Facebook: [facility], Germania Kochris, Endocrine Therapies, [employer] for your Life     Laquita was given the opportunity to ask questions.  She had a few questions and verbalized understanding of the information we discussed today.  My total time spent caring for the patient on  the day of the encounter: 90 minutes (10 minutes reviewing chart, 60 minutes of face to face counseling regarding survivorship education, review of care plan and additional resources and 20 minutes of documentation).  She was encouraged to call with any further questions.   Referrals were recommended for PT closer to home.  CARRIE Tucker

## 2025-01-22 ENCOUNTER — MED REC SCAN ONLY (OUTPATIENT)
Dept: INTERNAL MEDICINE CLINIC | Facility: CLINIC | Age: 65
End: 2025-01-22

## 2025-01-22 NOTE — TELEPHONE ENCOUNTER
Cass --    See message patient, pt's asking if she can drink \"cold protect tea\", it's an herbal supplement

## 2025-01-24 ENCOUNTER — HOSPITAL ENCOUNTER (OUTPATIENT)
Dept: BONE DENSITY | Age: 65
Discharge: HOME | End: 2025-01-24
Attending: INTERNAL MEDICINE
Payer: MEDICARE

## 2025-01-24 DIAGNOSIS — Z78.0 OSTEOPENIA AFTER MENOPAUSE: ICD-10-CM

## 2025-01-24 DIAGNOSIS — D05.12 DUCTAL CARCINOMA IN SITU (DCIS) OF LEFT BREAST: ICD-10-CM

## 2025-01-24 DIAGNOSIS — M85.80 OSTEOPENIA AFTER MENOPAUSE: ICD-10-CM

## 2025-01-24 PROCEDURE — 77080 DXA BONE DENSITY AXIAL: CPT | Performed by: INTERNAL MEDICINE

## 2025-01-27 ENCOUNTER — TELEPHONE (OUTPATIENT)
Dept: INTERNAL MEDICINE CLINIC | Facility: CLINIC | Age: 65
End: 2025-01-27

## 2025-02-04 RX ORDER — ANASTROZOLE 1 MG/1
1 TABLET ORAL DAILY
Qty: 30 TABLET | Refills: 2 | OUTPATIENT
Start: 2025-02-04

## 2025-02-11 ENCOUNTER — LAB ENCOUNTER (OUTPATIENT)
Dept: LAB | Facility: HOSPITAL | Age: 65
End: 2025-02-11
Attending: INTERNAL MEDICINE
Payer: MEDICARE

## 2025-02-11 ENCOUNTER — OFFICE VISIT (OUTPATIENT)
Age: 65
End: 2025-02-11
Attending: INTERNAL MEDICINE
Payer: MEDICARE

## 2025-02-11 VITALS
BODY MASS INDEX: 28.35 KG/M2 | TEMPERATURE: 98 F | OXYGEN SATURATION: 98 % | DIASTOLIC BLOOD PRESSURE: 82 MMHG | HEIGHT: 61 IN | HEART RATE: 85 BPM | WEIGHT: 150.19 LBS | SYSTOLIC BLOOD PRESSURE: 144 MMHG | RESPIRATION RATE: 18 BRPM

## 2025-02-11 DIAGNOSIS — Z51.81 ENCOUNTER FOR MONITORING AROMATASE INHIBITOR THERAPY: ICD-10-CM

## 2025-02-11 DIAGNOSIS — Z98.890 HISTORY OF LUMPECTOMY OF LEFT BREAST: ICD-10-CM

## 2025-02-11 DIAGNOSIS — Z79.811 ENCOUNTER FOR MONITORING AROMATASE INHIBITOR THERAPY: ICD-10-CM

## 2025-02-11 DIAGNOSIS — R79.89 HYPOURICEMIA: Primary | ICD-10-CM

## 2025-02-11 DIAGNOSIS — D05.12 DUCTAL CARCINOMA IN SITU (DCIS) OF LEFT BREAST: Primary | ICD-10-CM

## 2025-02-11 DIAGNOSIS — M85.80 OSTEOPENIA AFTER MENOPAUSE: ICD-10-CM

## 2025-02-11 DIAGNOSIS — Z78.0 OSTEOPENIA AFTER MENOPAUSE: ICD-10-CM

## 2025-02-11 LAB
ALBUMIN SERPL-MCNC: 4.5 G/DL (ref 3.2–4.8)
ALP LIVER SERPL-CCNC: 136 U/L
ALT SERPL-CCNC: 16 U/L
AST SERPL-CCNC: 12 U/L (ref ?–34)
BILIRUB DIRECT SERPL-MCNC: 0.3 MG/DL (ref ?–0.3)
BILIRUB SERPL-MCNC: 1.2 MG/DL (ref 0.2–1.1)
PROT SERPL-MCNC: 7.7 G/DL (ref 5.7–8.2)

## 2025-02-11 PROCEDURE — 36415 COLL VENOUS BLD VENIPUNCTURE: CPT

## 2025-02-11 PROCEDURE — 80076 HEPATIC FUNCTION PANEL: CPT

## 2025-02-11 RX ORDER — ANASTROZOLE 1 MG/1
1 TABLET ORAL DAILY
Qty: 90 TABLET | Refills: 3 | Status: SHIPPED | OUTPATIENT
Start: 2025-02-11

## 2025-02-11 NOTE — PROGRESS NOTES
[facility] Hematology Oncology Progress Note    Patient Name: Laquita Norris   YOB: 1960   Medical Record Number: [id number]   [facility]: [id number]   Attending Physician: Bindu Roth MD     Date of Visit: 2/11/2025      Chief Complaint:  Breast cancer     Oncologic History:  8/14/23: screening mammogram revealed left breast focal symmetry with associated calcifications. Additional imaging was recommended.   8/23/23: left diagnotic mammogram and ultrasound showed probably benign focal asymmetry in the left inner lower quadrant with grouped calcifications. Follow up in 6 months was recommended.   2/28/24: left diagnostic mammogram showed stable probably benign left breast mass with associated calcifications and probably a mass at 9:00, 5 cfn stable since 8/2023. Repeat dx mamm in 6 months was recommended.   8/20/24: left diagnostic mammogram showed increased calcifications in the medial left breast. Biopsy was recommended.   8/23/24: left breast biopsy positive for DCIS, ER 73%.   9/23/24: left breast bracketed lumpectomy with left margins x5 ([place]); path showed high grade multifocal DCIS, largest 2,0 cm, all margins negative with nearest margin 5 mm from DCIS. pTisNx.   12/2/24: started radiation at [facility] completed on 12/30/24.   1/15/25: started anastrozole.     Interval History:  Here for 3 months follow up and AI monitoring. She is feeling well overall and tolerating anastrozole reasonably well.   She has been gaining weight due to eating more sweets. She undergoing PT/OT for her legs and balance. She is using a cane to ambulate.   She reports mild occasional throbbing pain in her left breast near the lumpectomy scar. She healed well from RT.  She denies hot flashes, night sweats, hair loss, vaginal dryness. She denies any bone or back pain.     Performance Status: ECOG 0    PMH: HTN, HLD, DM II, osteoarthritis, osteopenia     Current Medications:    Current Outpatient Medications:      rosuvastatin 5 MG Oral Tab, Take 1 tablet (5 mg total) by mouth nightly., Disp: , Rfl:     anastrozole 1 MG Oral Tab tab, Take 1 tablet (1 mg total) by mouth daily., Disp: 30 tablet, Rfl: 2    enalapril 20 MG Oral Tab, Take 1 tablet (20 mg total) by mouth daily., Disp: 100 tablet, Rfl: 3    ergocalciferol 1.25 MG (38705 UT) Oral Cap, Take 1 capsule (50,000 Units total) by mouth once a week., Disp: 12 capsule, Rfl: 0    METFORMIN  MG Oral Tablet 24 Hr, TAKE 1 TABLET BY MOUTH DAILY WITH BREAKFAST AND 2 TABLET BY MOUTH DAILY WITH DINNER., Disp: 270 tablet, Rfl: 0    acetaminophen 500 MG Oral Tab, Take 1 tablet (500 mg total) by mouth every 6 (six) hours as needed for Pain., Disp: , Rfl:     Glucose Blood (ONETOUCH VERIO) In Vitro Strip, USE 1 STRIP TO CHECK GLUCOSE THREE TIMES DAILY, Disp: 400 strip, Rfl: 1    ONETOUCH DELICA PLUS [id number] Does not apply Misc, 1 LANCET BY FINGER STICK ROUTE THREE TIMES DAILY, Disp: 300 each, Rfl: 1    Polyvinyl Alcohol-Povidone (REFRESH OP), Apply 1 drop to eye daily as needed., Disp: , Rfl:     Homeopathic Products (EARACHE DROPS OT), Place 2 drops in ear(s) daily as needed., Disp: , Rfl:       Review of Systems:  10 -point systems reviewed, all negative except as mentioned in the HPI/interval history.     Vital Signs:  /82 (BP Location: Left arm, Patient Position: Sitting, Cuff Size: adult)   Pulse 85   Temp 98 °F (36.7 °C) (Oral)   Resp 18   Ht 1.549 m (5' 1\")   Wt 68.1 kg (150 lb 3.2 oz)   SpO2 98%   BMI 28.38 kg/m²     Wt Readings from Last 6 Encounters:   02/11/25 68.1 kg (150 lb 3.2 oz)   12/17/24 65.7 kg (144 lb 12.8 oz)   12/05/24 64.9 kg (143 lb)   11/13/24 64.4 kg (142 lb)   10/30/24 64.4 kg (142 lb)   10/16/24 64.5 kg (142 lb 3.2 oz)      Physical Examination:  General: Aert and oriented x 3, not in acute distress.  Neck: No masses.   Breast: Left breast periareolar lumpectomy with radiation changes. No palpable masses in either breast.   Chest: Clear to  auscultation.  Heart: Regular pulse.   Abdomen: Soft, non tender.   Extremities: No peripheral edema.   Neurological: Grossly intact.     Laboratory:  Lab Results   Component Value Date    WBC 5.6 12/07/2024    RBC 4.04 12/07/2024    HGB 13.0 12/07/2024    HCT 37.1 12/07/2024    MCV 91.8 12/07/2024    MCH 32.2 12/07/2024    MCHC 35.0 12/07/2024    RDW 11.7 12/07/2024    .0 12/07/2024    MPV 7.3 (L) 12/06/2018     Lab Results   Component Value Date     12/07/2024    K 3.9 12/07/2024     12/07/2024    CO2 26.0 12/07/2024    BUN 14 12/07/2024    CREATSERUM 0.63 12/07/2024     (H) 12/07/2024    CA 9.6 12/07/2024    ALKPHO 123 12/07/2024    ALT 22 12/07/2024    AST 19 12/07/2024    BILT 1.2 (H) 12/07/2024    ALB 4.3 12/07/2024    ALB 3.98 12/07/2024    TP 7.9 12/07/2024    TP 7.5 12/07/2024       Radiology:  XR DEXA BONE DENSITOMETRY (CPT=77080)    Result Date: 1/27/2025  CONCLUSION:  1. Osteopenia, which according to World Health Organization criteria places the patient at a mild-to-moderately increased risk for fracture.  2. Based on left femoral neck bone mineral density, the FRAX 10 year probability of a major osteoporotic fracture is 4.8% and the 10 year probability of a hip fracture is 0.5%.  3. Compared to the prior study, bone mineral density has decreased in the lumbar spine and left hip.   [facility]   Dictated by (CST): Darinel Curtis MD on 1/27/2025 at 2:35 PM     Finalized by (CST): Darinel Curtis MD on 1/27/2025 at 2:36 PM            Impression and Plan:    Left breast DCIS, ER positive  - s/p lumpectomy on 9/23/24; 2.0 cm high grade DCIS, no invasive disease, all margins negative  - s/p breast radiation at [facility] completed 12/2024.   - anastrozole initiated 1/15/25 for chemoprevention. She is tolerating it well so far. Plan to continue for 5 yrs.   - continue surveillance; bilateral diagnostic mammogram due in 4/2025    Bone health  - DEXA scan 1/24/24 showed mild osteopenia  with lowest T score -1.4 in the left femoral neck, and 4.8% risk of major fracture in 10 yr.   - discussed  increased risk of bone density loss and osteoporosis with AI.   - will continue vitamin D and Ca supplements and consider anti-resorptive therapy if osteopenia worsens on next DEXA scan.     Weight gain  - likely due to high calorie intake and diet non-compliance. Gained 8 lb in 3 months, started prior to AI   - advised to follow up with her endocrinologist regarding this       Return in about 3 months (around 5/11/2025).       Bindu Roth MD   [facility] Hematology Oncology [place]

## 2025-02-21 ENCOUNTER — OFFICE VISIT (OUTPATIENT)
Dept: INTERNAL MEDICINE CLINIC | Facility: CLINIC | Age: 65
End: 2025-02-21
Payer: COMMERCIAL

## 2025-02-21 VITALS
BODY MASS INDEX: 28.51 KG/M2 | WEIGHT: 151 LBS | HEART RATE: 82 BPM | OXYGEN SATURATION: 99 % | SYSTOLIC BLOOD PRESSURE: 144 MMHG | RESPIRATION RATE: 16 BRPM | DIASTOLIC BLOOD PRESSURE: 85 MMHG | TEMPERATURE: 98 F | HEIGHT: 61 IN

## 2025-02-21 DIAGNOSIS — H93.8X3 EAR PRESSURE, BILATERAL: Primary | ICD-10-CM

## 2025-02-21 DIAGNOSIS — I10 ESSENTIAL HYPERTENSION: ICD-10-CM

## 2025-02-21 DIAGNOSIS — J02.9 SORE THROAT: ICD-10-CM

## 2025-02-21 PROCEDURE — 3008F BODY MASS INDEX DOCD: CPT | Performed by: NURSE PRACTITIONER

## 2025-02-21 PROCEDURE — 1160F RVW MEDS BY RX/DR IN RCRD: CPT | Performed by: NURSE PRACTITIONER

## 2025-02-21 PROCEDURE — 99213 OFFICE O/P EST LOW 20 MIN: CPT | Performed by: NURSE PRACTITIONER

## 2025-02-21 PROCEDURE — 3079F DIAST BP 80-89 MM HG: CPT | Performed by: NURSE PRACTITIONER

## 2025-02-21 PROCEDURE — 3077F SYST BP >= 140 MM HG: CPT | Performed by: NURSE PRACTITIONER

## 2025-02-21 PROCEDURE — 1159F MED LIST DOCD IN RCRD: CPT | Performed by: NURSE PRACTITIONER

## 2025-02-21 PROCEDURE — 1170F FXNL STATUS ASSESSED: CPT | Performed by: NURSE PRACTITIONER

## 2025-02-21 RX ORDER — FLUTICASONE PROPIONATE 50 MCG
2 SPRAY, SUSPENSION (ML) NASAL DAILY
Qty: 11.1 ML | Refills: 0 | Status: SHIPPED | OUTPATIENT
Start: 2025-02-21 | End: 2026-02-16

## 2025-02-21 RX ORDER — LORATADINE 10 MG/1
10 CAPSULE, LIQUID FILLED ORAL DAILY
Qty: 30 CAPSULE | Refills: 0 | Status: SHIPPED | OUTPATIENT
Start: 2025-02-21 | End: 2025-03-23

## 2025-02-21 NOTE — PROGRESS NOTES
Laquita Norris is a 64 year old female.  Chief Complaint   Patient presents with    Ear Pain     Both ears     HPI:   She presents with a sore throat that started one week ago. She is also bilateral ear pain. She is having itchiness on the right ear. No problems with hearing. No fevers. She has had a slight cough with congestion.     She has been taking tylenol, lozenges, lemon with tea, green tea and tea for cold symptoms.     She did take a Covid-19 test which was negative on Wednesday.     No fevers, chest pain or SOB.   Current Outpatient Medications   Medication Sig Dispense Refill    fluticasone propionate 50 MCG/ACT Nasal Suspension 2 sprays by Each Nare route daily. 11.1 mL 0    Loratadine (CLARITIN) 10 MG Oral Cap Take 10 mg by mouth daily. 30 capsule 0    anastrozole 1 MG Oral Tab tab Take 1 tablet (1 mg total) by mouth daily. 90 tablet 3    rosuvastatin 5 MG Oral Tab Take 1 tablet (5 mg total) by mouth nightly.      enalapril 20 MG Oral Tab Take 1 tablet (20 mg total) by mouth daily. 100 tablet 3    ergocalciferol 1.25 MG (93960 UT) Oral Cap Take 1 capsule (50,000 Units total) by mouth once a week. 12 capsule 0    METFORMIN  MG Oral Tablet 24 Hr TAKE 1 TABLET BY MOUTH DAILY WITH BREAKFAST AND 2 TABLET BY MOUTH DAILY WITH DINNER. 270 tablet 0    acetaminophen 500 MG Oral Tab Take 1 tablet (500 mg total) by mouth every 6 (six) hours as needed for Pain.      Glucose Blood (ONETOUCH VERIO) In Vitro Strip USE 1 STRIP TO CHECK GLUCOSE THREE TIMES DAILY 400 strip 1    ONETOUCH DELICA PLUS [id number] Does not apply Misc 1 LANCET BY FINGER STICK ROUTE THREE TIMES DAILY 300 each 1    Polyvinyl Alcohol-Povidone (REFRESH OP) Apply 1 drop to eye daily as needed.      Homeopathic Products (EARACHE DROPS OT) Place 2 drops in ear(s) daily as needed.        Past Medical History:    Anxiety state    Bartholin cyst    per ng excision of bartholins cyst    BRCA gene mutation negative in female    negative for 70 gene  panel with RNA. Results in media tab    Bunion    per ng bunionectomy, bilateral feet    Cancer (HCC)    L Br Ca    Cyst of skin    per ng rt thigh cyst removal    Diabetes (HCC)    Diverticulitis    hosp x 3 days    Diverticulosis    Family history of malignant neoplasm of breast    Family history of malignant neoplasm of gastrointestinal tract    Fibroids    Hemorrhoids    per ng colonoscopy    High blood pressure    High cholesterol    History of blood transfusion    @[place]    History of bone density study    \"normal dexa '04, '07\"    Hx of motion sickness    Osteoarthritis    Perforated diverticulum of large intestine    Pes planus of left foot    Postmenopausal bleeding    per ng neg endometrial biopsy    Tonsillitis    tonsillectomy 1970    Uterine prolapse    uses pessary    Uterine prolapse    Donut pessary -- self cleaning     Visual impairment    contacts and glasses      Past Surgical History:   Procedure Laterality Date    Biopsy of uterus lining  10/2007    neg endometrial biopsy    Colonoscopy N/A 2018    Procedure: COLONOSCOPY;  Surgeon: Nini Garcia MD;  Location: [facility] ENDO    Foot surgery      bunionectomy    Hip replacement surgery      RT hip surgery 2020    Hysterectomy  2019    Leg/ankle surgery proc unlisted Left     to repair flat foot    Josh[place] biopsy stereo nodule 1 site left (cpt=19081) Left 2024    TOPHAT CLIP    Needle biopsy left  2013          x3 w/PPTL    Other surgical history Right 2013    thigh cyst removal    Pessary  2006    Tonsillectomy  1970    Tubal ligation        Social History:  Social History     Socioeconomic History    Marital status:    Tobacco Use    Smoking status: Never     Passive exposure: Never    Smokeless tobacco: Never   Vaping Use    Vaping status: Never Used   Substance and Sexual Activity    Alcohol use: No    Drug use: No    Sexual activity: Yes     Birth control/protection: Tubal Ligation   Other  Topics Concern    Caffeine Concern No     Comment: 16oz soda chocolate daily    Exercise Yes     Comment: Physical therapy 3 times per week.  Walking daily    Pt has a pacemaker No    Pt has a defibrillator No    Reaction to local anesthetic No      Family History   Problem Relation Age of Onset    Alcohol and Other Disorders Associated Father         alcoholism    Hypertension Father     Diabetes Mother         type 2    Lipids Mother         hyperlipidemia    Eye Problems Mother         eye issues    Heart Disorder Mother         per ng CABG    Hypertension Mother     Musculo-skelatal Disorder Mother         per ng osteoporosis    Allergies Mother         medication    Glaucoma Maternal Grandmother     Diabetes Maternal Grandmother     Hypertension Maternal Grandmother     Musculo-skelatal Disorder Maternal Grandmother         per ng osteoporosis    Allergies Sister     Cancer Maternal Aunt 45        breast ca; nun    Breast Cancer Maternal Aunt 45    Cancer Maternal Aunt 70        breast    Breast Cancer Maternal Aunt 70    Breast Cancer Maternal Aunt 80    Cancer Maternal Aunt 80        breast    Cancer Paternal Uncle         gastric    Diabetes Other         mGA    Breast Cancer Paternal Cousin Female 62    Cancer Paternal Cousin Female 62        breast    Breast Cancer Paternal Aunt 75    Cancer Paternal Aunt 75        breast      Allergies[1]     REVIEW OF SYSTEMS:     Review of Systems   Constitutional:  Negative for fever.   HENT:  Positive for congestion, ear pain, rhinorrhea and sore throat.    Respiratory:  Positive for cough. Negative for shortness of breath and wheezing.    Cardiovascular:  Negative for chest pain.   Gastrointestinal:  Negative for abdominal pain.   Genitourinary: Negative.    Musculoskeletal: Negative.    Skin: Negative.    Neurological:  Positive for headaches. Negative for dizziness.   Psychiatric/Behavioral: Negative.        Wt Readings from Last 5 Encounters:   02/21/25 151 lb  (68.5 kg)   02/11/25 150 lb 3.2 oz (68.1 kg)   12/17/24 144 lb 12.8 oz (65.7 kg)   12/05/24 143 lb (64.9 kg)   11/13/24 142 lb (64.4 kg)     Body mass index is 28.53 kg/m².      EXAM:   /85 (BP Location: Right arm, Patient Position: Sitting, Cuff Size: adult)   Pulse 82   Temp 98.1 °F (36.7 °C) (Temporal)   Resp 16   Ht 5' 1\" (1.549 m)   Wt 151 lb (68.5 kg)   SpO2 99%   BMI 28.53 kg/m²     Physical Exam  Vitals reviewed.   Constitutional:       Appearance: Normal appearance.   HENT:      Head: Normocephalic.      Right Ear: A middle ear effusion is present.      Left Ear: Tympanic membrane normal.      Nose: Congestion present.   Cardiovascular:      Rate and Rhythm: Normal rate and regular rhythm.      Pulses: Normal pulses.   Pulmonary:      Breath sounds: Normal breath sounds. No wheezing.   Musculoskeletal:         General: No swelling. Normal range of motion.   Skin:     General: Skin is warm and dry.   Neurological:      Mental Status: She is alert and oriented to person, place, and time.   Psychiatric:         Mood and Affect: Mood normal.         Behavior: Behavior normal.            ASSESSMENT AND PLAN:   1. Ear pressure, bilateral  - fluticasone propionate 50 MCG/ACT Nasal Suspension; 2 sprays by Each Nare route daily.  Dispense: 11.1 mL; Refill: 0  - Loratadine (CLARITIN) 10 MG Oral Cap; Take 10 mg by mouth daily.  Dispense: 30 capsule; Refill: 0    2. Sore throat  - likely related to congestion.   - increase fluid intake, ok to continue tylenol as needed and start Claritin and Flonase nasal spray as prescribed.     3. Essential hypertension  - slightly elevated in office.   - per patient she did not take her BP medication prior to coming to the office  - she is checking her BP at home and her latest reading was 135/85.   - CPM      The patient indicates understanding of these issues and agrees to the plan.  Return for if symptoms do not resolve.       [1]   Allergies  Allergen Reactions     Codeine RASH     T#3    Adhesive Tape OTHER (SEE COMMENTS)     Blisters from steri strips    Acetaminophen-Codeine RASH     Tolerates plain Tylenol    Chlorhexidine RASH

## 2025-02-25 RX ORDER — ERGOCALCIFEROL 1.25 MG/1
50000 CAPSULE, LIQUID FILLED ORAL WEEKLY
Qty: 12 CAPSULE | Refills: 0 | Status: SHIPPED | OUTPATIENT
Start: 2025-02-25

## 2025-02-25 NOTE — TELEPHONE ENCOUNTER
Please review. Protocol Failed; No Protocol    Medication(s) to Refill:   Requested Prescriptions     Pending Prescriptions Disp Refills    ERGOCALCIFEROL 1.25 MG (65404 UT) Oral Cap [Pharmacy Med Name: VITAMIN D2 (ERGO) 1.25MG  CAP] 12 capsule 0     Sig: Take 1 capsule by mouth once a week         Reason for Medication Refill being sent to Provider / Reason Protocol Failed:  [x] Non-Protocol Medication        Recent Labs:  Lab Results   Component Value Date    VITD 22.7 (L) 12/03/2024    [id number] 24.0 02/18/2012

## 2025-02-25 NOTE — TELEPHONE ENCOUNTER
Patient called and requesting Vit D  refill .  The last time she took it was Thursday .   SENT AS HIGH PRIORITY

## 2025-02-26 NOTE — TELEPHONE ENCOUNTER
Please call patient and assist on scheduling f/u with Dr. Ruiz   Detail Level: Generalized Render Risk Assessment In Note?: no Comment: ShBx to nose to RO wart vs verrucous keratosis.

## 2025-02-28 ENCOUNTER — TELEPHONE (OUTPATIENT)
Age: 65
End: 2025-02-28

## 2025-02-28 NOTE — TELEPHONE ENCOUNTER
NN received VM from patient stating that her left breast is throbbing and she isn't sure who to reach out to. NN returned call for further assessment. Patient states the throbbing comes and goes. She denies redness, warmth or swelling. Patient states throbbing is internal, breast is not tender to touch.     NN will touch base with Dr. De Jesus's team to discuss. NN encouraged patient to reach out to RT team at [facility] as well to determine if they may be delayed onset symptom from treatment.     Patient appreciative of the call and encouraged to reach out with questions or concerns.

## 2025-03-03 ENCOUNTER — TELEPHONE (OUTPATIENT)
Dept: SURGERY | Facility: CLINIC | Age: 65
End: 2025-03-03

## 2025-03-03 NOTE — TELEPHONE ENCOUNTER
Called and spoke with patient regarding throbbing to her breast. She stated that she started wearing a bra, and now her breast is feeling better. Plan for mammogram in April, order is in the system. Patient verbalized understanding.

## 2025-03-06 RX ORDER — METFORMIN HYDROCHLORIDE 500 MG/1
TABLET, EXTENDED RELEASE ORAL
Qty: 270 TABLET | Refills: 1 | Status: SHIPPED | OUTPATIENT
Start: 2025-03-06

## 2025-03-06 NOTE — TELEPHONE ENCOUNTER
Endocrine Refill protocol for metformin    Protocol Criteria:  PASSED  Reason: N/A    If all below requirements are met, send a 90-day supply with 1 refill per provider protocol.     Verify appointment with Endocrinology completed in the last 6 months or scheduled in the next 3 months.  Verify A1C has been completed within the last 6 months and is below 8.5%  Verify last GFR is greater than or equal to 40 in the past 12 months    Last completed office visit:11/13/2024 Beth Correa APRN   Next scheduled Follow up:   Future Appointments   Date Time Provider Department Center   3/26/2025  2:00 PM Alyssia Carroll DO [facility]   4/30/2025  1:00 PM [address]   5/14/2025  1:20 PM [facility]       Last GFR result:    Lab Results   Component Value Date    EGFRCR 98 12/07/2024     Last A1c result: Last A1C result: 5.3% done 11/13/2024.

## 2025-03-07 ENCOUNTER — PATIENT MESSAGE (OUTPATIENT)
Dept: ENDOCRINOLOGY CLINIC | Facility: CLINIC | Age: 65
End: 2025-03-07

## 2025-03-18 NOTE — TELEPHONE ENCOUNTER
----- Message from Kika Flores MD sent at 3/18/2025  6:17 AM CDT -----  Please contact patient with lab results.  A1c is at 7.6% which is decent.  Ideally closer to 7.0% but okay to continue current medications and let us know if he has any issues with getting refills on those.  Kidney function overall looks good.  On the urine sample there is leaking of small protein in the urine which can be the earliest sign of chronic kidney disease.  Best way to prevent this is to keep the blood sugars and blood pressure under good control to prevent any further kidney damage.  PSA level for prostate screening was well within normal.  Repeat labs with follow-up visit in 6 months.  Let us know sooner if questions!  Nice to see him outside of the clinic yesterday (saw him at [facility] in [place])!  Kika Flores MD     Left message on voicemail with Dr Vicki Leach note below and to keep appointment on 11/7/18. If anything changes or symptoms worse before appointment to give us a call back right away. If any questions to call the office back.

## 2025-03-26 ENCOUNTER — OFFICE VISIT (OUTPATIENT)
Dept: UROLOGY | Facility: HOSPITAL | Age: 65
End: 2025-03-26
Attending: OBSTETRICS & GYNECOLOGY
Payer: MEDICARE

## 2025-03-26 ENCOUNTER — TELEPHONE (OUTPATIENT)
Dept: ENDOCRINOLOGY CLINIC | Facility: CLINIC | Age: 65
End: 2025-03-26

## 2025-03-26 VITALS — BODY MASS INDEX: 29 KG/M2 | RESPIRATION RATE: 18 BRPM | WEIGHT: 151 LBS

## 2025-03-26 DIAGNOSIS — N95.2 POSTMENOPAUSAL ATROPHIC VAGINITIS: Primary | ICD-10-CM

## 2025-03-26 DIAGNOSIS — N81.84 PELVIC MUSCLE WASTING: ICD-10-CM

## 2025-03-26 DIAGNOSIS — Z98.890 POST-OPERATIVE STATE: ICD-10-CM

## 2025-03-26 PROCEDURE — 99212 OFFICE O/P EST SF 10 MIN: CPT

## 2025-03-26 NOTE — PROGRESS NOTES
She is s/p Post-Op Summary  Procedure Date: 01/27/22  Procedure Name: Anterior/Posterior/Enterocele Repair, Cystoscopy, Other (Please specify in comments) (robotic assisted laparoscopic lysis of adhesions)  Post-Op Symptoms: Patient denies pain, YOSEPH, UUI, prolapse symptoms, nausea/vomitting, fevers/chills, bleeding, voiding dysfunction, and defecatory dysfunction.  Do you feel your surgery was successful?: Very successful  Compared to before surgery are you?: Much better  If you could go back to before your surgery, would you do it all over again?: Definitely yes  How satisfied are you with the results of your surgery?: Completely satisfied    Doing well   no complaints  Voids freely  No UTIs  BMs reg  No Pain, denies dyspareunia  DM controlled  better    No leakage  No bulge  happy    Breast ca diagnosis, taking anastrozole    Resp 18   Wt 151 lb (68.5 kg)   BMI 28.53 kg/m²     Gen: NAD  CV: RRR  Pulm:nl effort  Abd:soft  : tolerated vaginal exam. Suture site well healed. No active bleeding. Good support     PROLAPSE ASSESSMENT SCALE:                                                 Aa:-2 Ba:-2 C:-9   gh: pb: tvl:9   Ap:-3 Bp:-3 D:         Discussed mgmt of vulvovaginal atrophy with vaginal estrogen cream. Reviewed associated benefits, risks, alternatives, and goals. Recommend low dose twice weekly mgmt   stop vag estrogen given anastrozole  May use vag moisturizers (info provided)     Reviewed bowel management     Discussed daily pelvic exercises (info provided)     Call with s/sx of UTI  Plan for follow up in 12 months, sooner prn    All questions answered  She understands and agrees to plan    Alyssia Carroll DO

## 2025-03-26 NOTE — TELEPHONE ENCOUNTER
Patient calling states has questions regards medications and is traveling. States if able to put medications in same bottle. Please call. (See patient message from 3/24/25)

## 2025-03-28 NOTE — TELEPHONE ENCOUNTER
Called and spoke to patient, informed patient that the only concern we would have in regards to placing all medications in the same pill bottle would be if patient is unable to determine the difference between medications and is unsure which medication is which. Patient also had concern and had bought a small pill organizer box to ensure all medication is taken and packed safely.

## 2025-03-31 ENCOUNTER — TELEPHONE (OUTPATIENT)
Dept: UROLOGY | Facility: HOSPITAL | Age: 65
End: 2025-03-31

## 2025-03-31 NOTE — TELEPHONE ENCOUNTER
Incoming telephone call received from patient.   Patient reports unable to read handout given during last office visit.   Copy of sexual intimacy products sent to patient via US mail per request.   Patient verbalized understanding.  Encouraged to call with questions or concerns.

## 2025-04-14 ENCOUNTER — TELEPHONE (OUTPATIENT)
Dept: INTERNAL MEDICINE CLINIC | Facility: CLINIC | Age: 65
End: 2025-04-14

## 2025-04-14 NOTE — TELEPHONE ENCOUNTER
Patient calling for follow up appointment from hospital.    Future Appointments   Date Time Provider Department Center   4/25/2025 12:40 PM Calista Aleman APRN [facility]

## 2025-04-16 ENCOUNTER — PATIENT MESSAGE (OUTPATIENT)
Dept: ENDOCRINOLOGY CLINIC | Facility: CLINIC | Age: 65
End: 2025-04-16

## 2025-04-16 DIAGNOSIS — E11.9 TYPE 2 DIABETES MELLITUS WITHOUT COMPLICATION, WITHOUT LONG-TERM CURRENT USE OF INSULIN (HCC): ICD-10-CM

## 2025-04-16 RX ORDER — BLOOD SUGAR DIAGNOSTIC
1 STRIP MISCELLANEOUS 3 TIMES DAILY
Qty: 300 STRIP | Refills: 1 | Status: SHIPPED | OUTPATIENT
Start: 2025-04-16

## 2025-04-16 NOTE — TELEPHONE ENCOUNTER
Endocrine Refill protocol for Glucose testing supplies     Protocol Criteria: PASSED Reason: N/A    If below requirement is met, send a 90-day supply with 1 refill per provider protocol.    Verify appointment with Endocrinology completed in the last 6 months or scheduled in the next 3 months.    Last completed office visit: 11/13/2024 Beth Correa APRN   Next scheduled Follow up:   Future Appointments   Date Time Provider Department Center      90 Day + 1 Refill authorized per protocol

## 2025-04-17 NOTE — TELEPHONE ENCOUNTER
Endo staff, please transition this encounter to telephone.     Although she may have had a low glucose reading, the ED report was reviewed and there was no mention for hypoglycemia. BG reading in ER was 121.     Please schedule apt with endo CDE or endo RN to apply a yas pro cgm and also a 1 week follow up to review glucose readings with CDE.       Unfortunately medicare does not cover for a cgm unless patient is on insulin therapy or has had a glucose reading <54.     Also please review glucose readings in the past few days at home.     Thank you!

## 2025-04-18 ENCOUNTER — PATIENT MESSAGE (OUTPATIENT)
Dept: INTERNAL MEDICINE CLINIC | Facility: CLINIC | Age: 65
End: 2025-04-18

## 2025-04-18 NOTE — TELEPHONE ENCOUNTER
Elizabeth message sent to call nurse for home care advice. Patient seen in ED 4/12, has follow up appointment 4/25/25.

## 2025-04-23 ENCOUNTER — TELEPHONE (OUTPATIENT)
Age: 65
End: 2025-04-23

## 2025-04-23 NOTE — TELEPHONE ENCOUNTER
Patient called NN looking for information regarding date of breast cancer diagnosis. NN provided information to patient. Patient appreciative of the help and encouraged to reach out with questions or concerns.

## 2025-04-25 ENCOUNTER — OFFICE VISIT (OUTPATIENT)
Dept: INTERNAL MEDICINE CLINIC | Facility: CLINIC | Age: 65
End: 2025-04-25
Payer: COMMERCIAL

## 2025-04-25 VITALS
HEIGHT: 61 IN | DIASTOLIC BLOOD PRESSURE: 88 MMHG | RESPIRATION RATE: 16 BRPM | BODY MASS INDEX: 29.64 KG/M2 | OXYGEN SATURATION: 96 % | HEART RATE: 81 BPM | TEMPERATURE: 98 F | SYSTOLIC BLOOD PRESSURE: 148 MMHG | WEIGHT: 157 LBS

## 2025-04-25 DIAGNOSIS — M25.511 ACUTE PAIN OF RIGHT SHOULDER: ICD-10-CM

## 2025-04-25 DIAGNOSIS — Z09 HOSPITAL DISCHARGE FOLLOW-UP: Primary | ICD-10-CM

## 2025-04-25 DIAGNOSIS — R55 SYNCOPE, UNSPECIFIED SYNCOPE TYPE: ICD-10-CM

## 2025-04-25 DIAGNOSIS — I10 ESSENTIAL HYPERTENSION: ICD-10-CM

## 2025-04-25 PROCEDURE — 3077F SYST BP >= 140 MM HG: CPT | Performed by: NURSE PRACTITIONER

## 2025-04-25 PROCEDURE — 3008F BODY MASS INDEX DOCD: CPT | Performed by: NURSE PRACTITIONER

## 2025-04-25 PROCEDURE — 99214 OFFICE O/P EST MOD 30 MIN: CPT | Performed by: NURSE PRACTITIONER

## 2025-04-25 PROCEDURE — 1160F RVW MEDS BY RX/DR IN RCRD: CPT | Performed by: NURSE PRACTITIONER

## 2025-04-25 PROCEDURE — 1159F MED LIST DOCD IN RCRD: CPT | Performed by: NURSE PRACTITIONER

## 2025-04-25 PROCEDURE — 1170F FXNL STATUS ASSESSED: CPT | Performed by: NURSE PRACTITIONER

## 2025-04-25 PROCEDURE — 1125F AMNT PAIN NOTED PAIN PRSNT: CPT | Performed by: NURSE PRACTITIONER

## 2025-04-25 PROCEDURE — 3079F DIAST BP 80-89 MM HG: CPT | Performed by: NURSE PRACTITIONER

## 2025-04-25 PROCEDURE — 1111F DSCHRG MED/CURRENT MED MERGE: CPT | Performed by: NURSE PRACTITIONER

## 2025-04-25 RX ORDER — ATORVASTATIN CALCIUM 40 MG/1
40 TABLET, FILM COATED ORAL AS DIRECTED
COMMUNITY
Start: 2024-06-24

## 2025-04-25 NOTE — PROGRESS NOTES
Laquita Norris is a 64 year old female.  Chief Complaint   Patient presents with    Urgent Care F/u     Right shoulder pain had a fall     HPI:   She presents for follow up. She went to the ER due to having syncope. She took a walk came home and felt dizzy. She was sitting on the couch got up and fell. She was not able to move her right arm. No LOC. She did hit her head. She was initially seen at the  and had an x-ray of her right shoulder completed which was normal.     CT Brain Without Contrast   Final Result   1. No acute intracranial pathology or maxillofacial bone fracture.   CT Maxillofacial Without Contrast   Final Result   1. No acute intracranial pathology or maxillofacial bone fracture.   X-ray Chest AP Portable   Final Result   1. No evidence of acute cardiopulmonary process.   Right shoulder   IMPRESSION:   No acute osseous abnormality. Mild osteoarthritis.   EKG NSR   Troponin negative x 2     She thinks maybe her sugar went low which caused her syncope. She states she is drinking enough water.     Current Medications[1]   Past Medical History[2]   Past Surgical History[3]   Social History:  Short Social Hx on File[4]   Family History[5]   Allergies[6]     REVIEW OF SYSTEMS:     Review of Systems   Constitutional:  Negative for fever.   HENT: Negative.     Respiratory:  Negative for cough, shortness of breath and wheezing.    Cardiovascular:  Negative for chest pain.   Gastrointestinal: Negative.    Genitourinary: Negative.    Musculoskeletal:  Positive for arthralgias (right shoulder).   Skin: Negative.    Neurological: Negative.    Psychiatric/Behavioral: Negative.        Wt Readings from Last 5 Encounters:   04/25/25 157 lb (71.2 kg)   03/26/25 151 lb (68.5 kg)   02/21/25 151 lb (68.5 kg)   02/11/25 150 lb 3.2 oz (68.1 kg)   12/17/24 144 lb 12.8 oz (65.7 kg)     Body mass index is 29.66 kg/m².      EXAM:   /88 (BP Location: Left arm, Patient Position: Sitting, Cuff Size: adult)   Pulse 81    Temp 98.1 °F (36.7 °C) (Temporal)   Resp 16   Ht 5' 1\" (1.549 m)   Wt 157 lb (71.2 kg)   SpO2 96%   BMI 29.66 kg/m²     Physical Exam  Vitals reviewed.   Constitutional:       Appearance: Normal appearance.   HENT:      Head: Normocephalic.   Cardiovascular:      Rate and Rhythm: Normal rate and regular rhythm.      Pulses: Normal pulses.   Pulmonary:      Breath sounds: Normal breath sounds. No wheezing.   Musculoskeletal:         General: No swelling.      Right shoulder: Decreased range of motion.   Skin:     General: Skin is warm and dry.   Neurological:      Mental Status: She is alert and oriented to person, place, and time.   Psychiatric:         Mood and Affect: Mood normal.         Behavior: Behavior normal.            ASSESSMENT AND PLAN:   1. Acute pain of right shoulder  - Physical Therapy Referral - External    2. Syncope, unspecified syncope type  - CARD ECHO 2D DOPPLER (CPT=93306); Future    3. Hospital discharge follow-up  - hospital notes, labs and imaging reviewed     4. Essential hypertension  - BP elevated in office  - dw patient to monitor her BP at home and send a message with BP results.       The patient indicates understanding of these issues and agrees to the plan.  Return for if symptoms do not resolve.         [1]   Current Outpatient Medications   Medication Sig Dispense Refill    atorvastatin 40 MG Oral Tab Take 1 tablet (40 mg total) by mouth As Directed.      Glucose Blood (ONETOUCH VERIO) In Vitro Strip USE 1 STRIP TO CHECK GLUCOSE THREE TIMES DAILY 300 strip 1    metFORMIN  MG Oral Tablet 24 Hr Take 1 tablet with breakfast and 2 tablets with dinner 270 tablet 1    ergocalciferol 1.25 MG (47212 UT) Oral Cap Take 1 capsule (50,000 Units total) by mouth once a week. 12 capsule 0    fluticasone propionate 50 MCG/ACT Nasal Suspension 2 sprays by Each Nare route daily. 11.1 mL 0    anastrozole 1 MG Oral Tab tab Take 1 tablet (1 mg total) by mouth daily. 90 tablet 3     rosuvastatin 5 MG Oral Tab Take 1 tablet (5 mg total) by mouth nightly.      enalapril 20 MG Oral Tab Take 1 tablet (20 mg total) by mouth daily. 100 tablet 3    acetaminophen 500 MG Oral Tab Take 1 tablet (500 mg total) by mouth every 6 (six) hours as needed for Pain.      ONETOUCH DELICA PLUS [id number] Does not apply Misc 1 LANCET BY FINGER STICK ROUTE THREE TIMES DAILY 300 each 1    Polyvinyl Alcohol-Povidone (REFRESH OP) Apply 1 drop to eye daily as needed.      Homeopathic Products (EARACHE DROPS OT) Place 2 drops in ear(s) daily as needed.     [2]   Past Medical History:   Anxiety state    Bartholin cyst    per  excision of bartholins cyst    BRCA gene mutation negative in female    negative for 70 gene panel with RNA. Results in media tab    Bunion    per  bunionectomy, bilateral feet    Cancer (HCC)    L Br Ca    Cyst of skin    per  rt thigh cyst removal    Diabetes (HCC)    Diverticulitis    hosp x 3 days    Diverticulosis    Family history of malignant neoplasm of breast    Family history of malignant neoplasm of gastrointestinal tract    Fibroids    Hemorrhoids    per  colonoscopy    High blood pressure    High cholesterol    History of blood transfusion    @[place]    History of bone density study    \"normal dexa '04, '07\"    Hx of motion sickness    Osteoarthritis    Perforated diverticulum of large intestine    Pes planus of left foot    Postmenopausal bleeding    per  neg endometrial biopsy    Tonsillitis    tonsillectomy 1970    Uterine prolapse    uses pessary    Uterine prolapse    Donut pessary -- self cleaning     Visual impairment    contacts and glasses   [3]   Past Surgical History:  Procedure Laterality Date    Biopsy of uterus lining  10/2007    neg endometrial biopsy    Colonoscopy N/A 02/20/2018    Procedure: COLONOSCOPY;  Surgeon: Nini Garcia MD;  Location: [facility] ENDO    Foot surgery  2010    bunionectomy    Hip replacement surgery      RT hip surgery 8/17/2020     Hysterectomy  2019    Leg/ankle surgery proc unlisted Left 2018    to repair flat foot    Lumpectomy left Left 2024    Joshua biopsy stereo nodule 1 site left (cpt=19081) Left 2024    TOPHAT CLIP    Needle biopsy left  2013          x3 w/PPTL    Other surgical history Right 2013    thigh cyst removal    Pessary  2006    Tonsillectomy  1970    Tubal ligation  1992   [4]   Social History  Socioeconomic History    Marital status:    Tobacco Use    Smoking status: Never     Passive exposure: Never    Smokeless tobacco: Never   Vaping Use    Vaping status: Never Used   Substance and Sexual Activity    Alcohol use: No    Drug use: No    Sexual activity: Yes     Birth control/protection: Tubal Ligation   Other Topics Concern    Caffeine Concern No     Comment: 16oz soda chocolate daily    Exercise Yes     Comment: Physical therapy 3 times per week.  Walking daily    Pt has a pacemaker No    Pt has a defibrillator No    Reaction to local anesthetic No     Social Drivers of Health     Food Insecurity: No Food Insecurity (2025)    NCSS - Food Insecurity     Worried About Running Out of Food in the Last Year: No     Ran Out of Food in the Last Year: No   Transportation Needs: No Transportation Needs (2025)    NCSS - Transportation     Lack of Transportation: No   Housing Stability: Not At Risk (2025)    NCSS - Housing/Utilities     Has Housing: Yes     Worried About Losing Housing: No     Unable to Get Utilities: No   [5]   Family History  Problem Relation Age of Onset    Alcohol and Other Disorders Associated Father         alcoholism    Hypertension Father     Diabetes Mother         type 2    Lipids Mother         hyperlipidemia    Eye Problems Mother         eye issues    Heart Disorder Mother         per ng CABG    Hypertension Mother     Musculo-skelatal Disorder Mother         per ng osteoporosis    Allergies Mother         medication    Glaucoma Maternal Grandmother      Diabetes Maternal Grandmother     Hypertension Maternal Grandmother     Musculo-skelatal Disorder Maternal Grandmother         per ng osteoporosis    Allergies Sister     Cancer Maternal Aunt 45        breast ca; nun    Breast Cancer Maternal Aunt 45    Cancer Maternal Aunt 70        breast    Breast Cancer Maternal Aunt 70    Breast Cancer Maternal Aunt 80    Cancer Maternal Aunt 80        breast    Cancer Paternal Uncle         gastric    Diabetes Other         mGA    Breast Cancer Paternal Cousin Female 62    Cancer Paternal Cousin Female 62        breast    Breast Cancer Paternal Aunt 75    Cancer Paternal Aunt 75        breast   [6]   Allergies  Allergen Reactions    Codeine RASH     T#3    Adhesive Tape OTHER (SEE COMMENTS)     Blisters from steri strips    Acetaminophen-Codeine RASH     Tolerates plain Tylenol    Chlorhexidine RASH

## 2025-04-30 ENCOUNTER — HOSPITAL ENCOUNTER (OUTPATIENT)
Dept: MAMMOGRAPHY | Facility: HOSPITAL | Age: 65
Discharge: HOME | End: 2025-04-30
Attending: SURGERY
Payer: MEDICARE

## 2025-04-30 DIAGNOSIS — D05.12 DUCTAL CARCINOMA IN SITU (DCIS) OF LEFT BREAST: ICD-10-CM

## 2025-04-30 PROCEDURE — 77066 DX MAMMO INCL CAD BI: CPT | Performed by: SURGERY

## 2025-04-30 PROCEDURE — 77062 BREAST TOMOSYNTHESIS BI: CPT | Performed by: SURGERY

## 2025-05-14 ENCOUNTER — OFFICE VISIT (OUTPATIENT)
Age: 65
End: 2025-05-14
Attending: INTERNAL MEDICINE
Payer: MEDICARE

## 2025-05-14 VITALS
HEART RATE: 83 BPM | RESPIRATION RATE: 18 BRPM | WEIGHT: 153.38 LBS | SYSTOLIC BLOOD PRESSURE: 125 MMHG | TEMPERATURE: 99 F | BODY MASS INDEX: 28.96 KG/M2 | DIASTOLIC BLOOD PRESSURE: 80 MMHG | HEIGHT: 61 IN | OXYGEN SATURATION: 96 %

## 2025-05-14 DIAGNOSIS — Z78.0 OSTEOPENIA AFTER MENOPAUSE: ICD-10-CM

## 2025-05-14 DIAGNOSIS — M85.80 OSTEOPENIA AFTER MENOPAUSE: ICD-10-CM

## 2025-05-14 DIAGNOSIS — D05.12 DUCTAL CARCINOMA IN SITU (DCIS) OF LEFT BREAST: Primary | ICD-10-CM

## 2025-05-14 DIAGNOSIS — Z79.811 ENCOUNTER FOR MONITORING AROMATASE INHIBITOR THERAPY: ICD-10-CM

## 2025-05-14 DIAGNOSIS — Z51.81 ENCOUNTER FOR MONITORING AROMATASE INHIBITOR THERAPY: ICD-10-CM

## 2025-05-14 RX ORDER — VITAMIN E (DL,TOCOPHERYL ACET) 45 MG/0.25
1 DROPS ORAL DAILY
COMMUNITY

## 2025-05-14 RX ORDER — IBUPROFEN 200 MG
400 TABLET ORAL AS NEEDED
COMMUNITY

## 2025-05-14 NOTE — PROGRESS NOTES
[facility] Hematology Oncology   Progress Note    Patient Name: Laquita Norris   YOB: 1960   Medical Record Number: [id number]   [facility]: [id number]   Attending Physician: Bindu Roth MD     Laquita Norris verbally consented to be recorded using ambient AI listening technology and understand that they can each withdraw their consent to this listening technology at any point by asking the clinician to turn off or pause the recording.        Date of Visit: 5/14/2025      Chief Complaint:  Breast cancer     Oncologic History:  8/14/23: screening mammogram revealed left breast focal symmetry with associated calcifications. Additional imaging was recommended.   8/23/23: left diagnotic mammogram and ultrasound showed probably benign focal asymmetry in the left inner lower quadrant with grouped calcifications. Follow up in 6 months was recommended.   2/28/24: left diagnostic mammogram showed stable probably benign left breast mass with associated calcifications and probably a mass at 9:00, 5 cfn stable since 8/2023. Repeat dx mamm in 6 months was recommended.   8/20/24: left diagnostic mammogram showed increased calcifications in the medial left breast. Biopsy was recommended.   8/23/24: left breast biopsy positive for DCIS, ER 73%.   9/23/24: left breast bracketed lumpectomy with left margins x5 ([place]); path showed high grade multifocal DCIS, largest 2,0 cm, all margins negative with nearest margin 5 mm from DCIS. pTisNx.   12/2/24: started radiation at [facility] completed on 12/30/24.   1/15/25: started anastrozole.     Interval History:  History of Present Illness  Laquita Norris is a 64 year old female with breast cancer who presents for follow-up, review of mammogram and AI management.     She is currently taking anastrozole daily and vitamin D supplements once a week. She reports compliance with her medication regimen and denies any missed doses. She has been losing weight intentionally, which she  attributes to dietary changes and walking.     She reports occasional throbbing pain in the left breast around the lumpectomy site. No palpable lesions or nipple changes. Her recent mammogram in April was benign.    She is complaining of right shoulder pain due to recent fall, otherwise no arthralgia, myalgia, hot flashes, night sweats, depression, mood swings, hair loss, or vaginal dryness.       Performance Status: ECOG 0    PMH: HTN, HLD, DM II, osteoarthritis, osteopenia     Current Medications:    Current Outpatient Medications:     Carboxymethylcellulose Sodium (REFRESH OP), Apply to eye as needed., Disp: , Rfl:     Probiotic Product (PROBIOTIC 10 ULTRA STRENGTH) Oral Cap, Take 1 capsule by mouth daily., Disp: , Rfl:     ibuprofen 200 MG Oral Tab, Take 2 tablets (400 mg total) by mouth as needed for Pain., Disp: , Rfl:     metFORMIN  MG Oral Tablet 24 Hr, Take 1 tablet with breakfast and 2 tablets with dinner, Disp: 270 tablet, Rfl: 1    ergocalciferol 1.25 MG (95411 UT) Oral Cap, Take 1 capsule (50,000 Units total) by mouth once a week., Disp: 12 capsule, Rfl: 0    fluticasone propionate 50 MCG/ACT Nasal Suspension, 2 sprays by Each Nare route daily., Disp: 11.1 mL, Rfl: 0    anastrozole 1 MG Oral Tab tab, Take 1 tablet (1 mg total) by mouth daily., Disp: 90 tablet, Rfl: 3    rosuvastatin 5 MG Oral Tab, Take 1 tablet (5 mg total) by mouth nightly., Disp: , Rfl:     enalapril 20 MG Oral Tab, Take 1 tablet (20 mg total) by mouth in the morning., Disp: 100 tablet, Rfl: 3    acetaminophen 500 MG Oral Tab, Take 1 tablet (500 mg total) by mouth every 6 (six) hours as needed for Pain., Disp: , Rfl:     Homeopathic Products (EARACHE DROPS OT), Place 2 drops in ear(s) daily as needed., Disp: , Rfl:     Glucose Blood (ONETOUCH VERIO) In Vitro Strip, USE 1 STRIP TO CHECK GLUCOSE THREE TIMES DAILY, Disp: 300 strip, Rfl: 1    ONETOUCH DELICA PLUS [id number] Does not apply Misc, 1 LANCET BY FINGER STICK ROUTE THREE  TIMES DAILY, Disp: 300 each, Rfl: 1    Polyvinyl Alcohol-Povidone (REFRESH OP), Apply 1 drop to eye daily as needed., Disp: , Rfl:       Review of Systems:  10 -point systems reviewed, all negative except as mentioned in the HPI/interval history.     Vital Signs:  /80 (BP Location: Left arm, Patient Position: Sitting, Cuff Size: adult)   Pulse 83   Temp 98.8 °F (37.1 °C) (Oral)   Resp 18   Ht 1.549 m (5' 1\")   Wt 69.6 kg (153 lb 6.4 oz)   SpO2 96%   BMI 28.98 kg/m²     Wt Readings from Last 6 Encounters:   05/14/25 69.6 kg (153 lb 6.4 oz)   04/25/25 71.2 kg (157 lb)   03/26/25 68.5 kg (151 lb)   02/21/25 68.5 kg (151 lb)   02/11/25 68.1 kg (150 lb 3.2 oz)   12/17/24 65.7 kg (144 lb 12.8 oz)      Physical Examination:  General: Aert and oriented x 3, not in acute distress.  Neck: No masses.   Breast: Left breast periareolar lumpectomy with radiation changes. No palpable masses in either breast. Axilla normal bilaterally.   Chest: Clear to auscultation.  Heart: Regular pulse.   Abdomen: Soft, non tender.   Extremities: No peripheral edema.   Neurological: Grossly intact.     Laboratory:  Lab Results   Component Value Date    WBC 5.6 12/07/2024    RBC 4.04 12/07/2024    HGB 13.0 12/07/2024    HCT 37.1 12/07/2024    MCV 91.8 12/07/2024    MCH 32.2 12/07/2024    MCHC 35.0 12/07/2024    RDW 11.7 12/07/2024    .0 12/07/2024    MPV 7.3 (L) 12/06/2018     Lab Results   Component Value Date     12/07/2024    K 3.9 12/07/2024     12/07/2024    CO2 26.0 12/07/2024    BUN 14 12/07/2024    CREATSERUM 0.63 12/07/2024     (H) 12/07/2024    CA 9.6 12/07/2024    ALKPHO 136 02/11/2025    ALT 16 02/11/2025    AST 12 02/11/2025    BILT 1.2 (H) 02/11/2025    ALB 4.5 02/11/2025    TP 7.7 02/11/2025       Radiology:  [facility] DEEPA 2D+3D DIAGNOSTIC [facility]  BILAT (AYT=[id number])  Result Date: 4/30/2025  CONCLUSION:    No mammographic evidence of breast malignancy.  Postoperative changes noted in the left  breast.  As long as clinical examination remains benign, left unilateral diagnostic mammogram is recommended in 6 months as per postoperative surveillance protocol.   BI-RADS CATEGORY:   DIAGNOSTIC CATEGORY 2 - BENIGN FINDING:   RECOMMENDATIONS:  SHORT TERM FOLLOW-UP DIAGNOSTIC MAMMOGRAM LEFT BREAST IN 6 MONTHS.    Your patient's answers to the health and family history questions collected during this mammogram indicate a potentially increased risk for breast cancer. It is recommended that this patient be evaluated in our Cancer Risk Assessment Clinic to determine eligibility for additional breast cancer screening, risk reduction strategies and/or genetic testing. Providers are encouraged to contact our breast navigator, Alyssia He, at [phone] with any questions or for guidance regarding this recommendation.   PLEASE NOTE: NORMAL MAMMOGRAM DOES NOT EXCLUDE THE POSSIBILITY OF BREAST CANCER.  A CLINICALLY SUSPICIOUS PALPABLE LUMP SHOULD BE BIOPSIED.   For patients over the age of 40, the target due date for the patient's next mammogram has been entered into a reminder system.   Patient received a discharge summary from the technologist after completion of exam.  Breast marker legend used on images  Triangle = Palpable lump [place] = Skin tag or mole BB = Nipple Linear damon = Scar Square = Pain    Dictated by (CST): Jennifer Mesa MD on 4/30/2025 at 1:24 PM     Finalized by (CST): Jennifer Mesa MD on 4/30/2025 at 1:33 PM      [address] [place] [address] [phone]       Impression and Plan:    Left breast DCIS, ER positive  - s/p lumpectomy on 9/23/24; 2.0 cm high grade DCIS, no invasive disease, all margins negative  - s/p breast radiation at [facility] completed 12/2024.   - anastrozole initiated 1/15/25 for chemoprevention.  - she has been tolerating it well with no side effects. Plan to continue for 5 yrs.   - bilateral diagnostic mammogram 4/2025 benign. Left  diagnostic mammogram will be due in 10/2025.     Bone health  - DEXA scan 1/24/24 showed mild osteopenia with lowest T score -1.4 in the left femoral neck, and 4.8% risk of major fracture in 10 yr.   - discussed increased risk of bone density loss and osteoporosis with AI.   - will continue vitamin D and Ca supplements and consider anti-resorptive therapy if osteopenia worsens on next DEXA scan.       Return in about 6 months (around 11/10/2025).       Bindu Roth MD   Hematology Oncology [place]

## 2025-05-20 NOTE — TELEPHONE ENCOUNTER
Please Review. Protocol Failed; No Protocol     Requested Prescriptions   Pending Prescriptions Disp Refills    ERGOCALCIFEROL 1.25 MG (32876 UT) Oral Cap [Pharmacy Med Name: VITAMIN D2 (ERGO) 1.25MG  CAP] 12 capsule 0     Sig: Take 1 capsule by mouth once a week       There is no refill protocol information for this order

## 2025-05-22 RX ORDER — ENALAPRIL MALEATE 20 MG/1
20 TABLET ORAL DAILY
Qty: 100 TABLET | Refills: 3 | Status: SHIPPED | OUTPATIENT
Start: 2025-05-22

## 2025-05-23 ENCOUNTER — HOSPITAL ENCOUNTER (OUTPATIENT)
Dept: CV DIAGNOSTICS | Facility: HOSPITAL | Age: 65
Discharge: HOME | End: 2025-05-23
Attending: NURSE PRACTITIONER
Payer: MEDICARE

## 2025-05-23 DIAGNOSIS — R55 SYNCOPE, UNSPECIFIED SYNCOPE TYPE: ICD-10-CM

## 2025-05-23 PROCEDURE — 93306 TTE W/DOPPLER COMPLETE: CPT | Performed by: NURSE PRACTITIONER

## 2025-05-23 RX ORDER — ERGOCALCIFEROL 1.25 MG/1
50000 CAPSULE, LIQUID FILLED ORAL WEEKLY
Qty: 12 CAPSULE | Refills: 0 | OUTPATIENT
Start: 2025-05-23

## 2025-05-23 RX ORDER — ENALAPRIL MALEATE 20 MG/1
20 TABLET ORAL DAILY
Qty: 100 TABLET | Refills: 3 | OUTPATIENT
Start: 2025-05-23

## 2025-05-26 ENCOUNTER — PATIENT MESSAGE (OUTPATIENT)
Dept: INTERNAL MEDICINE CLINIC | Facility: CLINIC | Age: 65
End: 2025-05-26

## 2025-05-27 ENCOUNTER — PATIENT MESSAGE (OUTPATIENT)
Dept: INTERNAL MEDICINE CLINIC | Facility: CLINIC | Age: 65
End: 2025-05-27

## 2025-05-27 RX ORDER — ERGOCALCIFEROL 1.25 MG/1
50000 CAPSULE, LIQUID FILLED ORAL
Qty: 6 CAPSULE | Refills: 1 | Status: SHIPPED | OUTPATIENT
Start: 2025-05-27

## 2025-05-27 NOTE — TELEPHONE ENCOUNTER
Calista Aleman please see patient mychart message requesting results of cardiac echo performed 5/23/25.   Please advise

## 2025-05-29 NOTE — TELEPHONE ENCOUNTER
Last right shoulder x-ray was completed on 4/12/2025    2D echo   EF 60-65% this is normal.   Wall motion normal  Grade 1 diastolic dysfunction   This is considered mild and a normal part of aging. It can also be caused by HTN and diabetes.

## 2025-05-29 NOTE — TELEPHONE ENCOUNTER
If right shoulder pain is not improving and she is requesting additional imaging she will need to schedule a follow up appt.     She did have an x-ray completed on 4/12/2025  Which showed mild osteoarthritis.

## 2025-06-17 ENCOUNTER — TELEPHONE (OUTPATIENT)
Dept: UROLOGY | Facility: HOSPITAL | Age: 65
End: 2025-06-17

## 2025-06-17 NOTE — TELEPHONE ENCOUNTER
Incoming TC from patient regarding billing/payment question. [employer] billing information provided to patient. Patient verbalizes understanding. Encouraged to call office with additional questions or concerns.

## 2025-06-25 ENCOUNTER — TELEPHONE (OUTPATIENT)
Dept: INTERNAL MEDICINE CLINIC | Facility: CLINIC | Age: 65
End: 2025-06-25

## 2025-06-25 NOTE — TELEPHONE ENCOUNTER
Attempted to reach patient to assist in scheduling retina scan in-office. Call went to [facility] for patient to call back and schedule retina scan. Stated so long as she doesn't have any contraindications, she can schedule. Phone number provided.

## 2025-07-01 ENCOUNTER — TELEPHONE (OUTPATIENT)
Dept: INTERNAL MEDICINE CLINIC | Facility: CLINIC | Age: 65
End: 2025-07-01

## 2025-07-01 DIAGNOSIS — E11.9 ENCOUNTER FOR DIABETES TYPE 2 EYE EXAM (HCC): Primary | ICD-10-CM

## 2025-07-01 DIAGNOSIS — Z01.00 ENCOUNTER FOR DIABETES TYPE 2 EYE EXAM (HCC): Primary | ICD-10-CM

## 2025-07-01 NOTE — TELEPHONE ENCOUNTER
Order for DM eye exam entered . Will close this encounter. Other TE already sent to call center to schedule Pt .

## 2025-07-01 NOTE — TELEPHONE ENCOUNTER
Patient calling received message that she is due for her   Retinal Eye Exam     Please link referral so appointment can be scheduled

## 2025-07-09 ENCOUNTER — NURSE ONLY (OUTPATIENT)
Dept: INTERNAL MEDICINE CLINIC | Facility: CLINIC | Age: 65
End: 2025-07-09
Payer: COMMERCIAL

## 2025-07-09 DIAGNOSIS — Z01.00 ENCOUNTER FOR DIABETES TYPE 2 EYE EXAM (HCC): ICD-10-CM

## 2025-07-09 DIAGNOSIS — E11.9 ENCOUNTER FOR DIABETES TYPE 2 EYE EXAM (HCC): ICD-10-CM

## 2025-07-09 PROCEDURE — 92229 IMG RTA DETC/MNTR DS POC ALY: CPT | Performed by: INTERNAL MEDICINE

## 2025-07-09 PROCEDURE — 2033F EYE IMG VALID W/O RTNOPTHY: CPT | Performed by: INTERNAL MEDICINE

## 2025-07-09 NOTE — PROGRESS NOTES
Pt. Came in for a Diabetic eye exam. Name/ verified. Informed pt will still need to see ophthalmologist after today's visit, pt understood. Pt also denied floaters, blurry vision and any recent eye surgeries. No diabetic retinopathy detected, can retest in 12 months.

## 2025-07-14 DIAGNOSIS — E11.9 TYPE 2 DIABETES MELLITUS WITHOUT COMPLICATION, WITHOUT LONG-TERM CURRENT USE OF INSULIN (HCC): ICD-10-CM

## 2025-07-14 RX ORDER — BLOOD SUGAR DIAGNOSTIC
1 STRIP MISCELLANEOUS 3 TIMES DAILY
Qty: 300 STRIP | Refills: 1 | Status: SHIPPED | OUTPATIENT
Start: 2025-07-14

## 2025-07-14 NOTE — TELEPHONE ENCOUNTER
Endocrine Refill protocol for Glucose testing supplies     Protocol Criteria: FAILED Reason: No Visit in required time frame mcm sent    If below requirement is met, send a 90-day supply with 1 refill per provider protocol.    Verify appointment with Endocrinology completed in the last 6 months or scheduled in the next 3 months.    Last completed office visit:11/13/2024 Beth Correa APRN   Last completed telemed visit: Visit date not found  No future appt mcm sent

## 2025-07-21 ENCOUNTER — NURSE TRIAGE (OUTPATIENT)
Dept: INTERNAL MEDICINE CLINIC | Facility: CLINIC | Age: 65
End: 2025-07-21

## 2025-07-21 NOTE — TELEPHONE ENCOUNTER
Action Requested: Summary for Provider     []  Critical Lab, Recommendations Needed  [x] Need Additional Advice  []   FYI    []   Need Orders  [] Need Medications Sent to Pharmacy  []  Other     SUMMARY: Per protocol, patient should be seen in the office within 3 days. Declines to see other providers, prefers to see PCP only.     Dr. Ruiz please advise if okay to use Res 24 on 7/29 at 1:40.     Reason for call: Appt Request and Swelling  Onset: 7/15    Patient reports of right arm bruising and swelling since last week after lifting a heavy suitcase. States she forgot that she sprained her right shoulder a few months ago and lifted a heavy suitcase last week using the right arm. Reports right is arm is black and blue, swollen but not painful. It has not gotten worse. She is able to move her arm slowly, avoiding any heavy lifting.    States she is moving out of [place] so she has been busy. She wants to see PCP for followup on this and to also say goodbye. PCP is her doctor for 19 years. Care advice given per protocol. Advised that if symptoms worsen to call back and that she needs to be seen sooner. Patient verbalized understanding and agreed with plan of care.     Reason for Disposition   After 10 days and bruise not fading    Protocols used: Bruises-A-OH

## 2025-07-28 ENCOUNTER — TELEPHONE (OUTPATIENT)
Facility: LOCATION | Age: 65
End: 2025-07-28

## 2025-07-28 NOTE — TELEPHONE ENCOUNTER
Pt called stated she will be moving out of stated and she needs to know how to go about transferring care    Please call the pt back

## 2025-07-28 NOTE — TELEPHONE ENCOUNTER
Benedictor returned patient call. She informed writer that she will be moving to [place] once their condo here is sold. She would like to cancel her appointment for November with Dr. Roth and is requesting how to go about finding a new oncologist in [place]. Benedictor informed that our providers are not familiar/connected with any of the health care system in [place], therefore she would establish care with a PCP in her area and ask for a referral to an oncologist in his/her system or in the vicinity. Her new oncologist can reach out to our clinic if only the patient's most recent visit note is needed to be faxed to them. She is aware that if her complete oncology record is needed, then they will need to obtain them through the medical records department/[facility] . Laquita verbalized understanding and thanked  for the call back. Appointment for 11/12 canceled.

## 2025-07-29 ENCOUNTER — OFFICE VISIT (OUTPATIENT)
Dept: INTERNAL MEDICINE CLINIC | Facility: CLINIC | Age: 65
End: 2025-07-29
Payer: COMMERCIAL

## 2025-07-29 VITALS
SYSTOLIC BLOOD PRESSURE: 133 MMHG | DIASTOLIC BLOOD PRESSURE: 74 MMHG | HEART RATE: 88 BPM | TEMPERATURE: 98 F | WEIGHT: 147 LBS | HEIGHT: 61 IN | BODY MASS INDEX: 27.75 KG/M2

## 2025-07-29 DIAGNOSIS — E11.9 TYPE 2 DIABETES MELLITUS WITHOUT COMPLICATION, WITHOUT LONG-TERM CURRENT USE OF INSULIN (HCC): Primary | ICD-10-CM

## 2025-07-29 DIAGNOSIS — R26.9 GAIT ABNORMALITY: ICD-10-CM

## 2025-07-29 DIAGNOSIS — I10 ESSENTIAL HYPERTENSION: ICD-10-CM

## 2025-07-29 DIAGNOSIS — E78.5 HYPERLIPIDEMIA, UNSPECIFIED HYPERLIPIDEMIA TYPE: ICD-10-CM

## 2025-07-29 DIAGNOSIS — Z85.3 HISTORY OF BREAST CANCER: ICD-10-CM

## 2025-07-29 PROCEDURE — 1159F MED LIST DOCD IN RCRD: CPT | Performed by: INTERNAL MEDICINE

## 2025-07-29 PROCEDURE — 3075F SYST BP GE 130 - 139MM HG: CPT | Performed by: INTERNAL MEDICINE

## 2025-07-29 PROCEDURE — 1126F AMNT PAIN NOTED NONE PRSNT: CPT | Performed by: INTERNAL MEDICINE

## 2025-07-29 PROCEDURE — 3008F BODY MASS INDEX DOCD: CPT | Performed by: INTERNAL MEDICINE

## 2025-07-29 PROCEDURE — 99214 OFFICE O/P EST MOD 30 MIN: CPT | Performed by: INTERNAL MEDICINE

## 2025-07-29 PROCEDURE — 3078F DIAST BP <80 MM HG: CPT | Performed by: INTERNAL MEDICINE

## 2025-08-01 ENCOUNTER — TELEPHONE (OUTPATIENT)
Dept: INTERNAL MEDICINE CLINIC | Facility: CLINIC | Age: 65
End: 2025-08-01

## 2025-08-01 DIAGNOSIS — E11.9 TYPE 2 DIABETES MELLITUS WITHOUT COMPLICATION, WITHOUT LONG-TERM CURRENT USE OF INSULIN (HCC): Primary | ICD-10-CM

## 2025-08-01 RX ORDER — AVOBENZONE, HOMOSALATE, OCTISALATE, OCTOCRYLENE 30; 40; 45; 26 MG/ML; MG/ML; MG/ML; MG/ML
1 CREAM TOPICAL 3 TIMES DAILY
Qty: 300 EACH | Refills: 1 | Status: SHIPPED | OUTPATIENT
Start: 2025-08-01

## 2025-08-01 RX ORDER — BLOOD SUGAR DIAGNOSTIC
1 STRIP MISCELLANEOUS 3 TIMES DAILY
Qty: 300 STRIP | Refills: 1 | Status: SHIPPED | OUTPATIENT
Start: 2025-08-01 | End: 2025-08-01

## 2025-08-01 RX ORDER — BLOOD SUGAR DIAGNOSTIC
1 STRIP MISCELLANEOUS 3 TIMES DAILY
Qty: 300 STRIP | Refills: 1 | Status: SHIPPED | OUTPATIENT
Start: 2025-08-01

## 2025-08-01 RX ORDER — AVOBENZONE, HOMOSALATE, OCTISALATE, OCTOCRYLENE 30; 40; 45; 26 MG/ML; MG/ML; MG/ML; MG/ML
1 CREAM TOPICAL 3 TIMES DAILY
Qty: 300 EACH | Refills: 1 | Status: SHIPPED | OUTPATIENT
Start: 2025-08-01 | End: 2025-08-01

## 2025-08-06 ENCOUNTER — TELEPHONE (OUTPATIENT)
Dept: INTERNAL MEDICINE CLINIC | Facility: CLINIC | Age: 65
End: 2025-08-06

## 2025-08-06 RX ORDER — ERGOCALCIFEROL 1.25 MG/1
50000 CAPSULE, LIQUID FILLED ORAL
Qty: 6 CAPSULE | Refills: 3 | Status: SHIPPED | OUTPATIENT
Start: 2025-08-06

## 2025-08-06 RX ORDER — BLOOD SUGAR DIAGNOSTIC
STRIP MISCELLANEOUS
Qty: 300 EACH | Refills: 3 | Status: SHIPPED | OUTPATIENT
Start: 2025-08-06

## 2025-08-06 RX ORDER — LANCETS
EACH MISCELLANEOUS
Qty: 300 EACH | Refills: 3 | Status: SHIPPED | OUTPATIENT
Start: 2025-08-06

## 2025-08-06 RX ORDER — BLOOD-GLUCOSE METER
1 EACH MISCELLANEOUS AS DIRECTED
Qty: 1 KIT | Refills: 0 | Status: SHIPPED | OUTPATIENT
Start: 2025-08-06

## 2025-08-11 ENCOUNTER — PATIENT MESSAGE (OUTPATIENT)
Dept: INTERNAL MEDICINE CLINIC | Facility: CLINIC | Age: 65
End: 2025-08-11

## 2025-08-11 DIAGNOSIS — H93.8X3 EAR PRESSURE, BILATERAL: ICD-10-CM

## 2025-08-12 RX ORDER — LORATADINE 10 MG/1
10 CAPSULE, LIQUID FILLED ORAL DAILY
Qty: 90 CAPSULE | Refills: 3 | Status: SHIPPED | OUTPATIENT
Start: 2025-08-12 | End: 2025-09-11

## 2025-08-12 RX ORDER — LORATADINE 10 MG/1
10 TABLET ORAL DAILY
Qty: 90 TABLET | Refills: 3 | Status: CANCELLED
Start: 2025-08-12

## 2025-08-14 RX ORDER — ERGOCALCIFEROL 1.25 MG/1
50000 CAPSULE, LIQUID FILLED ORAL
Qty: 6 CAPSULE | Refills: 3 | OUTPATIENT
Start: 2025-08-14

## 2025-08-27 DIAGNOSIS — E11.9 TYPE 2 DIABETES MELLITUS WITHOUT COMPLICATION, WITHOUT LONG-TERM CURRENT USE OF INSULIN (HCC): Primary | ICD-10-CM

## 2025-08-27 RX ORDER — METFORMIN HYDROCHLORIDE 500 MG/1
TABLET, EXTENDED RELEASE ORAL
Qty: 270 TABLET | Refills: 1 | Status: SHIPPED | OUTPATIENT
Start: 2025-08-27

## 2025-11-12 ENCOUNTER — APPOINTMENT (OUTPATIENT)
Facility: LOCATION | Age: 65
End: 2025-11-12
Attending: INTERNAL MEDICINE

## (undated) DIAGNOSIS — Z12.31 ENCOUNTER FOR MAMMOGRAM TO ESTABLISH BASELINE MAMMOGRAM: Primary | ICD-10-CM

## (undated) DEVICE — SOL  .9 1000ML BTL

## (undated) DEVICE — SOL  .9 1000ML BAG

## (undated) DEVICE — SUTURE VICRYL 2-0 CT-1

## (undated) DEVICE — ELECTRO LUBE IS A SINGLE PATIENT USE DEVICE THAT IS INTENDED TO BE USED ON ELECTROSURGICAL ELECTRODES TO REDUCE STICKING.: Brand: KEY SURGICAL ELECTRO LUBE

## (undated) DEVICE — ENCORE® LATEX ACCLAIM SIZE 8, STERILE LATEX POWDER-FREE SURGICAL GLOVE: Brand: ENCORE

## (undated) DEVICE — LUBRICANT JLY SURGILUBE 2OZ

## (undated) DEVICE — CHLORAPREP 26ML APPLICATOR

## (undated) DEVICE — PAD SACRAL SPAN AID

## (undated) DEVICE — PERINEAL POST PAD 1

## (undated) DEVICE — TIP COVER ACCESSORY

## (undated) DEVICE — Device: Brand: DEFENDO AIR/WATER/SUCTION AND BIOPSY VALVE

## (undated) DEVICE — INSUFFLATION NEEDLE TO ESTABLISH PNEUMOPERITONEUM.: Brand: INSUFFLATION NEEDLE

## (undated) DEVICE — SUCTION CANISTER, 3000CC,SAFELINER: Brand: DEROYAL

## (undated) DEVICE — SUTURE VICRYL 0 CT-1

## (undated) DEVICE — SOL H2O IV

## (undated) DEVICE — PLUMEPORT ACTIV LAPAROSCOPIC SMOKE FILTRATION DEVICE: Brand: PLUMEPORT ACTIVE

## (undated) DEVICE — Device

## (undated) DEVICE — SOLUTION IRRIG 1000ML 0.9% NACL USP BTL

## (undated) DEVICE — ELECTRODE ES 2.75IN PTFE BLDE MOD E-Z CLN

## (undated) DEVICE — MATERNITY KNIT PANTS,SEAMLESS: Brand: WINGS

## (undated) DEVICE — PAD ALC 43.5X24IN PREP  LF

## (undated) DEVICE — VIOLET BRAIDED (POLYGLACTIN 910), SYNTHETIC ABSORBABLE SUTURE: Brand: COATED VICRYL

## (undated) DEVICE — MEDI-VAC YANKAUER SUCTION HANDLE W/BULBOUS TIP: Brand: CARDINAL HEALTH

## (undated) DEVICE — TROCAR: Brand: KII FIOS FIRST ENTRY

## (undated) DEVICE — BLADELESS OBTURATOR: Brand: WECK VISTA

## (undated) DEVICE — EXOFIN TISSUE ADHESIVE 1.0ML

## (undated) DEVICE — STERILE LATEX POWDER-FREE SURGICAL GLOVESWITH NITRILE COATING: Brand: PROTEXIS

## (undated) DEVICE — SUTURE VICRYL 0

## (undated) DEVICE — ANTERIOR HIP: Brand: MEDLINE INDUSTRIES, INC.

## (undated) DEVICE — Device: Brand: STABLECUT®

## (undated) DEVICE — TRAY SKIN PREP PVP-1

## (undated) DEVICE — PACK,UNIVERSAL,SPLIT,II: Brand: MEDLINE

## (undated) DEVICE — DRAPE TAPE: Brand: CONVERTORS

## (undated) DEVICE — CLIP LIG M BLU TI HRT SHP WIRE HORZ

## (undated) DEVICE — MEDI-VAC NON-CONDUCTIVE SUCTION TUBING: Brand: CARDINAL HEALTH

## (undated) DEVICE — ADHESIVE LIQ 2/3ML VI MASTISOL

## (undated) DEVICE — ARM DRAPE

## (undated) DEVICE — SOLO FLEX HYBRID GUIDEWIRE .03

## (undated) DEVICE — SPONGE LAP 18X18 XRAY STRL

## (undated) DEVICE — STERILE POLYISOPRENE POWDER-FREE SURGICAL GLOVES WITH EMOLLIENT COATING: Brand: PROTEXIS

## (undated) DEVICE — SUTURE MONOCRYL 3-0 PS-2

## (undated) DEVICE — FLOSEAL HEMOSTATIC MATRIX, 5ML: Brand: FLOSEAL HEMOSTATIC MATRIX

## (undated) DEVICE — BATTERY

## (undated) DEVICE — GOWN,SIRUS,FABRIC-REINFORCED,X-LARGE: Brand: MEDLINE

## (undated) DEVICE — 3M™ STERI-STRIP™ REINFORCED ADHESIVE SKIN CLOSURES, R1547, 1/2 IN X 4 IN (12 MM X 100 MM), 6 STRIPS/ENVELOPE: Brand: 3M™ STERI-STRIP™

## (undated) DEVICE — SUTURE VICRYL 0 J906G

## (undated) DEVICE — SUTURE GORETEX CV-2 2U05A

## (undated) DEVICE — DERMABOND LIQUID ADHESIVE

## (undated) DEVICE — CATH URET CONE TIP 8FR 138008

## (undated) DEVICE — ENDOSCOPY PACK - LOWER: Brand: MEDLINE INDUSTRIES, INC.

## (undated) DEVICE — SUT PDS II 3-0 18IN ABSRB UD L24MM PS-1

## (undated) DEVICE — CYSTO PACK: Brand: MEDLINE INDUSTRIES, INC.

## (undated) DEVICE — 1010 S-DRAPE TOWEL DRAPE 10/BX: Brand: STERI-DRAPE™

## (undated) DEVICE — ANTIBACTERIAL UNDYED BRAIDED (POLYGLACTIN 910), SYNTHETIC ABSORBABLE SUTURE: Brand: COATED VICRYL

## (undated) DEVICE — KIT DRN 1/8IN PVC 3 SPRG EVAC

## (undated) DEVICE — SUTURE ETHILON 3-0 669H

## (undated) DEVICE — GYN LAP/ROBOTIC: Brand: MEDLINE INDUSTRIES, INC.

## (undated) DEVICE — SCD SLEEVE KNEE HI BLEND

## (undated) DEVICE — PAD ULNAR NERVE PROTECTOR

## (undated) DEVICE — UROLOGY DRAIN BAG STERILE

## (undated) DEVICE — CLIP SUR SM TI HRT SHP WIRE HORZ LIG SYS

## (undated) DEVICE — SPONGE LAP 4X18 XRAY STRL

## (undated) DEVICE — BIPOLAR SEALER 23-112-1 AQM 6.0: Brand: AQUAMANTYS™

## (undated) DEVICE — DRAPE SHEET LG

## (undated) DEVICE — CANNULA SEAL

## (undated) DEVICE — SUT PERMA- 2-0 18IN FS NABSRB BLK 26MM 3/8

## (undated) DEVICE — WEBRIL COTTON UNDERCAST PADDING: Brand: WEBRIL

## (undated) DEVICE — HOOD: Brand: FLYTE

## (undated) DEVICE — VISUALIZATION SYSTEM: Brand: CLEARIFY

## (undated) DEVICE — YANKAUER,FLEXIBLE HANDLE,REGLR CAPACITY: Brand: MEDLINE INDUSTRIES, INC.

## (undated) DEVICE — SUT MCRYL 4-0 18IN PS-2 ABSRB UD 19MM 3/8 CIR

## (undated) DEVICE — DRAPE,UNDERBUTTOCKS,STERILE: Brand: MEDLINE

## (undated) DEVICE — STANDARD HYPODERMIC NEEDLE,POLYPROPYLENE HUB: Brand: MONOJECT

## (undated) DEVICE — COLUMN DRAPE

## (undated) DEVICE — SUTURE VICRYL 0 CP-1

## (undated) DEVICE — ISOVUE 300 10X100ML VIAL

## (undated) DEVICE — Device: Brand: JELCO

## (undated) DEVICE — PAD,ABDOMINAL,8"X7.5",STERILE,LF,1/PK: Brand: MEDLINE

## (undated) DEVICE — LIGHT HANDLE

## (undated) DEVICE — MINOR GENERAL: Brand: MEDLINE INDUSTRIES, INC.

## (undated) DEVICE — LONE STAR 5MM HOOKS LRG STAYS

## (undated) DEVICE — [HIGH FLOW INSUFFLATOR,  DO NOT USE IF PACKAGE IS DAMAGED,  KEEP DRY,  KEEP AWAY FROM SUNLIGHT,  PROTECT FROM HEAT AND RADIOACTIVE SOURCES.]: Brand: PNEUMOSURE

## (undated) DEVICE — 40580 - THE PINK PAD - ADVANCED TRENDELENBURG POSITIONING KIT: Brand: 40580 - THE PINK PAD - ADVANCED TRENDELENBURG POSITIONING KIT

## (undated) DEVICE — PICO SINGLE USE NEGATIVE PRESSURE WOUND THERAPY SYSTEM 10CM X 30CM 4IN. X 12IN.: Brand: PICO

## (undated) DEVICE — DRAPE SRG 26X15IN UTL TPE STRL

## (undated) DEVICE — 12 ML SYRINGE LUER-LOCK TIP: Brand: MONOJECT

## (undated) DEVICE — SOL H2O 3000ML IRRIG

## (undated) DEVICE — BRA SURG ELIZ PINK M

## (undated) DEVICE — STERILE POLYISOPRENE POWDER-FREE SURGICAL GLOVES: Brand: PROTEXIS

## (undated) DEVICE — GAMMEX® PI HYBRID SIZE 6.5, STERILE POWDER-FREE SURGICAL GLOVE, POLYISOPRENE AND NEOPRENE BLEND: Brand: GAMMEX

## (undated) DEVICE — SUTURE VICRYL 1 CT-1

## (undated) DEVICE — ADHESIVE MASTISOL 2/3CC VL

## (undated) DEVICE — DRAPE SRG 131X112X63IN GYN URO

## (undated) DEVICE — SOL H2O 1000ML BTL

## (undated) DEVICE — ENCORE® LATEX ACCLAIM SIZE 6.5, STERILE LATEX POWDER-FREE SURGICAL GLOVE: Brand: ENCORE

## (undated) DEVICE — TUBING CYSTO TUR DUAL

## (undated) DEVICE — PROXIMATE SKIN STAPLERS (35 WIDE) CONTAINS 35 STAINLESS STEEL STAPLES (FIXED HEAD): Brand: PROXIMATE

## (undated) NOTE — LETTER
11/19/2020          To Whom It May Concern:    Karen Verma is currently under my medical care and may not return to work at this time . Please excuse Benson Kemp for one month .  She will see the Dr for work release       If you require additional informat

## (undated) NOTE — Clinical Note
TCM RN outreach complete.  TE routed to Call center for assitance as TCM slots conflict w infusion schedule

## (undated) NOTE — LETTER
[facility]  Administration of Anesthesia  1. I, Vanessa Jones, or _________________________________ acting on her behalf, (Patient) (Dependent/Representative) request to receive anesthesia for my pending procedure/operation/treatment.   A infections, high spinal block, spinal bleeding, seizure, cardiac arrest and death. 7. AWARENESS: I understand that it is possible (but unlikely) to have explicit memory of events from the operating room while under general anesthesia.   8. ELECTROCONVULSIV unconscious pt /Relationship    My signature below affirms that prior to the time of the procedure, I have explained to the patient and/or his/her guardian, the risks and benefits of undergoing anesthesia, as well as any reasonable alternatives.     _______

## (undated) NOTE — LETTER
11/7/2017          To Whom It May Concern:    Joan Benton is currently under my medical care and will be receiving daily infusions for the next 10 days. Please excuse Smitha Castano to leave work in order to receive her infusions.   If you require additional in

## (undated) NOTE — LETTER
1/20/2021          To Whom It May Concern:     Maik Mora is currently under my medical care and may not return to work due to lower extremities weakness until further notice from Dr Stephen Mendoza.      If you require additional information please contact our o

## (undated) NOTE — LETTER
[address]  [facility]: [phone]   Consent to Procedure/Sedation    Date: __8/30/2021_____    Time: ___7:31 AM ___    1.  I authorize the performance upon [place] the follo Patient:  ___________________________    Signature of person authorized to consent for patient: Relationship to patient:  ___________________________    ___________________    Witness: ____________________     Date: ______________    Printed: 8/30/2021   7

## (undated) NOTE — LETTER
9/4/2019          To Whom It May Concern:    Aislinn Chandler is currently under my medical care and may not return to work at this time. Please excuse Thalia Chowdhury for 3 days. She may return to work on 09-25-19.   Activity is restricted as follows: no lifting

## (undated) NOTE — ED AVS SNAPSHOT
Teddy Marin   MRN: [id number]    Department:  [facility] Emergency Department   Date of Visit:  10/27/2018           Disclosure     Insurance plans vary and the physician(s) referred by the ER may not be covered by your plan.  Please contact CARE PHYSICIAN AT ONCE OR RETURN IMMEDIATELY TO THE EMERGENCY DEPARTMENT. If you have been prescribed any medication(s), please fill your prescription right away and begin taking the medication(s) as directed.   If you believe that any of the medications

## (undated) NOTE — LETTER
06/25/25        Hello,    This is the [facility], office of Dr. Erickson Ruiz    Thank you for putting your trust in [facility].  Our goal is to deliver the highest quality healthcare and an exceptional patient experience.  Upon reviewing of your medical record shows you are due for the following:              Blood pressure follow up  Diabetic A1C follow up          Please call [phone] to schedule your appointment or schedule online via [employer].    If you changed to a new provider at another facility, please notify the clinic to update your records.    If you had any recent testing at another facility, please have your results faxed to our office at [phone].      Thank you and have a great day!

## (undated) NOTE — LETTER
[address] [place] [address] Rd, [place], [place]     AUTHORIZATION FOR SURGICAL OPERATION OR PROCEDURE    I hereby authorize Dr. Asiya Kimble DO, my Physician(s) and whomever may be designated as the doctor's Assistant, to perform the fo transfusion, I will discuss this with my Physician. 4. I consent to the photographing of procedure(s) to be performed for the purposes of advancing medicine, science and/or education, provided my identity is not revealed.  If the procedure has been videota ____________________________  (Witness signature)                                                                                                  (Date)                                (Time)  STATEMENT OF PHYSICIAN My signature below affirms that prior to

## (undated) NOTE — LETTER
12/28/2021              Joycelyn Aguilar        [address]        [facility] [address]                     To Whom It May Concern,     Joycelyn Aguilar (7/10/1960) is under my medical care.   She was seen in the office today  for Pre Oper

## (undated) NOTE — LETTER
Marissa Lowery, [address]       11/07/17        Patient: Freddy Head   YOB: 1960   Date of Visit: 11/7/2017       Dear  Dr. Douglas Marquez MD,      Thank you for referring Freddy Head to

## (undated) NOTE — MR AVS SNAPSHOT
[facility]  [address]  [phone]               Thank you for choosing us for your health care visit with Cecil Naqvi MD.  We are glad to serve you and happy to provide you with this serrano Summaries. If you've been to the Emergency Department or your doctor's office, you can view your past visit information in [employer] by going to Visits < Visit Summaries. [employer] questions? Call [phone] for help.   [employer] is NOT to be used for urge

## (undated) NOTE — LETTER
12/17/2020          To Whom It May Concern:    Teddy Marin is currently under my medical care and may not return to work at this time. Please excuse Mary Ellen Lepe until she is reevaluated in my office.    If you require additional information please contact

## (undated) NOTE — LETTER
9/5/2019              Parrish Duggan        [address]        [facility] [address]         To Whom It May Concern,      To Whom It May Concern:     Parrish Duggan is currently under my medical care and may not return to work at this time

## (undated) NOTE — LETTER
[address] [place] [address] Rd, [place], [place]     AUTHORIZATION FOR SURGICAL OPERATION OR PROCEDURE    I hereby authorize Dr. Whit Vaz MD , my Physician(s) and whomever may be designated as the doctor's Assistant, to perform the foll 4. I consent to the photographing of procedure(s) to be performed for the purposes of advancing medicine, science and/or education, provided my identity is not revealed.  If the procedure has been videotaped, the physician/surgeon will obtain the original v (Witness signature)                                                                                                  (Date)                                (Time)  STATEMENT OF PHYSICIAN My signature below affirms that prior to the time of the procedure;  I

## (undated) NOTE — LETTER
11/19/2020              Joan Benton        [address] [address]         Dear Smitha Castano,      Sincerely,    Jessica Cisneros MD  [address] [facility], [address]

## (undated) NOTE — LETTER
MAJOR CASE PREOPERATIVE INSTRUCTIONS    6/18/2019      Dear Susannah Barrow are having a total vaginal hysterectomy with bilateral salpingo oophorectomy on 7/22/19 at 1pm.    Do not eat or drink anything (including water) after midnight the night before suleman

## (undated) NOTE — LETTER
[facility]  [address] [place] [address], [address] [place] [address]  Office: [phone]  Fax: [phone]        Date:  6/14/2019     Patient:  Chip Cee         To Whom it may concern:     This letter has been written at the pa

## (undated) NOTE — LETTER
[facility] MAMMOGRAPHY  [address] [facility] [address]  Authorization for Imaging Procedure  Date of Procedure: 9/23/2024    I hereby authorize Dr. COFFEY , my physician and his/her assistants (if applicable), which may include medical students, residents, and/or fellows, to perform the following procedure and administer such anesthesia as may be determined necessary by my physician: MAMMOGRAPHY GUIDED LEFT BREAST WIRE LOCALIZATION BRACKETED  on Laquita Norris.   2.  I recognize that during the procedure, unforeseen conditions may necessitate additional or different procedures than those listed above. I, therefore, further authorize and request that the above-named physician, assistants, or designees perform such procedures as are, in their judgment, necessary and desirable.    3.  My physician has discussed prior to my procedure the potential benefits, risks and side effects of this procedure; the likelihood of achieving goals; and potential problems that might occur during recuperation. They also discussed reasonable alternatives to the procedure, including risks, benefits, and side effects related to the alternatives and risks related to not receiving this procedure. I have had all my questions answered and I acknowledge that no guarantee has been made as to the result that may be obtained.    4.  Should the need arise during my procedure, which includes change of level of care prior to discharge, I also consent to the administration of blood and/or blood products. Further, I understand that despite careful testing and screening of blood or blood products by collecting agencies, I may still be subject to ill effects as a result of receiving a blood transfusion and/or blood products. The following are some, but not all, of the potential risks that can occur: fever and allergic reactions, hemolytic reactions, transmission of diseases such as Hepatitis, AIDS and Cytomegalovirus (CMV)  and fluid overload. In the event that I wish to have an autologous transfusion of my own blood, or a directed donor transfusion, I will discuss this with my physician.  Check only if Refusing Blood or Blood Products  I understand refusal of blood or blood products as deemed necessary by my physician may have serious consequences to my condition to include possible death. I hereby assume responsibility for my refusal and release the hospital, its personnel, and my physicians from any responsibility for the consequences of my refusal.   [  ] Patient Refuses Blood      5.  I authorize the use of any specimen, organs, tissues, body parts or foreign objects that may be removed from my body during the procedure for diagnosis, research or teaching purposes and their subsequent disposal by hospital authorities. I also authorize the release of specimen test results and/or written reports to my treating physician on the hospital medical staff or other referring or consulting physicians involved in my care, at the discretion of the Pathologist or my treating physician.    6.  I consent to the photographing or videotaping of the procedures to be performed, including appropriate portions of my body for medical, scientific, or educational purposes, provided my identity is not revealed by the pictures or by descriptive texts accompanying them. If the procedure has been photographed/videotaped, the physician will obtain the original picture, image, videotape or CD. The hospital will not be responsible for storage, release or maintenance of the picture, image, tape or CD.   7.  I consent to the presence of a  or observers in the operating room as deemed necessary by my physician or their designees.    8.  I recognize that in the event my procedure results in extended X-Ray/fluoroscopy time, I may develop a skin reaction.    9.  If I have a Do Not Attempt Resuscitation (DNAR) order in place, that status will be  suspended while in the operating room, procedural suite, and during the recovery period unless otherwise explicitly stated by me (or a person authorized to consent on my behalf). The performing physician or my attending physician will determine when the applicable recovery period ends for purposes of reinstating the DNAR order.  10.  I acknowledge that my physician has explained sedation/analgesia administration to me including the risk and benefits I consent to the administration of sedation/analgesia as may be necessary or desirable in the judgment of my physician.      I CERTIFY THAT I HAVE READ AND FULLY UNDERSTAND THE ABOVE CONSENT FOR THE PROCEDURE.   Signature of Patient: _____________________________________________________________  Responsible person in case of minor, unconscious: ____________________________________  Relationship to patient:  __________________________________________________________  Signature of Witness: _______________________________Date: _________Time: __________    Statement of Physician: My signature below affirms that prior to the time of the procedure, I have explained to the patient and/or her guardian, the risks and benefits involved in the proposed treatment and any reasonable alternative to the proposed treatment. I have also explained the risks and benefits involved in the refusal of the proposed treatment and have answered the patient's questions. If I have a significant financial interest in a co-management agreement or a significant financial interest in any product or implant, or other significant relationship used in the procedure/surgery, I have disclosed this and had a discussion with my patient.  Signature of Physician:   _________________________________Date:_____________Time:________    Patient Name: Laquita Myrna : 7/10/1960  Printed: 2024   Medical Record #: [id number]

## (undated) NOTE — LETTER
8/5/2020              Ev Voss        [address]         To Whom It May Concern,    Ev Voss is currently under my care. She is scheduled for surgery on 8/17/2020.   Her anticipated time off of w

## (undated) NOTE — LETTER
9/19/2024          To Whom It May Concern:    Laquita Norris's MRI overall was unremarkable. I have no specific other recommendations for evaluation/management prior to her planned surgery for breast cancer and can proceed as scheduled.         Sincerely,    Heath Rosa MD

## (undated) NOTE — LETTER
[facility]  [address] [facility] [address] [facility] [address]  [phone]  Authorization for Imaging Procedure    I hereby authorize Dr. KARINA GARCIA, my physician and his/her assistants (if applicable), which may include medical students, residents, and/or fellows, to perform the following procedure and administer such anesthesia as may be determined necessary by my physician:  STEROTACTIC GUIDED BIOPSY WITH TOMOSYNTHESIS OF THE LEFT BREAST WITH CLIP PLACEMENT on Laquita Myrna.   2.  I recognize that during the procedure, unforeseen conditions may necessitate additional or different procedures than those listed above. I, therefore, further authorize and request that the above-named physician, assistants, or designees perform such procedures as are, in their judgment, necessary and desirable.    3.  My physician has discussed prior to my procedure the potential benefits, risks and side effects of this procedure; the likelihood of achieving goals; and potential problems that might occur during recuperation. They also discussed reasonable alternatives to the procedure, including risks, benefits, and side effects related to the alternatives and risks related to not receiving this procedure. I have had all my questions answered and I acknowledge that no guarantee has been made as to the result that may be obtained.    4.  Should the need arise during my procedure, which includes change of level of care prior to discharge, I also consent to the administration of blood and/or blood products. Further, I understand that despite careful testing and screening of blood or blood products by collecting agencies, I may still be subject to ill effects as a result of receiving a blood transfusion and/or blood products. The following are some, but not all, of the potential risks that can occur: fever and allergic reactions, hemolytic reactions, transmission of diseases such as Hepatitis,  AIDS and Cytomegalovirus (CMV) and fluid overload. In the event that I wish to have an autologous transfusion of my own blood, or a directed donor transfusion, I will discuss this with my physician.  Check only if Refusing Blood or Blood Products  I understand refusal of blood or blood products as deemed necessary by my physician may have serious consequences to my condition to include possible death. I hereby assume responsibility for my refusal and release the hospital, its personnel, and my physicians from any responsibility for the consequences of my refusal.   [  ] Patient Refuses Blood      5.  I authorize the use of any specimen, organs, tissues, body parts or foreign objects that may be removed from my body during the procedure for diagnosis, research or teaching purposes and their subsequent disposal by hospital authorities. I also authorize the release of specimen test results and/or written reports to my treating physician on the hospital medical staff or other referring or consulting physicians involved in my care, at the discretion of the Pathologist or my treating physician.    6.  I consent to the photographing or videotaping of the procedures to be performed, including appropriate portions of my body for medical, scientific, or educational purposes, provided my identity is not revealed by the pictures or by descriptive texts accompanying them. If the procedure has been photographed/videotaped, the physician will obtain the original picture, image, videotape or CD. The hospital will not be responsible for storage, release or maintenance of the picture, image, tape or CD.   7.  I consent to the presence of a  or observers in the operating room as deemed necessary by my physician or their designees.    8.  I recognize that in the event my procedure results in extended X-Ray/fluoroscopy time, I may develop a skin reaction.    9.  If I have a Do Not Attempt Resuscitation (DNAR) order in  place, that status will be suspended while in the operating room, procedural suite, and during the recovery period unless otherwise explicitly stated by me (or a person authorized to consent on my behalf). The performing physician or my attending physician will determine when the applicable recovery period ends for purposes of reinstating the DNAR order.  10.  I acknowledge that my physician has explained sedation/analgesia administration to me including the risk and benefits I consent to the administration of sedation/analgesia as may be necessary or desirable in the judgment of my physician.      I CERTIFY THAT I HAVE READ AND FULLY UNDERSTAND THE ABOVE CONSENT FOR THE PROCEDURE.   Signature of Patient: _____________________________________________________________  Responsible person in case of minor, unconscious: ____________________________________  Relationship to patient:  __________________________________________________________  Signature of Witness: _______________________________Date: _________Time: __________    Statement of Physician: My signature below affirms that prior to the time of the procedure, I have explained to the patient and/or her guardian, the risks and benefits involved in the proposed treatment and any reasonable alternative to the proposed treatment. I have also explained the risks and benefits involved in the refusal of the proposed treatment and have answered the patient's questions. If I have a significant financial interest in a co-management agreement or a significant financial interest in any product or implant, or other significant relationship used in the procedure/surgery, I have disclosed this and had a discussion with my patient.  Signature of Physician:   _________________________________Date:_____________Time:________    Patient Name: Laquita Norris : 7/10/1960  Printed: 2024   Medical Record #: [id number]

## (undated) NOTE — LETTER
[address] [address], [place], [place]  [address]  INFORMED CONSENT FOR TRANSFUSION OF BLOOD OR BLOOD PRODUCTS  My physician has informed me of the nature, purpose, benefits and risks of transfusion for blood and blood components that ______________________________________________  (Signature of Patient)                                                            (Responsible party in case of Minor,

## (undated) NOTE — LETTER
[address], [place]  Authorization for Invasive Procedures  1.  I hereby authorize Dr. Sergio Kitchen*** , my physician and whomever may be designated as the doctor's assistant, to perform the following operation and/or procedure:  *ZBIGNIEW performed for the purposes of advancing medicine, science, and/or education, provided my identity is not revealed. If the procedure has been videotaped, the physician/surgeon will obtain the original videotape.  The hospital will not be responsible for stor My signature below affirms that prior to the time of the procedure, I have explained to the patient and/or her legal representative, the risks and benefits involved in the proposed treatment and any reasonable alternative to the proposed treatment.  I have

## (undated) NOTE — Clinical Note
We talked. On Anastrozole for 7 days, no noted side effects yet. Bone density today. Sees you on 2/11. External PT order placed due to gait issues, concern for falls, will get closer to home. thanks

## (undated) NOTE — LETTER
11/5/2020          To Whom It May Concern:    Parrish Duggan is currently under my medical care and may not return to work at this time. Lena Maurice was seen in my office on October 7, 2020.  She has a follow up appointment on November 18, 2020, for re-evalua

## (undated) NOTE — ED AVS SNAPSHOT
Kathrine Carrel   MRN: [id number]    Department:  [facility]   Date of Visit:  4/28/2019           Disclosure     Insurance plans vary and the physician(s) referred by the ER may not be covered by your plan.  Please contact y CARE PHYSICIAN AT ONCE OR RETURN IMMEDIATELY TO THE EMERGENCY DEPARTMENT. If you have been prescribed any medication(s), please fill your prescription right away and begin taking the medication(s) as directed.   If you believe that any of the medications

## (undated) NOTE — LETTER
1/7/2021              Peggy Morley        [address]        [facility] [address]         To Whom It May Concern,    Peggy Morley is currently under my medical care and may not return to work at this time.     Please excuse Thais Emmanuel un

## (undated) NOTE — Clinical Note
Will work to coordinate surgery. I appreciate the opportunity to participate in her care.  Thanks, [employer]

## (undated) NOTE — LETTER
[facility]  Administration of Anesthesia  1. I, Vanessa Jones, or _________________________________ acting on her behalf, (Patient) (Dependent/Representative) request to receive anesthesia for my pending procedure/operation/treatment.   A infections, high spinal block, spinal bleeding, seizure, cardiac arrest and death. 7. AWARENESS: I understand that it is possible (but unlikely) to have explicit memory of events from the operating room while under general anesthesia.   8. ELECTROCONVULSIV unconscious pt /Relationship    My signature below affirms that prior to the time of the procedure, I have explained to the patient and/or his/her guardian, the risks and benefits of undergoing anesthesia, as well as any reasonable alternatives.     _______

## (undated) NOTE — Clinical Note
TCM outreach complete. Pt unable to schedule f/u at this time. Will call back after she checks her schedule. IF Pt calls back to schedule apt w in 14 day window you may proceed w TCM apt as outreach was complete.

## (undated) NOTE — LETTER
AUTHORIZATION FOR SURGICAL OPERATION OR OTHER PROCEDURE    1.  I hereby [facility] Tierney Mccullough, [facility] assigned to my case to perform the following operation and/or procedure at the [facility]:    _________________________EMBX______ Patient Name:  ______________________________________________________  (please print)      Patient signature:  ___________________________________________________             Relationship to Patient:           []  Parent    Responsible person

## (undated) NOTE — LETTER
[facility]  [address],[facility] [address], [address]  [place] [address]  [facility]: [phone]   Consent to Procedure/Sedation    Date: __6/10/2019_____    Time: ___10:25 AM ___    1.  I authorize the performance upon [place] the foll Signature of person authorized to consent for patient: Relationship to patient:  ___________________________    ___________________    Witness: ____________________     Date: ______________    Printed: 6/10/2019   10:25 AM    Patient Name: Parrish Duggan

## (undated) NOTE — LETTER
AUTHORIZATION FOR SURGICAL OPERATION OR OTHER PROCEDURE    1. I hereby authorize Dr. Verito Arnold, and [facility] staff assigned to my case to perform the following operation and/or procedure at the [facility]:    PESSARY RE-FITTING    2.   My Responsible person                          []  Spouse  In case of minor or                    [] Other  _____________   Incompetent name:  __________________________________________________                               (please print)      _____________

## (undated) NOTE — LETTER
05/22/19        [address]  [address] [place] [address]      Dear Sara Guan,    Our records indicate that you have outstanding lab work and or testing that was ordered for you and has not yet been completed:  Orders Placed This Encoun

## (undated) NOTE — LETTER
12/6/2023    Marco A Saha  [address] [facility] [address]    Subject: Diabetes and Traveling with diabetes oral medications/supplies    To Whom It May Concern:     Marco A Saha  is under my care for Diabetes and requires oral medication (Metformin and Glimepiride) for management of her diabetes. Low blood sugar is a possibility which would require treatment with glucose tablets, juice boxes or other acceptable substitutes in order to increase the blood sugar back up to a safe level. Several juice boxes must be available on the airplane and are only commercially available in 4 ounce size or larger. Therefore, it is essential that she have in possession at all times: glucose tablets, juice boxes or other acceptable substitutes in the event a low blood sugar occurs. In addition to  the above items, the patient must have at all times the following items for patient safety:  Lancet device, lancets, glucometer  Testing strips  Alcohol swabs  Glucose replacement (juice boxes or tabs) in the event of hypoglycemia (low blood sugar)   The above items cannot be stored in the checked luggage; they must be with the patient in carry-on luggage to avoid instability and access. If you have any questions regarding this patient and her medical care and requirements, please contact my office at [phone].   Sincerely,            CARRIE Mccoy

## (undated) NOTE — LETTER
12/28/2021              Joycelyn Aguilar        [address]        [facility] [address]     To Whom It May Concern,    Joycelyn Aguilar (7/10/1960) is under my medical care.   She was seen in the office today  for Pre Operative Clearance